# Patient Record
Sex: FEMALE | Race: WHITE | NOT HISPANIC OR LATINO | Employment: UNEMPLOYED | URBAN - METROPOLITAN AREA
[De-identification: names, ages, dates, MRNs, and addresses within clinical notes are randomized per-mention and may not be internally consistent; named-entity substitution may affect disease eponyms.]

---

## 2023-08-17 PROBLEM — N90.89 VULVAR LESION: Status: ACTIVE | Noted: 2023-08-17

## 2023-08-17 PROBLEM — K44.9 DIAPHRAGMATIC HERNIA WITHOUT OBSTRUCTION OR GANGRENE: Status: ACTIVE | Noted: 2023-08-17

## 2023-08-17 PROBLEM — R05.3 CHRONIC COUGH: Status: ACTIVE | Noted: 2023-08-17

## 2023-08-17 PROBLEM — R09.A2 GLOBUS PHARYNGEUS: Status: ACTIVE | Noted: 2023-08-17

## 2023-08-17 PROBLEM — D14.1 LARYNGEAL PAPILLOMA: Status: ACTIVE | Noted: 2023-08-17

## 2023-08-17 PROBLEM — K13.21 TONGUE LEUKOPLAKIA: Status: ACTIVE | Noted: 2023-08-17

## 2023-08-17 PROBLEM — J45.909 ASTHMA (HHS-HCC): Status: ACTIVE | Noted: 2023-08-17

## 2023-08-17 PROBLEM — K21.9 GASTROESOPHAGEAL REFLUX DISEASE: Status: ACTIVE | Noted: 2023-08-17

## 2023-08-17 PROBLEM — S02.5XXA CLOSED FRACTURE OF TOOTH: Status: ACTIVE | Noted: 2023-08-17

## 2023-08-17 PROBLEM — R49.0 HOARSENESS: Status: ACTIVE | Noted: 2023-08-17

## 2023-08-17 PROBLEM — C34.90 LUNG CANCER (MULTI): Status: ACTIVE | Noted: 2023-08-17

## 2023-08-17 PROBLEM — J44.9 CHRONIC OBSTRUCTIVE PULMONARY DISEASE (MULTI): Status: ACTIVE | Noted: 2023-08-17

## 2023-08-17 PROBLEM — K13.0 MUCOCELE OF LOWER LIP: Status: ACTIVE | Noted: 2023-08-17

## 2023-08-17 RX ORDER — ACETAMINOPHEN 500 MG
TABLET ORAL
COMMUNITY
End: 2024-01-08 | Stop reason: WASHOUT

## 2023-08-17 RX ORDER — BUDESONIDE AND FORMOTEROL FUMARATE DIHYDRATE 160; 4.5 UG/1; UG/1
AEROSOL RESPIRATORY (INHALATION)
COMMUNITY

## 2023-08-17 RX ORDER — ALBUTEROL SULFATE 90 UG/1
2 AEROSOL, METERED RESPIRATORY (INHALATION) 3 TIMES DAILY PRN
COMMUNITY

## 2023-08-17 RX ORDER — TIOTROPIUM BROMIDE INHALATION SPRAY 3.12 UG/1
SPRAY, METERED RESPIRATORY (INHALATION)
COMMUNITY
Start: 2022-01-11

## 2023-08-17 RX ORDER — IPRATROPIUM BROMIDE AND ALBUTEROL SULFATE 2.5; .5 MG/3ML; MG/3ML
SOLUTION RESPIRATORY (INHALATION)
COMMUNITY

## 2023-08-17 RX ORDER — BUPROPION HYDROCHLORIDE 300 MG/1
TABLET ORAL
COMMUNITY
End: 2024-06-06 | Stop reason: ALTCHOICE

## 2023-08-17 RX ORDER — AZELASTINE HCL 205.5 UG/1
SPRAY NASAL
COMMUNITY
End: 2023-08-17 | Stop reason: ENTERED-IN-ERROR

## 2023-08-17 RX ORDER — ALENDRONATE SODIUM 70 MG/1
70 TABLET ORAL
COMMUNITY

## 2023-08-17 RX ORDER — OMEPRAZOLE 40 MG/1
CAPSULE, DELAYED RELEASE ORAL
COMMUNITY
Start: 2022-03-29

## 2023-08-17 RX ORDER — GABAPENTIN 600 MG/1
1 TABLET ORAL 2 TIMES DAILY
COMMUNITY
Start: 2022-06-13

## 2023-08-17 RX ORDER — MONTELUKAST SODIUM 10 MG/1
TABLET ORAL
COMMUNITY

## 2023-08-17 RX ORDER — VENLAFAXINE HYDROCHLORIDE 150 MG/1
CAPSULE, EXTENDED RELEASE ORAL
COMMUNITY
Start: 2022-01-12 | End: 2024-06-06 | Stop reason: ALTCHOICE

## 2023-08-17 RX ORDER — IBUPROFEN 200 MG
TABLET ORAL
COMMUNITY

## 2023-08-17 RX ORDER — FLUTICASONE PROPIONATE 50 MCG
SPRAY, SUSPENSION (ML) NASAL
COMMUNITY
End: 2023-12-21 | Stop reason: ALTCHOICE

## 2023-08-30 ENCOUNTER — HOSPITAL ENCOUNTER (OUTPATIENT)
Dept: DATA CONVERSION | Facility: HOSPITAL | Age: 63
Discharge: HOME | End: 2023-08-30
Payer: COMMERCIAL

## 2023-08-30 DIAGNOSIS — C34.11 MALIGNANT NEOPLASM OF UPPER LOBE, RIGHT BRONCHUS OR LUNG (MULTI): ICD-10-CM

## 2023-08-30 DIAGNOSIS — C34.12 MALIGNANT NEOPLASM OF UPPER LOBE, LEFT BRONCHUS OR LUNG (MULTI): ICD-10-CM

## 2023-09-06 LAB
ALANINE AMINOTRANSFERASE (SGPT) (U/L) IN SER/PLAS: 15 U/L (ref 7–45)
ALBUMIN (G/DL) IN SER/PLAS: 4.3 G/DL (ref 3.4–5)
ALKALINE PHOSPHATASE (U/L) IN SER/PLAS: 81 U/L (ref 33–136)
AMYLASE (U/L) IN SER/PLAS: 34 U/L (ref 29–103)
ANION GAP IN SER/PLAS: 14 MMOL/L (ref 10–20)
ASPARTATE AMINOTRANSFERASE (SGOT) (U/L) IN SER/PLAS: 20 U/L (ref 9–39)
BASOPHILS (10*3/UL) IN BLOOD BY AUTOMATED COUNT: 0.08 X10E9/L (ref 0–0.1)
BASOPHILS/100 LEUKOCYTES IN BLOOD BY AUTOMATED COUNT: 1.1 % (ref 0–2)
BILIRUBIN TOTAL (MG/DL) IN SER/PLAS: 0.4 MG/DL (ref 0–1.2)
CALCIUM (MG/DL) IN SER/PLAS: 10 MG/DL (ref 8.6–10.6)
CARBON DIOXIDE, TOTAL (MMOL/L) IN SER/PLAS: 29 MMOL/L (ref 21–32)
CHLORIDE (MMOL/L) IN SER/PLAS: 100 MMOL/L (ref 98–107)
CREATININE (MG/DL) IN SER/PLAS: 0.93 MG/DL (ref 0.5–1.05)
EOSINOPHILS (10*3/UL) IN BLOOD BY AUTOMATED COUNT: 0.24 X10E9/L (ref 0–0.7)
EOSINOPHILS/100 LEUKOCYTES IN BLOOD BY AUTOMATED COUNT: 3.2 % (ref 0–6)
ERYTHROCYTE DISTRIBUTION WIDTH (RATIO) BY AUTOMATED COUNT: 16.6 % (ref 11.5–14.5)
ERYTHROCYTE MEAN CORPUSCULAR HEMOGLOBIN CONCENTRATION (G/DL) BY AUTOMATED: 31 G/DL (ref 32–36)
ERYTHROCYTE MEAN CORPUSCULAR VOLUME (FL) BY AUTOMATED COUNT: 93 FL (ref 80–100)
ERYTHROCYTES (10*6/UL) IN BLOOD BY AUTOMATED COUNT: 4.46 X10E12/L (ref 4–5.2)
GFR FEMALE: 69 ML/MIN/1.73M2
GLUCOSE (MG/DL) IN SER/PLAS: 81 MG/DL (ref 74–99)
HEMATOCRIT (%) IN BLOOD BY AUTOMATED COUNT: 41.6 % (ref 36–46)
HEMOGLOBIN (G/DL) IN BLOOD: 12.9 G/DL (ref 12–16)
IMMATURE GRANULOCYTES/100 LEUKOCYTES IN BLOOD BY AUTOMATED COUNT: 0.1 % (ref 0–0.9)
LEUKOCYTES (10*3/UL) IN BLOOD BY AUTOMATED COUNT: 7.5 X10E9/L (ref 4.4–11.3)
LIPASE (U/L) IN SER/PLAS: 26 U/L (ref 9–82)
LYMPHOCYTES (10*3/UL) IN BLOOD BY AUTOMATED COUNT: 2.11 X10E9/L (ref 1.2–4.8)
LYMPHOCYTES/100 LEUKOCYTES IN BLOOD BY AUTOMATED COUNT: 28.2 % (ref 13–44)
MONOCYTES (10*3/UL) IN BLOOD BY AUTOMATED COUNT: 0.86 X10E9/L (ref 0.1–1)
MONOCYTES/100 LEUKOCYTES IN BLOOD BY AUTOMATED COUNT: 11.5 % (ref 2–10)
NEUTROPHILS (10*3/UL) IN BLOOD BY AUTOMATED COUNT: 4.18 X10E9/L (ref 1.2–7.7)
NEUTROPHILS/100 LEUKOCYTES IN BLOOD BY AUTOMATED COUNT: 55.9 % (ref 40–80)
NRBC (PER 100 WBCS) BY AUTOMATED COUNT: 0 /100 WBC (ref 0–0)
PLATELETS (10*3/UL) IN BLOOD AUTOMATED COUNT: 432 X10E9/L (ref 150–450)
POTASSIUM (MMOL/L) IN SER/PLAS: 4.9 MMOL/L (ref 3.5–5.3)
PROTEIN TOTAL: 6.7 G/DL (ref 6.4–8.2)
SODIUM (MMOL/L) IN SER/PLAS: 138 MMOL/L (ref 136–145)
THYROTROPIN (MIU/L) IN SER/PLAS BY DETECTION LIMIT <= 0.05 MIU/L: 2.45 MIU/L (ref 0.44–3.98)
UREA NITROGEN (MG/DL) IN SER/PLAS: 9 MG/DL (ref 6–23)

## 2023-09-19 PROBLEM — J32.9 CHRONIC RECURRENT SINUSITIS: Status: ACTIVE | Noted: 2023-09-19

## 2023-09-19 PROBLEM — J38.3 DISORDER OF VOCAL CORD: Status: ACTIVE | Noted: 2023-09-19

## 2023-09-21 PROBLEM — C34.11 PRIMARY MALIGNANT NEOPLASM OF RIGHT UPPER LOBE OF LUNG (MULTI): Status: ACTIVE | Noted: 2023-09-21

## 2023-09-22 RX ORDER — HEPARIN SODIUM,PORCINE/PF 10 UNIT/ML
50 SYRINGE (ML) INTRAVENOUS AS NEEDED
Status: CANCELLED | OUTPATIENT
Start: 2023-09-30

## 2023-09-22 RX ORDER — HEPARIN 100 UNIT/ML
500 SYRINGE INTRAVENOUS AS NEEDED
Status: CANCELLED | OUTPATIENT
Start: 2023-09-30

## 2023-09-26 ENCOUNTER — HOSPITAL ENCOUNTER (OUTPATIENT)
Dept: DATA CONVERSION | Facility: HOSPITAL | Age: 63
Discharge: HOME | End: 2023-09-26
Payer: COMMERCIAL

## 2023-09-26 DIAGNOSIS — R10.9 UNSPECIFIED ABDOMINAL PAIN: ICD-10-CM

## 2023-09-26 DIAGNOSIS — Z00.6 ENCOUNTER FOR EXAMINATION FOR NORMAL COMPARISON AND CONTROL IN CLINICAL RESEARCH PROGRAM: ICD-10-CM

## 2023-09-26 DIAGNOSIS — C34.11 MALIGNANT NEOPLASM OF UPPER LOBE, RIGHT BRONCHUS OR LUNG (MULTI): ICD-10-CM

## 2023-09-26 DIAGNOSIS — Z51.12 ENCOUNTER FOR ANTINEOPLASTIC IMMUNOTHERAPY: ICD-10-CM

## 2023-09-27 LAB
ALANINE AMINOTRANSFERASE (SGPT) (U/L) IN SER/PLAS: 17 U/L (ref 7–45)
ALBUMIN (G/DL) IN SER/PLAS: 4.3 G/DL (ref 3.4–5)
ALKALINE PHOSPHATASE (U/L) IN SER/PLAS: 75 U/L (ref 33–136)
AMYLASE (U/L) IN SER/PLAS: 28 U/L (ref 29–103)
ANION GAP IN SER/PLAS: 13 MMOL/L (ref 10–20)
ASPARTATE AMINOTRANSFERASE (SGOT) (U/L) IN SER/PLAS: 21 U/L (ref 9–39)
BILIRUBIN TOTAL (MG/DL) IN SER/PLAS: 0.4 MG/DL (ref 0–1.2)
CALCIUM (MG/DL) IN SER/PLAS: 9.9 MG/DL (ref 8.6–10.6)
CARBON DIOXIDE, TOTAL (MMOL/L) IN SER/PLAS: 31 MMOL/L (ref 21–32)
CHLORIDE (MMOL/L) IN SER/PLAS: 103 MMOL/L (ref 98–107)
CREATININE (MG/DL) IN SER/PLAS: 0.84 MG/DL (ref 0.5–1.05)
GFR FEMALE: 78 ML/MIN/1.73M2
GLUCOSE (MG/DL) IN SER/PLAS: 81 MG/DL (ref 74–99)
LIPASE (U/L) IN SER/PLAS: 23 U/L (ref 9–82)
POTASSIUM (MMOL/L) IN SER/PLAS: 5 MMOL/L (ref 3.5–5.3)
PROTEIN TOTAL: 6.7 G/DL (ref 6.4–8.2)
SODIUM (MMOL/L) IN SER/PLAS: 142 MMOL/L (ref 136–145)
THYROTROPIN (MIU/L) IN SER/PLAS BY DETECTION LIMIT <= 0.05 MIU/L: 2.12 MIU/L (ref 0.44–3.98)
UREA NITROGEN (MG/DL) IN SER/PLAS: 9 MG/DL (ref 6–23)

## 2023-09-28 LAB
BASOPHILS (10*3/UL) IN BLOOD BY AUTOMATED COUNT: 0.1 X10E9/L (ref 0–0.1)
BASOPHILS/100 LEUKOCYTES IN BLOOD BY AUTOMATED COUNT: 1.2 % (ref 0–2)
EOSINOPHILS (10*3/UL) IN BLOOD BY AUTOMATED COUNT: 0.21 X10E9/L (ref 0–0.7)
EOSINOPHILS/100 LEUKOCYTES IN BLOOD BY AUTOMATED COUNT: 2.5 % (ref 0–6)
ERYTHROCYTE DISTRIBUTION WIDTH (RATIO) BY AUTOMATED COUNT: 15.9 % (ref 11.5–14.5)
ERYTHROCYTE MEAN CORPUSCULAR HEMOGLOBIN CONCENTRATION (G/DL) BY AUTOMATED: 30.8 G/DL (ref 32–36)
ERYTHROCYTE MEAN CORPUSCULAR VOLUME (FL) BY AUTOMATED COUNT: 96 FL (ref 80–100)
ERYTHROCYTES (10*6/UL) IN BLOOD BY AUTOMATED COUNT: 4.45 X10E12/L (ref 4–5.2)
HEMATOCRIT (%) IN BLOOD BY AUTOMATED COUNT: 42.6 % (ref 36–46)
HEMOGLOBIN (G/DL) IN BLOOD: 13.1 G/DL (ref 12–16)
IMMATURE GRANULOCYTES/100 LEUKOCYTES IN BLOOD BY AUTOMATED COUNT: 0.2 % (ref 0–0.9)
LEUKOCYTES (10*3/UL) IN BLOOD BY AUTOMATED COUNT: 8.3 X10E9/L (ref 4.4–11.3)
LYMPHOCYTES (10*3/UL) IN BLOOD BY AUTOMATED COUNT: 1.75 X10E9/L (ref 1.2–4.8)
LYMPHOCYTES/100 LEUKOCYTES IN BLOOD BY AUTOMATED COUNT: 21.1 % (ref 13–44)
MONOCYTES (10*3/UL) IN BLOOD BY AUTOMATED COUNT: 0.92 X10E9/L (ref 0.1–1)
MONOCYTES/100 LEUKOCYTES IN BLOOD BY AUTOMATED COUNT: 11.1 % (ref 2–10)
NEUTROPHILS (10*3/UL) IN BLOOD BY AUTOMATED COUNT: 5.3 X10E9/L (ref 1.2–7.7)
NEUTROPHILS/100 LEUKOCYTES IN BLOOD BY AUTOMATED COUNT: 63.9 % (ref 40–80)
NRBC (PER 100 WBCS) BY AUTOMATED COUNT: 0 /100 WBC (ref 0–0)
PLATELETS (10*3/UL) IN BLOOD AUTOMATED COUNT: 470 X10E9/L (ref 150–450)

## 2023-10-18 ENCOUNTER — LAB (OUTPATIENT)
Dept: LAB | Facility: LAB | Age: 63
End: 2023-10-18
Payer: COMMERCIAL

## 2023-10-18 DIAGNOSIS — C34.90 MALIGNANT NEOPLASM OF LUNG, UNSPECIFIED LATERALITY, UNSPECIFIED PART OF LUNG (MULTI): ICD-10-CM

## 2023-10-18 LAB
ALBUMIN SERPL BCP-MCNC: 4.2 G/DL (ref 3.4–5)
ALP SERPL-CCNC: 74 U/L (ref 33–136)
ALT SERPL W P-5'-P-CCNC: 18 U/L (ref 7–45)
AMYLASE SERPL-CCNC: 31 U/L (ref 29–103)
ANION GAP SERPL CALC-SCNC: 14 MMOL/L (ref 10–20)
AST SERPL W P-5'-P-CCNC: 19 U/L (ref 9–39)
BILIRUB SERPL-MCNC: 0.3 MG/DL (ref 0–1.2)
BUN SERPL-MCNC: 10 MG/DL (ref 6–23)
CALCIUM SERPL-MCNC: 9.8 MG/DL (ref 8.6–10.6)
CHLORIDE SERPL-SCNC: 101 MMOL/L (ref 98–107)
CO2 SERPL-SCNC: 30 MMOL/L (ref 21–32)
CREAT SERPL-MCNC: 0.79 MG/DL (ref 0.5–1.05)
GFR SERPL CREATININE-BSD FRML MDRD: 84 ML/MIN/1.73M*2
GLUCOSE SERPL-MCNC: 80 MG/DL (ref 74–99)
LIPASE SERPL-CCNC: 25 U/L (ref 9–82)
POTASSIUM SERPL-SCNC: 5.3 MMOL/L (ref 3.5–5.3)
PROT SERPL-MCNC: 6.7 G/DL (ref 6.4–8.2)
SODIUM SERPL-SCNC: 140 MMOL/L (ref 136–145)

## 2023-10-18 PROCEDURE — 80053 COMPREHEN METABOLIC PANEL: CPT | Mod: CMCLAB | Performed by: STUDENT IN AN ORGANIZED HEALTH CARE EDUCATION/TRAINING PROGRAM

## 2023-10-18 PROCEDURE — 82150 ASSAY OF AMYLASE: CPT | Performed by: STUDENT IN AN ORGANIZED HEALTH CARE EDUCATION/TRAINING PROGRAM

## 2023-10-18 PROCEDURE — 83690 ASSAY OF LIPASE: CPT | Performed by: STUDENT IN AN ORGANIZED HEALTH CARE EDUCATION/TRAINING PROGRAM

## 2023-10-18 PROCEDURE — 85025 COMPLETE CBC W/AUTO DIFF WBC: CPT | Performed by: STUDENT IN AN ORGANIZED HEALTH CARE EDUCATION/TRAINING PROGRAM

## 2023-10-19 ENCOUNTER — OFFICE VISIT (OUTPATIENT)
Dept: HEMATOLOGY/ONCOLOGY | Facility: CLINIC | Age: 63
End: 2023-10-19
Payer: COMMERCIAL

## 2023-10-19 ENCOUNTER — EDUCATION (OUTPATIENT)
Dept: HEMATOLOGY/ONCOLOGY | Facility: CLINIC | Age: 63
End: 2023-10-19

## 2023-10-19 ENCOUNTER — INFUSION (OUTPATIENT)
Dept: HEMATOLOGY/ONCOLOGY | Facility: CLINIC | Age: 63
End: 2023-10-19
Payer: COMMERCIAL

## 2023-10-19 VITALS
DIASTOLIC BLOOD PRESSURE: 64 MMHG | HEART RATE: 82 BPM | HEIGHT: 65 IN | SYSTOLIC BLOOD PRESSURE: 118 MMHG | BODY MASS INDEX: 23.84 KG/M2 | RESPIRATION RATE: 18 BRPM | OXYGEN SATURATION: 92 % | WEIGHT: 143.08 LBS | TEMPERATURE: 97.2 F

## 2023-10-19 DIAGNOSIS — C34.11 PRIMARY MALIGNANT NEOPLASM OF RIGHT UPPER LOBE OF LUNG (MULTI): ICD-10-CM

## 2023-10-19 DIAGNOSIS — C34.90 MALIGNANT NEOPLASM OF LUNG, UNSPECIFIED LATERALITY, UNSPECIFIED PART OF LUNG (MULTI): ICD-10-CM

## 2023-10-19 DIAGNOSIS — K59.00 CONSTIPATION, UNSPECIFIED CONSTIPATION TYPE: Primary | ICD-10-CM

## 2023-10-19 DIAGNOSIS — K12.30 MUCOSITIS: ICD-10-CM

## 2023-10-19 DIAGNOSIS — Z51.12 ENCOUNTER FOR ANTINEOPLASTIC IMMUNOTHERAPY: ICD-10-CM

## 2023-10-19 LAB
BASOPHILS # BLD AUTO: 0.08 X10*3/UL (ref 0–0.1)
BASOPHILS NFR BLD AUTO: 1 %
EOSINOPHIL # BLD AUTO: 0.17 X10*3/UL (ref 0–0.7)
EOSINOPHIL NFR BLD AUTO: 2.1 %
ERYTHROCYTE [DISTWIDTH] IN BLOOD BY AUTOMATED COUNT: 14.5 % (ref 11.5–14.5)
HCT VFR BLD AUTO: 42.6 % (ref 36–46)
HGB BLD-MCNC: 13.2 G/DL (ref 12–16)
IMM GRANULOCYTES # BLD AUTO: 0.02 X10*3/UL (ref 0–0.7)
IMM GRANULOCYTES NFR BLD AUTO: 0.2 % (ref 0–0.9)
LYMPHOCYTES # BLD AUTO: 1.85 X10*3/UL (ref 1.2–4.8)
LYMPHOCYTES NFR BLD AUTO: 22.4 %
MCH RBC QN AUTO: 30.2 PG (ref 26–34)
MCHC RBC AUTO-ENTMCNC: 31 G/DL (ref 32–36)
MCV RBC AUTO: 98 FL (ref 80–100)
MONOCYTES # BLD AUTO: 0.8 X10*3/UL (ref 0.1–1)
MONOCYTES NFR BLD AUTO: 9.7 %
NEUTROPHILS # BLD AUTO: 5.35 X10*3/UL (ref 1.2–7.7)
NEUTROPHILS NFR BLD AUTO: 64.6 %
NRBC BLD-RTO: 0 /100 WBCS (ref 0–0)
PLATELET # BLD AUTO: 392 X10*3/UL (ref 150–450)
PMV BLD AUTO: 11 FL (ref 7.5–11.5)
RBC # BLD AUTO: 4.37 X10*6/UL (ref 4–5.2)
WBC # BLD AUTO: 8.3 X10*3/UL (ref 4.4–11.3)

## 2023-10-19 PROCEDURE — 2500000004 HC RX 250 GENERAL PHARMACY W/ HCPCS (ALT 636 FOR OP/ED): Mod: SE | Performed by: STUDENT IN AN ORGANIZED HEALTH CARE EDUCATION/TRAINING PROGRAM

## 2023-10-19 PROCEDURE — 99214 OFFICE O/P EST MOD 30 MIN: CPT | Performed by: NURSE PRACTITIONER

## 2023-10-19 PROCEDURE — 1036F TOBACCO NON-USER: CPT | Performed by: NURSE PRACTITIONER

## 2023-10-19 PROCEDURE — 96365 THER/PROPH/DIAG IV INF INIT: CPT

## 2023-10-19 RX ORDER — HEPARIN 100 UNIT/ML
500 SYRINGE INTRAVENOUS AS NEEDED
Status: DISCONTINUED | OUTPATIENT
Start: 2023-10-19 | End: 2023-10-19 | Stop reason: HOSPADM

## 2023-10-19 RX ORDER — ALBUTEROL SULFATE 0.83 MG/ML
3 SOLUTION RESPIRATORY (INHALATION) AS NEEDED
Status: DISCONTINUED | OUTPATIENT
Start: 2023-10-19 | End: 2023-10-19 | Stop reason: HOSPADM

## 2023-10-19 RX ORDER — ACETAMINOPHEN 325 MG/1
650 TABLET ORAL AS NEEDED
Status: DISCONTINUED | OUTPATIENT
Start: 2023-10-19 | End: 2023-10-19 | Stop reason: HOSPADM

## 2023-10-19 RX ORDER — PROCHLORPERAZINE EDISYLATE 5 MG/ML
10 INJECTION INTRAMUSCULAR; INTRAVENOUS EVERY 6 HOURS PRN
Status: DISCONTINUED | OUTPATIENT
Start: 2023-10-19 | End: 2023-10-19 | Stop reason: HOSPADM

## 2023-10-19 RX ORDER — EPINEPHRINE 0.3 MG/.3ML
0.3 INJECTION SUBCUTANEOUS EVERY 5 MIN PRN
Status: DISCONTINUED | OUTPATIENT
Start: 2023-10-19 | End: 2023-10-19 | Stop reason: HOSPADM

## 2023-10-19 RX ORDER — DIPHENHYDRAMINE HYDROCHLORIDE 50 MG/ML
50 INJECTION INTRAMUSCULAR; INTRAVENOUS AS NEEDED
Status: DISCONTINUED | OUTPATIENT
Start: 2023-10-19 | End: 2023-10-19 | Stop reason: HOSPADM

## 2023-10-19 RX ORDER — HEPARIN SODIUM,PORCINE/PF 10 UNIT/ML
50 SYRINGE (ML) INTRAVENOUS AS NEEDED
Status: DISCONTINUED | OUTPATIENT
Start: 2023-10-19 | End: 2023-10-19 | Stop reason: HOSPADM

## 2023-10-19 RX ORDER — FAMOTIDINE 10 MG/ML
20 INJECTION INTRAVENOUS ONCE AS NEEDED
Status: DISCONTINUED | OUTPATIENT
Start: 2023-10-19 | End: 2023-10-19 | Stop reason: HOSPADM

## 2023-10-19 RX ORDER — PROCHLORPERAZINE MALEATE 10 MG
10 TABLET ORAL EVERY 6 HOURS PRN
Status: DISCONTINUED | OUTPATIENT
Start: 2023-10-19 | End: 2023-10-19 | Stop reason: HOSPADM

## 2023-10-19 RX ORDER — LACTULOSE 10 G/15ML
10 SOLUTION ORAL AS NEEDED
Qty: 960 ML | Refills: 5 | Status: SHIPPED | OUTPATIENT
Start: 2023-10-19

## 2023-10-19 RX ORDER — DIPHENHYDRAMINE HCL 25 MG
50 CAPSULE ORAL AS NEEDED
Status: DISCONTINUED | OUTPATIENT
Start: 2023-10-19 | End: 2023-10-19 | Stop reason: HOSPADM

## 2023-10-19 RX ORDER — HEPARIN 100 UNIT/ML
500 SYRINGE INTRAVENOUS AS NEEDED
Status: CANCELLED | OUTPATIENT
Start: 2023-10-19

## 2023-10-19 RX ORDER — HEPARIN SODIUM,PORCINE/PF 10 UNIT/ML
50 SYRINGE (ML) INTRAVENOUS AS NEEDED
Status: CANCELLED | OUTPATIENT
Start: 2023-10-19

## 2023-10-19 RX ADMIN — PEMBROLIZUMAB 200 MG: 25 INJECTION, SOLUTION INTRAVENOUS at 13:40

## 2023-10-19 ASSESSMENT — ENCOUNTER SYMPTOMS
DEPRESSION: 0
OCCASIONAL FEELINGS OF UNSTEADINESS: 1
LOSS OF SENSATION IN FEET: 0

## 2023-10-19 ASSESSMENT — PATIENT HEALTH QUESTIONNAIRE - PHQ9
1. LITTLE INTEREST OR PLEASURE IN DOING THINGS: NOT AT ALL
2. FEELING DOWN, DEPRESSED OR HOPELESS: SEVERAL DAYS
SUM OF ALL RESPONSES TO PHQ9 QUESTIONS 1 AND 2: 1

## 2023-10-19 ASSESSMENT — PAIN SCALES - GENERAL: PAINLEVEL: 3

## 2023-10-19 ASSESSMENT — COLUMBIA-SUICIDE SEVERITY RATING SCALE - C-SSRS
1. IN THE PAST MONTH, HAVE YOU WISHED YOU WERE DEAD OR WISHED YOU COULD GO TO SLEEP AND NOT WAKE UP?: NO
2. HAVE YOU ACTUALLY HAD ANY THOUGHTS OF KILLING YOURSELF?: NO
6. HAVE YOU EVER DONE ANYTHING, STARTED TO DO ANYTHING, OR PREPARED TO DO ANYTHING TO END YOUR LIFE?: NO

## 2023-10-19 NOTE — RESEARCH NOTES
Research Note Treatment Day    Hayley Potter is here today for treatment on [id number]. Today is C10_D_1. Procedures completed per protocol. AE's and con-meds reviewed with patient. Patient is aware of treatment plan.    [x]   Received treatment as planned   OR  []    Treatment delayed; patient calendar updated as required   Treatment delayed because:    []   AE    []   Physician Discretion    []   Clinical Deterioration or Progression     []   Other    Education Documentation  Treatment Plan and Schedule, taught by Rafiq Delgado RN at 10/19/2023  1:41 PM.  Learner: Patient  Readiness: Acceptance  Method: Explanation  Response: Verbalizes Understanding  Comment: Treatment plan and calendar reviewed with pt and daughter    Comprehensive Metabolic Panel (CMP), taught by Rafiq Delgado RN at 10/19/2023  1:41 PM.  Learner: Patient  Readiness: Acceptance  Method: Explanation  Response: Verbalizes Understanding  Comment: Treatment plan and calendar reviewed with pt and daughter    Complete Blood Count with Differential (CBC w/ Diff), taught by Rafiq Delgado RN at 10/19/2023  1:41 PM.  Learner: Patient  Readiness: Acceptance  Method: Explanation  Response: Verbalizes Understanding  Comment: Treatment plan and calendar reviewed with pt and daughter    Education Comments  No comments found.      Pt and daughter to clinic for cycle 10 of [id number].  Pt still complains of blurred vision grade 1, fatigue grade 1, low appetite grade 1, taste alteration grade 1, constipation grade 1 using stool softeners intermittently , still insomnia grade 1, neuropathy grade 1, dyspnea and cough grade 1 and bone pain grade 1.  NP in to complete assessment with pt.  Scans are scheduled prior to next visit.  Questions answered.

## 2023-10-19 NOTE — PROGRESS NOTES
Subjective:   Patient Name: Hayley Potter    : 1960     MRN: [id number]     Age: 63 y.o.     Gender: female  Referring Provider: KLAUS    CC:  Management of stage IA3 (G5tN5D1) adenocarcinoma of RUL s/p RUL lobectomy 2020, PDL-1 50%, NGS positive for KRAS G12C--> now with  metastatic recurrence involving liver, 11th L rib, and mediastinal LNs. 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 She was initially found to have spiculated 2.9cm mass in the RUL on the CT chest on 2020. She underwent CT guided RUL biopsy with pathology showed lung tissue with scan and focal aypicla grandular proliferation  suspicious for adenocarcinoma. IHC positive for CK7, TTF1 and napsin A. CK20 negative. PET/CT on 04/15/2020 showed RUL hypermetabolic RUL mass. No evidence of distant metastases. She underwent RUL lobectomy with mediastinal LN dissection with Dr. Wheat  on 2020 which showed invasive well to moderately differentiated adenocarcinoma, tumor measuring 2.5x2.3.x1.7cm. No VPI or LVI. All margins were negative. LN (0/11).      Unfortunately, on the surveillance CT chest (+) on 2022, there was a new irregular marginated 6mm GUSTABO nodule, which increased in size significantly on the repeat CT chest (-) on 2023, measuring 12mm.  There is also an additional new 9mm nodule  in the L lung as well as new mediastinal and possible L hilar LAD. PET/CT on  showed hypermetabolic L hilar and mediastinal LAD. 1st  GUSTABO nodule now 8mm likely inflammatory. 2nd GUSTABO nodule showed stable size with SUV 4.4. Soft tissue mass associated with  L 11th rib fracture, SUV 11.5.  "Mass within the liver SUV 13.1, suspicious for mets. Hypermetabolic activity also noted  in the stomach fundus, association with a hiatal hernia, in the cecum and ascending colon without masses seen. She underwent US guided liver biopsy on 02/16/2023 - poorly-differentiated carcinoma.     PMHx: vocal cord cancer, GERD and COPD, OA  PSHx: tonsillectomy, tubal ligation, breast surgery, L ankle and R hand surgery  FHx: strong family history of breast cancer including male breast cancer.   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 She quitted etoh 2010. She smokes Marijuana every day.     Treatment Summary:  04/21/2020: RUL lobectomy with medistainal LN dissection (Dr. Wheat)  04/11/2023: C1 pembrolizumab 200mg IV (on trial)  05/02/2023: C2 Pembrolizumab 200mg IV (on trial)  05/23/2023: C3 Pembrolizumab 200mg IV (on trial)  06/13/2023: C4 Pembrolizumab 200mg IV (on trial)   07/05/2023: C5 Pembrolizumab 200mg IV (on trial)   07/26/2023: C6 Pembrolizumab 200mg IV (on trial)   08/16/2023: C7 Pembrolizumab 200mg IV (on trial)   09/07/2023: C8 Pembrolizumab 200mg IV (on trial)   09/28/2023: C9 Pembrolizumab 200mg IV (on trial)   10/19/2023: C10 Pembrolizumab 200mg IV (on trial)    Interval History (10/19/2023):  Pt present in clinic for readiness to treat visit.  Her dtr is present for visit.  Energy level is good.  Breathing is stable.  Reports she gets a deep cough that at times can take up to 2-5 minutes to clear. Has neb that she saves for when episodes are \"bad.\" Intermittently, can see \"dots,\" has never passed out.  Appetite - has good and bad days. Wt is stable.  Denies n/v/d/c.  Routine BM's - taking senokot every other day.  Staying hydrated as best she can. Abdominal nerve pain managed, tolerable with routine gabapentin and PRN Advil.  Has not needed PRN oxycodone.  +Chronic mucositis to lower lip - managed with Anbesol and/or Oragel.  No rashs or skin concerns. " No other concerns today.  Labs reviewed and WNL.  Ready for treatment today.      ROS:  Patient denies the below review of systems, except for changes as noted in the interval history.    General:  Fatigue, anorexia, fevers, sweats, chills, heat/cold intolerance, weight changes.  HEENT:  Icterus, congestion, sore throat, rhinorrhea, taste change, voice changes.  Neurology:  Headache, dizziness, vision changes, hearing changes, other motor/sensory loss, gait instability  Pulmonology:  wheezing, hemoptysis, dyspnea at rest, pleuritic chest pain.  Cardiology:  Chest pain, palpitations  Gastroenterology:  Nausea, vomiting, reflux symptoms, constipation, diarrhea  Genitourinary:  Dysuria, polyuria, urine color change, urine smell change, hematuria.   Musculoskeletal:  Arthralgias, myalgias, joint swelling, focal weakness.  Skin:  Rash, pruritis, jaundice, petechiae  Psychology:  Anxiety, depression  Heme:  Easy bleeding or bruising.  Others:   All other systems reviewed and are negative.    Medical History:   Past Medical History:   Diagnosis Date    Personal history of malignant neoplasm of larynx     History of malignant neoplasm of larynx    Personal history of other diseases of the respiratory system     History of chronic obstructive lung disease    Personal history of other diseases of the respiratory system     History of asthma    Personal history of other malignant neoplasm of bronchus and lung     History of malignant neoplasm of lung       Surgical History:   Past Surgical History:   Procedure Laterality Date    CT GUIDED IMAGING FOR NEEDLE PLACEMENT  4/3/2020    CT GUIDED IMAGING FOR NEEDLE PLACEMENT [facility]    OTHER SURGICAL HISTORY  10/08/2019    Ankle surgery    OTHER SURGICAL HISTORY  10/08/2019    Tubal ligation    OTHER SURGICAL HISTORY  09/26/2022    Lumpectomy    OTHER SURGICAL HISTORY  02/23/2021    Tonsillectomy with adenoidectomy    US GUIDED PERCUTANEOUS PLACEMENT  2/16/2023    US  GUIDED PERCUTANEOUS PLACEMENT [facility]       Family History   Problem Relation Name Age of Onset    Cervical cancer Mother          FIGO stage IA2    Stroke Mother      Emphysema Mother      Breast cancer Mother      Stroke Brother         Allergies   Allergen Reactions    Cat Dander Runny nose     Irritated eyes    Cephalexin Other     YEAST INFECTION       Meds (Current):    Current Outpatient Medications:     albuterol 90 mcg/actuation inhaler, , Disp: , Rfl:     ALBUTEROL INHL, Albuterol AERS  Refills: 0     Active, Disp: , Rfl:     alendronate (Fosamax) 70 mg tablet, Take 1 tablet (70 mg) by mouth every 7 days. Take in the morning with a full glass of water, on an empty stomach, and do not take anything else by mouth or lie down for the next 30 min., Disp: , Rfl:     azelastine HCl (AZELASTINE NASL), , Disp: , Rfl:     budesonide-formoteroL (Symbicort) 160-4.5 mcg/actuation inhaler, Rinse mouth with water after use to reduce aftertaste and incidence of candidiasis. Do not swallow., Disp: , Rfl:     buPROPion XL (Wellbutrin XL) 300 mg 24 hr tablet, Do not crush, chew, or split., Disp: , Rfl:     cholecalciferol (Vitamin D3) 5,000 Units tablet, , Disp: , Rfl:     diphenhydramine HCl (BENADRYL ALLERGY ORAL), , Disp: , Rfl:     docusate sodium (COLACE CLEAR ORAL), , Disp: , Rfl:     fluticasone (Flonase Allergy Relief) 50 mcg/actuation nasal spray, 1 spray in each nostril Nasally Once a day, Disp: , Rfl:     gabapentin (Neurontin) 600 mg tablet, Take 1 tablet (600 mg) by mouth every 8 hours., Disp: , Rfl:     ibuprofen (AdviL) 200 mg tablet, 1 tablet with food or milk as needed Orally Three times a day, Disp: , Rfl:     ipratropium-albuteroL (Duo-Neb) 0.5-2.5 mg/3 mL nebulizer solution, , Disp: , Rfl:     montelukast (Singulair) 10 mg tablet, 1 tablet Orally Once a day, Disp: , Rfl:     omeprazole (PriLOSEC) 40 mg DR capsule, TAKE 1 CAPSULE BY MOUTH 30 MINUTES BEFORE MORNING MEAL TWICE A DAY, Disp: ,  "Rfl:     tiotropium (Spiriva Respimat) 2.5 mcg/actuation inhaler, INHALE TWO PUFFS BY MOUTH EVERY DAY, Disp: , Rfl:     venlafaxine XR (Effexor-XR) 150 mg 24 hr capsule, 1 capsule with food Orally Once a day, Disp: , Rfl:   No current facility-administered medications for this visit.    Performance Status (ECOG): 0     ECOG Definition  0 Fully active; no performance restrictions.  1 Strenuous physical activity restricted; fully ambulatory and able to carry out light work.  2 Capable of all self-care but unable to carry out any work activities. Up and about >50% of waking hours.  3 Capable of only limited self-care; confined to bed or chair >50% of waking hours.  4 Completely disabled; cannot carry out any self-care; totally confined to bed or chair.      Exam:  /64 (BP Location: Left arm, Patient Position: Sitting, BP Cuff Size: Adult)   Pulse 82   Temp 36.2 °C (97.2 °F) (Temporal)   Resp 18   Ht 1.657 m (5' 5.24\")   Wt 64.9 kg (143 lb 1.3 oz)   SpO2 92%   BMI 23.64 kg/m²     Wt Readings from Last 5 Encounters:   10/19/23 64.9 kg (143 lb 1.3 oz)   09/07/23 64.6 kg (142 lb 6.7 oz)   03/14/23 67.3 kg (148 lb 5.9 oz)   03/07/23 67.7 kg (149 lb 4 oz)   02/28/23 68.6 kg (151 lb 3.8 oz)        General: Well appearing, no acute distress  Neurology: Alert and oriented x3, CN II-XII grossly intact  Psychology: Affect appropriate  Eyes: PERRL, EOMI  ENT: Mucus membranes moist and intact, mucositis - two small white patches to lower lip  Lymphatics: No palpable adenopathy  Neck: Supple, no masses  Respiratory: Lungs clear bilaterally, no wheezes, no rales, no rhonchi  Cardiovascular: Normal rate and rhythm, no murmur  Abdomen: Soft, non-tender, non-distended, normoactive, no hepatosplenomegaly, no ascites  Musculoskeletal: Normal gait and stance  Extremities: No clubbing, no cyanosis, no edema  Skin: No rash  Genitourinary: Deferred  Rectal: Deferred   Pelvic: Deferred  Breasts: Deferred    Labs:  Lab on " 10/18/2023   Component Date Value Ref Range Status    WBC 10/18/2023 8.3  4.4 - 11.3 x10*3/uL Final    nRBC 10/18/2023 0.0  0.0 - 0.0 /100 WBCs Final    RBC 10/18/2023 4.37  4.00 - 5.20 x10*6/uL Final    Hemoglobin 10/18/2023 13.2  12.0 - 16.0 g/dL Final    Hematocrit 10/18/2023 42.6  36.0 - 46.0 % Final    MCV 10/18/2023 98  80 - 100 fL Final    MCH 10/18/2023 30.2  26.0 - 34.0 pg Final    MCHC 10/18/2023 31.0 (L)  32.0 - 36.0 g/dL Final    RDW 10/18/2023 14.5  11.5 - 14.5 % Final    Platelets 10/18/2023 392  150 - 450 x10*3/uL Final    MPV 10/18/2023 11.0  7.5 - 11.5 fL Final    Neutrophils % 10/18/2023 64.6  40.0 - 80.0 % Final    Immature Granulocytes %, Automated 10/18/2023 0.2  0.0 - 0.9 % Final    Immature Granulocyte Count (IG) includes promyelocytes, myelocytes and metamyelocytes but does not include bands. Percent differential counts (%) should be interpreted in the context of the absolute cell counts (cells/UL).    Lymphocytes % 10/18/2023 22.4  13.0 - 44.0 % Final    Monocytes % 10/18/2023 9.7  2.0 - 10.0 % Final    Eosinophils % 10/18/2023 2.1  0.0 - 6.0 % Final    Basophils % 10/18/2023 1.0  0.0 - 2.0 % Final    Neutrophils Absolute 10/18/2023 5.35  1.20 - 7.70 x10*3/uL Final    Percent differential counts (%) should be interpreted in the context of the absolute cell counts (cells/uL).    Immature Granulocytes Absolute, Au* 10/18/2023 0.02  0.00 - 0.70 x10*3/uL Final    Lymphocytes Absolute 10/18/2023 1.85  1.20 - 4.80 x10*3/uL Final    Monocytes Absolute 10/18/2023 0.80  0.10 - 1.00 x10*3/uL Final    Eosinophils Absolute 10/18/2023 0.17  0.00 - 0.70 x10*3/uL Final    Basophils Absolute 10/18/2023 0.08  0.00 - 0.10 x10*3/uL Final    Glucose 10/18/2023 80  74 - 99 mg/dL Final    Sodium 10/18/2023 140  136 - 145 mmol/L Final    Potassium 10/18/2023 5.3  3.5 - 5.3 mmol/L Final    Chloride 10/18/2023 101  98 - 107 mmol/L Final    Bicarbonate 10/18/2023 30  21 - 32 mmol/L Final    Anion Gap 10/18/2023 14   10 - 20 mmol/L Final    Urea Nitrogen 10/18/2023 10  6 - 23 mg/dL Final    Creatinine 10/18/2023 0.79  0.50 - 1.05 mg/dL Final    eGFR 10/18/2023 84  >60 mL/min/1.73m*2 Final    Calculations of estimated GFR are performed using the 2021 CKD-EPI Study Refit equation without the race variable for the IDMS-Traceable creatinine methods.  https://jasn.asnjournals.org/content/early/2021/09/22/ASN.3402286617    Calcium 10/18/2023 9.8  8.6 - 10.6 mg/dL Final    Albumin 10/18/2023 4.2  3.4 - 5.0 g/dL Final    Alkaline Phosphatase 10/18/2023 74  33 - 136 U/L Final    Total Protein 10/18/2023 6.7  6.4 - 8.2 g/dL Final    AST 10/18/2023 19  9 - 39 U/L Final    Bilirubin, Total 10/18/2023 0.3  0.0 - 1.2 mg/dL Final    ALT 10/18/2023 18  7 - 45 U/L Final    Patients treated with Sulfasalazine may generate falsely decreased results for ALT.    Amylase 10/18/2023 31  29 - 103 U/L Final    Lipase 10/18/2023 25  9 - 82 U/L Final        Assessment/Plan:   63 y.o. 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     The patient's records and imaging have been reviewed in detail.  MD discussed with the patient the natural history of their disease at length.  The following has also been discussed with the patient:     # Cancer: recurrent likely metastatic ([id number]) lung cancer: liver biopsy showed extensive necrosis. Liquid biopsy showed KRAS G12C mutation, which was identified in her original surgical resected  specimen. PDL-1 50%. Patient initially had stage IA2 (V6iP6U1) RUL lung  adenocarcinoma s/p RUL lobectomy, unfortunately now likely metastatic recurrence. Liver biopsy on 2/16 showed poorly differentiated malignant neoplasm with extensive necrosis. We discussed treatment  options with her today with either standard of care pembrolizumab vs clinical trials. Patient expressed interests in  enrolling into clinical trial. Enrolled into RCT [id number]. Doing well.                  - Interval Imaging: CT CAP (+) 08/30/2023: 1. Interval development of patchy and nodular infiltrates in the right lower lobe and worsening patchy and nodular infiltrates in the right middle  lobe, with the largest nodule in the right middle lobe posteriorly measuring 7 mm, not clearly visualized on the previous exam. The findings are most likely due to infectious etiology/pneumonia, however short-term follow-up is recommended. 2. Mildly prominent  pretracheal lymph nodes again seen, possibly reactive or neoplastic.  3. Previously described hypodense vague lesion in the left lobe of the liver centrally is less conspicuous on today's exam; follow-up recommended. 4. Additional findings include fracture of the right 8th rib, hiatal  hernia and chronic constipation. brain MRI (-) 02/24/2023: no brain metastases .     # Radiographic patchy infiltrations in the RML and RLL. DDx including pneumonia vs Grade I pneumonitis. Patient has no new symptoms from her baseline. Repeat CT chest showed improvement. Will continue to monitor.      # Hypothyroidism: G2. Likely 2/2 immunotherapy - resolved.  TSH 2.12 9/27/23.  Continue levothyroxine 75mcg daily. Will repeat TSH at next visit.      # Microcytic anemia 2/2 due to chronic disease: patient denies any hematochezia  or hematuria. No melena. hx of hiatal hernia. Per patient. Last EGD was done in 2021 and was normal. Colonoscopy was a few years ago.  EGD showed hiatal hernia 03/2023. Improved Hgb overall. Iron studies show low-normal ferritin and TSAT of 14%. Continue PO iron 3x/week. Tolerates well.    # Mucositis (chronic).  Pt reports since start of immunotherapy.  Recommended s&s rinse.  Continue Anbesol and/or Oragel for pain.  Will continue to monitor.       # Grade 1 hyperthyroidism: resolved, now hypothyroid (as above).     # Constipation: managed well with stool softener and PRN  lactulose. Lactulose refill Rx sent.       # Clinical Trials: April 11, 2023 initiated treatment on [id number] this is cycle 1 of first-line treatment.  She has been randomized to  arm A which is pembrolizumab alone for up to 2 years or progression followed by Alimta/carboplatin for second line therapy. Consent has been signed. Repeat CT scans after 2 cycles showed partial response. Continue treatment.      # Access: Peripheral veins.     # Pain: she has a displaced 8th Rib on the left side -Advised patient to take percocet daily and decrease advil use (she was taking 4x daily).  Will refer to supportive oncology. Pt declining to take percocet at this time and is taking advil sparingly.        # Bone Health: L fifth ribs lesion stable. Not noticeable on the scan on 8/30.     # Psychosocial: Coping well, no acute issues.  Good support from family & friends.  Social work support available.     # GI/CINV: No acute issues.  Eating and voiding well.  Nutrition consult available.     # Smoking: former smoker     # Fertility: N/A.        # Heme/ESAs: N/A.     # GOC: Patient is aware of palliative  intent goals of care.     # Language: English.     # Dispo: RTC in 3 weeks for next cycle.        Plan:    - RTC in 3 weeks for next cycle and FUV.  - She has our contact information and instructed to call with concerns/questions in the interim.

## 2023-10-30 ENCOUNTER — DOCUMENTATION (OUTPATIENT)
Dept: HEMATOLOGY/ONCOLOGY | Facility: CLINIC | Age: 63
End: 2023-10-30
Payer: COMMERCIAL

## 2023-11-02 ENCOUNTER — HOSPITAL ENCOUNTER (OUTPATIENT)
Dept: RADIOLOGY | Facility: HOSPITAL | Age: 63
Discharge: HOME | End: 2023-11-02
Payer: COMMERCIAL

## 2023-11-02 ENCOUNTER — APPOINTMENT (OUTPATIENT)
Dept: RADIOLOGY | Facility: CLINIC | Age: 63
End: 2023-11-02
Payer: COMMERCIAL

## 2023-11-02 DIAGNOSIS — Z00.6 ENCOUNTER FOR EXAMINATION FOR NORMAL COMPARISON AND CONTROL IN CLINICAL RESEARCH PROGRAM: ICD-10-CM

## 2023-11-02 DIAGNOSIS — Z51.12 ENCOUNTER FOR ANTINEOPLASTIC IMMUNOTHERAPY: ICD-10-CM

## 2023-11-02 DIAGNOSIS — C34.11 MALIGNANT NEOPLASM OF UPPER LOBE, RIGHT BRONCHUS OR LUNG (MULTI): ICD-10-CM

## 2023-11-02 PROCEDURE — 2550000001 HC RX 255 CONTRASTS: Performed by: STUDENT IN AN ORGANIZED HEALTH CARE EDUCATION/TRAINING PROGRAM

## 2023-11-02 PROCEDURE — 74177 CT ABD & PELVIS W/CONTRAST: CPT

## 2023-11-02 RX ADMIN — IOHEXOL 75 ML: 350 INJECTION, SOLUTION INTRAVENOUS at 11:14

## 2023-11-07 DIAGNOSIS — C34.92 PRIMARY MALIGNANT NEOPLASM OF LEFT LUNG METASTATIC TO OTHER SITE (MULTI): ICD-10-CM

## 2023-11-07 RX ORDER — ALBUTEROL SULFATE 0.83 MG/ML
3 SOLUTION RESPIRATORY (INHALATION) AS NEEDED
Status: CANCELLED | OUTPATIENT
Start: 2023-11-30

## 2023-11-07 RX ORDER — PROCHLORPERAZINE MALEATE 10 MG
10 TABLET ORAL EVERY 6 HOURS PRN
Status: CANCELLED | OUTPATIENT
Start: 2023-11-30

## 2023-11-07 RX ORDER — ALBUTEROL SULFATE 0.83 MG/ML
3 SOLUTION RESPIRATORY (INHALATION) AS NEEDED
Status: CANCELLED | OUTPATIENT
Start: 2023-11-09

## 2023-11-07 RX ORDER — DIPHENHYDRAMINE HCL 25 MG
50 CAPSULE ORAL AS NEEDED
Status: CANCELLED
Start: 2023-11-30

## 2023-11-07 RX ORDER — FAMOTIDINE 10 MG/ML
20 INJECTION INTRAVENOUS ONCE AS NEEDED
Status: CANCELLED | OUTPATIENT
Start: 2023-11-30

## 2023-11-07 RX ORDER — DIPHENHYDRAMINE HYDROCHLORIDE 50 MG/ML
50 INJECTION INTRAMUSCULAR; INTRAVENOUS AS NEEDED
Status: CANCELLED | OUTPATIENT
Start: 2023-11-09

## 2023-11-07 RX ORDER — EPINEPHRINE 0.3 MG/.3ML
0.3 INJECTION SUBCUTANEOUS EVERY 5 MIN PRN
Status: CANCELLED | OUTPATIENT
Start: 2023-11-30

## 2023-11-07 RX ORDER — PROCHLORPERAZINE MALEATE 10 MG
10 TABLET ORAL EVERY 6 HOURS PRN
Status: CANCELLED | OUTPATIENT
Start: 2023-11-09

## 2023-11-07 RX ORDER — DIPHENHYDRAMINE HYDROCHLORIDE 50 MG/ML
50 INJECTION INTRAMUSCULAR; INTRAVENOUS AS NEEDED
Status: CANCELLED | OUTPATIENT
Start: 2023-11-30

## 2023-11-07 RX ORDER — PROCHLORPERAZINE EDISYLATE 5 MG/ML
10 INJECTION INTRAMUSCULAR; INTRAVENOUS EVERY 6 HOURS PRN
Status: CANCELLED | OUTPATIENT
Start: 2023-11-09

## 2023-11-07 RX ORDER — EPINEPHRINE 0.3 MG/.3ML
0.3 INJECTION SUBCUTANEOUS EVERY 5 MIN PRN
Status: CANCELLED | OUTPATIENT
Start: 2023-11-09

## 2023-11-07 RX ORDER — DEXTROMETHORPHAN HYDROBROMIDE, GUAIFENESIN 5; 100 MG/5ML; MG/5ML
650 LIQUID ORAL AS NEEDED
Status: CANCELLED
Start: 2023-11-30

## 2023-11-07 RX ORDER — PROCHLORPERAZINE EDISYLATE 5 MG/ML
10 INJECTION INTRAMUSCULAR; INTRAVENOUS EVERY 6 HOURS PRN
Status: CANCELLED | OUTPATIENT
Start: 2023-11-30

## 2023-11-07 RX ORDER — FAMOTIDINE 10 MG/ML
20 INJECTION INTRAVENOUS ONCE AS NEEDED
Status: CANCELLED | OUTPATIENT
Start: 2023-11-09

## 2023-11-07 RX ORDER — DEXTROMETHORPHAN HYDROBROMIDE, GUAIFENESIN 5; 100 MG/5ML; MG/5ML
650 LIQUID ORAL AS NEEDED
Status: CANCELLED
Start: 2023-11-09

## 2023-11-07 RX ORDER — DIPHENHYDRAMINE HCL 25 MG
50 CAPSULE ORAL AS NEEDED
Status: CANCELLED
Start: 2023-11-09

## 2023-11-08 ENCOUNTER — LAB (OUTPATIENT)
Dept: LAB | Facility: LAB | Age: 63
End: 2023-11-08
Payer: COMMERCIAL

## 2023-11-08 DIAGNOSIS — C34.92 PRIMARY MALIGNANT NEOPLASM OF LEFT LUNG METASTATIC TO OTHER SITE (MULTI): ICD-10-CM

## 2023-11-08 DIAGNOSIS — C34.90 MALIGNANT NEOPLASM OF LUNG, UNSPECIFIED LATERALITY, UNSPECIFIED PART OF LUNG (MULTI): ICD-10-CM

## 2023-11-08 PROCEDURE — 80053 COMPREHEN METABOLIC PANEL: CPT

## 2023-11-08 PROCEDURE — 36415 COLL VENOUS BLD VENIPUNCTURE: CPT

## 2023-11-08 PROCEDURE — 85025 COMPLETE CBC W/AUTO DIFF WBC: CPT

## 2023-11-08 PROCEDURE — 83690 ASSAY OF LIPASE: CPT

## 2023-11-08 PROCEDURE — 84443 ASSAY THYROID STIM HORMONE: CPT

## 2023-11-08 PROCEDURE — 82150 ASSAY OF AMYLASE: CPT

## 2023-11-09 ENCOUNTER — EDUCATION (OUTPATIENT)
Dept: HEMATOLOGY/ONCOLOGY | Facility: CLINIC | Age: 63
End: 2023-11-09
Payer: COMMERCIAL

## 2023-11-09 ENCOUNTER — OFFICE VISIT (OUTPATIENT)
Dept: HEMATOLOGY/ONCOLOGY | Facility: CLINIC | Age: 63
End: 2023-11-09
Payer: COMMERCIAL

## 2023-11-09 ENCOUNTER — INFUSION (OUTPATIENT)
Dept: HEMATOLOGY/ONCOLOGY | Facility: CLINIC | Age: 63
End: 2023-11-09
Payer: COMMERCIAL

## 2023-11-09 VITALS
BODY MASS INDEX: 23.6 KG/M2 | SYSTOLIC BLOOD PRESSURE: 133 MMHG | RESPIRATION RATE: 18 BRPM | DIASTOLIC BLOOD PRESSURE: 88 MMHG | TEMPERATURE: 96.8 F | HEART RATE: 72 BPM | OXYGEN SATURATION: 95 % | WEIGHT: 142.86 LBS

## 2023-11-09 DIAGNOSIS — K12.30 MUCOSITIS: ICD-10-CM

## 2023-11-09 DIAGNOSIS — C34.90 MALIGNANT NEOPLASM OF LUNG, UNSPECIFIED LATERALITY, UNSPECIFIED PART OF LUNG (MULTI): ICD-10-CM

## 2023-11-09 DIAGNOSIS — C34.90 MALIGNANT NEOPLASM OF LUNG, UNSPECIFIED LATERALITY, UNSPECIFIED PART OF LUNG (MULTI): Primary | ICD-10-CM

## 2023-11-09 LAB
ALBUMIN SERPL BCP-MCNC: 4.5 G/DL (ref 3.4–5)
ALP SERPL-CCNC: 72 U/L (ref 33–136)
ALT SERPL W P-5'-P-CCNC: 20 U/L (ref 7–45)
AMYLASE SERPL-CCNC: 29 U/L (ref 29–103)
ANION GAP SERPL CALC-SCNC: 15 MMOL/L (ref 10–20)
AST SERPL W P-5'-P-CCNC: 23 U/L (ref 9–39)
BASOPHILS # BLD AUTO: 0.08 X10*3/UL (ref 0–0.1)
BASOPHILS NFR BLD AUTO: 1 %
BILIRUB SERPL-MCNC: 0.4 MG/DL (ref 0–1.2)
BUN SERPL-MCNC: 10 MG/DL (ref 6–23)
CALCIUM SERPL-MCNC: 9.9 MG/DL (ref 8.6–10.6)
CHLORIDE SERPL-SCNC: 102 MMOL/L (ref 98–107)
CO2 SERPL-SCNC: 28 MMOL/L (ref 21–32)
CREAT SERPL-MCNC: 0.86 MG/DL (ref 0.5–1.05)
EOSINOPHIL # BLD AUTO: 0.1 X10*3/UL (ref 0–0.7)
EOSINOPHIL NFR BLD AUTO: 1.3 %
ERYTHROCYTE [DISTWIDTH] IN BLOOD BY AUTOMATED COUNT: 13.6 % (ref 11.5–14.5)
GFR SERPL CREATININE-BSD FRML MDRD: 76 ML/MIN/1.73M*2
GLUCOSE SERPL-MCNC: 82 MG/DL (ref 74–99)
HCT VFR BLD AUTO: 43.8 % (ref 36–46)
HGB BLD-MCNC: 13.9 G/DL (ref 12–16)
IMM GRANULOCYTES # BLD AUTO: 0.02 X10*3/UL (ref 0–0.7)
IMM GRANULOCYTES NFR BLD AUTO: 0.3 % (ref 0–0.9)
LIPASE SERPL-CCNC: 18 U/L (ref 9–82)
LYMPHOCYTES # BLD AUTO: 1.71 X10*3/UL (ref 1.2–4.8)
LYMPHOCYTES NFR BLD AUTO: 21.4 %
MCH RBC QN AUTO: 30.5 PG (ref 26–34)
MCHC RBC AUTO-ENTMCNC: 31.7 G/DL (ref 32–36)
MCV RBC AUTO: 96 FL (ref 80–100)
MONOCYTES # BLD AUTO: 0.92 X10*3/UL (ref 0.1–1)
MONOCYTES NFR BLD AUTO: 11.5 %
NEUTROPHILS # BLD AUTO: 5.16 X10*3/UL (ref 1.2–7.7)
NEUTROPHILS NFR BLD AUTO: 64.5 %
NRBC BLD-RTO: 0 /100 WBCS (ref 0–0)
PLATELET # BLD AUTO: 452 X10*3/UL (ref 150–450)
POTASSIUM SERPL-SCNC: 4.6 MMOL/L (ref 3.5–5.3)
PROT SERPL-MCNC: 6.8 G/DL (ref 6.4–8.2)
RBC # BLD AUTO: 4.55 X10*6/UL (ref 4–5.2)
SODIUM SERPL-SCNC: 140 MMOL/L (ref 136–145)
TSH SERPL-ACNC: 1.07 MIU/L (ref 0.44–3.98)
WBC # BLD AUTO: 8 X10*3/UL (ref 4.4–11.3)

## 2023-11-09 PROCEDURE — 99213 OFFICE O/P EST LOW 20 MIN: CPT | Performed by: NURSE PRACTITIONER

## 2023-11-09 PROCEDURE — 1036F TOBACCO NON-USER: CPT | Performed by: NURSE PRACTITIONER

## 2023-11-09 PROCEDURE — 2500000004 HC RX 250 GENERAL PHARMACY W/ HCPCS (ALT 636 FOR OP/ED): Mod: SE | Performed by: STUDENT IN AN ORGANIZED HEALTH CARE EDUCATION/TRAINING PROGRAM

## 2023-11-09 PROCEDURE — 96365 THER/PROPH/DIAG IV INF INIT: CPT | Mod: INF

## 2023-11-09 RX ORDER — SENNOSIDES 8.6 MG/1
1 TABLET ORAL DAILY
COMMUNITY

## 2023-11-09 RX ORDER — FAMOTIDINE 10 MG/ML
20 INJECTION INTRAVENOUS ONCE AS NEEDED
Status: DISCONTINUED | OUTPATIENT
Start: 2023-11-09 | End: 2023-11-09 | Stop reason: HOSPADM

## 2023-11-09 RX ORDER — DIPHENHYDRAMINE HCL 25 MG
50 CAPSULE ORAL AS NEEDED
Status: DISCONTINUED | OUTPATIENT
Start: 2023-11-09 | End: 2023-11-09 | Stop reason: HOSPADM

## 2023-11-09 RX ORDER — HEPARIN 100 UNIT/ML
500 SYRINGE INTRAVENOUS AS NEEDED
Status: CANCELLED | OUTPATIENT
Start: 2023-11-09

## 2023-11-09 RX ORDER — PROCHLORPERAZINE EDISYLATE 5 MG/ML
10 INJECTION INTRAMUSCULAR; INTRAVENOUS EVERY 6 HOURS PRN
Status: DISCONTINUED | OUTPATIENT
Start: 2023-11-09 | End: 2023-11-09 | Stop reason: HOSPADM

## 2023-11-09 RX ORDER — ACETAMINOPHEN 325 MG/1
650 TABLET ORAL AS NEEDED
Status: DISCONTINUED | OUTPATIENT
Start: 2023-11-09 | End: 2023-11-09 | Stop reason: HOSPADM

## 2023-11-09 RX ORDER — ALBUTEROL SULFATE 0.83 MG/ML
3 SOLUTION RESPIRATORY (INHALATION) AS NEEDED
Status: DISCONTINUED | OUTPATIENT
Start: 2023-11-09 | End: 2023-11-09 | Stop reason: HOSPADM

## 2023-11-09 RX ORDER — PROCHLORPERAZINE MALEATE 10 MG
10 TABLET ORAL EVERY 6 HOURS PRN
Status: DISCONTINUED | OUTPATIENT
Start: 2023-11-09 | End: 2023-11-09 | Stop reason: HOSPADM

## 2023-11-09 RX ORDER — FERROUS GLUCONATE 325 MG
38 TABLET ORAL 2 TIMES WEEKLY
COMMUNITY

## 2023-11-09 RX ORDER — HEPARIN SODIUM,PORCINE/PF 10 UNIT/ML
50 SYRINGE (ML) INTRAVENOUS AS NEEDED
Status: DISCONTINUED | OUTPATIENT
Start: 2023-11-09 | End: 2023-11-09 | Stop reason: HOSPADM

## 2023-11-09 RX ORDER — LEVOTHYROXINE SODIUM 75 UG/1
75 TABLET ORAL
COMMUNITY
End: 2023-11-22 | Stop reason: SDUPTHER

## 2023-11-09 RX ORDER — EPINEPHRINE 0.3 MG/.3ML
0.3 INJECTION SUBCUTANEOUS EVERY 5 MIN PRN
Status: DISCONTINUED | OUTPATIENT
Start: 2023-11-09 | End: 2023-11-09 | Stop reason: HOSPADM

## 2023-11-09 RX ORDER — MULTIVIT-MIN/IRON FUM/FOLIC AC 7.5 MG-4
1 TABLET ORAL DAILY
COMMUNITY

## 2023-11-09 RX ORDER — DIPHENHYDRAMINE HYDROCHLORIDE 50 MG/ML
50 INJECTION INTRAMUSCULAR; INTRAVENOUS AS NEEDED
Status: DISCONTINUED | OUTPATIENT
Start: 2023-11-09 | End: 2023-11-09 | Stop reason: HOSPADM

## 2023-11-09 RX ORDER — HEPARIN 100 UNIT/ML
500 SYRINGE INTRAVENOUS AS NEEDED
Status: DISCONTINUED | OUTPATIENT
Start: 2023-11-09 | End: 2023-11-09 | Stop reason: HOSPADM

## 2023-11-09 RX ORDER — HEPARIN SODIUM,PORCINE/PF 10 UNIT/ML
50 SYRINGE (ML) INTRAVENOUS AS NEEDED
Status: CANCELLED | OUTPATIENT
Start: 2023-11-09

## 2023-11-09 RX ADMIN — Medication 200 MG: at 15:01

## 2023-11-09 ASSESSMENT — ENCOUNTER SYMPTOMS
OCCASIONAL FEELINGS OF UNSTEADINESS: 0
DEPRESSION: 1
LOSS OF SENSATION IN FEET: 1

## 2023-11-09 ASSESSMENT — PAIN SCALES - GENERAL: PAINLEVEL: 3

## 2023-11-09 NOTE — RESEARCH NOTES
Research Note Treatment Day    Hayley Potter is here today for treatment on [id number]. Today is C_11D1.  Procedures completed per protocol. AE's and con-meds reviewed with patient. Patient is aware of treatment plan.    [x]   Received treatment as planned   OR  []    Treatment delayed; patient calendar updated as required   Treatment delayed because:    []   AE    []   Physician Discretion    []   Clinical Deterioration or Progression     []   Other    Education Documentation  Mouth Sores, taught by Rafiq Delgado RN at 11/9/2023  3:11 PM.  Learner: Patient  Readiness: Acceptance  Method: Explanation  Response: Verbalizes Understanding  Comment: Treatment plan reinforced with pt    Supportive Medications, taught by Rafiq Delgado RN at 11/9/2023  3:11 PM.  Learner: Patient  Readiness: Acceptance  Method: Explanation  Response: Verbalizes Understanding  Comment: Treatment plan reinforced with pt    Treatment Plan and Schedule, taught by Rafiq Delgado RN at 11/9/2023  3:11 PM.  Learner: Patient  Readiness: Acceptance  Method: Explanation  Response: Verbalizes Understanding  Comment: Treatment plan reinforced with pt    Comprehensive Metabolic Panel (CMP), taught by Rafiq Delgado RN at 11/9/2023  3:11 PM.  Learner: Patient  Readiness: Acceptance  Method: Explanation  Response: Verbalizes Understanding  Comment: Treatment plan reinforced with pt    Complete Blood Count with Differential (CBC w/ Diff), taught by Rafiq Delgado RN at 11/9/2023  3:11 PM.  Learner: Patient  Readiness: Acceptance  Method: Explanation  Response: Verbalizes Understanding  Comment: Treatment plan reinforced with pt    Education Comments  No comments found.    Pt and daughter to clinic for next cycle of therapy.  NP reviewed the scans with pt and daughter.  Labs reviewed.  Questions answered.

## 2023-11-09 NOTE — PROGRESS NOTES
Patient ID: Hayley Potter is a 63 y.o. female.    CC:  Management of stage IA3 ([id number]) adenocarcinoma of RUL s/p RUL lobectomy 04/2020, PDL-1 50%, NGS positive for KRAS G12C--> now with  metastatic recurrence involving liver, 11th L rib, and mediastinal LNs. 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 She was initially found to have spiculated 2.9cm mass in the RUL on the CT chest on 03/02/2020. She underwent CT guided RUL biopsy with pathology showed lung tissue with scan and focal aypicla grandular proliferation  suspicious for adenocarcinoma. IHC positive for CK7, TTF1 and napsin A. CK20 negative. PET/CT on 04/15/2020 showed RUL hypermetabolic RUL mass. No evidence of distant metastases. She underwent RUL lobectomy with mediastinal LN dissection with Dr. Wheat  on 04/21/2020 which showed invasive well to moderately differentiated adenocarcinoma, tumor measuring 2.5x2.3.x1.7cm. No VPI or LVI. All margins were negative. LN (0/11).      Unfortunately, on the surveillance CT chest (+) on 08/24/2022, there was a new irregular marginated 6mm GUSTABO nodule, which increased in size significantly on the repeat CT chest (-) on 01/06/2023, measuring 12mm.  There is also an additional new 9mm nodule  in the L lung as well as new mediastinal and possible L hilar LAD. PET/CT on 02/016/2023 showed hypermetabolic L hilar and mediastinal LAD. 1st  GUSTABO nodule now 8mm likely inflammatory. 2nd GUSTABO nodule showed stable size with SUV 4.4. Soft tissue mass associated with  L 11th rib fracture, SUV 11.5. Mass within the liver SUV 13.1, suspicious for mets. Hypermetabolic activity also noted   in the stomach fundus, association with a hiatal hernia, in the cecum and ascending colon without masses seen. She underwent US guided liver biopsy on 02/16/2023 - poorly-differentiated carcinoma.       Treatment Summary:  04/21/2020: RUL lobectomy with medistainal LN dissection (Dr. Wheat)  04/11/2023: C1 pembrolizumab 200mg IV (on trial)  05/02/2023: C2 Pembrolizumab 200mg IV (on trial)  05/23/2023: C3 Pembrolizumab 200mg IV (on trial)  06/13/2023: C4 Pembrolizumab 200mg IV (on trial)   07/05/2023: C5 Pembrolizumab 200mg IV (on trial)   07/26/2023: C6 Pembrolizumab 200mg IV (on trial)   08/16/2023: C7 Pembrolizumab 200mg IV (on trial)   09/07/2023: C8 Pembrolizumab 200mg IV (on trial)   09/28/2023: C9 Pembrolizumab 200mg IV (on trial)   10/19/2023: C10 Pembrolizumab 200mg IV (on trial)  11/9/2023: C11 Pembrolizumab 200mg IV (on trial)    Interval History (11/9/2023):  Pt present in clinic for readiness to treat visit.  Her dtr is present for visit.  Energy level is good.  She denies any fever, chills or night sweats.  Cough has resolved.  No chest pain or shortness of breath.  She denies any nausea or vomiting.  No constipation or diarrhea.  Primary complaint today is mouth sores secondary to new dentures using Anbesol and/or Oragel minimal relief. She has chronic right rib pain which seems more irritated than normal.  She does have chronic abdominal nerve pain which is managed with gabapentin and as needed Advil.   No rashs or skin concerns. No other concerns today.  Labs reviewed and WNL.  Ready for treatment today.      PMHx: vocal cord cancer, GERD and COPD, OA  PSHx: tonsillectomy, tubal ligation, lumpectomy, L ankle and R hand surgery  FHx: strong family history of breast cancer including male breast cancer.   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 She quitted etoh 2010. She smokes Marijuana every day.      Allergies:  Cephalexin    Medications:  Medication list reviewed with patient and updated in EMR    Vital Signs:  /88 (BP Location: Left arm, Patient Position: Sitting, BP Cuff Size: Small adult)   Pulse 72   Temp 36 °C (96.8 °F) (Tympanic)   Resp 18   Wt 64.8 kg (142 lb 13.7 oz)   SpO2 95%   BMI 23.60 kg/m²     Wt Readings from Last 5 Encounters:   10/19/23 64.9 kg (143 lb 1.3 oz)   23 64.6 kg (142 lb 6.7 oz)   23 67.3 kg (148 lb 5.9 oz)   23 67.7 kg (149 lb 4 oz)   23 68.6 kg (151 lb 3.8 oz)      Physical Exam:  ECO  Pain: 3  Constitutional: Well developed, awake/alert/oriented x3, no distress, alert and cooperative  Eyes: PER. sclera anicteric  ENMT: Oral mucosa moist  Respiratory/Thorax: Breathing is non-labored. Lungs are clear to auscultation bilaterally. No adventitious breath sounds  Cardiovascular: S1-S2. Regular rate and rhythm. No murmurs, rubs, or gallops appreciated  Gastrointestinal: Abdomen soft nontender, nondistended, normal active bowel sounds.  Musculoskeletal: ROM intact, no joint swelling, normal strength  Extremities: normal extremities, no cyanosis, no edema, no clubbing  Neurologic: alert and oriented x3. Nonfocal exam. No myoclonus  Psychological: Pleasant, appropriate and easily engaged     Labs:   2023  WBC 8.0, hemoglobin 13.9, hematocrit 43.8, platelets 452,000  CMP within normal limits  TSH 1.07    Imaging:    IMPRESSION:  8 mm ground-glass nodule more conspicuous compared to 1 month earlier  and developed compared 2 months ago. Faint patchy ground-glass  opacities in both lower lungs are similar to mildly improved. No  evidence of malignancy in abdomen pelvis.      Signed by: Manuelito Izaguirre 2023 3:36 PM    Assessment/Plan:  63 y.o. 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      The patient's records and imaging have been reviewed in detail.  MD discussed with the patient the natural history of their disease at length.  The following has also been discussed with the patient:     # Cancer: recurrent likely metastatic (T2Y0K0e) lung cancer: liver biopsy showed extensive necrosis. Liquid biopsy showed KRAS G12C mutation, which was identified in her original surgical resected  specimen. PDL-1 50%. Patient initially had stage IA2 ([id number]) RUL lung  adenocarcinoma s/p RUL lobectomy, unfortunately now likely metastatic recurrence. Liver biopsy on 2/16 showed poorly differentiated malignant neoplasm with extensive necrosis. We discussed treatment  options with her today with either standard of care pembrolizumab vs clinical trials. Patient expressed interests in enrolling into clinical trial. Enrolled into RCT [id number]. Doing well.        - Interval Imaging: CT CAP 11/2/2023  See above      # Hypothyroidism: G2. Likely 2/2 immunotherapy - resolved.  TSH 1.07 11/8/23.  Continue levothyroxine 75mcg daily. Will repeat TSH at next visit.      # Microcytic anemia 2/2 due to chronic disease: patient denies any hematochezia  or hematuria. No melena. hx of hiatal hernia. Per patient. Last EGD was done in 2021 and was normal. Colonoscopy was a few years ago.  EGD showed hiatal hernia 03/2023. Improved Hgb overall. Iron studies show low-normal ferritin and TSAT of 14%. Continue PO iron 3x/week. Tolerates well.  - - - resolved     # Mucositis (chronic).  Pt reports since start of immunotherapy.  Chronic sore secondary to ill fitting dentures recommended s&s rinse.  Continue Anbesol and/or Oragel for pain.  Will continue to monitor.  BMX added     # Grade 1 hyperthyroidism: resolved, now hypothyroid (as above).     # Constipation: managed well with stool softener and PRN lactulose.        # Clinical Trials: April 11, 2023 initiated treatment on [id number] this is cycle 1 of first-line treatment.  She has been  randomized to  arm A which is pembrolizumab alone for up to 2 years or progression followed by Alimta/carboplatin for second line therapy. Consent has been signed. Repeat CT scans after 2 cycles showed partial response. Continue treatment.      # Access: Peripheral veins.     # Pain: she has a displaced 8th Rib on the left side -Advised patient to take percocet daily and decrease advil use (she was taking 4x daily).  Will refer to supportive oncology. Pt declining to take percocet at this time and is taking advil sparingly.        # Bone Health: L fifth ribs lesion stable. Not noticeable on the scan on 8/30.     # Psychosocial: Coping well, no acute issues.  Good support from family & friends.  Social work support available.     # GI/CINV: No acute issues.  Eating and voiding well.  Nutrition consult available.     # Smoking: former smoker     # Fertility: N/A.        # Heme/ESAs: N/A.     # GOC: Patient is aware of palliative  intent goals of care.     # Language: English.     # Dispo: RTC in 3 weeks for next cycle.      Plan:  - labs and ct reviewed with patient   - proceed with treatment today   - RTC in 3 weeks for next cycle and FUV.  - She has our contact information and instructed to call with concerns/questions in the interim.          DUTCH Amin-CNP

## 2023-11-22 ENCOUNTER — TELEPHONE (OUTPATIENT)
Dept: HEMATOLOGY/ONCOLOGY | Facility: CLINIC | Age: 63
End: 2023-11-22
Payer: COMMERCIAL

## 2023-11-22 DIAGNOSIS — E03.9 HYPOTHYROIDISM, UNSPECIFIED TYPE: Primary | ICD-10-CM

## 2023-11-22 RX ORDER — LEVOTHYROXINE SODIUM 75 UG/1
75 TABLET ORAL
Qty: 30 TABLET | Refills: 2 | Status: SHIPPED | OUTPATIENT
Start: 2023-11-22 | End: 2023-11-30 | Stop reason: SDUPTHER

## 2023-11-29 ENCOUNTER — LAB (OUTPATIENT)
Dept: LAB | Facility: LAB | Age: 63
End: 2023-11-29
Payer: COMMERCIAL

## 2023-11-29 DIAGNOSIS — C34.90 MALIGNANT NEOPLASM OF LUNG, UNSPECIFIED LATERALITY, UNSPECIFIED PART OF LUNG (MULTI): ICD-10-CM

## 2023-11-29 LAB
ALBUMIN SERPL BCP-MCNC: 4.3 G/DL (ref 3.4–5)
ALP SERPL-CCNC: 71 U/L (ref 33–136)
ALT SERPL W P-5'-P-CCNC: 18 U/L (ref 7–45)
AMYLASE SERPL-CCNC: 27 U/L (ref 29–103)
ANION GAP SERPL CALC-SCNC: 13 MMOL/L (ref 10–20)
AST SERPL W P-5'-P-CCNC: 25 U/L (ref 9–39)
BASOPHILS # BLD AUTO: 0.09 X10*3/UL (ref 0–0.1)
BASOPHILS NFR BLD AUTO: 1 %
BILIRUB SERPL-MCNC: 0.4 MG/DL (ref 0–1.2)
BUN SERPL-MCNC: 9 MG/DL (ref 6–23)
CALCIUM SERPL-MCNC: 10.2 MG/DL (ref 8.6–10.6)
CHLORIDE SERPL-SCNC: 102 MMOL/L (ref 98–107)
CO2 SERPL-SCNC: 30 MMOL/L (ref 21–32)
CREAT SERPL-MCNC: 0.93 MG/DL (ref 0.5–1.05)
EOSINOPHIL # BLD AUTO: 0.13 X10*3/UL (ref 0–0.7)
EOSINOPHIL NFR BLD AUTO: 1.4 %
ERYTHROCYTE [DISTWIDTH] IN BLOOD BY AUTOMATED COUNT: 13.2 % (ref 11.5–14.5)
GFR SERPL CREATININE-BSD FRML MDRD: 69 ML/MIN/1.73M*2
GLUCOSE SERPL-MCNC: 75 MG/DL (ref 74–99)
HCT VFR BLD AUTO: 43.9 % (ref 36–46)
HGB BLD-MCNC: 14.3 G/DL (ref 12–16)
IMM GRANULOCYTES # BLD AUTO: 0.02 X10*3/UL (ref 0–0.7)
IMM GRANULOCYTES NFR BLD AUTO: 0.2 % (ref 0–0.9)
LIPASE SERPL-CCNC: 16 U/L (ref 9–82)
LYMPHOCYTES # BLD AUTO: 2.23 X10*3/UL (ref 1.2–4.8)
LYMPHOCYTES NFR BLD AUTO: 24 %
MCH RBC QN AUTO: 31.6 PG (ref 26–34)
MCHC RBC AUTO-ENTMCNC: 32.6 G/DL (ref 32–36)
MCV RBC AUTO: 97 FL (ref 80–100)
MONOCYTES # BLD AUTO: 0.97 X10*3/UL (ref 0.1–1)
MONOCYTES NFR BLD AUTO: 10.4 %
NEUTROPHILS # BLD AUTO: 5.85 X10*3/UL (ref 1.2–7.7)
NEUTROPHILS NFR BLD AUTO: 63 %
NRBC BLD-RTO: 0 /100 WBCS (ref 0–0)
PLATELET # BLD AUTO: 459 X10*3/UL (ref 150–450)
POTASSIUM SERPL-SCNC: 5.2 MMOL/L (ref 3.5–5.3)
PROT SERPL-MCNC: 6.9 G/DL (ref 6.4–8.2)
RBC # BLD AUTO: 4.52 X10*6/UL (ref 4–5.2)
SODIUM SERPL-SCNC: 140 MMOL/L (ref 136–145)
WBC # BLD AUTO: 9.3 X10*3/UL (ref 4.4–11.3)

## 2023-11-29 PROCEDURE — 80053 COMPREHEN METABOLIC PANEL: CPT

## 2023-11-29 PROCEDURE — 82150 ASSAY OF AMYLASE: CPT

## 2023-11-29 PROCEDURE — 83690 ASSAY OF LIPASE: CPT

## 2023-11-29 PROCEDURE — 36415 COLL VENOUS BLD VENIPUNCTURE: CPT

## 2023-11-29 PROCEDURE — 85025 COMPLETE CBC W/AUTO DIFF WBC: CPT

## 2023-11-30 ENCOUNTER — ANCILLARY PROCEDURE (OUTPATIENT)
Dept: RADIOLOGY | Facility: CLINIC | Age: 63
End: 2023-11-30
Payer: COMMERCIAL

## 2023-11-30 ENCOUNTER — EDUCATION (OUTPATIENT)
Dept: HEMATOLOGY/ONCOLOGY | Facility: CLINIC | Age: 63
End: 2023-11-30

## 2023-11-30 ENCOUNTER — INFUSION (OUTPATIENT)
Dept: HEMATOLOGY/ONCOLOGY | Facility: CLINIC | Age: 63
End: 2023-11-30
Payer: COMMERCIAL

## 2023-11-30 ENCOUNTER — OFFICE VISIT (OUTPATIENT)
Dept: HEMATOLOGY/ONCOLOGY | Facility: CLINIC | Age: 63
End: 2023-11-30
Payer: COMMERCIAL

## 2023-11-30 VITALS
OXYGEN SATURATION: 96 % | DIASTOLIC BLOOD PRESSURE: 78 MMHG | TEMPERATURE: 97.3 F | WEIGHT: 141.98 LBS | BODY MASS INDEX: 23.46 KG/M2 | RESPIRATION RATE: 18 BRPM | SYSTOLIC BLOOD PRESSURE: 120 MMHG | HEART RATE: 90 BPM

## 2023-11-30 DIAGNOSIS — R07.89 LEFT-SIDED CHEST WALL PAIN: Primary | ICD-10-CM

## 2023-11-30 DIAGNOSIS — C34.90 MALIGNANT NEOPLASM OF LUNG, UNSPECIFIED LATERALITY, UNSPECIFIED PART OF LUNG (MULTI): ICD-10-CM

## 2023-11-30 DIAGNOSIS — R07.89 LEFT-SIDED CHEST WALL PAIN: ICD-10-CM

## 2023-11-30 DIAGNOSIS — E03.9 HYPOTHYROIDISM, UNSPECIFIED TYPE: ICD-10-CM

## 2023-11-30 DIAGNOSIS — C34.91 PRIMARY MALIGNANT NEOPLASM OF RIGHT LUNG METASTATIC TO OTHER SITE (MULTI): ICD-10-CM

## 2023-11-30 PROCEDURE — 96365 THER/PROPH/DIAG IV INF INIT: CPT | Mod: INF

## 2023-11-30 PROCEDURE — 1036F TOBACCO NON-USER: CPT | Performed by: NURSE PRACTITIONER

## 2023-11-30 PROCEDURE — 99214 OFFICE O/P EST MOD 30 MIN: CPT | Mod: 25 | Performed by: NURSE PRACTITIONER

## 2023-11-30 PROCEDURE — 99214 OFFICE O/P EST MOD 30 MIN: CPT | Performed by: NURSE PRACTITIONER

## 2023-11-30 PROCEDURE — 2500000004 HC RX 250 GENERAL PHARMACY W/ HCPCS (ALT 636 FOR OP/ED): Mod: SE | Performed by: STUDENT IN AN ORGANIZED HEALTH CARE EDUCATION/TRAINING PROGRAM

## 2023-11-30 PROCEDURE — 71046 X-RAY EXAM CHEST 2 VIEWS: CPT

## 2023-11-30 RX ORDER — LEVOTHYROXINE SODIUM 75 UG/1
75 TABLET ORAL DAILY
Qty: 90 TABLET | Refills: 1 | Status: SHIPPED | OUTPATIENT
Start: 2023-11-30 | End: 2024-11-29

## 2023-11-30 RX ORDER — DIPHENHYDRAMINE HCL 25 MG
50 CAPSULE ORAL AS NEEDED
Status: DISCONTINUED | OUTPATIENT
Start: 2023-11-30 | End: 2023-11-30 | Stop reason: HOSPADM

## 2023-11-30 RX ORDER — PROCHLORPERAZINE EDISYLATE 5 MG/ML
10 INJECTION INTRAMUSCULAR; INTRAVENOUS EVERY 6 HOURS PRN
Status: DISCONTINUED | OUTPATIENT
Start: 2023-11-30 | End: 2023-11-30 | Stop reason: HOSPADM

## 2023-11-30 RX ORDER — FAMOTIDINE 10 MG/ML
20 INJECTION INTRAVENOUS ONCE AS NEEDED
Status: DISCONTINUED | OUTPATIENT
Start: 2023-11-30 | End: 2023-11-30 | Stop reason: HOSPADM

## 2023-11-30 RX ORDER — ACETAMINOPHEN 325 MG/1
650 TABLET ORAL AS NEEDED
Status: DISCONTINUED | OUTPATIENT
Start: 2023-11-30 | End: 2023-11-30 | Stop reason: HOSPADM

## 2023-11-30 RX ORDER — DIPHENHYDRAMINE HYDROCHLORIDE 50 MG/ML
50 INJECTION INTRAMUSCULAR; INTRAVENOUS AS NEEDED
Status: DISCONTINUED | OUTPATIENT
Start: 2023-11-30 | End: 2023-11-30 | Stop reason: HOSPADM

## 2023-11-30 RX ORDER — EPINEPHRINE 0.3 MG/.3ML
0.3 INJECTION SUBCUTANEOUS EVERY 5 MIN PRN
Status: DISCONTINUED | OUTPATIENT
Start: 2023-11-30 | End: 2023-11-30 | Stop reason: HOSPADM

## 2023-11-30 RX ORDER — ALBUTEROL SULFATE 0.83 MG/ML
3 SOLUTION RESPIRATORY (INHALATION) AS NEEDED
Status: DISCONTINUED | OUTPATIENT
Start: 2023-11-30 | End: 2023-11-30 | Stop reason: HOSPADM

## 2023-11-30 RX ORDER — PROCHLORPERAZINE MALEATE 10 MG
10 TABLET ORAL EVERY 6 HOURS PRN
Status: DISCONTINUED | OUTPATIENT
Start: 2023-11-30 | End: 2023-11-30 | Stop reason: HOSPADM

## 2023-11-30 RX ADMIN — Medication 200 MG: at 14:43

## 2023-11-30 ASSESSMENT — PAIN SCALES - GENERAL: PAINLEVEL: 6

## 2023-11-30 NOTE — PROGRESS NOTES
Patient ID: Hayley Potter is a 63 y.o. female.    CC:  Management of stage IA3 ([id number]) adenocarcinoma of RUL s/p RUL lobectomy 04/2020, PDL-1 50%, NGS positive for KRAS G12C--> now with  metastatic recurrence involving liver, 11th L rib, and mediastinal LNs. 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 She was initially found to have spiculated 2.9cm mass in the RUL on the CT chest on 03/02/2020. She underwent CT guided RUL biopsy with pathology showed lung tissue with scan and focal aypicla grandular proliferation  suspicious for adenocarcinoma. IHC positive for CK7, TTF1 and napsin A. CK20 negative. PET/CT on 04/15/2020 showed RUL hypermetabolic RUL mass. No evidence of distant metastases. She underwent RUL lobectomy with mediastinal LN dissection with Dr. Wheat  on 04/21/2020 which showed invasive well to moderately differentiated adenocarcinoma, tumor measuring 2.5x2.3.x1.7cm. No VPI or LVI. All margins were negative. LN (0/11).      Unfortunately, on the surveillance CT chest (+) on 08/24/2022, there was a new irregular marginated 6mm GUSTABO nodule, which increased in size significantly on the repeat CT chest (-) on 01/06/2023, measuring 12mm.  There is also an additional new 9mm nodule  in the L lung as well as new mediastinal and possible L hilar LAD. PET/CT on 02/016/2023 showed hypermetabolic L hilar and mediastinal LAD. 1st  GUSTAOB nodule now 8mm likely inflammatory. 2nd GSUTABO nodule showed stable size with SUV 4.4. Soft tissue mass associated with  L 11th rib fracture, SUV 11.5. Mass within the liver SUV 13.1, suspicious for mets. Hypermetabolic activity also noted   in the stomach fundus, association with a hiatal hernia, in the cecum and ascending colon without masses seen. She underwent US guided liver biopsy on 02/16/2023 - poorly-differentiated carcinoma.     Treatment Summary:  04/21/2020: RUL lobectomy with medistainal LN dissection (Dr. Wheat)  04/11/2023: C1 pembrolizumab 200mg IV (on trial)  05/02/2023: C2 Pembrolizumab 200mg IV (on trial)  05/23/2023: C3 Pembrolizumab 200mg IV (on trial)  06/13/2023: C4 Pembrolizumab 200mg IV (on trial)   07/05/2023: C5 Pembrolizumab 200mg IV (on trial)   07/26/2023: C6 Pembrolizumab 200mg IV (on trial)   08/16/2023: C7 Pembrolizumab 200mg IV (on trial)   09/07/2023: C8 Pembrolizumab 200mg IV (on trial)   09/28/2023: C9 Pembrolizumab 200mg IV (on trial)   10/19/2023: C10 Pembrolizumab 200mg IV (on trial)  11/9/2023: C11 Pembrolizumab 200mg IV (on trial)  11/30/2023: C12 Pembrolizumab 200mg IV (on trial)    Interval History (11/30/2023):    Pt present in clinic for readiness to treat visit. She is due to receive cycle #12 of Pembrolizumab on clinical trial [id number]. Her daughter is present for visit.  Energy level is good.  She denies any fever, chills or night sweats.  Cough has resolved. Today she has complaints of left sided chest wall pain.   This is occurring with certain movements and occasionally with deep breaths.  Chronic right sided rib pain unchanged. She denies any nausea or vomiting.  No constipation or diarrhea. Previous mouth sores have resolved. No rashs or skin concerns. No other concerns today.  Labs reviewed and WNL.  Ready for treatment today.      PMHx: vocal cord cancer, GERD and COPD, OA  PSHx: tonsillectomy, tubal ligation, lumpectomy, L ankle and R hand surgery  FHx: strong family history of breast cancer including male breast cancer.   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 She quitted etoh 2010. She smokes Marijuana every day.      Allergies:  Cephalexin    Medications:  Medication list reviewed with patient and updated in EMR    Vital Signs:  /88 (BP Location: Left arm, Patient Position: Sitting, BP Cuff Size: Small adult)   Pulse 72   Temp 36 °C (96.8 °F) (Tympanic)   Resp 18   Wt 64.8 kg (142 lb 13.7 oz)   SpO2 95%   BMI 23.60 kg/m²      Physical Exam:  ECO  Pain: 3  Constitutional: Well developed, awake/alert/oriented x3, no distress, alert and cooperative  Eyes: PER. sclera anicteric  ENMT: Oral mucosa moist  Respiratory/Thorax: Breathing is non-labored. Lungs are clear to auscultation bilaterally. No adventitious breath sounds  Cardiovascular: S1-S2. Regular rate and rhythm. No murmurs, rubs, or gallops appreciated  Gastrointestinal: Abdomen soft nontender, nondistended, normal active bowel sounds.  Musculoskeletal: ROM intact, no joint swelling, normal strength  Extremities: normal extremities, no cyanosis, no edema, no clubbing  Neurologic: alert and oriented x3. Nonfocal exam. No myoclonus  Psychological: Pleasant, appropriate and easily engaged     Labs:   2023  WBC 8.0, hemoglobin 13.9, hematocrit 43.8, platelets 452,000  CMP within normal limits  TSH 1.07    Imaging:    IMPRESSION:  8 mm ground-glass nodule more conspicuous compared to 1 month earlier  and developed compared 2 months ago. Faint patchy ground-glass  opacities in both lower lungs are similar to mildly improved. No  evidence of malignancy in abdomen pelvis.      Signed by: Manuelito Izaguirre 2023 3:36 PM    Assessment/Plan:  63 y.o. 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     The patient's records and imaging have been reviewed in detail.  MD discussed with the patient the natural history of their disease at length.  The following has also been discussed with the patient:     # Cancer:  recurrent likely metastatic (T1N9L0t) lung cancer: liver biopsy showed extensive necrosis. Liquid biopsy showed KRAS G12C mutation, which was identified in her original surgical resected  specimen. PDL-1 50%. Patient initially had stage IA2 (P4uB3T3) RUL lung  adenocarcinoma s/p RUL lobectomy, unfortunately now likely metastatic recurrence. Liver biopsy on 2/16 showed poorly differentiated malignant neoplasm with extensive necrosis. We discussed treatment  options with her today with either standard of care pembrolizumab vs clinical trials. Patient expressed interests in enrolling into clinical trial. Enrolled into RCT [id number]. Doing well.        - Interval Imaging: CT CAP 11/2/2023  - - - next imaging ordered       # Hypothyroidism: G2. Likely 2/2 immunotherapy - resolved.  TSH 1.07 11/8/23.  Continue levothyroxine 75mcg daily.      # Microcytic anemia 2/2 due to chronic disease: patient denies any hematochezia  or hematuria. No melena. hx of hiatal hernia. Per patient. Last EGD was done in 2021 and was normal. Colonoscopy was a few years ago.  EGD showed hiatal hernia 03/2023. Improved Hgb overall. Iron studies show low-normal ferritin and TSAT of 14%. Continue PO iron 3x/week. Tolerates well.  - - - resolved     # Mucositis (chronic).  Pt reports since start of immunotherapy.  Chronic sore secondary to ill fitting dentures recommended s&s rinse.  Continue Anbesol and/or Oragel for pain.  Will continue to monitor.  BMX added     # Grade 1 hyperthyroidism: resolved, now hypothyroid (as above).     # Constipation: managed well with stool softener and PRN lactulose.        # Clinical Trials: April 11, 2023 initiated treatment on [id number] this is cycle 1 of first-line treatment.  She has been randomized to  arm A which is pembrolizumab alone for up to 2 years or progression followed by Alimta/carboplatin for second line therapy. Consent has been signed. Repeat CT scans after 2 cycles showed partial response. Continue  treatment.      # Access: Peripheral veins.     # Pain: she has a displaced 8th Rib on the left side -Advised patient to take percocet daily and decrease advil use (she was taking 4x daily).  Will refer to supportive oncology. Pt declining to take percocet at this time and is taking advil sparingly.     # Bone Health: L fifth ribs lesion stable. Not noticeable on the scan on 8/30.    - - - increased left rib or chest wall pain over the past month. Will obtain chest x-ray today. Additional imaging as indicated. Next CT due in January. ? Need for bone scan.      # Psychosocial: Coping well, no acute issues.  Good support from family & friends.  Social work support available.     # GI/CINV: No acute issues.  Eating and voiding well.  Nutrition consult available.     # Smoking: former smoker     # Fertility: N/A.        # Heme/ESAs: N/A.     # GOC: Patient is aware of palliative  intent goals of care.     # Language: English.     # Dispo: RTC in 3 weeks for next cycle.      Plan:  - proceed with treatment today   - RTC in 3 weeks for next cycle and FUV.  - She has our contact information and instructed to call with concerns/questions in the interim.       Mikie Hancock, DUTCH-CNP

## 2023-12-01 NOTE — RESEARCH NOTES
Research Note Treatment Day    Hayley Potter is here today for treatment on [id number]. Today is C12_D_1. Procedures completed per protocol. AE's and con-meds reviewed with patient. Patient is aware of treatment plan.    [x]   Received treatment as planned   OR  []    Treatment delayed; patient calendar updated as required   Treatment delayed because:    []   AE    []   Physician Discretion    []   Clinical Deterioration or Progression     []   Other    Education Documentation  Pain Management, taught by Rafiq Delgado RN at 12/1/2023  3:59 PM.  Learner: Family, Patient  Readiness: Acceptance  Method: Explanation  Response: Verbalizes Understanding    Treatment Plan and Schedule, taught by Rafiq Delgado RN at 12/1/2023  3:59 PM.  Learner: Family, Patient  Readiness: Acceptance  Method: Explanation  Response: Verbalizes Understanding    Diagnostic Studies, taught by Rafiq Delgado RN at 12/1/2023  3:59 PM.  Learner: Family, Patient  Readiness: Acceptance  Method: Explanation  Response: Verbalizes Understanding    Comprehensive Metabolic Panel (CMP), taught by Rafiq Delgado RN at 12/1/2023  3:59 PM.  Learner: Family, Patient  Readiness: Acceptance  Method: Explanation  Response: Verbalizes Understanding    Complete Blood Count with Differential (CBC w/ Diff), taught by Rafiq Delgado RN at 12/1/2023  3:59 PM.  Learner: Family, Patient  Readiness: Acceptance  Method: Explanation  Response: Verbalizes Understanding    Education Comments  No comments found.    Pt to clinic for cycle 12 of immunotherapy on [id number] trial.  Pt feels pretty good except for her continued complaints of pain to right side and a new pain to her left side.  NP in to see pt and she ordered a CXR to be done after treatment.  Labs were done and reviewed with pt.  Scheduled pt next scans per trial.  Questions answered.

## 2023-12-18 DIAGNOSIS — C34.90: ICD-10-CM

## 2023-12-18 DIAGNOSIS — C34.90 MALIGNANT NEOPLASM OF LUNG, UNSPECIFIED LATERALITY, UNSPECIFIED PART OF LUNG (MULTI): Primary | ICD-10-CM

## 2023-12-18 RX ORDER — FAMOTIDINE 10 MG/ML
20 INJECTION INTRAVENOUS ONCE AS NEEDED
Status: CANCELLED | OUTPATIENT
Start: 2023-12-21

## 2023-12-18 RX ORDER — ALBUTEROL SULFATE 0.83 MG/ML
3 SOLUTION RESPIRATORY (INHALATION) AS NEEDED
Status: CANCELLED | OUTPATIENT
Start: 2023-12-21

## 2023-12-18 RX ORDER — DIPHENHYDRAMINE HYDROCHLORIDE 50 MG/ML
50 INJECTION INTRAMUSCULAR; INTRAVENOUS AS NEEDED
Status: CANCELLED | OUTPATIENT
Start: 2023-12-21

## 2023-12-18 RX ORDER — EPINEPHRINE 0.3 MG/.3ML
0.3 INJECTION SUBCUTANEOUS EVERY 5 MIN PRN
Status: CANCELLED | OUTPATIENT
Start: 2023-12-21

## 2023-12-18 RX ORDER — EPINEPHRINE 0.3 MG/.3ML
0.3 INJECTION SUBCUTANEOUS EVERY 5 MIN PRN
Status: CANCELLED | OUTPATIENT
Start: 2024-01-11

## 2023-12-18 RX ORDER — FAMOTIDINE 10 MG/ML
20 INJECTION INTRAVENOUS ONCE AS NEEDED
Status: CANCELLED | OUTPATIENT
Start: 2024-01-11

## 2023-12-18 RX ORDER — DEXTROMETHORPHAN HYDROBROMIDE, GUAIFENESIN 5; 100 MG/5ML; MG/5ML
650 LIQUID ORAL AS NEEDED
Status: CANCELLED
Start: 2024-01-11

## 2023-12-18 RX ORDER — PROCHLORPERAZINE EDISYLATE 5 MG/ML
10 INJECTION INTRAMUSCULAR; INTRAVENOUS EVERY 6 HOURS PRN
Status: CANCELLED | OUTPATIENT
Start: 2023-12-21

## 2023-12-18 RX ORDER — PROCHLORPERAZINE EDISYLATE 5 MG/ML
10 INJECTION INTRAMUSCULAR; INTRAVENOUS EVERY 6 HOURS PRN
Status: CANCELLED | OUTPATIENT
Start: 2024-01-11

## 2023-12-18 RX ORDER — ALBUTEROL SULFATE 0.83 MG/ML
3 SOLUTION RESPIRATORY (INHALATION) AS NEEDED
Status: CANCELLED | OUTPATIENT
Start: 2024-01-11

## 2023-12-18 RX ORDER — PROCHLORPERAZINE MALEATE 10 MG
10 TABLET ORAL EVERY 6 HOURS PRN
Status: CANCELLED | OUTPATIENT
Start: 2023-12-21

## 2023-12-18 RX ORDER — DIPHENHYDRAMINE HCL 50 MG
50 CAPSULE ORAL AS NEEDED
Status: CANCELLED
Start: 2023-12-21

## 2023-12-18 RX ORDER — DIPHENHYDRAMINE HYDROCHLORIDE 50 MG/ML
50 INJECTION INTRAMUSCULAR; INTRAVENOUS AS NEEDED
Status: CANCELLED | OUTPATIENT
Start: 2024-01-11

## 2023-12-18 RX ORDER — DIPHENHYDRAMINE HCL 50 MG
50 CAPSULE ORAL AS NEEDED
Status: CANCELLED
Start: 2024-01-11

## 2023-12-18 RX ORDER — DEXTROMETHORPHAN HYDROBROMIDE, GUAIFENESIN 5; 100 MG/5ML; MG/5ML
650 LIQUID ORAL AS NEEDED
Status: CANCELLED
Start: 2023-12-21

## 2023-12-18 RX ORDER — PROCHLORPERAZINE MALEATE 10 MG
10 TABLET ORAL EVERY 6 HOURS PRN
Status: CANCELLED | OUTPATIENT
Start: 2024-01-11

## 2023-12-19 ENCOUNTER — HOSPITAL ENCOUNTER (OUTPATIENT)
Dept: RADIOLOGY | Facility: HOSPITAL | Age: 63
Discharge: HOME | End: 2023-12-19
Payer: COMMERCIAL

## 2023-12-19 VITALS — BODY MASS INDEX: 22.5 KG/M2 | HEIGHT: 66 IN | WEIGHT: 140 LBS

## 2023-12-19 DIAGNOSIS — Z12.31 ENCOUNTER FOR SCREENING MAMMOGRAM FOR MALIGNANT NEOPLASM OF BREAST: ICD-10-CM

## 2023-12-19 PROCEDURE — 77067 SCR MAMMO BI INCL CAD: CPT

## 2023-12-20 ENCOUNTER — LAB (OUTPATIENT)
Dept: LAB | Facility: LAB | Age: 63
End: 2023-12-20
Payer: COMMERCIAL

## 2023-12-20 DIAGNOSIS — C34.90 MALIGNANT NEOPLASM OF LUNG, UNSPECIFIED LATERALITY, UNSPECIFIED PART OF LUNG (MULTI): ICD-10-CM

## 2023-12-20 LAB
ALBUMIN SERPL BCP-MCNC: 4.3 G/DL (ref 3.4–5)
ALP SERPL-CCNC: 65 U/L (ref 33–136)
ALT SERPL W P-5'-P-CCNC: 20 U/L (ref 7–45)
AMYLASE SERPL-CCNC: 23 U/L (ref 29–103)
ANION GAP SERPL CALC-SCNC: 15 MMOL/L (ref 10–20)
AST SERPL W P-5'-P-CCNC: 23 U/L (ref 9–39)
BASOPHILS # BLD AUTO: 0.07 X10*3/UL (ref 0–0.1)
BASOPHILS NFR BLD AUTO: 0.9 %
BILIRUB SERPL-MCNC: 0.4 MG/DL (ref 0–1.2)
BUN SERPL-MCNC: 7 MG/DL (ref 6–23)
CALCIUM SERPL-MCNC: 9.9 MG/DL (ref 8.6–10.6)
CHLORIDE SERPL-SCNC: 101 MMOL/L (ref 98–107)
CO2 SERPL-SCNC: 29 MMOL/L (ref 21–32)
CREAT SERPL-MCNC: 0.88 MG/DL (ref 0.5–1.05)
EOSINOPHIL # BLD AUTO: 0.09 X10*3/UL (ref 0–0.7)
EOSINOPHIL NFR BLD AUTO: 1.2 %
ERYTHROCYTE [DISTWIDTH] IN BLOOD BY AUTOMATED COUNT: 12.9 % (ref 11.5–14.5)
GFR SERPL CREATININE-BSD FRML MDRD: 74 ML/MIN/1.73M*2
GLUCOSE SERPL-MCNC: 78 MG/DL (ref 74–99)
HCT VFR BLD AUTO: 42.8 % (ref 36–46)
HGB BLD-MCNC: 14 G/DL (ref 12–16)
IMM GRANULOCYTES # BLD AUTO: 0.02 X10*3/UL (ref 0–0.7)
IMM GRANULOCYTES NFR BLD AUTO: 0.3 % (ref 0–0.9)
LIPASE SERPL-CCNC: 19 U/L (ref 9–82)
LYMPHOCYTES # BLD AUTO: 1.83 X10*3/UL (ref 1.2–4.8)
LYMPHOCYTES NFR BLD AUTO: 24.4 %
MCH RBC QN AUTO: 31.1 PG (ref 26–34)
MCHC RBC AUTO-ENTMCNC: 32.7 G/DL (ref 32–36)
MCV RBC AUTO: 95 FL (ref 80–100)
MONOCYTES # BLD AUTO: 0.82 X10*3/UL (ref 0.1–1)
MONOCYTES NFR BLD AUTO: 10.9 %
NEUTROPHILS # BLD AUTO: 4.68 X10*3/UL (ref 1.2–7.7)
NEUTROPHILS NFR BLD AUTO: 62.3 %
NRBC BLD-RTO: 0 /100 WBCS (ref 0–0)
PLATELET # BLD AUTO: 386 X10*3/UL (ref 150–450)
POTASSIUM SERPL-SCNC: 4.9 MMOL/L (ref 3.5–5.3)
PROT SERPL-MCNC: 6.7 G/DL (ref 6.4–8.2)
RBC # BLD AUTO: 4.5 X10*6/UL (ref 4–5.2)
SODIUM SERPL-SCNC: 140 MMOL/L (ref 136–145)
WBC # BLD AUTO: 7.5 X10*3/UL (ref 4.4–11.3)

## 2023-12-20 PROCEDURE — 83690 ASSAY OF LIPASE: CPT

## 2023-12-20 PROCEDURE — 36415 COLL VENOUS BLD VENIPUNCTURE: CPT

## 2023-12-20 PROCEDURE — 80053 COMPREHEN METABOLIC PANEL: CPT

## 2023-12-20 PROCEDURE — 85025 COMPLETE CBC W/AUTO DIFF WBC: CPT

## 2023-12-20 PROCEDURE — 82150 ASSAY OF AMYLASE: CPT

## 2023-12-21 ENCOUNTER — EDUCATION (OUTPATIENT)
Dept: HEMATOLOGY/ONCOLOGY | Facility: CLINIC | Age: 63
End: 2023-12-21

## 2023-12-21 ENCOUNTER — OFFICE VISIT (OUTPATIENT)
Dept: HEMATOLOGY/ONCOLOGY | Facility: CLINIC | Age: 63
End: 2023-12-21
Payer: COMMERCIAL

## 2023-12-21 ENCOUNTER — INFUSION (OUTPATIENT)
Dept: HEMATOLOGY/ONCOLOGY | Facility: CLINIC | Age: 63
End: 2023-12-21
Payer: COMMERCIAL

## 2023-12-21 ENCOUNTER — APPOINTMENT (OUTPATIENT)
Dept: HEMATOLOGY/ONCOLOGY | Facility: CLINIC | Age: 63
End: 2023-12-21
Payer: COMMERCIAL

## 2023-12-21 VITALS
BODY MASS INDEX: 22.65 KG/M2 | WEIGHT: 138.23 LBS | OXYGEN SATURATION: 96 % | DIASTOLIC BLOOD PRESSURE: 83 MMHG | HEART RATE: 93 BPM | RESPIRATION RATE: 18 BRPM | TEMPERATURE: 97.7 F | SYSTOLIC BLOOD PRESSURE: 128 MMHG

## 2023-12-21 DIAGNOSIS — M25.571 ARTHRALGIA OF RIGHT FOOT: ICD-10-CM

## 2023-12-21 DIAGNOSIS — C34.90 MALIGNANT NEOPLASM OF LUNG, UNSPECIFIED LATERALITY, UNSPECIFIED PART OF LUNG (MULTI): ICD-10-CM

## 2023-12-21 DIAGNOSIS — G89.3 CANCER ASSOCIATED PAIN: Primary | ICD-10-CM

## 2023-12-21 DIAGNOSIS — Z51.12 ENCOUNTER FOR ANTINEOPLASTIC IMMUNOTHERAPY: ICD-10-CM

## 2023-12-21 PROCEDURE — 96365 THER/PROPH/DIAG IV INF INIT: CPT | Mod: INF

## 2023-12-21 PROCEDURE — 2500000004 HC RX 250 GENERAL PHARMACY W/ HCPCS (ALT 636 FOR OP/ED): Mod: SE | Performed by: STUDENT IN AN ORGANIZED HEALTH CARE EDUCATION/TRAINING PROGRAM

## 2023-12-21 PROCEDURE — 99214 OFFICE O/P EST MOD 30 MIN: CPT | Performed by: NURSE PRACTITIONER

## 2023-12-21 PROCEDURE — 1036F TOBACCO NON-USER: CPT | Performed by: NURSE PRACTITIONER

## 2023-12-21 RX ORDER — PROCHLORPERAZINE EDISYLATE 5 MG/ML
10 INJECTION INTRAMUSCULAR; INTRAVENOUS EVERY 6 HOURS PRN
Status: DISCONTINUED | OUTPATIENT
Start: 2023-12-21 | End: 2023-12-21 | Stop reason: HOSPADM

## 2023-12-21 RX ORDER — HEPARIN 100 UNIT/ML
500 SYRINGE INTRAVENOUS AS NEEDED
Status: CANCELLED | OUTPATIENT
Start: 2023-12-21

## 2023-12-21 RX ORDER — HEPARIN SODIUM,PORCINE/PF 10 UNIT/ML
50 SYRINGE (ML) INTRAVENOUS AS NEEDED
Status: CANCELLED | OUTPATIENT
Start: 2023-12-21

## 2023-12-21 RX ORDER — DIPHENHYDRAMINE HCL 25 MG
50 CAPSULE ORAL AS NEEDED
Status: DISCONTINUED | OUTPATIENT
Start: 2023-12-21 | End: 2023-12-21 | Stop reason: HOSPADM

## 2023-12-21 RX ORDER — OXYCODONE AND ACETAMINOPHEN 5; 325 MG/1; MG/1
1 TABLET ORAL EVERY 6 HOURS PRN
Qty: 30 TABLET | Refills: 0 | Status: SHIPPED | OUTPATIENT
Start: 2023-12-21 | End: 2024-04-25

## 2023-12-21 RX ORDER — PROCHLORPERAZINE MALEATE 10 MG
10 TABLET ORAL EVERY 6 HOURS PRN
Status: DISCONTINUED | OUTPATIENT
Start: 2023-12-21 | End: 2023-12-21 | Stop reason: HOSPADM

## 2023-12-21 RX ORDER — ACETAMINOPHEN 325 MG/1
650 TABLET ORAL AS NEEDED
Status: DISCONTINUED | OUTPATIENT
Start: 2023-12-21 | End: 2023-12-21 | Stop reason: HOSPADM

## 2023-12-21 RX ADMIN — PEMBROLIZUMAB 200 MG: 25 INJECTION, SOLUTION INTRAVENOUS at 11:23

## 2023-12-21 ASSESSMENT — PAIN SCALES - GENERAL: PAINLEVEL: 2

## 2023-12-21 NOTE — PROGRESS NOTES
Patient ID: Hayley Potter is a 63 y.o. female.    CC:  Management of stage IA3 ([id number]) adenocarcinoma of RUL s/p RUL lobectomy 04/2020, PDL-1 50%, NGS positive for KRAS G12C--> now with  metastatic recurrence involving liver, 11th L rib, and mediastinal LNs. 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 She was initially found to have spiculated 2.9cm mass in the RUL on the CT chest on 03/02/2020. She underwent CT guided RUL biopsy with pathology showed lung tissue with scan and focal aypicla grandular proliferation  suspicious for adenocarcinoma. IHC positive for CK7, TTF1 and napsin A. CK20 negative. PET/CT on 04/15/2020 showed RUL hypermetabolic RUL mass. No evidence of distant metastases. She underwent RUL lobectomy with mediastinal LN dissection with Dr. Wheat  on 04/21/2020 which showed invasive well to moderately differentiated adenocarcinoma, tumor measuring 2.5x2.3.x1.7cm. No VPI or LVI. All margins were negative. LN (0/11).      Unfortunately, on the surveillance CT chest (+) on 08/24/2022, there was a new irregular marginated 6mm GUSTABO nodule, which increased in size significantly on the repeat CT chest (-) on 01/06/2023, measuring 12mm.  There is also an additional new 9mm nodule  in the L lung as well as new mediastinal and possible L hilar LAD. PET/CT on 02/016/2023 showed hypermetabolic L hilar and mediastinal LAD. 1st  GUSTABO nodule now 8mm likely inflammatory. 2nd GUSTABO nodule showed stable size with SUV 4.4. Soft tissue mass associated with  L 11th rib fracture, SUV 11.5. Mass within the liver SUV 13.1, suspicious for mets. Hypermetabolic activity also noted   in the stomach fundus, association with a hiatal hernia, in the cecum and ascending colon without masses seen. She underwent US guided liver biopsy on 02/16/2023 - poorly-differentiated carcinoma.     Treatment Summary:  04/21/2020: RUL lobectomy with medistainal LN dissection (Dr. Wheat)  04/11/2023: C1 pembrolizumab 200mg IV (on trial)  05/02/2023: C2 Pembrolizumab 200mg IV (on trial)  05/23/2023: C3 Pembrolizumab 200mg IV (on trial)  06/13/2023: C4 Pembrolizumab 200mg IV (on trial)   07/05/2023: C5 Pembrolizumab 200mg IV (on trial)   07/26/2023: C6 Pembrolizumab 200mg IV (on trial)   08/16/2023: C7 Pembrolizumab 200mg IV (on trial)   09/07/2023: C8 Pembrolizumab 200mg IV (on trial)   09/28/2023: C9 Pembrolizumab 200mg IV (on trial)   10/19/2023: C10 Pembrolizumab 200mg IV (on trial)  11/09/2023: C11 Pembrolizumab 200mg IV (on trial)  11/30/2023: C12 Pembrolizumab 200mg IV (on trial)  12/21/2023: C13 Pembrolizumab 200mg IV (on trial)    Interval History (12/21/2023):    Pt present in clinic for readiness to treat visit. - cycle 13 of Pembrolizumab on clinical trial [id number]. Her daughter is present for visit.  Energy level is good.  Breathing is stable.  No changes with her appetite.  Wt is stable.  Denies n/v/d.  Intermittent constipation d/t diet - managed.  Reports today arthralgia pain (grade 1) to her right great toe.  Intermittent, 2x/wk.  Lasts minutes.  Started a few weeks ago.  Same discomfort as in her wrists, left thumb, and joints to right hand.  Requesting norco refill.  No rashes or skin concerns.  Labs WNL.  Ready for treatment today.      PMHx: vocal cord cancer, GERD and COPD, OA  PSHx: tonsillectomy, tubal ligation, lumpectomy, L ankle and R hand surgery  FHx: strong family history of breast cancer including male breast cancer.   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 She quitted etoh 2010. She smokes Marijuana every day.      Allergies:  Cephalexin    Medications:  Medication list reviewed with patient and updated in EMR    Vital Signs:  /83, HR 93, RR 18, T 97.7, SpO2 96%, Wt 62.7 kg, Ht 166.4 cm  Wt Readings from Last 5 Encounters:   23 62.7 kg (138 lb 3.7 oz)   23 63.5 kg (140 lb)   23 64.4 kg (141 lb 15.6 oz)   23 64.8 kg (142 lb 13.7 oz)   10/19/23 64.9 kg (143 lb 1.3 oz)      Physical Exam:  ECO  Pain: mild 1-3  General: Well appearing, no acute distress  Neurology: Alert and oriented x3, CN II-XII grossly intact  Psychology: Affect appropriate  Eyes: PERRL, EOMI  ENT: Mucus membranes moist and intact, no ulcers  Lymphatics: No LAD   Neck: Neck supple  Respiratory: Lungs clear bilaterally, no wheezes, no rales, no rhonchi  Cardiovascular: Normal rate and rhythm, no murmur  Abdomen: Soft, non-tender, non-distended, normoactive, no ascites  Musculoskeletal: Normal gait and stance  Extremities: No clubbing, no cyanosis, no edema  Skin: No rash, erythema to anterior neck 2/2 radiation     Lab Results   Component Value Date    WBC 7.5 2023    HGB 14.0 2023    HCT 42.8 2023    MCV 95 2023     2023   Na 140, K 4.9, BUN 7, Cr 0.88     Assessment/Plan:  63 y.o. 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     The patient's records and imaging have been reviewed in detail.  MD discussed with the patient the natural history of their disease at length.  The following has also been discussed with the patient:     # Cancer: recurrent likely metastatic ([id number]) lung cancer: liver biopsy showed extensive necrosis. Liquid biopsy showed KRAS G12C mutation, which was identified in her original surgical resected  specimen. PDL-1 50%. Patient initially had stage IA2 ([id number]) RUL lung  adenocarcinoma s/p RUL lobectomy, unfortunately  now likely metastatic recurrence. Liver biopsy on  showed poorly differentiated malignant neoplasm with extensive necrosis. We discussed treatment  options with her today with either standard of care pembrolizumab vs clinical trials. Patient expressed interests in enrolling into clinical trial. Enrolled into RCT [id number]. Doing well.      Imagin2023 CT CAP (-/+):  8 mm ground-glass nodule more conspicuous compared to 1 month earlier and developed compared 2 months ago. Faint patchy ground-glass  opacities in both lower lungs are similar to mildly improved. No evidence of malignancy in abdomen pelvis.  Repeat scan scheduled 24.       # Hypothyroidism: G2. Likely 2/2 immunotherapy - resolved.  TSH 1.07 23.  Continue levothyroxine 75mcg daily.      # Microcytic anemia 2/2 due to chronic disease: patient denies any hematochezia  or hematuria. No melena. hx of hiatal hernia. Per patient. Last EGD was done in  and was normal. Colonoscopy was a few years ago.  EGD showed hiatal hernia 2023. Improved Hgb overall. Iron studies show low-normal ferritin and TSAT of 14%. Continue PO iron 3x/week. Tolerates well.     # Mucositis (chronic).  Pt reports since start of immunotherapy.  Chronic sore secondary to ill fitting dentures recommended s&s rinse.  Continue Anbesol and/or Oragel for pain.  Will continue to monitor.  BMX added.   - denies current complaints.     # Grade 1 hyperthyroidism: resolved, now hypothyroid (as above).     # Constipation: managed well with stool softener and PRN lactulose.        # Clinical Trials: 2023 initiated treatment on [id number] this is cycle 1 of first-line treatment.  She has been randomized to  arm A which is pembrolizumab alone for up to 2 years or progression followed by Alimta/carboplatin for second line therapy. Consent has been signed. Repeat CT scans after 2 cycles showed partial response. Continue treatment.      # Access: Peripheral veins.     #  Pain: she has a displaced 8th Rib on the left side -Advised patient to take percocet daily and decrease advil use (she was taking 4x daily).  Will refer to supportive oncology. Pt declining to take percocet at this time and is taking advil sparingly.     # Bone Health: L fifth ribs lesion stable. Not noticeable on the scan on 8/30.    # Arthralgia (grade 1).  Uric acid lab ordered r/o gout vs. Immune related arthralgia.  Intermittent, continue PRN Advil or Percocet for severe pain.  Percocet refill Rx sent.  OARRS reviewed - appropriate, no aberrant behavior.  Last filled 3/8.    Left rib or chest wall pain over the past month. 11/30/23 CXR - RESULTS: Mediastinal surgical clips are noted. The cardiac silhouette is within normal limits. Mediastinal contours are unremarkable. The lungs demonstrate no infiltrate or atelectasis. There is no evidence for pleural effusion. Remote fracture is noted involving the right 7th rib. There are degenerative changes of the thoracic spine.  - Continue PRN Percocet for pain control.      # Psychosocial: Coping well, no acute issues.  Good support from family & friends.  Social work support available.     # GI/CINV: No acute issues.  Eating and voiding well.  Nutrition consult available.     # Smoking: former smoker     # Fertility: N/A.        # Heme/ESAs: N/A.     # GOC: Patient is aware of palliative  intent goals of care.     # Language: English.     # Dispo: RTC in 3 weeks for next cycle.  Pt instructed to call with concerns/questions .

## 2023-12-21 NOTE — PROGRESS NOTES
pt tolerated today's infusions without difficulty. Pt aware of upcoming appt schedule. pt to call with any questions and/or concerns.

## 2023-12-21 NOTE — RESEARCH NOTES
Research Note Treatment Day    Hayley Potter is here today for treatment on [id number]. Today is C_13D_1. Procedures completed per protocol. AE's and con-meds reviewed with patient. Patient is aware of treatment plan.    [x]   Received treatment as planned   OR  []    Treatment delayed; patient calendar updated as required   Treatment delayed because:    []   AE    []   Physician Discretion    []   Clinical Deterioration or Progression     []   Other    Education Documentation  Treatment Plan and Schedule, taught by Rafiq Delgado RN at 12/21/2023  4:13 PM.  Learner: Patient  Readiness: Acceptance  Method: Explanation  Response: Verbalizes Understanding    Education Comments  No comments found.    Pt to clinic with her daughter for cycle 13 on [id number].  Pt feels pretty well with no real complaints other than some joint pain to knuckles and right toe. NP in to finish assessment.

## 2023-12-23 PROBLEM — G89.3 CANCER ASSOCIATED PAIN: Status: ACTIVE | Noted: 2023-12-23

## 2023-12-23 PROBLEM — M25.571 ARTHRALGIA OF RIGHT FOOT: Status: ACTIVE | Noted: 2023-12-23

## 2024-01-04 ENCOUNTER — HOSPITAL ENCOUNTER (OUTPATIENT)
Dept: RADIOLOGY | Facility: HOSPITAL | Age: 64
Discharge: HOME | End: 2024-01-04
Payer: COMMERCIAL

## 2024-01-04 DIAGNOSIS — C34.91 PRIMARY MALIGNANT NEOPLASM OF RIGHT LUNG METASTATIC TO OTHER SITE (MULTI): ICD-10-CM

## 2024-01-04 PROCEDURE — 74177 CT ABD & PELVIS W/CONTRAST: CPT

## 2024-01-04 PROCEDURE — 2550000001 HC RX 255 CONTRASTS: Performed by: STUDENT IN AN ORGANIZED HEALTH CARE EDUCATION/TRAINING PROGRAM

## 2024-01-04 RX ADMIN — IOHEXOL 75 ML: 350 INJECTION, SOLUTION INTRAVENOUS at 09:22

## 2024-01-08 DIAGNOSIS — C34.11 MALIGNANT NEOPLASM OF UPPER LOBE OF RIGHT LUNG (MULTI): ICD-10-CM

## 2024-01-08 DIAGNOSIS — C34.90 MALIGNANT NEOPLASM OF LUNG, UNSPECIFIED LATERALITY, UNSPECIFIED PART OF LUNG (MULTI): ICD-10-CM

## 2024-01-08 PROBLEM — M79.18 MYOFASCIAL MUSCLE PAIN: Status: ACTIVE | Noted: 2021-05-22

## 2024-01-08 PROBLEM — C34.91 PRIMARY MALIGNANT NEOPLASM OF RIGHT LUNG METASTATIC TO OTHER SITE (MULTI): Status: ACTIVE | Noted: 2023-09-21

## 2024-01-08 PROBLEM — G54.1: Status: ACTIVE | Noted: 2021-03-20

## 2024-01-08 PROBLEM — J40 BRONCHITIS: Status: ACTIVE | Noted: 2024-01-08

## 2024-01-08 PROBLEM — E03.9 HYPOTHYROIDISM: Status: ACTIVE | Noted: 2023-09-28

## 2024-01-08 PROBLEM — R07.89 CHEST WALL PAIN: Status: ACTIVE | Noted: 2023-11-30

## 2024-01-08 RX ORDER — FLUTICASONE PROPIONATE 50 MCG
SPRAY, SUSPENSION (ML) NASAL
COMMUNITY
End: 2024-04-25 | Stop reason: ALTCHOICE

## 2024-01-08 RX ORDER — AMOXICILLIN AND CLAVULANATE POTASSIUM 875; 125 MG/1; MG/1
875 TABLET, FILM COATED ORAL 2 TIMES DAILY
COMMUNITY
Start: 2023-05-23 | End: 2024-01-11 | Stop reason: ALTCHOICE

## 2024-01-08 RX ORDER — AZITHROMYCIN 500 MG/1
500 TABLET, FILM COATED ORAL DAILY
COMMUNITY
Start: 2023-05-23 | End: 2024-04-04 | Stop reason: ALTCHOICE

## 2024-01-08 RX ORDER — BUSPIRONE HYDROCHLORIDE 10 MG/1
10 TABLET ORAL 2 TIMES DAILY
COMMUNITY
Start: 2023-01-19

## 2024-01-08 RX ORDER — ASPIRIN 325 MG
50000 TABLET, DELAYED RELEASE (ENTERIC COATED) ORAL
COMMUNITY
Start: 2023-11-21

## 2024-01-08 RX ORDER — SOD SULF/POT CHLORIDE/MAG SULF 1.479 G
TABLET ORAL
COMMUNITY
Start: 2023-02-27

## 2024-01-08 RX ORDER — RISPERIDONE 1 MG/ML
SOLUTION ORAL
COMMUNITY
Start: 2023-11-09

## 2024-01-08 RX ORDER — PREGABALIN 50 MG/1
50 CAPSULE ORAL 3 TIMES DAILY
COMMUNITY
Start: 2021-06-10 | End: 2024-04-04 | Stop reason: ALTCHOICE

## 2024-01-08 RX ORDER — DOXYCYCLINE 100 MG/1
100 CAPSULE ORAL
COMMUNITY
Start: 2023-04-11 | End: 2024-02-22 | Stop reason: WASHOUT

## 2024-01-08 RX ORDER — BISACODYL 5 MG
5 TABLET, DELAYED RELEASE (ENTERIC COATED) ORAL
COMMUNITY

## 2024-01-08 RX ORDER — BUSPIRONE HYDROCHLORIDE 15 MG/1
15 TABLET ORAL EVERY 12 HOURS
COMMUNITY
Start: 2023-10-10

## 2024-01-08 RX ORDER — CYCLOBENZAPRINE HCL 10 MG
10 TABLET ORAL
COMMUNITY
Start: 2023-01-19

## 2024-01-10 ENCOUNTER — LAB (OUTPATIENT)
Dept: LAB | Facility: LAB | Age: 64
End: 2024-01-10
Payer: COMMERCIAL

## 2024-01-10 DIAGNOSIS — C34.90: ICD-10-CM

## 2024-01-10 LAB
ALBUMIN SERPL BCP-MCNC: 4.3 G/DL (ref 3.4–5)
ALP SERPL-CCNC: 64 U/L (ref 33–136)
ALT SERPL W P-5'-P-CCNC: 15 U/L (ref 7–45)
AMYLASE SERPL-CCNC: 26 U/L (ref 29–103)
ANION GAP SERPL CALC-SCNC: 13 MMOL/L (ref 10–20)
AST SERPL W P-5'-P-CCNC: 19 U/L (ref 9–39)
BASOPHILS # BLD AUTO: 0.08 X10*3/UL (ref 0–0.1)
BASOPHILS NFR BLD AUTO: 1 %
BILIRUB SERPL-MCNC: 0.4 MG/DL (ref 0–1.2)
BUN SERPL-MCNC: 10 MG/DL (ref 6–23)
CALCIUM SERPL-MCNC: 10 MG/DL (ref 8.6–10.6)
CHLORIDE SERPL-SCNC: 103 MMOL/L (ref 98–107)
CO2 SERPL-SCNC: 29 MMOL/L (ref 21–32)
CREAT SERPL-MCNC: 0.92 MG/DL (ref 0.5–1.05)
EGFRCR SERPLBLD CKD-EPI 2021: 70 ML/MIN/1.73M*2
EOSINOPHIL # BLD AUTO: 0.12 X10*3/UL (ref 0–0.7)
EOSINOPHIL NFR BLD AUTO: 1.5 %
ERYTHROCYTE [DISTWIDTH] IN BLOOD BY AUTOMATED COUNT: 12.8 % (ref 11.5–14.5)
GLUCOSE SERPL-MCNC: 87 MG/DL (ref 74–99)
HCT VFR BLD AUTO: 42.8 % (ref 36–46)
HGB BLD-MCNC: 14 G/DL (ref 12–16)
IMM GRANULOCYTES # BLD AUTO: 0.02 X10*3/UL (ref 0–0.7)
IMM GRANULOCYTES NFR BLD AUTO: 0.3 % (ref 0–0.9)
LIPASE SERPL-CCNC: 24 U/L (ref 9–82)
LYMPHOCYTES # BLD AUTO: 1.6 X10*3/UL (ref 1.2–4.8)
LYMPHOCYTES NFR BLD AUTO: 20.6 %
MCH RBC QN AUTO: 31.1 PG (ref 26–34)
MCHC RBC AUTO-ENTMCNC: 32.7 G/DL (ref 32–36)
MCV RBC AUTO: 95 FL (ref 80–100)
MONOCYTES # BLD AUTO: 1.02 X10*3/UL (ref 0.1–1)
MONOCYTES NFR BLD AUTO: 13.2 %
NEUTROPHILS # BLD AUTO: 4.91 X10*3/UL (ref 1.2–7.7)
NEUTROPHILS NFR BLD AUTO: 63.4 %
NRBC BLD-RTO: 0 /100 WBCS (ref 0–0)
PLATELET # BLD AUTO: 390 X10*3/UL (ref 150–450)
POTASSIUM SERPL-SCNC: 5.3 MMOL/L (ref 3.5–5.3)
PROT SERPL-MCNC: 6.6 G/DL (ref 6.4–8.2)
RBC # BLD AUTO: 4.5 X10*6/UL (ref 4–5.2)
SODIUM SERPL-SCNC: 140 MMOL/L (ref 136–145)
WBC # BLD AUTO: 7.8 X10*3/UL (ref 4.4–11.3)

## 2024-01-10 PROCEDURE — 83690 ASSAY OF LIPASE: CPT

## 2024-01-10 PROCEDURE — 82150 ASSAY OF AMYLASE: CPT

## 2024-01-10 PROCEDURE — 85025 COMPLETE CBC W/AUTO DIFF WBC: CPT

## 2024-01-10 PROCEDURE — 80053 COMPREHEN METABOLIC PANEL: CPT

## 2024-01-10 PROCEDURE — 36415 COLL VENOUS BLD VENIPUNCTURE: CPT

## 2024-01-11 ENCOUNTER — OFFICE VISIT (OUTPATIENT)
Dept: HEMATOLOGY/ONCOLOGY | Facility: CLINIC | Age: 64
End: 2024-01-11
Payer: COMMERCIAL

## 2024-01-11 ENCOUNTER — APPOINTMENT (OUTPATIENT)
Dept: HEMATOLOGY/ONCOLOGY | Facility: CLINIC | Age: 64
End: 2024-01-11
Payer: COMMERCIAL

## 2024-01-11 ENCOUNTER — INFUSION (OUTPATIENT)
Dept: HEMATOLOGY/ONCOLOGY | Facility: CLINIC | Age: 64
End: 2024-01-11
Payer: COMMERCIAL

## 2024-01-11 VITALS
RESPIRATION RATE: 18 BRPM | HEART RATE: 94 BPM | BODY MASS INDEX: 22.56 KG/M2 | OXYGEN SATURATION: 92 % | TEMPERATURE: 96.8 F | DIASTOLIC BLOOD PRESSURE: 82 MMHG | SYSTOLIC BLOOD PRESSURE: 150 MMHG | WEIGHT: 137.68 LBS

## 2024-01-11 DIAGNOSIS — M25.571 ARTHRALGIA OF RIGHT FOOT: ICD-10-CM

## 2024-01-11 DIAGNOSIS — G89.3 CANCER ASSOCIATED PAIN: ICD-10-CM

## 2024-01-11 DIAGNOSIS — C34.90: Primary | ICD-10-CM

## 2024-01-11 DIAGNOSIS — Z00.6 EXAMINATION OF PARTICIPANT IN CLINICAL TRIAL: ICD-10-CM

## 2024-01-11 DIAGNOSIS — C34.11 MALIGNANT NEOPLASM OF UPPER LOBE OF RIGHT LUNG (MULTI): ICD-10-CM

## 2024-01-11 DIAGNOSIS — C34.90: ICD-10-CM

## 2024-01-11 LAB — URATE SERPL-MCNC: 4 MG/DL (ref 2.3–6.7)

## 2024-01-11 PROCEDURE — 86481 TB AG RESPONSE T-CELL SUSP: CPT

## 2024-01-11 PROCEDURE — 2500000004 HC RX 250 GENERAL PHARMACY W/ HCPCS (ALT 636 FOR OP/ED): Mod: SE | Performed by: STUDENT IN AN ORGANIZED HEALTH CARE EDUCATION/TRAINING PROGRAM

## 2024-01-11 PROCEDURE — 1036F TOBACCO NON-USER: CPT | Performed by: STUDENT IN AN ORGANIZED HEALTH CARE EDUCATION/TRAINING PROGRAM

## 2024-01-11 PROCEDURE — 84550 ASSAY OF BLOOD/URIC ACID: CPT

## 2024-01-11 PROCEDURE — 99215 OFFICE O/P EST HI 40 MIN: CPT | Performed by: STUDENT IN AN ORGANIZED HEALTH CARE EDUCATION/TRAINING PROGRAM

## 2024-01-11 RX ORDER — OXYCODONE AND ACETAMINOPHEN 5; 325 MG/1; MG/1
1 TABLET ORAL EVERY 12 HOURS PRN
Qty: 14 TABLET | Refills: 0 | Status: CANCELLED | OUTPATIENT
Start: 2024-01-11 | End: 2024-01-18

## 2024-01-11 RX ORDER — ALBUTEROL SULFATE 0.83 MG/ML
3 SOLUTION RESPIRATORY (INHALATION) AS NEEDED
Status: DISCONTINUED | OUTPATIENT
Start: 2024-01-11 | End: 2024-01-11 | Stop reason: HOSPADM

## 2024-01-11 RX ORDER — ACETAMINOPHEN 325 MG/1
650 TABLET ORAL AS NEEDED
Status: DISCONTINUED | OUTPATIENT
Start: 2024-01-11 | End: 2024-01-11 | Stop reason: HOSPADM

## 2024-01-11 RX ORDER — PROCHLORPERAZINE EDISYLATE 5 MG/ML
10 INJECTION INTRAMUSCULAR; INTRAVENOUS EVERY 6 HOURS PRN
Status: DISCONTINUED | OUTPATIENT
Start: 2024-01-11 | End: 2024-01-11 | Stop reason: HOSPADM

## 2024-01-11 RX ORDER — DIPHENHYDRAMINE HCL 25 MG
50 CAPSULE ORAL AS NEEDED
Status: DISCONTINUED | OUTPATIENT
Start: 2024-01-11 | End: 2024-01-11 | Stop reason: HOSPADM

## 2024-01-11 RX ORDER — FAMOTIDINE 10 MG/ML
20 INJECTION INTRAVENOUS ONCE AS NEEDED
Status: DISCONTINUED | OUTPATIENT
Start: 2024-01-11 | End: 2024-01-11 | Stop reason: HOSPADM

## 2024-01-11 RX ORDER — EPINEPHRINE 0.3 MG/.3ML
0.3 INJECTION SUBCUTANEOUS EVERY 5 MIN PRN
Status: DISCONTINUED | OUTPATIENT
Start: 2024-01-11 | End: 2024-01-11 | Stop reason: HOSPADM

## 2024-01-11 RX ORDER — PROCHLORPERAZINE MALEATE 10 MG
10 TABLET ORAL EVERY 6 HOURS PRN
Status: DISCONTINUED | OUTPATIENT
Start: 2024-01-11 | End: 2024-01-11 | Stop reason: HOSPADM

## 2024-01-11 RX ORDER — DIPHENHYDRAMINE HYDROCHLORIDE 50 MG/ML
50 INJECTION INTRAMUSCULAR; INTRAVENOUS AS NEEDED
Status: DISCONTINUED | OUTPATIENT
Start: 2024-01-11 | End: 2024-01-11 | Stop reason: HOSPADM

## 2024-01-11 RX ADMIN — Medication 200 MG: at 11:15

## 2024-01-11 ASSESSMENT — COLUMBIA-SUICIDE SEVERITY RATING SCALE - C-SSRS
6. HAVE YOU EVER DONE ANYTHING, STARTED TO DO ANYTHING, OR PREPARED TO DO ANYTHING TO END YOUR LIFE?: NO
2. HAVE YOU ACTUALLY HAD ANY THOUGHTS OF KILLING YOURSELF?: NO
1. IN THE PAST MONTH, HAVE YOU WISHED YOU WERE DEAD OR WISHED YOU COULD GO TO SLEEP AND NOT WAKE UP?: NO

## 2024-01-11 ASSESSMENT — ENCOUNTER SYMPTOMS
LOSS OF SENSATION IN FEET: 0
DEPRESSION: 0
OCCASIONAL FEELINGS OF UNSTEADINESS: 0

## 2024-01-11 ASSESSMENT — PATIENT HEALTH QUESTIONNAIRE - PHQ9
2. FEELING DOWN, DEPRESSED OR HOPELESS: NOT AT ALL
SUM OF ALL RESPONSES TO PHQ9 QUESTIONS 1 AND 2: 0
10. IF YOU CHECKED OFF ANY PROBLEMS, HOW DIFFICULT HAVE THESE PROBLEMS MADE IT FOR YOU TO DO YOUR WORK, TAKE CARE OF THINGS AT HOME, OR GET ALONG WITH OTHER PEOPLE: NOT DIFFICULT AT ALL
1. LITTLE INTEREST OR PLEASURE IN DOING THINGS: NOT AT ALL

## 2024-01-11 ASSESSMENT — PAIN SCALES - GENERAL: PAINLEVEL: 2

## 2024-01-11 NOTE — PROGRESS NOTES
Patient ID: Hayley Potter is a 63 y.o. female.    CC:  Management of stage IA3 ([id number]) adenocarcinoma of RUL s/p RUL lobectomy 04/2020, PDL-1 50%, NGS positive for KRAS G12C--> now with  metastatic recurrence involving liver, 11th L rib, and mediastinal LNs. 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 She was initially found to have spiculated 2.9cm mass in the RUL on the CT chest on 03/02/2020. She underwent CT guided RUL biopsy with pathology showed lung tissue with scan and focal aypicla grandular proliferation  suspicious for adenocarcinoma. IHC positive for CK7, TTF1 and napsin A. CK20 negative. PET/CT on 04/15/2020 showed RUL hypermetabolic RUL mass. No evidence of distant metastases. She underwent RUL lobectomy with mediastinal LN dissection with Dr. Wheat  on 04/21/2020 which showed invasive well to moderately differentiated adenocarcinoma, tumor measuring 2.5x2.3.x1.7cm. No VPI or LVI. All margins were negative. LN (0/11).      Unfortunately, on the surveillance CT chest (+) on 08/24/2022, there was a new irregular marginated 6mm GUSTABO nodule, which increased in size significantly on the repeat CT chest (-) on 01/06/2023, measuring 12mm.  There is also an additional new 9mm nodule  in the L lung as well as new mediastinal and possible L hilar LAD. PET/CT on 02/016/2023 showed hypermetabolic L hilar and mediastinal LAD. 1st  GUSTABO nodule now 8mm likely inflammatory. 2nd GUSTABO nodule showed stable size with SUV 4.4. Soft tissue mass associated with  L 11th rib fracture, SUV 11.5. Mass within the liver SUV 13.1, suspicious for mets. Hypermetabolic activity also noted   in the stomach fundus, association with a hiatal hernia, in the cecum and ascending colon without masses seen. She underwent US guided liver biopsy on 02/16/2023 - poorly-differentiated carcinoma.     Treatment Summary:  04/21/2020: RUL lobectomy with medistainal LN dissection (Dr. Wheat)  04/11/2023: C1 pembrolizumab 200mg IV (on trial)  05/02/2023: C2 Pembrolizumab 200mg IV (on trial)  05/23/2023: C3 Pembrolizumab 200mg IV (on trial)  06/13/2023: C4 Pembrolizumab 200mg IV (on trial)   07/05/2023: C5 Pembrolizumab 200mg IV (on trial)   07/26/2023: C6 Pembrolizumab 200mg IV (on trial)   08/16/2023: C7 Pembrolizumab 200mg IV (on trial)   09/07/2023: C8 Pembrolizumab 200mg IV (on trial)   09/28/2023: C9 Pembrolizumab 200mg IV (on trial)   10/19/2023: C10 Pembrolizumab 200mg IV (on trial)  11/09/2023: C11 Pembrolizumab 200mg IV (on trial)  11/30/2023: C12 Pembrolizumab 200mg IV (on trial)  12/21/2023: C13 Pembrolizumab 200mg IV (on trial)  01/11/2024: C14 Pembrolizumab 200mg IV (on trial)    Interval History (01/11/2023):    Pt present in clinic for readiness to treat visit. Energy level is good.  Breathing is stable.  No changes with her appetite.  Wt is stable.  Denies n/v/d.  Intermittent constipation d/t diet - managed.  Stable cough. R sided rib cage pain, below R breast, started around the holiday. She was doing a lot of heavy lifting around that time.  No rashes or skin concerns.  Labs WNL.  Ready for treatment today.      PMHx: vocal cord cancer, GERD and COPD, OA  PSHx: tonsillectomy, tubal ligation, lumpectomy, L ankle and R hand surgery  FHx: strong family history of breast cancer including male breast cancer.   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 She quitted etoh 2010. She smokes Marijuana every day.     Allergies:  Cephalexin    Medications:  Medication list reviewed with patient and updated in EMR    Vital Signs:  /83, HR 93, RR 18, T 97.7,  SpO2 96%, Wt 62.7 kg, Ht 166.4 cm  Wt Readings from Last 5 Encounters:   23 62.7 kg (138 lb 3.7 oz)   23 63.5 kg (140 lb)   23 64.4 kg (141 lb 15.6 oz)   23 64.8 kg (142 lb 13.7 oz)   10/19/23 64.9 kg (143 lb 1.3 oz)      Physical Exam:  ECO  Pain: mild 1-3  General: Well appearing, no acute distress  Neurology: Alert and oriented x3, CN II-XII grossly intact  Psychology: Affect appropriate  Eyes: PERRL, EOMI  ENT: Mucus membranes moist and intact, no ulcers  Lymphatics: No LAD   Neck: Neck supple  Respiratory: Lungs clear bilaterally, no wheezes, no rales, no rhonchi  Cardiovascular: Normal rate and rhythm, no murmur  Abdomen: Soft, non-tender, non-distended, normoactive, no ascites  Musculoskeletal: Normal gait and stance  Extremities: No clubbing, no cyanosis, no edema  Skin: No rash, erythema to anterior neck / radiation     Lab Results   Component Value Date    WBC 7.8 01/10/2024    HGB 14.0 01/10/2024    HCT 42.8 01/10/2024    MCV 95 01/10/2024     01/10/2024   Na 140, K 4.9, BUN 7, Cr 0.88     Assessment/Plan:  63 y.o. 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     The patient's records and imaging have been reviewed in detail.  MD discussed with the patient the natural history of their disease at length.  The following has also been discussed with the patient:     # Cancer: recurrent likely metastatic ([id number]) lung cancer: liver biopsy showed extensive necrosis. Liquid biopsy showed KRAS G12C mutation, which was identified in her original surgical resected  specimen. PDL-1 50%. Patient initially had stage IA2 ([id number]) RUL lung  adenocarcinoma s/p RUL lobectomy, unfortunately now likely metastatic recurrence. Liver biopsy on  showed poorly differentiated malignant neoplasm with extensive necrosis. We discussed treatment   options with her today with either standard of care pembrolizumab vs clinical trials. Patient expressed interests in enrolling into clinical trial. Enrolled into RCT [id number]. Doing well.      - Interval scan: 01/04/2024: CT CAP (+): 1. Interval resolution of subsolid nodule in the right lower lobe infrahilar location. 2. Stable appearing nodular opacity in the left upper lobe subpleural location with slight linear configuration appearing less conspicuous from CT dated 05/19/2023. Attention this area on follow-up.  3. No lymphadenopathy in the chest. 4. No metastatic disease in the abdomen/pelvis. 5. Stable appearing sclerotic lesion within the left posterior 5th rib from prior imaging including 11/11/2021.       # Hypothyroidism: G2. Likely 2/2 immunotherapy - resolved.  TSH 1.07 11/8/23.  Continue levothyroxine 75mcg daily.      # Microcytic anemia 2/2 due to chronic disease: patient denies any hematochezia  or hematuria. No melena. hx of hiatal hernia. Per patient. Last EGD was done in 2021 and was normal. Colonoscopy was a few years ago.  EGD showed hiatal hernia 03/2023. Improved Hgb overall. Iron studies show low-normal ferritin and TSAT of 14%. Continue PO iron 3x/week. Tolerates well.     # Mucositis (chronic).  Pt reports since start of immunotherapy.  Chronic sore secondary to ill fitting dentures recommended s&s rinse.  Continue Anbesol and/or Oragel for pain.  Will continue to monitor.  BMX added.  12/21 - denies current complaints.     # Grade 1 hyperthyroidism: resolved, now hypothyroid (as above).     # Constipation: managed well with stool softener and PRN lactulose.        # Clinical Trials: April 11, 2023 initiated treatment on [id number] this is cycle 1 of first-line treatment.  She has been randomized to  arm A which is pembrolizumab alone for up to 2 years or progression followed by Alimta/carboplatin for second line therapy. Consent has been signed. Repeat CT scans after 2 cycles showed partial  response. Continue treatment.      # Access: Peripheral veins.     # Pain: she has a displaced 8th Rib on the left side -Advised patient to take percocet daily and decrease advil use. Currently using percocet, as needed, which helps.    # Bone Health: L fifth ribs lesion stable.    # Arthralgia (grade 1).  Uric acid lab ordered r/o gout vs. Immune related arthralgia.  Intermittent, continue PRN Advil or Percocet for severe pain.  Percocet refill Rx sent.  OARRS reviewed - appropriate, no aberrant behavior.  Last filled 3/8.    # Left rib or chest wall pain over the past month. 11/30/23 CXR - RESULTS: Mediastinal surgical clips are noted. The cardiac silhouette is within normal limits. Mediastinal contours are unremarkable. The lungs demonstrate no infiltrate or atelectasis. There is no evidence for pleural effusion. Remote fracture is noted involving the right 7th rib. There are degenerative changes of the thoracic spine.  - Continue PRN Percocet for pain control.      # Psychosocial: Coping well, no acute issues.  Good support from family & friends.  Social work support available.     # GI/CINV: No acute issues.  Eating and voiding well.  Nutrition consult available.     # Smoking: former smoker     # Fertility: N/A.        # Heme/ESAs: N/A.     # GOC: Patient is aware of palliative  intent goals of care.     # Language: English.     # Dispo: RTC in 3 weeks for next cycle.  Pt instructed to call with concerns/questions .

## 2024-01-11 NOTE — PROGRESS NOTES
"Patient here today for follow up. Patient is still having left sided rib pain. She has new right sided pain, under her breast, started about 10 days ago. New cough, productive with pale yellow sputum. Afebrile.    Fatigue: energy level \"pretty good\"  PO intake: adequate \"most of the time\"  Weight: slow weight loss  N/V/D/C: denies    Medications reviewed with patient.       "

## 2024-01-13 LAB
NIL(NEG) CONTROL SPOT COUNT: NORMAL
PANEL A SPOT COUNT: 0
PANEL B SPOT COUNT: 0
POS CONTROL SPOT COUNT: NORMAL
T-SPOT. TB INTERPRETATION: NEGATIVE

## 2024-01-26 DIAGNOSIS — C34.11 MALIGNANT NEOPLASM OF UPPER LOBE OF RIGHT LUNG (MULTI): Primary | ICD-10-CM

## 2024-01-26 RX ORDER — DIPHENHYDRAMINE HYDROCHLORIDE 50 MG/ML
50 INJECTION INTRAMUSCULAR; INTRAVENOUS AS NEEDED
Status: CANCELLED | OUTPATIENT
Start: 2024-02-01

## 2024-01-26 RX ORDER — ALBUTEROL SULFATE 0.83 MG/ML
3 SOLUTION RESPIRATORY (INHALATION) AS NEEDED
Status: CANCELLED | OUTPATIENT
Start: 2024-02-22

## 2024-01-26 RX ORDER — PROCHLORPERAZINE MALEATE 10 MG
10 TABLET ORAL EVERY 6 HOURS PRN
Status: CANCELLED | OUTPATIENT
Start: 2024-02-22

## 2024-01-26 RX ORDER — DIPHENHYDRAMINE HYDROCHLORIDE 50 MG/ML
50 INJECTION INTRAMUSCULAR; INTRAVENOUS AS NEEDED
Status: CANCELLED | OUTPATIENT
Start: 2024-02-22

## 2024-01-26 RX ORDER — PROCHLORPERAZINE MALEATE 10 MG
10 TABLET ORAL EVERY 6 HOURS PRN
Status: CANCELLED | OUTPATIENT
Start: 2024-03-14

## 2024-01-26 RX ORDER — DIPHENHYDRAMINE HCL 25 MG
50 CAPSULE ORAL AS NEEDED
Status: CANCELLED
Start: 2024-02-22

## 2024-01-26 RX ORDER — PROCHLORPERAZINE EDISYLATE 5 MG/ML
10 INJECTION INTRAMUSCULAR; INTRAVENOUS EVERY 6 HOURS PRN
Status: CANCELLED | OUTPATIENT
Start: 2024-02-22

## 2024-01-26 RX ORDER — DIPHENHYDRAMINE HYDROCHLORIDE 50 MG/ML
50 INJECTION INTRAMUSCULAR; INTRAVENOUS AS NEEDED
Status: CANCELLED | OUTPATIENT
Start: 2024-03-14

## 2024-01-26 RX ORDER — DEXTROMETHORPHAN HYDROBROMIDE, GUAIFENESIN 5; 100 MG/5ML; MG/5ML
650 LIQUID ORAL AS NEEDED
Status: CANCELLED
Start: 2024-02-01

## 2024-01-26 RX ORDER — DEXTROMETHORPHAN HYDROBROMIDE, GUAIFENESIN 5; 100 MG/5ML; MG/5ML
650 LIQUID ORAL AS NEEDED
Status: CANCELLED
Start: 2024-02-22

## 2024-01-26 RX ORDER — FAMOTIDINE 10 MG/ML
20 INJECTION INTRAVENOUS ONCE AS NEEDED
Status: CANCELLED | OUTPATIENT
Start: 2024-02-01

## 2024-01-26 RX ORDER — PROCHLORPERAZINE MALEATE 10 MG
10 TABLET ORAL EVERY 6 HOURS PRN
Status: CANCELLED | OUTPATIENT
Start: 2024-02-01

## 2024-01-26 RX ORDER — PROCHLORPERAZINE EDISYLATE 5 MG/ML
10 INJECTION INTRAMUSCULAR; INTRAVENOUS EVERY 6 HOURS PRN
Status: CANCELLED | OUTPATIENT
Start: 2024-02-01

## 2024-01-26 RX ORDER — ALBUTEROL SULFATE 0.83 MG/ML
3 SOLUTION RESPIRATORY (INHALATION) AS NEEDED
Status: CANCELLED | OUTPATIENT
Start: 2024-02-01

## 2024-01-26 RX ORDER — DIPHENHYDRAMINE HCL 25 MG
50 CAPSULE ORAL AS NEEDED
Status: CANCELLED
Start: 2024-03-14

## 2024-01-26 RX ORDER — PROCHLORPERAZINE EDISYLATE 5 MG/ML
10 INJECTION INTRAMUSCULAR; INTRAVENOUS EVERY 6 HOURS PRN
Status: CANCELLED | OUTPATIENT
Start: 2024-03-14

## 2024-01-26 RX ORDER — FAMOTIDINE 10 MG/ML
20 INJECTION INTRAVENOUS ONCE AS NEEDED
Status: CANCELLED | OUTPATIENT
Start: 2024-02-22

## 2024-01-26 RX ORDER — EPINEPHRINE 0.3 MG/.3ML
0.3 INJECTION SUBCUTANEOUS EVERY 5 MIN PRN
Status: CANCELLED | OUTPATIENT
Start: 2024-03-14

## 2024-01-26 RX ORDER — EPINEPHRINE 0.3 MG/.3ML
0.3 INJECTION SUBCUTANEOUS EVERY 5 MIN PRN
Status: CANCELLED | OUTPATIENT
Start: 2024-02-22

## 2024-01-26 RX ORDER — ALBUTEROL SULFATE 0.83 MG/ML
3 SOLUTION RESPIRATORY (INHALATION) AS NEEDED
Status: CANCELLED | OUTPATIENT
Start: 2024-03-14

## 2024-01-26 RX ORDER — FAMOTIDINE 10 MG/ML
20 INJECTION INTRAVENOUS ONCE AS NEEDED
Status: CANCELLED | OUTPATIENT
Start: 2024-03-14

## 2024-01-26 RX ORDER — DIPHENHYDRAMINE HCL 25 MG
50 CAPSULE ORAL AS NEEDED
Status: CANCELLED
Start: 2024-02-01

## 2024-01-26 RX ORDER — EPINEPHRINE 0.3 MG/.3ML
0.3 INJECTION SUBCUTANEOUS EVERY 5 MIN PRN
Status: CANCELLED | OUTPATIENT
Start: 2024-02-01

## 2024-01-26 RX ORDER — DEXTROMETHORPHAN HYDROBROMIDE, GUAIFENESIN 5; 100 MG/5ML; MG/5ML
650 LIQUID ORAL AS NEEDED
Status: CANCELLED
Start: 2024-03-14

## 2024-01-31 ENCOUNTER — LAB (OUTPATIENT)
Dept: LAB | Facility: LAB | Age: 64
End: 2024-01-31
Payer: COMMERCIAL

## 2024-01-31 DIAGNOSIS — C34.11 MALIGNANT NEOPLASM OF UPPER LOBE OF RIGHT LUNG (MULTI): ICD-10-CM

## 2024-01-31 LAB
BASOPHILS # BLD AUTO: 0.1 X10*3/UL (ref 0–0.1)
BASOPHILS NFR BLD AUTO: 1.3 %
EOSINOPHIL # BLD AUTO: 0.12 X10*3/UL (ref 0–0.7)
EOSINOPHIL NFR BLD AUTO: 1.6 %
ERYTHROCYTE [DISTWIDTH] IN BLOOD BY AUTOMATED COUNT: 12.9 % (ref 11.5–14.5)
HCT VFR BLD AUTO: 41.4 % (ref 36–46)
HGB BLD-MCNC: 13.4 G/DL (ref 12–16)
IMM GRANULOCYTES # BLD AUTO: 0.03 X10*3/UL (ref 0–0.7)
IMM GRANULOCYTES NFR BLD AUTO: 0.4 % (ref 0–0.9)
LYMPHOCYTES # BLD AUTO: 1.75 X10*3/UL (ref 1.2–4.8)
LYMPHOCYTES NFR BLD AUTO: 22.6 %
MCH RBC QN AUTO: 30.9 PG (ref 26–34)
MCHC RBC AUTO-ENTMCNC: 32.4 G/DL (ref 32–36)
MCV RBC AUTO: 95 FL (ref 80–100)
MONOCYTES # BLD AUTO: 1.06 X10*3/UL (ref 0.1–1)
MONOCYTES NFR BLD AUTO: 13.7 %
NEUTROPHILS # BLD AUTO: 4.68 X10*3/UL (ref 1.2–7.7)
NEUTROPHILS NFR BLD AUTO: 60.4 %
NRBC BLD-RTO: 0 /100 WBCS (ref 0–0)
PLATELET # BLD AUTO: 399 X10*3/UL (ref 150–450)
RBC # BLD AUTO: 4.34 X10*6/UL (ref 4–5.2)
WBC # BLD AUTO: 7.7 X10*3/UL (ref 4.4–11.3)

## 2024-01-31 PROCEDURE — 36415 COLL VENOUS BLD VENIPUNCTURE: CPT

## 2024-01-31 PROCEDURE — 82150 ASSAY OF AMYLASE: CPT

## 2024-01-31 PROCEDURE — 85025 COMPLETE CBC W/AUTO DIFF WBC: CPT

## 2024-01-31 PROCEDURE — 80053 COMPREHEN METABOLIC PANEL: CPT

## 2024-01-31 PROCEDURE — 83690 ASSAY OF LIPASE: CPT

## 2024-02-01 ENCOUNTER — APPOINTMENT (OUTPATIENT)
Dept: HEMATOLOGY/ONCOLOGY | Facility: CLINIC | Age: 64
End: 2024-02-01
Payer: COMMERCIAL

## 2024-02-01 ENCOUNTER — OFFICE VISIT (OUTPATIENT)
Dept: HEMATOLOGY/ONCOLOGY | Facility: CLINIC | Age: 64
End: 2024-02-01
Payer: COMMERCIAL

## 2024-02-01 ENCOUNTER — EDUCATION (OUTPATIENT)
Dept: HEMATOLOGY/ONCOLOGY | Facility: CLINIC | Age: 64
End: 2024-02-01

## 2024-02-01 VITALS
OXYGEN SATURATION: 93 % | DIASTOLIC BLOOD PRESSURE: 81 MMHG | BODY MASS INDEX: 23.07 KG/M2 | SYSTOLIC BLOOD PRESSURE: 118 MMHG | TEMPERATURE: 97 F | RESPIRATION RATE: 18 BRPM | WEIGHT: 140.76 LBS | HEART RATE: 97 BPM

## 2024-02-01 DIAGNOSIS — R07.89 CHEST WALL PAIN: ICD-10-CM

## 2024-02-01 DIAGNOSIS — Z51.12 ENCOUNTER FOR ANTINEOPLASTIC IMMUNOTHERAPY: ICD-10-CM

## 2024-02-01 DIAGNOSIS — C34.11 MALIGNANT NEOPLASM OF UPPER LOBE OF RIGHT LUNG (MULTI): ICD-10-CM

## 2024-02-01 DIAGNOSIS — C34.91 PRIMARY MALIGNANT NEOPLASM OF RIGHT LUNG METASTATIC TO OTHER SITE (MULTI): Primary | ICD-10-CM

## 2024-02-01 LAB
ALBUMIN SERPL BCP-MCNC: 4.4 G/DL (ref 3.4–5)
ALP SERPL-CCNC: 67 U/L (ref 33–136)
ALT SERPL W P-5'-P-CCNC: 13 U/L (ref 7–45)
AMYLASE SERPL-CCNC: 34 U/L (ref 29–103)
ANION GAP SERPL CALC-SCNC: 14 MMOL/L (ref 10–20)
AST SERPL W P-5'-P-CCNC: 18 U/L (ref 9–39)
BILIRUB SERPL-MCNC: 0.5 MG/DL (ref 0–1.2)
BUN SERPL-MCNC: 9 MG/DL (ref 6–23)
CALCIUM SERPL-MCNC: 10.1 MG/DL (ref 8.6–10.6)
CHLORIDE SERPL-SCNC: 103 MMOL/L (ref 98–107)
CO2 SERPL-SCNC: 30 MMOL/L (ref 21–32)
CREAT SERPL-MCNC: 0.92 MG/DL (ref 0.5–1.05)
EGFRCR SERPLBLD CKD-EPI 2021: 70 ML/MIN/1.73M*2
GLUCOSE SERPL-MCNC: 70 MG/DL (ref 74–99)
LIPASE SERPL-CCNC: 22 U/L (ref 9–82)
POTASSIUM SERPL-SCNC: 5.3 MMOL/L (ref 3.5–5.3)
PROT SERPL-MCNC: 6.2 G/DL (ref 6.4–8.2)
SODIUM SERPL-SCNC: 142 MMOL/L (ref 136–145)

## 2024-02-01 PROCEDURE — 99214 OFFICE O/P EST MOD 30 MIN: CPT | Performed by: NURSE PRACTITIONER

## 2024-02-01 PROCEDURE — 1036F TOBACCO NON-USER: CPT | Performed by: NURSE PRACTITIONER

## 2024-02-01 ASSESSMENT — PATIENT HEALTH QUESTIONNAIRE - PHQ9
2. FEELING DOWN, DEPRESSED OR HOPELESS: NEARLY EVERY DAY
10. IF YOU CHECKED OFF ANY PROBLEMS, HOW DIFFICULT HAVE THESE PROBLEMS MADE IT FOR YOU TO DO YOUR WORK, TAKE CARE OF THINGS AT HOME, OR GET ALONG WITH OTHER PEOPLE: NOT DIFFICULT AT ALL
5. POOR APPETITE OR OVEREATING: SEVERAL DAYS
8. MOVING OR SPEAKING SO SLOWLY THAT OTHER PEOPLE COULD HAVE NOTICED. OR THE OPPOSITE, BEING SO FIGETY OR RESTLESS THAT YOU HAVE BEEN MOVING AROUND A LOT MORE THAN USUAL: NOT AT ALL
7. TROUBLE CONCENTRATING ON THINGS, SUCH AS READING THE NEWSPAPER OR WATCHING TELEVISION: NEARLY EVERY DAY
4. FEELING TIRED OR HAVING LITTLE ENERGY: SEVERAL DAYS
1. LITTLE INTEREST OR PLEASURE IN DOING THINGS: NOT AT ALL
SUM OF ALL RESPONSES TO PHQ9 QUESTIONS 1 AND 2: 3
3. TROUBLE FALLING OR STAYING ASLEEP OR SLEEPING TOO MUCH: NEARLY EVERY DAY
9. THOUGHTS THAT YOU WOULD BE BETTER OFF DEAD, OR OF HURTING YOURSELF: NOT AT ALL
SUM OF ALL RESPONSES TO PHQ QUESTIONS 1-9: 11
6. FEELING BAD ABOUT YOURSELF - OR THAT YOU ARE A FAILURE OR HAVE LET YOURSELF OR YOUR FAMILY DOWN: NOT AT ALL

## 2024-02-01 ASSESSMENT — COLUMBIA-SUICIDE SEVERITY RATING SCALE - C-SSRS
2. HAVE YOU ACTUALLY HAD ANY THOUGHTS OF KILLING YOURSELF?: NO
1. IN THE PAST MONTH, HAVE YOU WISHED YOU WERE DEAD OR WISHED YOU COULD GO TO SLEEP AND NOT WAKE UP?: NO
6. HAVE YOU EVER DONE ANYTHING, STARTED TO DO ANYTHING, OR PREPARED TO DO ANYTHING TO END YOUR LIFE?: NO

## 2024-02-01 ASSESSMENT — ENCOUNTER SYMPTOMS
OCCASIONAL FEELINGS OF UNSTEADINESS: 0
LOSS OF SENSATION IN FEET: 0
DEPRESSION: 0

## 2024-02-01 ASSESSMENT — PAIN SCALES - GENERAL: PAINLEVEL: 2

## 2024-02-01 NOTE — RESEARCH NOTES
Research Note Treatment Day    Hayley Potter is here today for treatment on [id number]. Today is C15_D_1. Procedures completed per protocol. AE's and con-meds reviewed with patient. Patient is aware of treatment plan.    [x]   Received treatment as planned   OR  []    Treatment delayed; patient calendar updated as required   Treatment delayed because:    []   AE    []   Physician Discretion    []   Clinical Deterioration or Progression     []   Other    Education Documentation  Treatment Plan and Schedule, taught by Rafiq Delgado RN at 2/1/2024  4:02 PM.  Learner: Family, Patient  Readiness: Acceptance  Method: Explanation  Response: Verbalizes Understanding    Education Comments  No comments found.    Pt and her daughter to clinic for cycle 15 on [id number].  She is tolerating treatment well without major complaints.  Labs reviewed and ok for treatment. Pt aware of treatment plan . NP in to complete assessment.

## 2024-02-01 NOTE — PROGRESS NOTES
Patient ID: Hayley Potter is a 63 y.o. female.    CC:  Management of stage IA3 ([id number]) adenocarcinoma of RUL s/p RUL lobectomy 04/2020, PDL-1 50%, NGS positive for KRAS G12C--> now with  metastatic recurrence involving liver, 11th L rib, and mediastinal LNs. 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 She was initially found to have spiculated 2.9cm mass in the RUL on the CT chest on 03/02/2020. She underwent CT guided RUL biopsy with pathology showed lung tissue with scan and focal aypicla grandular proliferation  suspicious for adenocarcinoma. IHC positive for CK7, TTF1 and napsin A. CK20 negative. PET/CT on 04/15/2020 showed RUL hypermetabolic RUL mass. No evidence of distant metastases. She underwent RUL lobectomy with mediastinal LN dissection with Dr. Wheat  on 04/21/2020 which showed invasive well to moderately differentiated adenocarcinoma, tumor measuring 2.5x2.3.x1.7cm. No VPI or LVI. All margins were negative. LN (0/11).      Unfortunately, on the surveillance CT chest (+) on 08/24/2022, there was a new irregular marginated 6mm GUSTABO nodule, which increased in size significantly on the repeat CT chest (-) on 01/06/2023, measuring 12mm.  There is also an additional new 9mm nodule  in the L lung as well as new mediastinal and possible L hilar LAD. PET/CT on 02/016/2023 showed hypermetabolic L hilar and mediastinal LAD. 1st  GUSTABO nodule now 8mm likely inflammatory. 2nd GUSTABO nodule showed stable size with SUV 4.4. Soft tissue mass associated with  L 11th rib fracture, SUV 11.5. Mass within the liver SUV 13.1, suspicious for mets. Hypermetabolic activity also noted   in the stomach fundus, association with a hiatal hernia, in the cecum and ascending colon without masses seen. She underwent US guided liver biopsy on 02/16/2023 - poorly-differentiated carcinoma.     Treatment Summary:  04/21/2020: RUL lobectomy with medistainal LN dissection (Dr. Wheat)  04/11/2023: C1 pembrolizumab 200mg IV (on trial)  05/02/2023: C2 Pembrolizumab 200mg IV (on trial)  05/23/2023: C3 Pembrolizumab 200mg IV (on trial)  06/13/2023: C4 Pembrolizumab 200mg IV (on trial)   07/05/2023: C5 Pembrolizumab 200mg IV (on trial)   07/26/2023: C6 Pembrolizumab 200mg IV (on trial)   08/16/2023: C7 Pembrolizumab 200mg IV (on trial)   09/07/2023: C8 Pembrolizumab 200mg IV (on trial)   09/28/2023: C9 Pembrolizumab 200mg IV (on trial)   10/19/2023: C10 Pembrolizumab 200mg IV (on trial)  11/09/2023: C11 Pembrolizumab 200mg IV (on trial)  11/30/2023: C12 Pembrolizumab 200mg IV (on trial)  12/21/2023: C13 Pembrolizumab 200mg IV (on trial)  01/11/2024: C14 Pembrolizumab 200mg IV (on trial)  02/01/2024: C15 Pembrolizumab 200mg IV (on trial)    Interval History (02/01/2024):    Pt present in clinic for readiness to treat visit.  Her dtr is present for visit.  Reports she is feeling good today.  +Fatigue (grade 1), few days following treatment, then resolves.  Intermittent hot flashes.  Breathing is stable.  Appetite - certain days better than other days.  Wt is stable.  Denies n/v/c/d.  Daily BM.  Staying hydrated.  Intermittent rib pain, comes and goes.  Starts in center of her lower chest and extends out on each flank.  Rates her pain 2-3/10.  No recent 7/10 episodes.  Managed with PRN percocet. Discussed okay to receive Influenza and RSV vaccines.  No rashes or skin concerns.  No other concerns today.  Labs WNL.  Ready for treatment.       PMHx: vocal cord cancer, GERD and COPD, OA  PSHx: tonsillectomy, tubal ligation, lumpectomy, L ankle and R hand surgery  FHx: strong family history of breast cancer including male  breast cancer.   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 Then worked as a  since . She quitted etoh . She smokes Marijuana every day.     Allergies:  Cephalexin    Medications:  Medication list reviewed with patient and updated in EMR    Vital Signs:    /81 (BP Location: Right arm, Patient Position: Sitting, BP Cuff Size: Adult long)   Pulse 97   Temp 36.1 °C (97 °F) (Temporal)   Resp 18   Wt 63.9 kg (140 lb 12.2 oz)   SpO2 93%   BMI 23.07 kg/m²     Wt Readings from Last 5 Encounters:   24 62.5 kg (137 lb 10.8 oz)   23 62.7 kg (138 lb 3.7 oz)   23 63.5 kg (140 lb)   23 64.4 kg (141 lb 15.6 oz)   23 64.8 kg (142 lb 13.7 oz)      Physical Exam:  ECO  Pain: mild 2-3  General: Well appearing, no acute distress  Neurology: Alert and oriented x3, CN II-XII grossly intact  Psychology: Affect appropriate  Eyes: PERRL, EOMI  ENT: Mucus membranes moist and intact, no ulcers  Lymphatics: No LAD   Neck: Neck supple  Respiratory: Lungs clear bilaterally, no wheezes, no rales, no rhonchi  Cardiovascular: Normal rate and rhythm, no murmur  Abdomen: Soft, non-tender, non-distended, normoactive, no ascites  Musculoskeletal: Normal gait and stance  Extremities: No clubbing, no cyanosis, no edema  Skin: No rashes or skin concerns.       Results from last 7 days   Lab Units 24  1410   GLUCOSE mg/dL 70*   SODIUM mmol/L 142   POTASSIUM mmol/L 5.3   CHLORIDE mmol/L 103   CO2 mmol/L 30   BUN mg/dL 9   CREATININE mg/dL 0.92   EGFR mL/min/1.73m*2 70   CALCIUM mg/dL 10.1   ALBUMIN g/dL 4.4   PROTEIN TOTAL g/dL 6.2*   BILIRUBIN TOTAL mg/dL 0.5   ALK PHOS U/L 67   ALT U/L 13   AST U/L 18     Results from last 7 days   Lab Units 24  1410   WBC AUTO x10*3/uL 7.7   HEMOGLOBIN g/dL 13.4   HEMATOCRIT % 41.4   PLATELETS AUTO x10*3/uL 399   NEUTROS ABS x10*3/uL 4.68   LYMPHS ABS AUTO x10*3/uL 1.75   MONOS ABS AUTO x10*3/uL 1.06*   EOS ABS AUTO x10*3/uL 0.12    NEUTROS PCT AUTO % 60.4   LYMPHS PCT AUTO % 22.6   MONOS PCT AUTO % 13.7   EOS PCT AUTO % 1.6      Assessment/Plan:  63 y.o. 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     The patient's records and imaging have been reviewed in detail.  MD discussed with the patient the natural history of their disease at length.  The following has also been discussed with the patient:     # Cancer: recurrent likely metastatic ([id number]) lung cancer: liver biopsy showed extensive necrosis. Liquid biopsy showed KRAS G12C mutation, which was identified in her original surgical resected  specimen. PDL-1 50%. Patient initially had stage IA2 ([id number]) RUL lung  adenocarcinoma s/p RUL lobectomy, unfortunately now likely metastatic recurrence. Liver biopsy on 2/16 showed poorly differentiated malignant neoplasm with extensive necrosis. We discussed treatment  options with her today with either standard of care pembrolizumab vs clinical trials. Patient expressed interests in enrolling into clinical trial. Enrolled into RCT [id number]. Doing well.      - Interval scan: 01/04/2024: CT CAP (+): 1. Interval resolution of subsolid nodule in the right lower lobe infrahilar location. 2. Stable appearing nodular opacity in the left upper lobe subpleural location with slight linear configuration appearing less conspicuous from CT dated 05/19/2023. Attention this area on follow-up.  3. No lymphadenopathy in the chest. 4. No metastatic disease in the abdomen/pelvis. 5. Stable appearing sclerotic lesion within the left posterior 5th rib from prior imaging including 11/11/2021.       # Hypothyroidism: G2. Likely 2/2 immunotherapy - resolved.  TSH 1.07 11/8/23.  Continue levothyroxine 75mcg daily.      # Microcytic anemia 2/2 due to chronic disease: patient denies any hematochezia  or hematuria. No melena. hx  of hiatal hernia. Per patient. Last EGD was done in 2021 and was normal. Colonoscopy was a few years ago.  EGD showed hiatal hernia 03/2023. Improved Hgb overall. Iron studies show low-normal ferritin and TSAT of 14%. Continue PO iron 3x/week. Tolerates well.     # Mucositis (chronic).  Pt reports since start of immunotherapy.  Chronic sore secondary to ill fitting dentures recommended s&s rinse.  Continue Anbesol and/or Oragel for pain.  Will continue to monitor.  BMX added.  2/1/24  - resolved      # Grade 1 hyperthyroidism: resolved, now hypothyroid (as above).     # Constipation: managed well with stool softener and PRN lactulose.        # Clinical Trials: April 11, 2023 initiated treatment on [id number] this is cycle 1 of first-line treatment.  She has been randomized to  arm A which is pembrolizumab alone for up to 2 years or progression followed by Alimta/carboplatin for second line therapy. Consent has been signed. Repeat CT scans after 2 cycles showed partial response. Continue treatment.      # Access: Peripheral veins.     # Pain: she has a displaced 8th Rib on the left side -Advised patient to take percocet daily and decrease advil use. Currently managed with PRN Percocet.  Rx last sent 12/21/23.     # Bone Health: L fifth ribs lesion stable.    # Arthralgia (grade 1).  Uric acid lab (-).  Intermittent arthralgia to left hand.  Continue PRN Advil or Percocet for severe pain.      # Left rib or chest wall pain over the past month. 11/30/23 CXR - RESULTS: Mediastinal surgical clips are noted. The cardiac silhouette is within normal limits. Mediastinal contours are unremarkable. The lungs demonstrate no infiltrate or atelectasis. There is no evidence for pleural effusion. Remote fracture is noted involving the right 7th rib. There are degenerative changes of the thoracic spine.  - Continue PRN Percocet for pain control.      # Psychosocial: Coping well, no acute issues.  Good support from family & friends.   Social work support available.     # GI/CINV: No acute issues.  Eating and voiding well.  Nutrition consult available.     # Smoking: former smoker     # Fertility: N/A.        # Heme/ESAs: N/A.     # GOC: Patient is aware of palliative  intent goals of care.     # Language: English.     # Dispo: RTC in 3 weeks for next cycle.  Pt instructed to call with concerns/questions .

## 2024-02-15 DIAGNOSIS — C34.11 MALIGNANT NEOPLASM OF UPPER LOBE OF RIGHT LUNG (MULTI): Primary | ICD-10-CM

## 2024-02-15 DIAGNOSIS — E03.2 HYPOTHYROIDISM DUE TO MEDICATION: ICD-10-CM

## 2024-02-15 DIAGNOSIS — C34.91 PRIMARY MALIGNANT NEOPLASM OF RIGHT LUNG METASTATIC TO OTHER SITE (MULTI): ICD-10-CM

## 2024-02-15 RX ORDER — PROCHLORPERAZINE MALEATE 10 MG
10 TABLET ORAL EVERY 6 HOURS PRN
Status: CANCELLED | OUTPATIENT
Start: 2024-04-25

## 2024-02-15 RX ORDER — ALBUTEROL SULFATE 0.83 MG/ML
3 SOLUTION RESPIRATORY (INHALATION) AS NEEDED
Status: CANCELLED | OUTPATIENT
Start: 2024-04-04

## 2024-02-15 RX ORDER — DIPHENHYDRAMINE HCL 25 MG
50 CAPSULE ORAL AS NEEDED
Status: CANCELLED
Start: 2024-04-25

## 2024-02-15 RX ORDER — PROCHLORPERAZINE MALEATE 10 MG
10 TABLET ORAL EVERY 6 HOURS PRN
Status: CANCELLED | OUTPATIENT
Start: 2024-04-04

## 2024-02-15 RX ORDER — DIPHENHYDRAMINE HCL 25 MG
50 CAPSULE ORAL AS NEEDED
Status: CANCELLED
Start: 2024-04-04

## 2024-02-15 RX ORDER — DEXTROMETHORPHAN HYDROBROMIDE, GUAIFENESIN 5; 100 MG/5ML; MG/5ML
650 LIQUID ORAL AS NEEDED
Status: CANCELLED
Start: 2024-04-04

## 2024-02-15 RX ORDER — DIPHENHYDRAMINE HYDROCHLORIDE 50 MG/ML
50 INJECTION INTRAMUSCULAR; INTRAVENOUS AS NEEDED
Status: CANCELLED | OUTPATIENT
Start: 2024-05-16

## 2024-02-15 RX ORDER — ALBUTEROL SULFATE 0.83 MG/ML
3 SOLUTION RESPIRATORY (INHALATION) AS NEEDED
Status: CANCELLED | OUTPATIENT
Start: 2024-04-25

## 2024-02-15 RX ORDER — DEXTROMETHORPHAN HYDROBROMIDE, GUAIFENESIN 5; 100 MG/5ML; MG/5ML
650 LIQUID ORAL AS NEEDED
Status: CANCELLED
Start: 2024-05-16

## 2024-02-15 RX ORDER — EPINEPHRINE 0.3 MG/.3ML
0.3 INJECTION SUBCUTANEOUS EVERY 5 MIN PRN
Status: CANCELLED | OUTPATIENT
Start: 2024-04-25

## 2024-02-15 RX ORDER — FAMOTIDINE 10 MG/ML
20 INJECTION INTRAVENOUS ONCE AS NEEDED
Status: CANCELLED | OUTPATIENT
Start: 2024-04-04

## 2024-02-15 RX ORDER — PROCHLORPERAZINE EDISYLATE 5 MG/ML
10 INJECTION INTRAMUSCULAR; INTRAVENOUS EVERY 6 HOURS PRN
Status: CANCELLED | OUTPATIENT
Start: 2024-04-25

## 2024-02-15 RX ORDER — EPINEPHRINE 0.3 MG/.3ML
0.3 INJECTION SUBCUTANEOUS EVERY 5 MIN PRN
Status: CANCELLED | OUTPATIENT
Start: 2024-04-04

## 2024-02-15 RX ORDER — PROCHLORPERAZINE MALEATE 10 MG
10 TABLET ORAL EVERY 6 HOURS PRN
Status: CANCELLED | OUTPATIENT
Start: 2024-05-16

## 2024-02-15 RX ORDER — PROCHLORPERAZINE EDISYLATE 5 MG/ML
10 INJECTION INTRAMUSCULAR; INTRAVENOUS EVERY 6 HOURS PRN
Status: CANCELLED | OUTPATIENT
Start: 2024-05-16

## 2024-02-15 RX ORDER — DIPHENHYDRAMINE HYDROCHLORIDE 50 MG/ML
50 INJECTION INTRAMUSCULAR; INTRAVENOUS AS NEEDED
Status: CANCELLED | OUTPATIENT
Start: 2024-04-04

## 2024-02-15 RX ORDER — ALBUTEROL SULFATE 0.83 MG/ML
3 SOLUTION RESPIRATORY (INHALATION) AS NEEDED
Status: CANCELLED | OUTPATIENT
Start: 2024-05-16

## 2024-02-15 RX ORDER — DIPHENHYDRAMINE HYDROCHLORIDE 50 MG/ML
50 INJECTION INTRAMUSCULAR; INTRAVENOUS AS NEEDED
Status: CANCELLED | OUTPATIENT
Start: 2024-04-25

## 2024-02-15 RX ORDER — DIPHENHYDRAMINE HCL 25 MG
50 CAPSULE ORAL AS NEEDED
Status: CANCELLED
Start: 2024-05-16

## 2024-02-15 RX ORDER — FAMOTIDINE 10 MG/ML
20 INJECTION INTRAVENOUS ONCE AS NEEDED
Status: CANCELLED | OUTPATIENT
Start: 2024-04-25

## 2024-02-15 RX ORDER — EPINEPHRINE 0.3 MG/.3ML
0.3 INJECTION SUBCUTANEOUS EVERY 5 MIN PRN
Status: CANCELLED | OUTPATIENT
Start: 2024-05-16

## 2024-02-15 RX ORDER — PROCHLORPERAZINE EDISYLATE 5 MG/ML
10 INJECTION INTRAMUSCULAR; INTRAVENOUS EVERY 6 HOURS PRN
Status: CANCELLED | OUTPATIENT
Start: 2024-04-04

## 2024-02-15 RX ORDER — FAMOTIDINE 10 MG/ML
20 INJECTION INTRAVENOUS ONCE AS NEEDED
Status: CANCELLED | OUTPATIENT
Start: 2024-05-16

## 2024-02-15 RX ORDER — DEXTROMETHORPHAN HYDROBROMIDE, GUAIFENESIN 5; 100 MG/5ML; MG/5ML
650 LIQUID ORAL AS NEEDED
Status: CANCELLED
Start: 2024-04-25

## 2024-02-21 ENCOUNTER — LAB (OUTPATIENT)
Dept: LAB | Facility: LAB | Age: 64
End: 2024-02-21
Payer: COMMERCIAL

## 2024-02-21 DIAGNOSIS — E03.2 HYPOTHYROIDISM DUE TO MEDICATION: ICD-10-CM

## 2024-02-21 DIAGNOSIS — C34.11 MALIGNANT NEOPLASM OF UPPER LOBE OF RIGHT LUNG (MULTI): ICD-10-CM

## 2024-02-21 LAB
ALBUMIN SERPL BCP-MCNC: 4.3 G/DL (ref 3.4–5)
ALP SERPL-CCNC: 73 U/L (ref 33–136)
ALT SERPL W P-5'-P-CCNC: 14 U/L (ref 7–45)
AMYLASE SERPL-CCNC: 31 U/L (ref 29–103)
ANION GAP SERPL CALC-SCNC: 16 MMOL/L (ref 10–20)
AST SERPL W P-5'-P-CCNC: 19 U/L (ref 9–39)
BASOPHILS # BLD AUTO: 0.06 X10*3/UL (ref 0–0.1)
BASOPHILS NFR BLD AUTO: 0.9 %
BILIRUB SERPL-MCNC: 0.5 MG/DL (ref 0–1.2)
BUN SERPL-MCNC: 8 MG/DL (ref 6–23)
CALCIUM SERPL-MCNC: 9.9 MG/DL (ref 8.6–10.6)
CHLORIDE SERPL-SCNC: 101 MMOL/L (ref 98–107)
CO2 SERPL-SCNC: 30 MMOL/L (ref 21–32)
CREAT SERPL-MCNC: 0.85 MG/DL (ref 0.5–1.05)
EGFRCR SERPLBLD CKD-EPI 2021: 77 ML/MIN/1.73M*2
EOSINOPHIL # BLD AUTO: 0.08 X10*3/UL (ref 0–0.7)
EOSINOPHIL NFR BLD AUTO: 1.2 %
ERYTHROCYTE [DISTWIDTH] IN BLOOD BY AUTOMATED COUNT: 12.9 % (ref 11.5–14.5)
GLUCOSE SERPL-MCNC: 69 MG/DL (ref 74–99)
HCT VFR BLD AUTO: 42.3 % (ref 36–46)
HGB BLD-MCNC: 13.7 G/DL (ref 12–16)
IMM GRANULOCYTES # BLD AUTO: 0.01 X10*3/UL (ref 0–0.7)
IMM GRANULOCYTES NFR BLD AUTO: 0.2 % (ref 0–0.9)
LIPASE SERPL-CCNC: 19 U/L (ref 9–82)
LYMPHOCYTES # BLD AUTO: 1.57 X10*3/UL (ref 1.2–4.8)
LYMPHOCYTES NFR BLD AUTO: 24.3 %
MCH RBC QN AUTO: 31 PG (ref 26–34)
MCHC RBC AUTO-ENTMCNC: 32.4 G/DL (ref 32–36)
MCV RBC AUTO: 96 FL (ref 80–100)
MONOCYTES # BLD AUTO: 0.78 X10*3/UL (ref 0.1–1)
MONOCYTES NFR BLD AUTO: 12.1 %
NEUTROPHILS # BLD AUTO: 3.95 X10*3/UL (ref 1.2–7.7)
NEUTROPHILS NFR BLD AUTO: 61.3 %
NRBC BLD-RTO: 0 /100 WBCS (ref 0–0)
PLATELET # BLD AUTO: 387 X10*3/UL (ref 150–450)
POTASSIUM SERPL-SCNC: 5.2 MMOL/L (ref 3.5–5.3)
PROT SERPL-MCNC: 6.6 G/DL (ref 6.4–8.2)
RBC # BLD AUTO: 4.42 X10*6/UL (ref 4–5.2)
SODIUM SERPL-SCNC: 142 MMOL/L (ref 136–145)
TSH SERPL-ACNC: 0.63 MIU/L (ref 0.44–3.98)
WBC # BLD AUTO: 6.5 X10*3/UL (ref 4.4–11.3)

## 2024-02-21 PROCEDURE — 82150 ASSAY OF AMYLASE: CPT

## 2024-02-21 PROCEDURE — 83690 ASSAY OF LIPASE: CPT

## 2024-02-21 PROCEDURE — 84443 ASSAY THYROID STIM HORMONE: CPT

## 2024-02-21 PROCEDURE — 36415 COLL VENOUS BLD VENIPUNCTURE: CPT

## 2024-02-21 PROCEDURE — 85025 COMPLETE CBC W/AUTO DIFF WBC: CPT

## 2024-02-21 PROCEDURE — 80053 COMPREHEN METABOLIC PANEL: CPT

## 2024-02-22 ENCOUNTER — EDUCATION (OUTPATIENT)
Dept: HEMATOLOGY/ONCOLOGY | Facility: CLINIC | Age: 64
End: 2024-02-22

## 2024-02-22 ENCOUNTER — INFUSION (OUTPATIENT)
Dept: HEMATOLOGY/ONCOLOGY | Facility: CLINIC | Age: 64
End: 2024-02-22
Payer: COMMERCIAL

## 2024-02-22 ENCOUNTER — OFFICE VISIT (OUTPATIENT)
Dept: HEMATOLOGY/ONCOLOGY | Facility: CLINIC | Age: 64
End: 2024-02-22
Payer: COMMERCIAL

## 2024-02-22 ENCOUNTER — APPOINTMENT (OUTPATIENT)
Dept: HEMATOLOGY/ONCOLOGY | Facility: CLINIC | Age: 64
End: 2024-02-22
Payer: COMMERCIAL

## 2024-02-22 VITALS
TEMPERATURE: 97 F | BODY MASS INDEX: 22.53 KG/M2 | SYSTOLIC BLOOD PRESSURE: 126 MMHG | WEIGHT: 137.46 LBS | RESPIRATION RATE: 17 BRPM | HEART RATE: 87 BPM | OXYGEN SATURATION: 92 % | DIASTOLIC BLOOD PRESSURE: 66 MMHG

## 2024-02-22 DIAGNOSIS — Z51.12 ENCOUNTER FOR ANTINEOPLASTIC IMMUNOTHERAPY: ICD-10-CM

## 2024-02-22 DIAGNOSIS — J01.11 ACUTE RECURRENT FRONTAL SINUSITIS: Primary | ICD-10-CM

## 2024-02-22 DIAGNOSIS — C34.11 MALIGNANT NEOPLASM OF UPPER LOBE OF RIGHT LUNG (MULTI): ICD-10-CM

## 2024-02-22 PROCEDURE — 99214 OFFICE O/P EST MOD 30 MIN: CPT | Performed by: NURSE PRACTITIONER

## 2024-02-22 PROCEDURE — 96365 THER/PROPH/DIAG IV INF INIT: CPT | Mod: INF

## 2024-02-22 PROCEDURE — 2500000004 HC RX 250 GENERAL PHARMACY W/ HCPCS (ALT 636 FOR OP/ED): Mod: SE | Performed by: STUDENT IN AN ORGANIZED HEALTH CARE EDUCATION/TRAINING PROGRAM

## 2024-02-22 PROCEDURE — 1036F TOBACCO NON-USER: CPT | Performed by: NURSE PRACTITIONER

## 2024-02-22 RX ORDER — DIPHENHYDRAMINE HYDROCHLORIDE 50 MG/ML
50 INJECTION INTRAMUSCULAR; INTRAVENOUS AS NEEDED
Status: DISCONTINUED | OUTPATIENT
Start: 2024-02-22 | End: 2024-02-22 | Stop reason: HOSPADM

## 2024-02-22 RX ORDER — DIPHENHYDRAMINE HCL 25 MG
50 CAPSULE ORAL AS NEEDED
Status: DISCONTINUED | OUTPATIENT
Start: 2024-02-22 | End: 2024-02-22 | Stop reason: HOSPADM

## 2024-02-22 RX ORDER — DOXYCYCLINE 100 MG/1
100 CAPSULE ORAL 2 TIMES DAILY
Qty: 20 CAPSULE | Refills: 0 | Status: SHIPPED | OUTPATIENT
Start: 2024-02-22 | End: 2024-03-03

## 2024-02-22 RX ORDER — PROCHLORPERAZINE EDISYLATE 5 MG/ML
10 INJECTION INTRAMUSCULAR; INTRAVENOUS EVERY 6 HOURS PRN
Status: DISCONTINUED | OUTPATIENT
Start: 2024-02-22 | End: 2024-02-22 | Stop reason: HOSPADM

## 2024-02-22 RX ORDER — ALBUTEROL SULFATE 0.83 MG/ML
3 SOLUTION RESPIRATORY (INHALATION) AS NEEDED
Status: DISCONTINUED | OUTPATIENT
Start: 2024-02-22 | End: 2024-02-22 | Stop reason: HOSPADM

## 2024-02-22 RX ORDER — PROCHLORPERAZINE MALEATE 10 MG
10 TABLET ORAL EVERY 6 HOURS PRN
Status: DISCONTINUED | OUTPATIENT
Start: 2024-02-22 | End: 2024-02-22 | Stop reason: HOSPADM

## 2024-02-22 RX ORDER — ACETAMINOPHEN 325 MG/1
650 TABLET ORAL AS NEEDED
Status: DISCONTINUED | OUTPATIENT
Start: 2024-02-22 | End: 2024-02-22 | Stop reason: HOSPADM

## 2024-02-22 RX ORDER — FAMOTIDINE 10 MG/ML
20 INJECTION INTRAVENOUS ONCE AS NEEDED
Status: DISCONTINUED | OUTPATIENT
Start: 2024-02-22 | End: 2024-02-22 | Stop reason: HOSPADM

## 2024-02-22 RX ORDER — EPINEPHRINE 0.3 MG/.3ML
0.3 INJECTION SUBCUTANEOUS EVERY 5 MIN PRN
Status: DISCONTINUED | OUTPATIENT
Start: 2024-02-22 | End: 2024-02-22 | Stop reason: HOSPADM

## 2024-02-22 RX ADMIN — Medication 200 MG: at 13:36

## 2024-02-22 ASSESSMENT — PATIENT HEALTH QUESTIONNAIRE - PHQ9
SUM OF ALL RESPONSES TO PHQ9 QUESTIONS 1 AND 2: 0
2. FEELING DOWN, DEPRESSED OR HOPELESS: NOT AT ALL
1. LITTLE INTEREST OR PLEASURE IN DOING THINGS: NOT AT ALL

## 2024-02-22 ASSESSMENT — ENCOUNTER SYMPTOMS
DEPRESSION: 0
OCCASIONAL FEELINGS OF UNSTEADINESS: 0
LOSS OF SENSATION IN FEET: 0

## 2024-02-22 ASSESSMENT — PAIN SCALES - GENERAL: PAINLEVEL: 3

## 2024-02-22 NOTE — RESEARCH NOTES
Research Note Treatment Day    Hayley Potter is here today for treatment on [id number]. Today is C16D1. Procedures completed per protocol. AE's and con-meds reviewed with patient. Patient is aware of treatment plan.    [x]   Received treatment as planned   OR  []    Treatment delayed; patient calendar updated as required   Treatment delayed because:    []   AE    []   Physician Discretion    []   Clinical Deterioration or Progression     []   Other    Education Documentation  Treatment Plan and Schedule, taught by Rafiq Delgaod RN at 2/22/2024  3:55 PM.  Learner: Family, Patient  Readiness: Acceptance  Method: Explanation  Response: Verbalizes Understanding    Education Comments  No comments found.    Pt and her daughter to the clinic for cycle 16 of [id number] pembro.  Pt is feeling well today with minimal complaints.  NP in to see pt for complete assessment. Pt will have CT scans prior to her next visit with us.  All questions answered appropriately.
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
## 2024-03-11 ENCOUNTER — APPOINTMENT (OUTPATIENT)
Dept: RADIOLOGY | Facility: HOSPITAL | Age: 64
End: 2024-03-11
Payer: COMMERCIAL

## 2024-03-11 ENCOUNTER — HOSPITAL ENCOUNTER (OUTPATIENT)
Dept: RADIOLOGY | Facility: HOSPITAL | Age: 64
Discharge: HOME | End: 2024-03-11
Payer: COMMERCIAL

## 2024-03-11 DIAGNOSIS — C34.91 PRIMARY MALIGNANT NEOPLASM OF RIGHT LUNG METASTATIC TO OTHER SITE (MULTI): ICD-10-CM

## 2024-03-11 PROCEDURE — 2550000001 HC RX 255 CONTRASTS: Performed by: STUDENT IN AN ORGANIZED HEALTH CARE EDUCATION/TRAINING PROGRAM

## 2024-03-11 PROCEDURE — 74177 CT ABD & PELVIS W/CONTRAST: CPT

## 2024-03-11 RX ADMIN — IOHEXOL 75 ML: 350 INJECTION, SOLUTION INTRAVENOUS at 11:09

## 2024-03-12 DIAGNOSIS — C34.11 MALIGNANT NEOPLASM OF UPPER LOBE OF RIGHT LUNG (MULTI): ICD-10-CM

## 2024-03-13 ENCOUNTER — LAB (OUTPATIENT)
Dept: LAB | Facility: LAB | Age: 64
End: 2024-03-13
Payer: COMMERCIAL

## 2024-03-13 DIAGNOSIS — C34.11 MALIGNANT NEOPLASM OF UPPER LOBE OF RIGHT LUNG (MULTI): ICD-10-CM

## 2024-03-13 LAB
BASOPHILS # BLD AUTO: 0.09 X10*3/UL (ref 0–0.1)
BASOPHILS NFR BLD AUTO: 1.4 %
EOSINOPHIL # BLD AUTO: 0.09 X10*3/UL (ref 0–0.7)
EOSINOPHIL NFR BLD AUTO: 1.4 %
ERYTHROCYTE [DISTWIDTH] IN BLOOD BY AUTOMATED COUNT: 13 % (ref 11.5–14.5)
HCT VFR BLD AUTO: 39.3 % (ref 36–46)
HGB BLD-MCNC: 13.1 G/DL (ref 12–16)
IMM GRANULOCYTES # BLD AUTO: 0.02 X10*3/UL (ref 0–0.7)
IMM GRANULOCYTES NFR BLD AUTO: 0.3 % (ref 0–0.9)
LYMPHOCYTES # BLD AUTO: 1.58 X10*3/UL (ref 1.2–4.8)
LYMPHOCYTES NFR BLD AUTO: 23.9 %
MCH RBC QN AUTO: 31.4 PG (ref 26–34)
MCHC RBC AUTO-ENTMCNC: 33.3 G/DL (ref 32–36)
MCV RBC AUTO: 94 FL (ref 80–100)
MONOCYTES # BLD AUTO: 0.72 X10*3/UL (ref 0.1–1)
MONOCYTES NFR BLD AUTO: 10.9 %
NEUTROPHILS # BLD AUTO: 4.11 X10*3/UL (ref 1.2–7.7)
NEUTROPHILS NFR BLD AUTO: 62.1 %
NRBC BLD-RTO: 0 /100 WBCS (ref 0–0)
PLATELET # BLD AUTO: 399 X10*3/UL (ref 150–450)
RBC # BLD AUTO: 4.17 X10*6/UL (ref 4–5.2)
WBC # BLD AUTO: 6.6 X10*3/UL (ref 4.4–11.3)

## 2024-03-13 PROCEDURE — 82150 ASSAY OF AMYLASE: CPT

## 2024-03-13 PROCEDURE — 36415 COLL VENOUS BLD VENIPUNCTURE: CPT

## 2024-03-13 PROCEDURE — 85025 COMPLETE CBC W/AUTO DIFF WBC: CPT

## 2024-03-13 PROCEDURE — 83690 ASSAY OF LIPASE: CPT

## 2024-03-13 PROCEDURE — 80053 COMPREHEN METABOLIC PANEL: CPT

## 2024-03-14 ENCOUNTER — INFUSION (OUTPATIENT)
Dept: HEMATOLOGY/ONCOLOGY | Facility: CLINIC | Age: 64
End: 2024-03-14
Payer: COMMERCIAL

## 2024-03-14 ENCOUNTER — APPOINTMENT (OUTPATIENT)
Dept: HEMATOLOGY/ONCOLOGY | Facility: CLINIC | Age: 64
End: 2024-03-14
Payer: COMMERCIAL

## 2024-03-14 ENCOUNTER — OFFICE VISIT (OUTPATIENT)
Dept: HEMATOLOGY/ONCOLOGY | Facility: CLINIC | Age: 64
End: 2024-03-14
Payer: COMMERCIAL

## 2024-03-14 VITALS
WEIGHT: 142.53 LBS | RESPIRATION RATE: 18 BRPM | HEART RATE: 94 BPM | OXYGEN SATURATION: 94 % | DIASTOLIC BLOOD PRESSURE: 90 MMHG | TEMPERATURE: 96.1 F | BODY MASS INDEX: 23.36 KG/M2 | SYSTOLIC BLOOD PRESSURE: 138 MMHG

## 2024-03-14 DIAGNOSIS — C34.11 MALIGNANT NEOPLASM OF UPPER LOBE OF RIGHT LUNG (MULTI): ICD-10-CM

## 2024-03-14 DIAGNOSIS — R05.3 CHRONIC COUGH: ICD-10-CM

## 2024-03-14 DIAGNOSIS — Z51.12 ENCOUNTER FOR ANTINEOPLASTIC IMMUNOTHERAPY: Primary | ICD-10-CM

## 2024-03-14 LAB
ALBUMIN SERPL BCP-MCNC: 4.1 G/DL (ref 3.4–5)
ALP SERPL-CCNC: 91 U/L (ref 33–136)
ALT SERPL W P-5'-P-CCNC: 13 U/L (ref 7–45)
AMYLASE SERPL-CCNC: 33 U/L (ref 29–103)
ANION GAP SERPL CALC-SCNC: 13 MMOL/L (ref 10–20)
AST SERPL W P-5'-P-CCNC: 16 U/L (ref 9–39)
BILIRUB SERPL-MCNC: 0.3 MG/DL (ref 0–1.2)
BUN SERPL-MCNC: 8 MG/DL (ref 6–23)
CALCIUM SERPL-MCNC: 9.9 MG/DL (ref 8.6–10.6)
CHLORIDE SERPL-SCNC: 99 MMOL/L (ref 98–107)
CO2 SERPL-SCNC: 32 MMOL/L (ref 21–32)
CREAT SERPL-MCNC: 0.9 MG/DL (ref 0.5–1.05)
EGFRCR SERPLBLD CKD-EPI 2021: 72 ML/MIN/1.73M*2
GLUCOSE SERPL-MCNC: 78 MG/DL (ref 74–99)
LIPASE SERPL-CCNC: 26 U/L (ref 9–82)
POTASSIUM SERPL-SCNC: 5 MMOL/L (ref 3.5–5.3)
PROT SERPL-MCNC: 6.5 G/DL (ref 6.4–8.2)
SODIUM SERPL-SCNC: 139 MMOL/L (ref 136–145)

## 2024-03-14 PROCEDURE — 96374 THER/PROPH/DIAG INJ IV PUSH: CPT | Mod: INF

## 2024-03-14 PROCEDURE — 99214 OFFICE O/P EST MOD 30 MIN: CPT | Performed by: NURSE PRACTITIONER

## 2024-03-14 PROCEDURE — 96413 CHEMO IV INFUSION 1 HR: CPT

## 2024-03-14 PROCEDURE — 2500000004 HC RX 250 GENERAL PHARMACY W/ HCPCS (ALT 636 FOR OP/ED): Mod: SE | Performed by: STUDENT IN AN ORGANIZED HEALTH CARE EDUCATION/TRAINING PROGRAM

## 2024-03-14 PROCEDURE — 1036F TOBACCO NON-USER: CPT | Performed by: NURSE PRACTITIONER

## 2024-03-14 RX ORDER — HEPARIN 100 UNIT/ML
500 SYRINGE INTRAVENOUS AS NEEDED
Status: CANCELLED | OUTPATIENT
Start: 2024-03-14

## 2024-03-14 RX ORDER — FAMOTIDINE 10 MG/ML
20 INJECTION INTRAVENOUS ONCE AS NEEDED
Status: DISCONTINUED | OUTPATIENT
Start: 2024-03-14 | End: 2024-03-14 | Stop reason: HOSPADM

## 2024-03-14 RX ORDER — ALBUTEROL SULFATE 0.83 MG/ML
3 SOLUTION RESPIRATORY (INHALATION) AS NEEDED
Status: DISCONTINUED | OUTPATIENT
Start: 2024-03-14 | End: 2024-03-14 | Stop reason: HOSPADM

## 2024-03-14 RX ORDER — HEPARIN SODIUM,PORCINE/PF 10 UNIT/ML
50 SYRINGE (ML) INTRAVENOUS AS NEEDED
Status: CANCELLED | OUTPATIENT
Start: 2024-03-14

## 2024-03-14 RX ORDER — DIPHENHYDRAMINE HYDROCHLORIDE 50 MG/ML
50 INJECTION INTRAMUSCULAR; INTRAVENOUS AS NEEDED
Status: DISCONTINUED | OUTPATIENT
Start: 2024-03-14 | End: 2024-03-14 | Stop reason: HOSPADM

## 2024-03-14 RX ORDER — EPINEPHRINE 0.3 MG/.3ML
0.3 INJECTION SUBCUTANEOUS EVERY 5 MIN PRN
Status: DISCONTINUED | OUTPATIENT
Start: 2024-03-14 | End: 2024-03-14 | Stop reason: HOSPADM

## 2024-03-14 RX ADMIN — Medication 200 MG: at 13:48

## 2024-03-14 ASSESSMENT — ENCOUNTER SYMPTOMS
GASTROINTESTINAL NEGATIVE: 1
NERVOUS/ANXIOUS: 1
FLANK PAIN: 1
FATIGUE: 1
NEUROLOGICAL NEGATIVE: 1
COUGH: 1
HEMATOLOGIC/LYMPHATIC NEGATIVE: 1

## 2024-03-14 ASSESSMENT — PAIN SCALES - GENERAL: PAINLEVEL: 3

## 2024-03-14 NOTE — PROGRESS NOTES
pt tolerated today's infusions without difficulty. pt to call with any questions and/or concerns.

## 2024-03-14 NOTE — PROGRESS NOTES
Patient ID: Hayley Potter is a 63 y.o. female.    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 She was initially found to have spiculated 2.9cm mass in the RUL on the CT chest on 03/02/2020. She underwent CT guided RUL biopsy with pathology showed lung tissue with scan and focal aypicla grandular proliferation  suspicious for adenocarcinoma. IHC positive for CK7, TTF1 and napsin A. CK20 negative. PET/CT on 04/15/2020 showed RUL hypermetabolic RUL mass. No evidence of distant metastases. She underwent RUL lobectomy with mediastinal LN dissection with Dr. Wheat  on 04/21/2020 which showed invasive well to moderately differentiated adenocarcinoma, tumor measuring 2.5x2.3.x1.7cm. No VPI or LVI. All margins were negative. LN (0/11).      Unfortunately, on the surveillance CT chest (+) on 08/24/2022, there was a new irregular marginated 6mm GUSTABO nodule, which increased in size significantly on the repeat CT chest (-) on 01/06/2023, measuring 12mm.  There is also an additional new 9mm nodule  in the L lung as well as new mediastinal and possible L hilar LAD. PET/CT on 02/16/2023 showed hypermetabolic L hilar and mediastinal LAD. 1st  GUSTABO nodule now 8mm likely inflammatory. 2nd GUSTABO nodule showed stable size with SUV 4.4. Soft tissue mass associated with  L 11th rib fracture, SUV 11.5. Mass within the liver SUV 13.1, suspicious for mets. Hypermetabolic activity also noted  in  the stomach fundus, association with a hiatal hernia, in the cecum and ascending colon without masses seen. She underwent US guided liver biopsy on 02/16/2023 - poorly-differentiated carcinoma.     Treatment Summary:  04/21/2020: RUL lobectomy with medistainal LN dissection (Dr. Wheat)  04/11/2023: C1 pembrolizumab 200mg IV (on trial)  05/02/2023: C2 Pembrolizumab 200mg IV (on trial)  05/23/2023: C3 Pembrolizumab 200mg IV (on trial)  06/13/2023: C4 Pembrolizumab 200mg IV (on trial)   07/05/2023: C5 Pembrolizumab 200mg IV (on trial)   07/26/2023: C6 Pembrolizumab 200mg IV (on trial)   08/16/2023: C7 Pembrolizumab 200mg IV (on trial)   09/07/2023: C8 Pembrolizumab 200mg IV (on trial)   09/28/2023: C9 Pembrolizumab 200mg IV (on trial)   10/19/2023: C10 Pembrolizumab 200mg IV (on trial)  11/09/2023: C11 Pembrolizumab 200mg IV (on trial)  11/30/2023: C12 Pembrolizumab 200mg IV (on trial)  12/21/2023: C13 Pembrolizumab 200mg IV (on trial)  01/11/2024: C14 Pembrolizumab 200mg IV (on trial)  02/01/2024: C15 Pembrolizumab 200mg IV (on trial)  02/22/2024: C16 Pembrolizumab 200mg IV (on trial)  03/14/2024: C17 Pembrolizumab 200mg IV (on trial)    Interval History (03/14/2024):    Pt present in clinic for readiness to treat visit.  Her dtr is present for visit.  Feeling good, little tired at times, fatigue (grade 1).   Breathing feels good, tight in the morning, not enough to make her uncomfortable.  Completed ATB - sinuses feel better.  Cough - productive at times. Not keeping her up.  Phlegm translucent.  No fevers, chills, or CP.  Appetite is good.  Denies n/v/c/d. Pain 3/10 under her ribs. The same, no worse. Managed with gummies.  Callus - to podiatrist, taken care of.  Weaning off her antidepressant. Plan is every other day, then every four days, then stop.  New med started Banophen 25mg Q6H PRN, taken 1-2x/day.  Reports feeling anxious at times vs. feeling depressed.  No other concerns today.  Ready for treatment.         Review of Systems   Constitutional:  Positive for fatigue.   HENT:  Negative.     Respiratory:  Positive for cough.    Gastrointestinal: Negative.    Musculoskeletal:  Positive for flank pain.   Neurological: Negative.    Hematological: Negative.    Psychiatric/Behavioral:  The patient is nervous/anxious.       PMHx: vocal cord cancer, GERD and COPD, OA  PSHx: tonsillectomy, tubal ligation, lumpectomy, L ankle and R hand surgery  FHx: strong family history of breast cancer including male breast cancer.   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 She quit etoh . She smokes Marijuana every day.     Allergies:  Cephalexin    Medications:  Medication list reviewed with patient and updated in EMR    Vital Signs:  /90 (BP Location: Right arm, Patient Position: Sitting, BP Cuff Size: Adult long)   Pulse 94   Temp 35.6 °C (96.1 °F) (Temporal)   Resp 18   Wt 64.7 kg (142 lb 8.4 oz)   SpO2 94%   BMI 23.36 kg/m²     Wt Readings from Last 5 Encounters:   24 64.7 kg (142 lb 8.4 oz)   24 62.4 kg (137 lb 7.3 oz)   24 63.9 kg (140 lb 12.2 oz)   24 62.5 kg (137 lb 10.8 oz)   23 62.7 kg (138 lb 3.7 oz)      Physical Exam:  ECO  Pain: mild 2-3    General: Well appearing, no acute distress  Neurology: Alert and oriented x3, CN II-XII grossly intact  Psychology: Affect appropriate  Eyes: PERRL, EOMI  ENT: Mucus membranes moist and intact, no ulcers  Lymphatics: No LAD   Neck: Neck supple  Respiratory: Lungs clear bilaterally, no wheezes, no rales, no rhonchi  Cardiovascular: Normal rate and rhythm, no murmur  Abdomen: Soft, non-tender, non-distended, normoactive, no ascites  Musculoskeletal: Normal gait and stance  Extremities: No clubbing, no cyanosis, no edema  Skin: No rashes or skin concerns    Results from last 7 days   Lab Units 24  1528   GLUCOSE mg/dL 78   SODIUM mmol/L 139   POTASSIUM mmol/L 5.0   CHLORIDE mmol/L 99    CO2 mmol/L 32   BUN mg/dL 8   CREATININE mg/dL 0.90   EGFR mL/min/1.73m*2 72   CALCIUM mg/dL 9.9   ALBUMIN g/dL 4.1   PROTEIN TOTAL g/dL 6.5   BILIRUBIN TOTAL mg/dL 0.3   ALK PHOS U/L 91   ALT U/L 13   AST U/L 16     Results from last 7 days   Lab Units 03/13/24  1528   WBC AUTO x10*3/uL 6.6   HEMOGLOBIN g/dL 13.1   HEMATOCRIT % 39.3   PLATELETS AUTO x10*3/uL 399   NEUTROS ABS x10*3/uL 4.11   LYMPHS ABS AUTO x10*3/uL 1.58   MONOS ABS AUTO x10*3/uL 0.72   EOS ABS AUTO x10*3/uL 0.09   NEUTROS PCT AUTO % 62.1   LYMPHS PCT AUTO % 23.9   MONOS PCT AUTO % 10.9   EOS PCT AUTO % 1.4      Assessment/Plan:  63 y.o. 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     The patient's records and imaging have been reviewed in detail.  MD discussed with the patient the natural history of their disease at length.  The following has also been discussed with the patient:     # Cancer: recurrent likely metastatic ([id number]) lung cancer: liver biopsy showed extensive necrosis. Liquid biopsy showed KRAS G12C mutation, which was identified in her original surgical resected  specimen. PDL-1 50%. Patient initially had stage IA2 (L3yS1D3) RUL lung  adenocarcinoma s/p RUL lobectomy, unfortunately now likely metastatic recurrence. Liver biopsy on 2/16 showed poorly differentiated malignant neoplasm with extensive necrosis. We discussed treatment  options with her today with either standard of care pembrolizumab vs clinical trials. Patient expressed interests in enrolling into clinical trial. Enrolled into RCT [id number]. Doing well.      - Interval scan: 03/11/2024: CT CAP (+): 1. No new metastatic disease in the chest, abdomen, or pelvis.  2. Stable appearing nodularity in the left upper lobe contiguous with  an area of linear scar measuring 7 mm.  3. Sclerotic lesion left posterior 5th rib as seen on  prior imaging.    # Hypothyroidism: G2. Likely 2/2 immunotherapy - resolved.  TSH 1.07 11/8/23.  Continue levothyroxine 75mcg daily.      # Microcytic anemia 2/2 due to chronic disease: patient denies any hematochezia  or hematuria. No melena. hx of hiatal hernia. Per patient. Last EGD was done in 2021 and was normal. Colonoscopy was a few years ago.  EGD showed hiatal hernia 03/2023. Improved Hgb overall. Iron studies show low-normal ferritin and TSAT of 14%. Continue PO iron 3x/week. Tolerates well. Stable.      # Mucositis (chronic).  Pt reports since start of immunotherapy.  Chronic sore secondary to ill fitting dentures recommended s&s rinse.  Continue Anbesol and/or Oragel for pain.  Will continue to monitor.  BMX added.  2/1/24  - resolved      # Grade 1 hyperthyroidism: resolved, now hypothyroid (as above).     # Constipation: managed well with stool softener and PRN lactulose.        # Clinical Trials: April 11, 2023 initiated treatment on [id number] this is cycle 1 of first-line treatment.  She has been randomized to  arm A which is pembrolizumab alone for up to 2 years or progression followed by Alimta/carboplatin for second line therapy. Consent has been signed. 3/11 CT scan shows favorable response. Continue treatment.      # Access: Peripheral veins.     # Pain: she has a displaced 8th Rib on the left side -Advised patient to take percocet daily and decrease Advil use. PRN Percocet Rx in place.  Rx last sent 12/21/23. Currently managed with PRN gummies.      # Bone Health: L fifth ribs lesion stable.    # Arthralgia (grade 1).  Uric acid lab (-).  Intermittent arthralgia to left hand.  Continue PRN Advil or Percocet for severe pain.      # Left rib or chest wall pain over the past month. 11/30/23 CXR - RESULTS: Mediastinal surgical clips are noted. The cardiac silhouette is within normal limits. Mediastinal contours are unremarkable. The lungs demonstrate no infiltrate or atelectasis. There is no evidence  for pleural effusion. Remote fracture is noted involving the right 7th rib. There are degenerative changes of the thoracic spine.  - Continue PRN Percocet and/or gummies for pain control.      # Psychosocial: Coping well, no acute issues.  Good support from family & friends.  Social work support available.     # GI/CINV: No acute issues.  Eating and voiding well.  Nutrition consult available.    # Sinusitis:  Pressure to maxillary and frontal sinuses.  +Increased mucous, yellow in color.  Denies fevers.  Hx of recurrent sinus infections.  Rx called to pt's pharmacy.  Doxycycline 100mg BID x7-10 days.  Extended course d/t recent smoke exposure.  3/14: Resolved.       # Smoking: former smoker     # Fertility: N/A.        # Heme/ESAs: N/A.     # GOC: Patient is aware of palliative  intent goals of care.     # Language: English.     # Dispo: RTC in 3 weeks for next cycle.  Pt instructed to call with concerns/questions .

## 2024-04-03 ENCOUNTER — LAB (OUTPATIENT)
Dept: LAB | Facility: LAB | Age: 64
End: 2024-04-03
Payer: COMMERCIAL

## 2024-04-03 DIAGNOSIS — C34.11 MALIGNANT NEOPLASM OF UPPER LOBE OF RIGHT LUNG (MULTI): ICD-10-CM

## 2024-04-03 LAB
ALBUMIN SERPL BCP-MCNC: 4.1 G/DL (ref 3.4–5)
ALP SERPL-CCNC: 62 U/L (ref 33–136)
ALT SERPL W P-5'-P-CCNC: 15 U/L (ref 7–45)
AMYLASE SERPL-CCNC: 35 U/L (ref 29–103)
ANION GAP SERPL CALC-SCNC: 14 MMOL/L (ref 10–20)
AST SERPL W P-5'-P-CCNC: 18 U/L (ref 9–39)
BASOPHILS # BLD AUTO: 0.08 X10*3/UL (ref 0–0.1)
BASOPHILS NFR BLD AUTO: 1 %
BILIRUB SERPL-MCNC: 0.5 MG/DL (ref 0–1.2)
BUN SERPL-MCNC: 8 MG/DL (ref 6–23)
CALCIUM SERPL-MCNC: 10.2 MG/DL (ref 8.6–10.6)
CHLORIDE SERPL-SCNC: 105 MMOL/L (ref 98–107)
CO2 SERPL-SCNC: 29 MMOL/L (ref 21–32)
CREAT SERPL-MCNC: 0.89 MG/DL (ref 0.5–1.05)
EGFRCR SERPLBLD CKD-EPI 2021: 73 ML/MIN/1.73M*2
EOSINOPHIL # BLD AUTO: 0.11 X10*3/UL (ref 0–0.7)
EOSINOPHIL NFR BLD AUTO: 1.3 %
ERYTHROCYTE [DISTWIDTH] IN BLOOD BY AUTOMATED COUNT: 13.2 % (ref 11.5–14.5)
GLUCOSE SERPL-MCNC: 88 MG/DL (ref 74–99)
HCT VFR BLD AUTO: 42.7 % (ref 36–46)
HGB BLD-MCNC: 13.8 G/DL (ref 12–16)
IMM GRANULOCYTES # BLD AUTO: 0.01 X10*3/UL (ref 0–0.7)
IMM GRANULOCYTES NFR BLD AUTO: 0.1 % (ref 0–0.9)
LIPASE SERPL-CCNC: 14 U/L (ref 9–82)
LYMPHOCYTES # BLD AUTO: 1.71 X10*3/UL (ref 1.2–4.8)
LYMPHOCYTES NFR BLD AUTO: 20.4 %
MCH RBC QN AUTO: 31 PG (ref 26–34)
MCHC RBC AUTO-ENTMCNC: 32.3 G/DL (ref 32–36)
MCV RBC AUTO: 96 FL (ref 80–100)
MONOCYTES # BLD AUTO: 0.95 X10*3/UL (ref 0.1–1)
MONOCYTES NFR BLD AUTO: 11.4 %
NEUTROPHILS # BLD AUTO: 5.51 X10*3/UL (ref 1.2–7.7)
NEUTROPHILS NFR BLD AUTO: 65.8 %
NRBC BLD-RTO: 0 /100 WBCS (ref 0–0)
PLATELET # BLD AUTO: 445 X10*3/UL (ref 150–450)
POTASSIUM SERPL-SCNC: 4.6 MMOL/L (ref 3.5–5.3)
PROT SERPL-MCNC: 6.5 G/DL (ref 6.4–8.2)
RBC # BLD AUTO: 4.45 X10*6/UL (ref 4–5.2)
SODIUM SERPL-SCNC: 143 MMOL/L (ref 136–145)
WBC # BLD AUTO: 8.4 X10*3/UL (ref 4.4–11.3)

## 2024-04-03 PROCEDURE — 80053 COMPREHEN METABOLIC PANEL: CPT

## 2024-04-03 PROCEDURE — 85025 COMPLETE CBC W/AUTO DIFF WBC: CPT

## 2024-04-03 PROCEDURE — 36415 COLL VENOUS BLD VENIPUNCTURE: CPT

## 2024-04-03 PROCEDURE — 82150 ASSAY OF AMYLASE: CPT

## 2024-04-03 PROCEDURE — 83690 ASSAY OF LIPASE: CPT

## 2024-04-04 ENCOUNTER — OFFICE VISIT (OUTPATIENT)
Dept: HEMATOLOGY/ONCOLOGY | Facility: CLINIC | Age: 64
End: 2024-04-04
Payer: COMMERCIAL

## 2024-04-04 ENCOUNTER — EDUCATION (OUTPATIENT)
Dept: HEMATOLOGY/ONCOLOGY | Facility: CLINIC | Age: 64
End: 2024-04-04
Payer: COMMERCIAL

## 2024-04-04 ENCOUNTER — INFUSION (OUTPATIENT)
Dept: HEMATOLOGY/ONCOLOGY | Facility: CLINIC | Age: 64
End: 2024-04-04
Payer: COMMERCIAL

## 2024-04-04 VITALS
HEART RATE: 92 BPM | TEMPERATURE: 98.2 F | RESPIRATION RATE: 18 BRPM | DIASTOLIC BLOOD PRESSURE: 75 MMHG | WEIGHT: 136.8 LBS | SYSTOLIC BLOOD PRESSURE: 109 MMHG | HEIGHT: 65 IN | OXYGEN SATURATION: 99 % | BODY MASS INDEX: 22.79 KG/M2

## 2024-04-04 DIAGNOSIS — C34.11 MALIGNANT NEOPLASM OF UPPER LOBE OF RIGHT LUNG (MULTI): Primary | ICD-10-CM

## 2024-04-04 DIAGNOSIS — Z00.6 EXAMINATION OF PARTICIPANT IN CLINICAL TRIAL: ICD-10-CM

## 2024-04-04 DIAGNOSIS — C34.11 MALIGNANT NEOPLASM OF UPPER LOBE OF RIGHT LUNG (MULTI): ICD-10-CM

## 2024-04-04 PROCEDURE — 2500000004 HC RX 250 GENERAL PHARMACY W/ HCPCS (ALT 636 FOR OP/ED): Mod: SE | Performed by: STUDENT IN AN ORGANIZED HEALTH CARE EDUCATION/TRAINING PROGRAM

## 2024-04-04 PROCEDURE — 99214 OFFICE O/P EST MOD 30 MIN: CPT | Performed by: STUDENT IN AN ORGANIZED HEALTH CARE EDUCATION/TRAINING PROGRAM

## 2024-04-04 PROCEDURE — 96413 CHEMO IV INFUSION 1 HR: CPT

## 2024-04-04 RX ORDER — DIPHENHYDRAMINE HYDROCHLORIDE 50 MG/ML
50 INJECTION INTRAMUSCULAR; INTRAVENOUS AS NEEDED
Status: DISCONTINUED | OUTPATIENT
Start: 2024-04-04 | End: 2024-04-04 | Stop reason: HOSPADM

## 2024-04-04 RX ORDER — ACETAMINOPHEN 325 MG/1
650 TABLET ORAL AS NEEDED
Status: DISCONTINUED | OUTPATIENT
Start: 2024-04-04 | End: 2024-04-04 | Stop reason: HOSPADM

## 2024-04-04 RX ORDER — EPINEPHRINE 0.3 MG/.3ML
0.3 INJECTION SUBCUTANEOUS EVERY 5 MIN PRN
Status: DISCONTINUED | OUTPATIENT
Start: 2024-04-04 | End: 2024-04-04 | Stop reason: HOSPADM

## 2024-04-04 RX ORDER — DIPHENHYDRAMINE HCL 25 MG
50 CAPSULE ORAL AS NEEDED
Status: DISCONTINUED | OUTPATIENT
Start: 2024-04-04 | End: 2024-04-04 | Stop reason: HOSPADM

## 2024-04-04 RX ORDER — PROCHLORPERAZINE MALEATE 10 MG
10 TABLET ORAL EVERY 6 HOURS PRN
Status: DISCONTINUED | OUTPATIENT
Start: 2024-04-04 | End: 2024-04-04 | Stop reason: HOSPADM

## 2024-04-04 RX ORDER — PROCHLORPERAZINE EDISYLATE 5 MG/ML
10 INJECTION INTRAMUSCULAR; INTRAVENOUS EVERY 6 HOURS PRN
Status: DISCONTINUED | OUTPATIENT
Start: 2024-04-04 | End: 2024-04-04 | Stop reason: HOSPADM

## 2024-04-04 RX ORDER — ALBUTEROL SULFATE 0.83 MG/ML
3 SOLUTION RESPIRATORY (INHALATION) AS NEEDED
Status: DISCONTINUED | OUTPATIENT
Start: 2024-04-04 | End: 2024-04-04 | Stop reason: HOSPADM

## 2024-04-04 RX ORDER — FAMOTIDINE 10 MG/ML
20 INJECTION INTRAVENOUS ONCE AS NEEDED
Status: DISCONTINUED | OUTPATIENT
Start: 2024-04-04 | End: 2024-04-04 | Stop reason: HOSPADM

## 2024-04-04 RX ADMIN — Medication 200 MG: at 14:57

## 2024-04-04 ASSESSMENT — ENCOUNTER SYMPTOMS
NEUROLOGICAL NEGATIVE: 1
COUGH: 1
FATIGUE: 1
HEMATOLOGIC/LYMPHATIC NEGATIVE: 1
NERVOUS/ANXIOUS: 1
FLANK PAIN: 1
GASTROINTESTINAL NEGATIVE: 1

## 2024-04-04 ASSESSMENT — PAIN SCALES - GENERAL: PAINLEVEL: 3

## 2024-04-04 NOTE — RESEARCH NOTES
Research Note Treatment Day    Hayley Potter is here today for treatment on [id number]. Today is C18D1. Procedures completed per protocol. AE's and con-meds reviewed with patient. Patient is aware of treatment plan.    [x]   Received treatment as planned   OR  []    Treatment delayed; patient calendar updated as required   Treatment delayed because:    []   AE    []   Physician Discretion    []   Clinical Deterioration or Progression     []   Other    Education Documentation  Treatment Plan and Schedule, taught by Rafiq Delgado RN at 4/4/2024  2:55 PM.  Learner: Family, Patient  Readiness: Acceptance  Method: Explanation  Response: Verbalizes Understanding    Education Comments  No comments found.    Pt here with her daughter for cycle 18 day 1.  Pt is feeling well without significant complaints noted.  She is excited about having been on this for a year already and states she feels so much better than she did a year ago.  MD in to see pt for complete assessment.

## 2024-04-04 NOTE — PROGRESS NOTES
Patient ID: Hayley Potter is a 63 y.o. female.    CC:  Management of stage IA3 (I3pB5M2) adenocarcinoma of RUL s/p RUL lobectomy 04/2020, PDL-1 50%, NGS positive for KRAS G12C--> now with  metastatic recurrence involving liver, 11th L rib, and mediastinal LNs. 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 She was initially found to have spiculated 2.9cm mass in the RUL on the CT chest on 03/02/2020. She underwent CT guided RUL biopsy with pathology showed lung tissue with scan and focal aypicla grandular proliferation  suspicious for adenocarcinoma. IHC positive for CK7, TTF1 and napsin A. CK20 negative. PET/CT on 04/15/2020 showed RUL hypermetabolic RUL mass. No evidence of distant metastases. She underwent RUL lobectomy with mediastinal LN dissection with Dr. Wheat  on 04/21/2020 which showed invasive well to moderately differentiated adenocarcinoma, tumor measuring 2.5x2.3.x1.7cm. No VPI or LVI. All margins were negative. LN (0/11).      Unfortunately, on the surveillance CT chest (+) on 08/24/2022, there was a new irregular marginated 6mm GUSTABO nodule, which increased in size significantly on the repeat CT chest (-) on 01/06/2023, measuring 12mm.  There is also an additional new 9mm nodule  in the L lung as well as new mediastinal and possible L hilar LAD. PET/CT on 02/16/2023 showed hypermetabolic L hilar and mediastinal LAD. 1st  GUSTABO nodule now 8mm likely inflammatory. 2nd GUSTABO nodule showed stable size with SUV 4.4. Soft tissue mass associated with  L 11th rib fracture, SUV 11.5. Mass within the liver SUV 13.1, suspicious for mets. Hypermetabolic activity also noted  in  the stomach fundus, association with a hiatal hernia, in the cecum and ascending colon without masses seen. She underwent US guided liver biopsy on 02/16/2023 - poorly-differentiated carcinoma.     Treatment Summary:  04/21/2020: RUL lobectomy with medistainal LN dissection (Dr. Wheat)  04/11/2023: C1 pembrolizumab 200mg IV (on trial)  05/02/2023: C2 Pembrolizumab 200mg IV (on trial)  05/23/2023: C3 Pembrolizumab 200mg IV (on trial)  06/13/2023: C4 Pembrolizumab 200mg IV (on trial)   07/05/2023: C5 Pembrolizumab 200mg IV (on trial)   07/26/2023: C6 Pembrolizumab 200mg IV (on trial)   08/16/2023: C7 Pembrolizumab 200mg IV (on trial)   09/07/2023: C8 Pembrolizumab 200mg IV (on trial)   09/28/2023: C9 Pembrolizumab 200mg IV (on trial)   10/19/2023: C10 Pembrolizumab 200mg IV (on trial)  11/09/2023: C11 Pembrolizumab 200mg IV (on trial)  11/30/2023: C12 Pembrolizumab 200mg IV (on trial)  12/21/2023: C13 Pembrolizumab 200mg IV (on trial)  01/11/2024: C14 Pembrolizumab 200mg IV (on trial)  02/01/2024: C15 Pembrolizumab 200mg IV (on trial)  02/22/2024: C16 Pembrolizumab 200mg IV (on trial)  03/14/2024: C17 Pembrolizumab 200mg IV (on trial)  04/04/2024: C18 Pembrolizumab 200mg IV (on trial)    Interval History (03/14/2024):    Pt present in clinic for readiness to treat visit.  Her dtr is present for visit.  She recently was tapered off from her antidepressant. She has been having more anxiety and agitation lately. Chronic cough stable. Chronic rib pain stable. Energy level is fair. Eating and voiding well. No n/v/d/c. No fever or chills.       Review of Systems   Constitutional:  Positive for fatigue.   HENT:  Negative.     Respiratory:  Positive for cough.    Gastrointestinal: Negative.    Musculoskeletal:  Positive for flank pain.   Neurological: Negative.    Hematological: Negative.    Psychiatric/Behavioral:  The patient is nervous/anxious.       PMHx: vocal cord cancer, GERD and COPD, OA  PSHx: tonsillectomy, tubal  "ligation, lumpectomy, L ankle and R hand surgery  FHx: strong family history of breast cancer including male breast cancer.   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 She quit etoh . She smokes Marijuana every day.     Allergies:  Cephalexin    Medications:  Medication list reviewed with patient and updated in EMR    Vital Signs:  /75 (BP Location: Left arm, Patient Position: Sitting, BP Cuff Size: Adult long)   Pulse 92   Temp 36.8 °C (98.2 °F) (Temporal)   Resp 18   Ht (S) 1.652 m (5' 5.04\")   Wt 62.1 kg (136 lb 12.7 oz)   SpO2 99%   BMI 22.74 kg/m²     Wt Readings from Last 5 Encounters:   24 62.1 kg (136 lb 12.7 oz)   24 64.7 kg (142 lb 8.4 oz)   24 62.4 kg (137 lb 7.3 oz)   24 63.9 kg (140 lb 12.2 oz)   24 62.5 kg (137 lb 10.8 oz)      Physical Exam:  ECO  Pain: mild 2-3    General: Well appearing, no acute distress  Neurology: Alert and oriented x3, CN II-XII grossly intact  Psychology: Affect appropriate  Eyes: PERRL, EOMI  ENT: Mucus membranes moist and intact, no ulcers  Lymphatics: No LAD   Neck: Neck supple  Respiratory: Lungs clear bilaterally, no wheezes, no rales, no rhonchi  Cardiovascular: Normal rate and rhythm, no murmur  Abdomen: Soft, non-tender, non-distended, normoactive, no ascites  Musculoskeletal: Normal gait and stance  Extremities: No clubbing, no cyanosis, no edema  Skin: No rashes or skin concerns    Results from last 7 days   Lab Units 24  1546   GLUCOSE mg/dL 88   SODIUM mmol/L 143   POTASSIUM mmol/L 4.6   CHLORIDE mmol/L 105   CO2 mmol/L 29   BUN mg/dL 8   CREATININE mg/dL 0.89   EGFR mL/min/1.73m*2 73   CALCIUM mg/dL 10.2   ALBUMIN g/dL 4.1   PROTEIN TOTAL g/dL 6.5   BILIRUBIN TOTAL mg/dL 0.5   ALK PHOS U/L 62   ALT U/L 15   AST U/L 18       Results from last 7 days   Lab Units 24  1546   WBC AUTO x10*3/uL 8.4   HEMOGLOBIN g/dL 13.8   HEMATOCRIT % 42.7   PLATELETS AUTO " x10*3/uL 445   NEUTROS ABS x10*3/uL 5.51   LYMPHS ABS AUTO x10*3/uL 1.71   MONOS ABS AUTO x10*3/uL 0.95   EOS ABS AUTO x10*3/uL 0.11   NEUTROS PCT AUTO % 65.8   LYMPHS PCT AUTO % 20.4   MONOS PCT AUTO % 11.4   EOS PCT AUTO % 1.3        Assessment/Plan:  63 y.o. 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     The patient's records and imaging have been reviewed in detail.  MD discussed with the patient the natural history of their disease at length.  The following has also been discussed with the patient:     # Cancer: recurrent likely metastatic (G1A2P8j) lung cancer: liver biopsy showed extensive necrosis. Liquid biopsy showed KRAS G12C mutation, which was identified in her original surgical resected  specimen. PDL-1 50%. Patient initially had stage IA2 (M5lQ0N5) RUL lung  adenocarcinoma s/p RUL lobectomy, unfortunately now likely metastatic recurrence. Liver biopsy on 2/16 showed poorly differentiated malignant neoplasm with extensive necrosis. We discussed treatment  options with her today with either standard of care pembrolizumab vs clinical trials. Patient expressed interests in enrolling into clinical trial. Enrolled into RCT [id number]. Doing well.      - Interval scan: 03/11/2024: CT CAP (+): 1. No new metastatic disease in the chest, abdomen, or pelvis.  2. Stable appearing nodularity in the left upper lobe contiguous with  an area of linear scar measuring 7 mm.  3. Sclerotic lesion left posterior 5th rib as seen on prior imaging.    # Hypothyroidism: G2. Likely 2/2 immunotherapy - resolved.  TSH 1.07 11/8/23.  Continue levothyroxine 75mcg daily.      # Microcytic anemia 2/2 due to chronic disease: patient denies any hematochezia  or hematuria. No melena. hx of hiatal hernia. Per patient. Last EGD was done in 2021 and was normal. Colonoscopy was a few years ago.   EGD showed hiatal hernia 03/2023. Improved Hgb overall. Iron studies show low-normal ferritin and TSAT of 14%. Continue PO iron 3x/week. Tolerates well. Stable.      # Mucositis (chronic).  Pt reports since start of immunotherapy.  Chronic sore secondary to ill fitting dentures recommended s&s rinse.  Continue Anbesol and/or Oragel for pain.  Will continue to monitor.  BMX added.  2/1/24  - resolved      # Grade 1 hyperthyroidism: resolved, now hypothyroid (as above).     # Constipation: managed well with stool softener and PRN lactulose.        # Clinical Trials: April 11, 2023 initiated treatment on [id number] this is cycle 1 of first-line treatment.  She has been randomized to  arm A which is pembrolizumab alone for up to 2 years or progression followed by Alimta/carboplatin for second line therapy. Consent has been signed. 3/11 CT scan shows favorable response. Continue treatment.      # Access: Peripheral veins.     # Pain: she has a displaced 8th Rib on the left side -Advised patient to take percocet daily and decrease Advil use. PRN Percocet Rx in place.  Rx last sent 12/21/23. Currently managed with PRN gummies.      # Bone Health: L fifth ribs lesion stable.    # Arthralgia (grade 1).  Uric acid lab (-).  Intermittent arthralgia to left hand.  Continue PRN Advil or Percocet for severe pain.      # Left rib or chest wall pain over the past month. 11/30/23 CXR - RESULTS: Mediastinal surgical clips are noted. The cardiac silhouette is within normal limits. Mediastinal contours are unremarkable. The lungs demonstrate no infiltrate or atelectasis. There is no evidence for pleural effusion. Remote fracture is noted involving the right 7th rib. There are degenerative changes of the thoracic spine.  - Continue PRN Percocet and/or gummies for pain control.      # Psychosocial: Coping well, no acute issues.  Good support from family & friends.  Social work support available.     # GI/CINV: No acute issues.  Eating and  voiding well.  Nutrition consult available.    # Sinusitis:  Pressure to maxillary and frontal sinuses.  +Increased mucous, yellow in color.  Denies fevers.  Hx of recurrent sinus infections.  Rx called to pt's pharmacy.  Doxycycline 100mg BID x7-10 days.  Extended course d/t recent smoke exposure.  3/14: Resolved.       # Smoking: former smoker     # Fertility: N/A.        # Heme/ESAs: N/A.     # GOC: Patient is aware of palliative  intent goals of care.     # Language: English.     # Dispo: RTC in 3 weeks for next cycle.  Pt instructed to call with concerns/questions .

## 2024-04-04 NOTE — PROGRESS NOTES
"Patient here today for follow up with her daughter. She's noticed her hair isn't growing back.     Fatigue: \"most days pretty good..I'm starting to get tired going into treatments and after\"  PO intake: adequate  Weight: appears stable  N/V/D/C: denies    Medications reviewed with patient.       " EGD

## 2024-04-11 ENCOUNTER — OFFICE VISIT (OUTPATIENT)
Dept: OBSTETRICS AND GYNECOLOGY | Facility: CLINIC | Age: 64
End: 2024-04-11
Payer: COMMERCIAL

## 2024-04-11 VITALS — DIASTOLIC BLOOD PRESSURE: 64 MMHG | BODY MASS INDEX: 22.6 KG/M2 | SYSTOLIC BLOOD PRESSURE: 118 MMHG | WEIGHT: 136 LBS

## 2024-04-11 DIAGNOSIS — N95.2 POSTMENOPAUSAL ATROPHIC VAGINITIS: Primary | ICD-10-CM

## 2024-04-11 DIAGNOSIS — S31.41XA LACERATION OF VULVA, INITIAL ENCOUNTER: ICD-10-CM

## 2024-04-11 PROCEDURE — 1036F TOBACCO NON-USER: CPT | Performed by: MIDWIFE

## 2024-04-11 PROCEDURE — 99203 OFFICE O/P NEW LOW 30 MIN: CPT | Performed by: MIDWIFE

## 2024-04-11 RX ORDER — ESTRADIOL 0.1 MG/G
CREAM VAGINAL
Qty: 42.5 G | Refills: 1 | Status: SHIPPED | OUTPATIENT
Start: 2024-04-11

## 2024-04-11 NOTE — PROGRESS NOTES
Subjective   Patient ID: Hayley Potter is a 63 y.o. female who presents for Vaginal tear. This occurred about a week ago after first IC in 11 yrs with partner of 8 mos.    HPI  PMHx: pt currently in a clinical tx trial for tx of stage 4 lung CA, she says her oncologist has already told her that estradiol cream use is okay for her; last pap 2014 NIL Neg  SocHx:  11 yrs ago, with new partner 8 mos they sing together and smoke weed together  ROS: no pelvic pain, no urinary c/o, NAD  Review of Systems   Genitourinary:         Vulvar laceration       Objective   Physical Exam  Constitutional:       Appearance: Normal appearance. She is normal weight.   HENT:      Head: Normocephalic.   Pulmonary:      Effort: Pulmonary effort is normal.   Genitourinary:         Comments: Healing laceration  Neurological:      Mental Status: She is alert.   Psychiatric:         Behavior: Behavior normal.         Thought Content: Thought content normal.         Assessment/Plan   Diagnoses and all orders for this visit:  Postmenopausal atrophic vaginitis  -     estradiol (Estrace) 0.01 % (0.1 mg/gram) vaginal cream; Use 1/4 applicator-full of cream per vagina Monday Wednesday and Friday for 2 weeks, then taper to as needed use  Please return to office for FU in 3 mos  Laceration of vulva, initial encounter  -     estradiol (Estrace) 0.01 % (0.1 mg/gram) vaginal cream; Use 1/4 applicator-full of cream per vagina Monday Wednesday and Friday for 2 weeks, then taper to as needed use  Please return to office for FU in 3 mos    Exam findings and vulvovaginal skin care discussed at length, including use of age appropriate lubricants for IC  RTO for FU and AE/pap in 3 mos       KALYN Cha, ND 04/11/24 2:39 PM

## 2024-04-15 ENCOUNTER — TELEPHONE (OUTPATIENT)
Dept: OBSTETRICS AND GYNECOLOGY | Facility: CLINIC | Age: 64
End: 2024-04-15
Payer: COMMERCIAL

## 2024-04-24 ENCOUNTER — LAB (OUTPATIENT)
Dept: LAB | Facility: LAB | Age: 64
End: 2024-04-24
Payer: COMMERCIAL

## 2024-04-24 DIAGNOSIS — C34.11 MALIGNANT NEOPLASM OF UPPER LOBE OF RIGHT LUNG (MULTI): ICD-10-CM

## 2024-04-24 LAB
BASOPHILS # BLD AUTO: 0.06 X10*3/UL (ref 0–0.1)
BASOPHILS NFR BLD AUTO: 0.8 %
EOSINOPHIL # BLD AUTO: 0.13 X10*3/UL (ref 0–0.7)
EOSINOPHIL NFR BLD AUTO: 1.8 %
ERYTHROCYTE [DISTWIDTH] IN BLOOD BY AUTOMATED COUNT: 13 % (ref 11.5–14.5)
HCT VFR BLD AUTO: 45.8 % (ref 36–46)
HGB BLD-MCNC: 14.5 G/DL (ref 12–16)
IMM GRANULOCYTES # BLD AUTO: 0.02 X10*3/UL (ref 0–0.7)
IMM GRANULOCYTES NFR BLD AUTO: 0.3 % (ref 0–0.9)
LYMPHOCYTES # BLD AUTO: 1.52 X10*3/UL (ref 1.2–4.8)
LYMPHOCYTES NFR BLD AUTO: 20.6 %
MCH RBC QN AUTO: 30.7 PG (ref 26–34)
MCHC RBC AUTO-ENTMCNC: 31.7 G/DL (ref 32–36)
MCV RBC AUTO: 97 FL (ref 80–100)
MONOCYTES # BLD AUTO: 0.87 X10*3/UL (ref 0.1–1)
MONOCYTES NFR BLD AUTO: 11.8 %
NEUTROPHILS # BLD AUTO: 4.77 X10*3/UL (ref 1.2–7.7)
NEUTROPHILS NFR BLD AUTO: 64.7 %
NRBC BLD-RTO: 0 /100 WBCS (ref 0–0)
PLATELET # BLD AUTO: 454 X10*3/UL (ref 150–450)
RBC # BLD AUTO: 4.73 X10*6/UL (ref 4–5.2)
WBC # BLD AUTO: 7.4 X10*3/UL (ref 4.4–11.3)

## 2024-04-24 PROCEDURE — 85025 COMPLETE CBC W/AUTO DIFF WBC: CPT

## 2024-04-24 PROCEDURE — 83690 ASSAY OF LIPASE: CPT

## 2024-04-24 PROCEDURE — 82150 ASSAY OF AMYLASE: CPT

## 2024-04-24 PROCEDURE — 80053 COMPREHEN METABOLIC PANEL: CPT

## 2024-04-24 PROCEDURE — 36415 COLL VENOUS BLD VENIPUNCTURE: CPT

## 2024-04-25 ENCOUNTER — APPOINTMENT (OUTPATIENT)
Dept: HEMATOLOGY/ONCOLOGY | Facility: CLINIC | Age: 64
End: 2024-04-25
Payer: COMMERCIAL

## 2024-04-25 ENCOUNTER — EDUCATION (OUTPATIENT)
Dept: HEMATOLOGY/ONCOLOGY | Facility: CLINIC | Age: 64
End: 2024-04-25

## 2024-04-25 ENCOUNTER — INFUSION (OUTPATIENT)
Dept: HEMATOLOGY/ONCOLOGY | Facility: CLINIC | Age: 64
End: 2024-04-25
Payer: COMMERCIAL

## 2024-04-25 ENCOUNTER — OFFICE VISIT (OUTPATIENT)
Dept: HEMATOLOGY/ONCOLOGY | Facility: CLINIC | Age: 64
End: 2024-04-25
Payer: COMMERCIAL

## 2024-04-25 VITALS
TEMPERATURE: 97.1 F | SYSTOLIC BLOOD PRESSURE: 136 MMHG | WEIGHT: 136.91 LBS | BODY MASS INDEX: 22.75 KG/M2 | HEART RATE: 74 BPM | DIASTOLIC BLOOD PRESSURE: 81 MMHG | OXYGEN SATURATION: 95 %

## 2024-04-25 DIAGNOSIS — C34.11 MALIGNANT NEOPLASM OF UPPER LOBE OF RIGHT LUNG (MULTI): ICD-10-CM

## 2024-04-25 DIAGNOSIS — C34.90 MALIGNANT NEOPLASM OF LUNG, UNSPECIFIED LATERALITY, UNSPECIFIED PART OF LUNG (MULTI): ICD-10-CM

## 2024-04-25 DIAGNOSIS — Z53.20: ICD-10-CM

## 2024-04-25 LAB
ALBUMIN SERPL BCP-MCNC: 4.3 G/DL (ref 3.4–5)
ALP SERPL-CCNC: 59 U/L (ref 33–136)
ALT SERPL W P-5'-P-CCNC: 17 U/L (ref 7–45)
AMYLASE SERPL-CCNC: 30 U/L (ref 29–103)
ANION GAP SERPL CALC-SCNC: 15 MMOL/L (ref 10–20)
AST SERPL W P-5'-P-CCNC: 22 U/L (ref 9–39)
BILIRUB SERPL-MCNC: 0.4 MG/DL (ref 0–1.2)
BUN SERPL-MCNC: 6 MG/DL (ref 6–23)
CALCIUM SERPL-MCNC: 10.1 MG/DL (ref 8.6–10.6)
CHLORIDE SERPL-SCNC: 101 MMOL/L (ref 98–107)
CO2 SERPL-SCNC: 30 MMOL/L (ref 21–32)
CREAT SERPL-MCNC: 0.91 MG/DL (ref 0.5–1.05)
EGFRCR SERPLBLD CKD-EPI 2021: 71 ML/MIN/1.73M*2
GLUCOSE SERPL-MCNC: 83 MG/DL (ref 74–99)
LIPASE SERPL-CCNC: 17 U/L (ref 9–82)
POTASSIUM SERPL-SCNC: 5.3 MMOL/L (ref 3.5–5.3)
PROT SERPL-MCNC: 6.9 G/DL (ref 6.4–8.2)
SODIUM SERPL-SCNC: 141 MMOL/L (ref 136–145)

## 2024-04-25 PROCEDURE — 96413 CHEMO IV INFUSION 1 HR: CPT

## 2024-04-25 PROCEDURE — 96374 THER/PROPH/DIAG INJ IV PUSH: CPT | Mod: INF

## 2024-04-25 PROCEDURE — 99213 OFFICE O/P EST LOW 20 MIN: CPT | Performed by: STUDENT IN AN ORGANIZED HEALTH CARE EDUCATION/TRAINING PROGRAM

## 2024-04-25 PROCEDURE — 96365 THER/PROPH/DIAG IV INF INIT: CPT | Mod: INF

## 2024-04-25 PROCEDURE — 2500000004 HC RX 250 GENERAL PHARMACY W/ HCPCS (ALT 636 FOR OP/ED): Mod: SE | Performed by: STUDENT IN AN ORGANIZED HEALTH CARE EDUCATION/TRAINING PROGRAM

## 2024-04-25 RX ORDER — HEPARIN SODIUM,PORCINE/PF 10 UNIT/ML
50 SYRINGE (ML) INTRAVENOUS AS NEEDED
Status: DISCONTINUED | OUTPATIENT
Start: 2024-04-25 | End: 2024-04-25 | Stop reason: HOSPADM

## 2024-04-25 RX ORDER — DIPHENHYDRAMINE HYDROCHLORIDE 50 MG/ML
50 INJECTION INTRAMUSCULAR; INTRAVENOUS AS NEEDED
Status: DISCONTINUED | OUTPATIENT
Start: 2024-04-25 | End: 2024-04-25 | Stop reason: HOSPADM

## 2024-04-25 RX ORDER — FAMOTIDINE 10 MG/ML
20 INJECTION INTRAVENOUS ONCE AS NEEDED
Status: DISCONTINUED | OUTPATIENT
Start: 2024-04-25 | End: 2024-04-25 | Stop reason: HOSPADM

## 2024-04-25 RX ORDER — PROCHLORPERAZINE EDISYLATE 5 MG/ML
10 INJECTION INTRAMUSCULAR; INTRAVENOUS EVERY 6 HOURS PRN
Status: DISCONTINUED | OUTPATIENT
Start: 2024-04-25 | End: 2024-04-25 | Stop reason: HOSPADM

## 2024-04-25 RX ORDER — ALBUTEROL SULFATE 0.83 MG/ML
3 SOLUTION RESPIRATORY (INHALATION) AS NEEDED
Status: DISCONTINUED | OUTPATIENT
Start: 2024-04-25 | End: 2024-04-25 | Stop reason: HOSPADM

## 2024-04-25 RX ORDER — DIPHENHYDRAMINE HCL 25 MG
50 CAPSULE ORAL AS NEEDED
Status: DISCONTINUED | OUTPATIENT
Start: 2024-04-25 | End: 2024-04-25 | Stop reason: HOSPADM

## 2024-04-25 RX ORDER — HEPARIN 100 UNIT/ML
500 SYRINGE INTRAVENOUS AS NEEDED
Status: CANCELLED | OUTPATIENT
Start: 2024-04-25

## 2024-04-25 RX ORDER — HEPARIN 100 UNIT/ML
500 SYRINGE INTRAVENOUS AS NEEDED
Status: DISCONTINUED | OUTPATIENT
Start: 2024-04-25 | End: 2024-04-25 | Stop reason: HOSPADM

## 2024-04-25 RX ORDER — HEPARIN SODIUM,PORCINE/PF 10 UNIT/ML
50 SYRINGE (ML) INTRAVENOUS AS NEEDED
Status: CANCELLED | OUTPATIENT
Start: 2024-04-25

## 2024-04-25 RX ORDER — EPINEPHRINE 0.3 MG/.3ML
0.3 INJECTION SUBCUTANEOUS EVERY 5 MIN PRN
Status: DISCONTINUED | OUTPATIENT
Start: 2024-04-25 | End: 2024-04-25 | Stop reason: HOSPADM

## 2024-04-25 RX ORDER — PROCHLORPERAZINE MALEATE 10 MG
10 TABLET ORAL EVERY 6 HOURS PRN
Status: DISCONTINUED | OUTPATIENT
Start: 2024-04-25 | End: 2024-04-25 | Stop reason: HOSPADM

## 2024-04-25 RX ORDER — ACETAMINOPHEN 325 MG/1
650 TABLET ORAL AS NEEDED
Status: DISCONTINUED | OUTPATIENT
Start: 2024-04-25 | End: 2024-04-25 | Stop reason: HOSPADM

## 2024-04-25 RX ADMIN — Medication 200 MG: at 14:44

## 2024-04-25 ASSESSMENT — ENCOUNTER SYMPTOMS
FLANK PAIN: 1
GASTROINTESTINAL NEGATIVE: 1
NERVOUS/ANXIOUS: 0
FATIGUE: 1
NEUROLOGICAL NEGATIVE: 1
BACK PAIN: 1
HEMATOLOGIC/LYMPHATIC NEGATIVE: 1
COUGH: 1

## 2024-04-25 ASSESSMENT — PAIN SCALES - GENERAL: PAINLEVEL: 4

## 2024-04-25 NOTE — RESEARCH NOTES
Research Note Treatment Day    Hayley Potter is here today for treatment on [id number]. Today is C19D1. Procedures completed per protocol. AE's and con-meds reviewed with patient. Patient is aware of treatment plan.    [x]   Received treatment as planned   OR  []    Treatment delayed; patient calendar updated as required   Treatment delayed because:    []   AE    []   Physician Discretion    []   Clinical Deterioration or Progression     []   Other    Education Documentation  Treatment Plan and Schedule, taught by Rafiq Delgado RN at 4/25/2024  3:38 PM.  Learner: Family, Patient  Readiness: Acceptance  Method: Explanation  Response: Verbalizes Understanding    Education Comments  No comments found.    Pt and her daughter presented to clinic for cycle 19 on [id number].  Labs were done yesterday and reviewed. Pt feels well except she stated she sneezed hard on Saturday and hurt a muscle in her back.  She is using some of her percocet to help with the pain she states.  MD in to see pt and complete assessment.  Lidocaine patches and heating pad were suggested to the pt to help with the muscle pain.

## 2024-04-25 NOTE — PROGRESS NOTES
"Patient here today for follow up with her daughter. She is having right/middle back pain. She's been using Percocet to help with the pain. Pain is very limiting.     Fatigue: \"fine, it's spring time\", does well with [place] weather  PO intake: adequate  Weight: recently gained 3lbs, she tries to maintain 135 - 145lbs  N/V/D/C: denies    Medications reviewed with patient.       "

## 2024-04-25 NOTE — PROGRESS NOTES
Patient ID: Hayley Potter is a 63 y.o. female.    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 She was initially found to have spiculated 2.9cm mass in the RUL on the CT chest on 03/02/2020. She underwent CT guided RUL biopsy with pathology showed lung tissue with scan and focal aypicla grandular proliferation  suspicious for adenocarcinoma. IHC positive for CK7, TTF1 and napsin A. CK20 negative. PET/CT on 04/15/2020 showed RUL hypermetabolic RUL mass. No evidence of distant metastases. She underwent RUL lobectomy with mediastinal LN dissection with Dr. Wheat  on 04/21/2020 which showed invasive well to moderately differentiated adenocarcinoma, tumor measuring 2.5x2.3.x1.7cm. No VPI or LVI. All margins were negative. LN (0/11).      Unfortunately, on the surveillance CT chest (+) on 08/24/2022, there was a new irregular marginated 6mm GUSTABO nodule, which increased in size significantly on the repeat CT chest (-) on 01/06/2023, measuring 12mm.  There is also an additional new 9mm nodule  in the L lung as well as new mediastinal and possible L hilar LAD. PET/CT on 02/16/2023 showed hypermetabolic L hilar and mediastinal LAD. 1st  GUSTABO nodule now 8mm likely inflammatory. 2nd GUSTABO nodule showed stable size with SUV 4.4. Soft tissue mass associated with  L 11th rib fracture, SUV 11.5. Mass within the liver SUV 13.1, suspicious for mets. Hypermetabolic activity also noted  in  the stomach fundus, association with a hiatal hernia, in the cecum and ascending colon without masses seen. She underwent US guided liver biopsy on 02/16/2023 - poorly-differentiated carcinoma.     Treatment Summary:  04/21/2020: RUL lobectomy with medistainal LN dissection (Dr. Wheat)  04/11/2023: C1 pembrolizumab 200mg IV (on trial)  05/02/2023: C2 Pembrolizumab 200mg IV (on trial)  05/23/2023: C3 Pembrolizumab 200mg IV (on trial)  06/13/2023: C4 Pembrolizumab 200mg IV (on trial)   07/05/2023: C5 Pembrolizumab 200mg IV (on trial)   07/26/2023: C6 Pembrolizumab 200mg IV (on trial)   08/16/2023: C7 Pembrolizumab 200mg IV (on trial)   09/07/2023: C8 Pembrolizumab 200mg IV (on trial)   09/28/2023: C9 Pembrolizumab 200mg IV (on trial)   10/19/2023: C10 Pembrolizumab 200mg IV (on trial)  11/09/2023: C11 Pembrolizumab 200mg IV (on trial)  11/30/2023: C12 Pembrolizumab 200mg IV (on trial)  12/21/2023: C13 Pembrolizumab 200mg IV (on trial)  01/11/2024: C14 Pembrolizumab 200mg IV (on trial)  02/01/2024: C15 Pembrolizumab 200mg IV (on trial)  02/22/2024: C16 Pembrolizumab 200mg IV (on trial)  03/14/2024: C17 Pembrolizumab 200mg IV (on trial)  04/04/2024: C18 Pembrolizumab 200mg IV (on trial)  04/25/2024: C19 Pembrolizumab 200mg IV (on trial)    Interval History (04/25/2024):    Pt present in clinic for readiness to treat visit.  Her dtr is present for visit. Twisted back muscle early during this week while sneezing.  Taking percocet for pain, which helps.  Chronic cough stable. Chronic rib pain stable. Energy level is fair. Eating and voiding well. No n/v/d/c. No fever or chills.       Review of Systems   Constitutional:  Positive for fatigue.   HENT:  Negative.     Respiratory:  Positive for cough.    Gastrointestinal: Negative.    Musculoskeletal:  Positive for back pain and flank pain.   Neurological: Negative.    Hematological: Negative.    Psychiatric/Behavioral:  The patient is not nervous/anxious.       PMHx: vocal  cord cancer, GERD and COPD, OA  PSHx: tonsillectomy, tubal ligation, lumpectomy, L ankle and R hand surgery  FHx: strong family history of breast cancer including male breast cancer.   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 She quit etoh . She smokes Marijuana every day.     Allergies:  Cephalexin    Medications:  Medication list reviewed with patient and updated in EMR    Vital Signs:  /81 (BP Location: Left arm, Patient Position: Sitting, BP Cuff Size: Adult long)   Pulse 74   Temp 36.2 °C (97.1 °F) (Temporal)   Wt 62.1 kg (136 lb 14.5 oz)   SpO2 95%   BMI 22.75 kg/m²     Wt Readings from Last 5 Encounters:   24 62.1 kg (136 lb 14.5 oz)   24 61.7 kg (136 lb)   24 62.1 kg (136 lb 12.7 oz)   24 64.7 kg (142 lb 8.4 oz)   24 62.4 kg (137 lb 7.3 oz)      Physical Exam:  ECO  Pain: mild 2-3    General: Well appearing, no acute distress  Neurology: Alert and oriented x3, CN II-XII grossly intact  Psychology: Affect appropriate  Eyes: PERRL, EOMI  ENT: Mucus membranes moist and intact, no ulcers  Lymphatics: No LAD   Neck: Neck supple  Respiratory: Lungs clear bilaterally, no wheezes, no rales, no rhonchi  Cardiovascular: Normal rate and rhythm, no murmur  Abdomen: Soft, non-tender, non-distended, normoactive, no ascites  Musculoskeletal: Normal gait and stance  Extremities: No clubbing, no cyanosis, no edema  Skin: No rashes or skin concerns    Results from last 7 days   Lab Units 24  1416   GLUCOSE mg/dL 83   SODIUM mmol/L 141   POTASSIUM mmol/L 5.3   CHLORIDE mmol/L 101   CO2 mmol/L 30   BUN mg/dL 6   CREATININE mg/dL 0.91   EGFR mL/min/1.73m*2 71   CALCIUM mg/dL 10.1   ALBUMIN g/dL 4.3   PROTEIN TOTAL g/dL 6.9   BILIRUBIN TOTAL mg/dL 0.4   ALK PHOS U/L 59   ALT U/L 17   AST U/L 22     Results from last 7 days   Lab Units 24  1416   WBC AUTO x10*3/uL 7.4   HEMOGLOBIN g/dL 14.5   HEMATOCRIT % 45.8   PLATELETS  AUTO x10*3/uL 454*   NEUTROS ABS x10*3/uL 4.77   LYMPHS ABS AUTO x10*3/uL 1.52   MONOS ABS AUTO x10*3/uL 0.87   EOS ABS AUTO x10*3/uL 0.13   NEUTROS PCT AUTO % 64.7   LYMPHS PCT AUTO % 20.6   MONOS PCT AUTO % 11.8   EOS PCT AUTO % 1.8      Assessment/Plan:  63 y.o. 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     The patient's records and imaging have been reviewed in detail.  MD discussed with the patient the natural history of their disease at length.  The following has also been discussed with the patient:     # Cancer: recurrent likely metastatic ([id number]) lung cancer: liver biopsy showed extensive necrosis. Liquid biopsy showed KRAS G12C mutation, which was identified in her original surgical resected  specimen. PDL-1 50%. Patient initially had stage IA2 ([id number]) RUL lung  adenocarcinoma s/p RUL lobectomy, unfortunately now likely metastatic recurrence. Liver biopsy on 2/16 showed poorly differentiated malignant neoplasm with extensive necrosis. We discussed treatment  options with her today with either standard of care pembrolizumab vs clinical trials. Patient expressed interests in enrolling into clinical trial. Enrolled into RCT [id number]. Doing well.      - Interval scan: 03/11/2024: CT CAP (+): 1. No new metastatic disease in the chest, abdomen, or pelvis.  2. Stable appearing nodularity in the left upper lobe contiguous with an area of linear scar measuring 7 mm.  3. Sclerotic lesion left posterior 5th rib as seen on prior imaging.    # Hypothyroidism: G2. Likely 2/2 immunotherapy - resolved.  TSH 1.07 11/8/23.  Continue levothyroxine 75mcg daily.      # Microcytic anemia 2/2 due to chronic disease: patient denies any hematochezia  or hematuria. No melena. hx of hiatal hernia. Per patient. Last EGD was done in 2021 and was normal. Colonoscopy was a few years ago.   EGD showed hiatal hernia 03/2023. Improved Hgb overall. Iron studies show low-normal ferritin and TSAT of 14%. Continue PO iron 3x/week. Tolerates well. Stable.      # Mucositis (chronic).  Pt reports since start of immunotherapy.  Chronic sore secondary to ill fitting dentures recommended s&s rinse.  Continue Anbesol and/or Oragel for pain.  Will continue to monitor.  BMX added.  2/1/24  - resolved      # Grade 1 hyperthyroidism: resolved, now hypothyroid (as above).     # Constipation: managed well with stool softener and PRN lactulose.        # Clinical Trials: April 11, 2023 initiated treatment on [id number] this is cycle 1 of first-line treatment.  She has been randomized to  arm A which is pembrolizumab alone for up to 2 years or progression followed by Alimta/carboplatin for second line therapy. Consent has been signed. 3/11 CT scan shows favorable response. Continue treatment.      # Access: Peripheral veins.     # Pain: she has a displaced 8th Rib on the left side -Advised patient to take percocet daily and decrease Advil use. PRN Percocet Rx in place.  Rx last sent 12/21/23. Currently managed with PRN gummies.  New pain on the R upper back, controlled with percocet. Will try lidocaine patch OTC.     # Bone Health: L fifth ribs lesion stable.    # Arthralgia (grade 1).  Uric acid lab (-).  Intermittent arthralgia to left hand.  Continue PRN Advil or Percocet for severe pain.      # Left rib or chest wall pain over the past month. 11/30/23 CXR - RESULTS: Mediastinal surgical clips are noted. The cardiac silhouette is within normal limits. Mediastinal contours are unremarkable. The lungs demonstrate no infiltrate or atelectasis. There is no evidence for pleural effusion. Remote fracture is noted involving the right 7th rib. There are degenerative changes of the thoracic spine.  - Continue PRN Percocet and/or gummies for pain control.      # Psychosocial: Coping well, no acute issues.  Good support from family &  friends.  Social work support available.     # GI/CINV: No acute issues.  Eating and voiding well.  Nutrition consult available.    # Sinusitis:  Pressure to maxillary and frontal sinuses.  +Increased mucous, yellow in color.  Denies fevers.  Hx of recurrent sinus infections.  Rx called to pt's pharmacy.  Doxycycline 100mg BID x7-10 days.  Extended course d/t recent smoke exposure.  3/14: Resolved.       # Smoking: former smoker     # Fertility: N/A.        # Heme/ESAs: N/A.     # GOC: Patient is aware of palliative  intent goals of care.     # Language: English.     # Dispo: RTC in 3 weeks for next cycle.  Pt instructed to call with concerns/questions .

## 2024-05-08 ENCOUNTER — HOSPITAL ENCOUNTER (OUTPATIENT)
Dept: RADIOLOGY | Facility: HOSPITAL | Age: 64
Discharge: HOME | End: 2024-05-08
Payer: COMMERCIAL

## 2024-05-08 DIAGNOSIS — R05.8 PRODUCTIVE COUGH: ICD-10-CM

## 2024-05-08 DIAGNOSIS — R06.02 SOB (SHORTNESS OF BREATH): ICD-10-CM

## 2024-05-08 DIAGNOSIS — R07.1 CHEST PAIN ON BREATHING: ICD-10-CM

## 2024-05-08 DIAGNOSIS — C34.91 PRIMARY MALIGNANT NEOPLASM OF RIGHT LUNG METASTATIC TO OTHER SITE (MULTI): Primary | ICD-10-CM

## 2024-05-08 DIAGNOSIS — C34.91 PRIMARY MALIGNANT NEOPLASM OF RIGHT LUNG METASTATIC TO OTHER SITE (MULTI): ICD-10-CM

## 2024-05-08 PROCEDURE — 71250 CT THORAX DX C-: CPT

## 2024-05-08 PROCEDURE — 71250 CT THORAX DX C-: CPT | Mod: FOREIGN READ | Performed by: RADIOLOGY

## 2024-05-09 ENCOUNTER — TELEPHONE (OUTPATIENT)
Dept: HEMATOLOGY/ONCOLOGY | Facility: CLINIC | Age: 64
End: 2024-05-09
Payer: COMMERCIAL

## 2024-05-09 DIAGNOSIS — R05.1 ACUTE COUGH: Primary | ICD-10-CM

## 2024-05-09 DIAGNOSIS — R09.81 SINUS CONGESTION: ICD-10-CM

## 2024-05-09 RX ORDER — LEVOFLOXACIN 750 MG/1
750 TABLET ORAL DAILY
Qty: 7 TABLET | Refills: 0 | Status: SHIPPED | OUTPATIENT
Start: 2024-05-09 | End: 2024-05-16

## 2024-05-09 NOTE — TELEPHONE ENCOUNTER
Spoke with pt to update her on CT results not showing Pneumonitis, Per Mikie Hancock. Rx for Levaquin was sent to [facility] [place] Pt aware and states she will  and start today. Pt states she will call with any questions or concerns.

## 2024-05-15 ENCOUNTER — LAB (OUTPATIENT)
Dept: LAB | Facility: LAB | Age: 64
End: 2024-05-15
Payer: COMMERCIAL

## 2024-05-15 DIAGNOSIS — Z51.12 ENCOUNTER FOR ANTINEOPLASTIC IMMUNOTHERAPY: ICD-10-CM

## 2024-05-15 DIAGNOSIS — C34.91 PRIMARY MALIGNANT NEOPLASM OF RIGHT LUNG METASTATIC TO OTHER SITE (MULTI): ICD-10-CM

## 2024-05-15 DIAGNOSIS — C34.11 MALIGNANT NEOPLASM OF UPPER LOBE OF RIGHT LUNG (MULTI): ICD-10-CM

## 2024-05-15 LAB
BASOPHILS # BLD AUTO: 0.09 X10*3/UL (ref 0–0.1)
BASOPHILS NFR BLD AUTO: 1.3 %
EOSINOPHIL # BLD AUTO: 0.13 X10*3/UL (ref 0–0.7)
EOSINOPHIL NFR BLD AUTO: 1.9 %
ERYTHROCYTE [DISTWIDTH] IN BLOOD BY AUTOMATED COUNT: 12.6 % (ref 11.5–14.5)
HCT VFR BLD AUTO: 40.7 % (ref 36–46)
HGB BLD-MCNC: 13.3 G/DL (ref 12–16)
IMM GRANULOCYTES # BLD AUTO: 0.02 X10*3/UL (ref 0–0.7)
IMM GRANULOCYTES NFR BLD AUTO: 0.3 % (ref 0–0.9)
LYMPHOCYTES # BLD AUTO: 1.29 X10*3/UL (ref 1.2–4.8)
LYMPHOCYTES NFR BLD AUTO: 19.1 %
MCH RBC QN AUTO: 30.6 PG (ref 26–34)
MCHC RBC AUTO-ENTMCNC: 32.7 G/DL (ref 32–36)
MCV RBC AUTO: 94 FL (ref 80–100)
MONOCYTES # BLD AUTO: 0.88 X10*3/UL (ref 0.1–1)
MONOCYTES NFR BLD AUTO: 13 %
NEUTROPHILS # BLD AUTO: 4.35 X10*3/UL (ref 1.2–7.7)
NEUTROPHILS NFR BLD AUTO: 64.4 %
NRBC BLD-RTO: 0 /100 WBCS (ref 0–0)
PLATELET # BLD AUTO: 354 X10*3/UL (ref 150–450)
RBC # BLD AUTO: 4.34 X10*6/UL (ref 4–5.2)
WBC # BLD AUTO: 6.8 X10*3/UL (ref 4.4–11.3)

## 2024-05-15 PROCEDURE — 36415 COLL VENOUS BLD VENIPUNCTURE: CPT

## 2024-05-15 PROCEDURE — 82150 ASSAY OF AMYLASE: CPT

## 2024-05-15 PROCEDURE — 84443 ASSAY THYROID STIM HORMONE: CPT

## 2024-05-15 PROCEDURE — 80053 COMPREHEN METABOLIC PANEL: CPT

## 2024-05-15 PROCEDURE — 83690 ASSAY OF LIPASE: CPT

## 2024-05-15 PROCEDURE — 85025 COMPLETE CBC W/AUTO DIFF WBC: CPT

## 2024-05-16 ENCOUNTER — EDUCATION (OUTPATIENT)
Dept: HEMATOLOGY/ONCOLOGY | Facility: CLINIC | Age: 64
End: 2024-05-16

## 2024-05-16 ENCOUNTER — DOCUMENTATION (OUTPATIENT)
Dept: HEMATOLOGY/ONCOLOGY | Facility: CLINIC | Age: 64
End: 2024-05-16

## 2024-05-16 ENCOUNTER — OFFICE VISIT (OUTPATIENT)
Dept: HEMATOLOGY/ONCOLOGY | Facility: CLINIC | Age: 64
End: 2024-05-16
Payer: COMMERCIAL

## 2024-05-16 VITALS
WEIGHT: 135.36 LBS | SYSTOLIC BLOOD PRESSURE: 142 MMHG | TEMPERATURE: 97.4 F | BODY MASS INDEX: 22.5 KG/M2 | RESPIRATION RATE: 18 BRPM | HEART RATE: 71 BPM | DIASTOLIC BLOOD PRESSURE: 82 MMHG | OXYGEN SATURATION: 96 %

## 2024-05-16 DIAGNOSIS — C34.11 MALIGNANT NEOPLASM OF UPPER LOBE OF RIGHT LUNG (MULTI): Primary | ICD-10-CM

## 2024-05-16 LAB
ALBUMIN SERPL BCP-MCNC: 3.9 G/DL (ref 3.4–5)
ALP SERPL-CCNC: 56 U/L (ref 33–136)
ALT SERPL W P-5'-P-CCNC: 11 U/L (ref 7–45)
AMYLASE SERPL-CCNC: 29 U/L (ref 29–103)
ANION GAP SERPL CALC-SCNC: 11 MMOL/L (ref 10–20)
AST SERPL W P-5'-P-CCNC: 18 U/L (ref 9–39)
BILIRUB SERPL-MCNC: 0.3 MG/DL (ref 0–1.2)
BUN SERPL-MCNC: 6 MG/DL (ref 6–23)
CALCIUM SERPL-MCNC: 9.8 MG/DL (ref 8.6–10.6)
CHLORIDE SERPL-SCNC: 101 MMOL/L (ref 98–107)
CO2 SERPL-SCNC: 32 MMOL/L (ref 21–32)
CREAT SERPL-MCNC: 0.84 MG/DL (ref 0.5–1.05)
EGFRCR SERPLBLD CKD-EPI 2021: 78 ML/MIN/1.73M*2
GLUCOSE SERPL-MCNC: 63 MG/DL (ref 74–99)
LIPASE SERPL-CCNC: 17 U/L (ref 9–82)
POTASSIUM SERPL-SCNC: 4.6 MMOL/L (ref 3.5–5.3)
PROT SERPL-MCNC: 6.1 G/DL (ref 6.4–8.2)
SODIUM SERPL-SCNC: 139 MMOL/L (ref 136–145)
TSH SERPL-ACNC: 0.48 MIU/L (ref 0.44–3.98)

## 2024-05-16 PROCEDURE — 99214 OFFICE O/P EST MOD 30 MIN: CPT | Performed by: STUDENT IN AN ORGANIZED HEALTH CARE EDUCATION/TRAINING PROGRAM

## 2024-05-16 RX ORDER — DIPHENHYDRAMINE HCL 25 MG
50 CAPSULE ORAL AS NEEDED
Status: CANCELLED
Start: 2024-06-06

## 2024-05-16 RX ORDER — DEXTROMETHORPHAN HYDROBROMIDE, GUAIFENESIN 5; 100 MG/5ML; MG/5ML
650 LIQUID ORAL AS NEEDED
Start: 2024-06-27

## 2024-05-16 RX ORDER — ALBUTEROL SULFATE 0.83 MG/ML
3 SOLUTION RESPIRATORY (INHALATION) AS NEEDED
OUTPATIENT
Start: 2024-06-27

## 2024-05-16 RX ORDER — PROCHLORPERAZINE MALEATE 10 MG
10 TABLET ORAL EVERY 6 HOURS PRN
Status: CANCELLED | OUTPATIENT
Start: 2024-06-06

## 2024-05-16 RX ORDER — EPINEPHRINE 0.3 MG/.3ML
0.3 INJECTION SUBCUTANEOUS EVERY 5 MIN PRN
OUTPATIENT
Start: 2024-06-27

## 2024-05-16 RX ORDER — DIPHENHYDRAMINE HYDROCHLORIDE 50 MG/ML
50 INJECTION INTRAMUSCULAR; INTRAVENOUS AS NEEDED
OUTPATIENT
Start: 2024-06-27

## 2024-05-16 RX ORDER — FAMOTIDINE 10 MG/ML
20 INJECTION INTRAVENOUS ONCE AS NEEDED
Status: CANCELLED | OUTPATIENT
Start: 2024-06-06

## 2024-05-16 RX ORDER — DIPHENHYDRAMINE HYDROCHLORIDE 50 MG/ML
50 INJECTION INTRAMUSCULAR; INTRAVENOUS AS NEEDED
Status: CANCELLED | OUTPATIENT
Start: 2024-06-06

## 2024-05-16 RX ORDER — DIPHENHYDRAMINE HCL 25 MG
50 CAPSULE ORAL AS NEEDED
Start: 2024-06-27

## 2024-05-16 RX ORDER — PROCHLORPERAZINE EDISYLATE 5 MG/ML
10 INJECTION INTRAMUSCULAR; INTRAVENOUS EVERY 6 HOURS PRN
Status: CANCELLED | OUTPATIENT
Start: 2024-06-06

## 2024-05-16 RX ORDER — FAMOTIDINE 10 MG/ML
20 INJECTION INTRAVENOUS ONCE AS NEEDED
OUTPATIENT
Start: 2024-06-27

## 2024-05-16 RX ORDER — PROCHLORPERAZINE MALEATE 10 MG
10 TABLET ORAL EVERY 6 HOURS PRN
OUTPATIENT
Start: 2024-06-27

## 2024-05-16 RX ORDER — DEXTROMETHORPHAN HYDROBROMIDE, GUAIFENESIN 5; 100 MG/5ML; MG/5ML
650 LIQUID ORAL AS NEEDED
Status: CANCELLED
Start: 2024-06-06

## 2024-05-16 RX ORDER — ALBUTEROL SULFATE 0.83 MG/ML
3 SOLUTION RESPIRATORY (INHALATION) AS NEEDED
Status: CANCELLED | OUTPATIENT
Start: 2024-06-06

## 2024-05-16 RX ORDER — PROCHLORPERAZINE EDISYLATE 5 MG/ML
10 INJECTION INTRAMUSCULAR; INTRAVENOUS EVERY 6 HOURS PRN
OUTPATIENT
Start: 2024-06-27

## 2024-05-16 RX ORDER — EPINEPHRINE 0.3 MG/.3ML
0.3 INJECTION SUBCUTANEOUS EVERY 5 MIN PRN
Status: CANCELLED | OUTPATIENT
Start: 2024-06-06

## 2024-05-16 ASSESSMENT — ENCOUNTER SYMPTOMS
HEMATOLOGIC/LYMPHATIC NEGATIVE: 1
FATIGUE: 1
NEUROLOGICAL NEGATIVE: 1
COUGH: 1
BACK PAIN: 1
FLANK PAIN: 1
NERVOUS/ANXIOUS: 0
GASTROINTESTINAL NEGATIVE: 1

## 2024-05-16 ASSESSMENT — PAIN SCALES - GENERAL: PAINLEVEL: 3

## 2024-05-16 NOTE — PROGRESS NOTES
Patient here for follow up visit.  No concerns or complaints noted at this time.   Patient here with Daughter Jacqueline    Medications and Allergies reviewed and reconciled this visit.  Infusion scheduled after visit today.  Follow up per MD request.    Patient reports availability and use of mychart, Reviewed this is a good place to communicate with the team as well as review labs and upcoming orders.      No barriers to education noted, patient agrees to current plan and verbalized understanding using teach back method.

## 2024-05-16 NOTE — PROGRESS NOTES
Patient ID: Hayley Potter is a 63 y.o. female.    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 She was initially found to have spiculated 2.9cm mass in the RUL on the CT chest on 03/02/2020. She underwent CT guided RUL biopsy with pathology showed lung tissue with scan and focal aypicla grandular proliferation  suspicious for adenocarcinoma. IHC positive for CK7, TTF1 and napsin A. CK20 negative. PET/CT on 04/15/2020 showed RUL hypermetabolic RUL mass. No evidence of distant metastases. She underwent RUL lobectomy with mediastinal LN dissection with Dr. Wheat  on 04/21/2020 which showed invasive well to moderately differentiated adenocarcinoma, tumor measuring 2.5x2.3.x1.7cm. No VPI or LVI. All margins were negative. LN (0/11).      Unfortunately, on the surveillance CT chest (+) on 08/24/2022, there was a new irregular marginated 6mm GUSTABO nodule, which increased in size significantly on the repeat CT chest (-) on 01/06/2023, measuring 12mm.  There is also an additional new 9mm nodule  in the L lung as well as new mediastinal and possible L hilar LAD. PET/CT on 02/16/2023 showed hypermetabolic L hilar and mediastinal LAD. 1st  GUSTABO nodule now 8mm likely inflammatory. 2nd GUSTABO nodule showed stable size with SUV 4.4. Soft tissue mass associated with  L 11th rib fracture, SUV 11.5. Mass within the liver SUV 13.1, suspicious for mets. Hypermetabolic activity also noted  in  the stomach fundus, association with a hiatal hernia, in the cecum and ascending colon without masses seen. She underwent US guided liver biopsy on 02/16/2023 - poorly-differentiated carcinoma.     Treatment Summary:  04/21/2020: RUL lobectomy with medistainal LN dissection (Dr. Wheat)  04/11/2023: C1 pembrolizumab 200mg IV (on trial)  05/02/2023: C2 Pembrolizumab 200mg IV (on trial)  05/23/2023: C3 Pembrolizumab 200mg IV (on trial)  06/13/2023: C4 Pembrolizumab 200mg IV (on trial)   07/05/2023: C5 Pembrolizumab 200mg IV (on trial)   07/26/2023: C6 Pembrolizumab 200mg IV (on trial)   08/16/2023: C7 Pembrolizumab 200mg IV (on trial)   09/07/2023: C8 Pembrolizumab 200mg IV (on trial)   09/28/2023: C9 Pembrolizumab 200mg IV (on trial)   10/19/2023: C10 Pembrolizumab 200mg IV (on trial)  11/09/2023: C11 Pembrolizumab 200mg IV (on trial)  11/30/2023: C12 Pembrolizumab 200mg IV (on trial)  12/21/2023: C13 Pembrolizumab 200mg IV (on trial)  01/11/2024: C14 Pembrolizumab 200mg IV (on trial)  02/01/2024: C15 Pembrolizumab 200mg IV (on trial)  02/22/2024: C16 Pembrolizumab 200mg IV (on trial)  03/14/2024: C17 Pembrolizumab 200mg IV (on trial)  04/04/2024: C18 Pembrolizumab 200mg IV (on trial)  04/25/2024: C19 Pembrolizumab 200mg IV (on trial)    Interval History (04/25/2024):    Pt present in clinic for readiness to treat visit.  Her dtr is present for visit. She is having more sinus pain recently. She was given 1 week of levaquin with minimal improvement. She is about to call her ENT.   Chronic cough stable. Chronic rib pain stable. Energy level is fair. Eating and voiding well. No n/v/d/c. No fever or chills.       Review of Systems   Constitutional:  Positive for fatigue.   HENT:  Negative.     Respiratory:  Positive for cough.    Gastrointestinal: Negative.    Musculoskeletal:  Positive for back pain and flank pain.   Neurological: Negative.    Hematological: Negative.    Psychiatric/Behavioral:  The patient is not  nervous/anxious.       PMHx: vocal cord cancer, GERD and COPD, OA  PSHx: tonsillectomy, tubal ligation, lumpectomy, L ankle and R hand surgery  FHx: strong family history of breast cancer including male breast cancer.   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 She quit etoh . She smokes Marijuana every day.     Allergies:  Cephalexin    Medications:  Medication list reviewed with patient and updated in EMR    Vital Signs:  /82 (BP Location: Left arm, Patient Position: Sitting, BP Cuff Size: Adult long)   Pulse 71   Temp 36.3 °C (97.4 °F) (Temporal)   Resp 18   Wt 61.4 kg (135 lb 5.8 oz)   SpO2 96%   BMI 22.50 kg/m²     Wt Readings from Last 5 Encounters:   24 61.4 kg (135 lb 5.8 oz)   24 62.1 kg (136 lb 14.5 oz)   24 61.7 kg (136 lb)   24 62.1 kg (136 lb 12.7 oz)   24 64.7 kg (142 lb 8.4 oz)      Physical Exam:  ECO  Pain: mild 2-3    General: Well appearing, no acute distress  Neurology: Alert and oriented x3, CN II-XII grossly intact  Psychology: Affect appropriate  Eyes: PERRL, EOMI  ENT: Mucus membranes moist and intact, no ulcers  Lymphatics: No LAD   Neck: Neck supple  Respiratory: Lungs clear bilaterally, no wheezes, no rales, no rhonchi  Cardiovascular: Normal rate and rhythm, no murmur  Abdomen: Soft, non-tender, non-distended, normoactive, no ascites  Musculoskeletal: Normal gait and stance  Extremities: No clubbing, no cyanosis, no edema  Skin: No rashes or skin concerns    Results from last 7 days   Lab Units 05/15/24  1350   GLUCOSE mg/dL 63*   SODIUM mmol/L 139   POTASSIUM mmol/L 4.6   CHLORIDE mmol/L 101   CO2 mmol/L 32   BUN mg/dL 6   CREATININE mg/dL 0.84   EGFR mL/min/1.73m*2 78   CALCIUM mg/dL 9.8   ALBUMIN g/dL 3.9   PROTEIN TOTAL g/dL 6.1*   BILIRUBIN TOTAL mg/dL 0.3   ALK PHOS U/L 56   ALT U/L 11   AST U/L 18     Results from last 7 days   Lab Units 05/15/24  1350   WBC AUTO x10*3/uL 6.8    HEMOGLOBIN g/dL 13.3   HEMATOCRIT % 40.7   PLATELETS AUTO x10*3/uL 354   NEUTROS ABS x10*3/uL 4.35   LYMPHS ABS AUTO x10*3/uL 1.29   MONOS ABS AUTO x10*3/uL 0.88   EOS ABS AUTO x10*3/uL 0.13   NEUTROS PCT AUTO % 64.4   LYMPHS PCT AUTO % 19.1   MONOS PCT AUTO % 13.0   EOS PCT AUTO % 1.9      Assessment/Plan:  63 y.o. 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     The patient's records and imaging have been reviewed in detail.  MD discussed with the patient the natural history of their disease at length.  The following has also been discussed with the patient:     # Cancer: recurrent likely metastatic ([id number]) lung cancer: liver biopsy showed extensive necrosis. Liquid biopsy showed KRAS G12C mutation, which was identified in her original surgical resected  specimen. PDL-1 50%. Patient initially had stage IA2 ([id number]) RUL lung  adenocarcinoma s/p RUL lobectomy, unfortunately now likely metastatic recurrence. Liver biopsy on 2/16 showed poorly differentiated malignant neoplasm with extensive necrosis. We discussed treatment  options with her today with either standard of care pembrolizumab vs clinical trials. Patient expressed interests in enrolling into clinical trial. Enrolled into RCT [id number]. Doing well.      - Interval scan: 03/11/2024: CT CAP (+): 1. No new metastatic disease in the chest, abdomen, or pelvis.  2. Stable appearing nodularity in the left upper lobe contiguous with an area of linear scar measuring 7 mm.  3. Sclerotic lesion left posterior 5th rib as seen on prior imaging.    # Hypothyroidism: G2. Likely 2/2 immunotherapy - resolved.  TSH 1.07 11/8/23.  Continue levothyroxine 75mcg daily.      # Microcytic anemia 2/2 due to chronic disease: patient denies any hematochezia  or hematuria. No melena. hx of hiatal hernia. Per patient. Last EGD was done in  2021 and was normal. Colonoscopy was a few years ago.  EGD showed hiatal hernia 03/2023. Improved Hgb overall. Iron studies show low-normal ferritin and TSAT of 14%. Continue PO iron 3x/week. Tolerates well. Stable.      # Mucositis (chronic).  Pt reports since start of immunotherapy.  Chronic sore secondary to ill fitting dentures recommended s&s rinse.  Continue Anbesol and/or Oragel for pain.  Will continue to monitor.  BMX added.  2/1/24  - resolved      # Grade 1 hyperthyroidism: resolved, now hypothyroid (as above).     # Constipation: managed well with stool softener and PRN lactulose.        # Clinical Trials: April 11, 2023 initiated treatment on [id number] this is cycle 1 of first-line treatment.  She has been randomized to  arm A which is pembrolizumab alone for up to 2 years or progression followed by Alimta/carboplatin for second line therapy. Consent has been signed. 3/11 CT scan shows favorable response. Continue treatment.      # Access: Peripheral veins.     # Pain: she has a displaced 8th Rib on the left side -Advised patient to take percocet daily and decrease Advil use. PRN Percocet Rx in place.  Rx last sent 12/21/23. Currently managed with PRN gummies.  New pain on the R upper back, controlled with percocet. Will try lidocaine patch OTC.     # Bone Health: L fifth ribs lesion stable.    # Arthralgia (grade 1).  Uric acid lab (-).  Intermittent arthralgia to left hand.  Continue PRN Advil or Percocet for severe pain.      # Left rib or chest wall pain over the past month. 11/30/23 CXR - RESULTS: Mediastinal surgical clips are noted. The cardiac silhouette is within normal limits. Mediastinal contours are unremarkable. The lungs demonstrate no infiltrate or atelectasis. There is no evidence for pleural effusion. Remote fracture is noted involving the right 7th rib. There are degenerative changes of the thoracic spine.  - Continue PRN Percocet and/or gummies for pain control.      # Psychosocial:  Coping well, no acute issues.  Good support from family & friends.  Social work support available.     # GI/CINV: No acute issues.  Eating and voiding well.  Nutrition consult available.    # Sinusitis:  Pressure to maxillary and frontal sinuses.  +Increased mucous, yellow in color.  Denies fevers.  Hx of recurrent sinus infections.  Rx called to pt's pharmacy.  Doxycycline 100mg BID x7-10 days.  Extended course d/t recent smoke exposure.  3/14: Resolved.       # Smoking: former smoker     # Fertility: N/A.        # Heme/ESAs: N/A.     # GOC: Patient is aware of palliative  intent goals of care.     # Language: English.     # Dispo: RTC in 3 weeks for next cycle.  Pt instructed to call with concerns/questions .

## 2024-05-17 ENCOUNTER — INFUSION (OUTPATIENT)
Dept: HEMATOLOGY/ONCOLOGY | Facility: CLINIC | Age: 64
End: 2024-05-17
Payer: COMMERCIAL

## 2024-05-17 VITALS
BODY MASS INDEX: 22.72 KG/M2 | TEMPERATURE: 96.6 F | WEIGHT: 136.69 LBS | HEART RATE: 76 BPM | DIASTOLIC BLOOD PRESSURE: 88 MMHG | SYSTOLIC BLOOD PRESSURE: 138 MMHG

## 2024-05-17 DIAGNOSIS — C34.11 MALIGNANT NEOPLASM OF UPPER LOBE OF RIGHT LUNG (MULTI): ICD-10-CM

## 2024-05-17 PROCEDURE — 2500000004 HC RX 250 GENERAL PHARMACY W/ HCPCS (ALT 636 FOR OP/ED): Mod: SE | Performed by: STUDENT IN AN ORGANIZED HEALTH CARE EDUCATION/TRAINING PROGRAM

## 2024-05-17 PROCEDURE — 96374 THER/PROPH/DIAG INJ IV PUSH: CPT | Mod: INF

## 2024-05-17 RX ORDER — PROCHLORPERAZINE EDISYLATE 5 MG/ML
10 INJECTION INTRAMUSCULAR; INTRAVENOUS EVERY 6 HOURS PRN
Status: CANCELLED | OUTPATIENT
Start: 2024-05-17

## 2024-05-17 RX ORDER — FAMOTIDINE 10 MG/ML
20 INJECTION INTRAVENOUS ONCE AS NEEDED
Status: CANCELLED | OUTPATIENT
Start: 2024-05-17

## 2024-05-17 RX ORDER — EPINEPHRINE 0.3 MG/.3ML
0.3 INJECTION SUBCUTANEOUS EVERY 5 MIN PRN
Status: CANCELLED | OUTPATIENT
Start: 2024-05-17

## 2024-05-17 RX ORDER — PROCHLORPERAZINE MALEATE 10 MG
10 TABLET ORAL EVERY 6 HOURS PRN
Status: DISCONTINUED | OUTPATIENT
Start: 2024-05-17 | End: 2024-05-17 | Stop reason: HOSPADM

## 2024-05-17 RX ORDER — DIPHENHYDRAMINE HYDROCHLORIDE 50 MG/ML
50 INJECTION INTRAMUSCULAR; INTRAVENOUS AS NEEDED
Status: DISCONTINUED | OUTPATIENT
Start: 2024-05-17 | End: 2024-05-17 | Stop reason: HOSPADM

## 2024-05-17 RX ORDER — PROCHLORPERAZINE EDISYLATE 5 MG/ML
10 INJECTION INTRAMUSCULAR; INTRAVENOUS EVERY 6 HOURS PRN
Status: DISCONTINUED | OUTPATIENT
Start: 2024-05-17 | End: 2024-05-17 | Stop reason: HOSPADM

## 2024-05-17 RX ORDER — FAMOTIDINE 10 MG/ML
20 INJECTION INTRAVENOUS ONCE AS NEEDED
Status: DISCONTINUED | OUTPATIENT
Start: 2024-05-17 | End: 2024-05-17 | Stop reason: HOSPADM

## 2024-05-17 RX ORDER — EPINEPHRINE 0.3 MG/.3ML
0.3 INJECTION SUBCUTANEOUS EVERY 5 MIN PRN
Status: DISCONTINUED | OUTPATIENT
Start: 2024-05-17 | End: 2024-05-17 | Stop reason: HOSPADM

## 2024-05-17 RX ORDER — ACETAMINOPHEN 325 MG/1
650 TABLET ORAL AS NEEDED
Status: CANCELLED | OUTPATIENT
Start: 2024-05-17

## 2024-05-17 RX ORDER — PROCHLORPERAZINE MALEATE 10 MG
10 TABLET ORAL EVERY 6 HOURS PRN
Status: CANCELLED | OUTPATIENT
Start: 2024-05-17

## 2024-05-17 RX ORDER — DIPHENHYDRAMINE HYDROCHLORIDE 50 MG/ML
50 INJECTION INTRAMUSCULAR; INTRAVENOUS AS NEEDED
Status: CANCELLED | OUTPATIENT
Start: 2024-05-17

## 2024-05-17 RX ORDER — DIPHENHYDRAMINE HCL 25 MG
50 CAPSULE ORAL AS NEEDED
Status: CANCELLED | OUTPATIENT
Start: 2024-05-17

## 2024-05-17 RX ORDER — ALBUTEROL SULFATE 0.83 MG/ML
3 SOLUTION RESPIRATORY (INHALATION) AS NEEDED
Status: DISCONTINUED | OUTPATIENT
Start: 2024-05-17 | End: 2024-05-17 | Stop reason: HOSPADM

## 2024-05-17 RX ORDER — DIPHENHYDRAMINE HCL 25 MG
50 CAPSULE ORAL AS NEEDED
Status: DISCONTINUED | OUTPATIENT
Start: 2024-05-17 | End: 2024-05-17 | Stop reason: HOSPADM

## 2024-05-17 RX ORDER — ALBUTEROL SULFATE 0.83 MG/ML
3 SOLUTION RESPIRATORY (INHALATION) AS NEEDED
Status: CANCELLED | OUTPATIENT
Start: 2024-05-17

## 2024-05-17 RX ORDER — ACETAMINOPHEN 325 MG/1
650 TABLET ORAL AS NEEDED
Status: DISCONTINUED | OUTPATIENT
Start: 2024-05-17 | End: 2024-05-17 | Stop reason: HOSPADM

## 2024-05-17 RX ADMIN — Medication 200 MG: at 14:47

## 2024-05-17 ASSESSMENT — PAIN SCALES - GENERAL: PAINLEVEL: 3

## 2024-05-17 NOTE — RESEARCH NOTES
Research Note Treatment Day    Hayley Potter is here today for treatment on [id number]. Today is C20D1. Procedures completed per protocol. AE's and con-meds reviewed with patient. Patient is aware of treatment plan.    [x]   Received treatment as planned   OR  []    Treatment delayed; patient calendar updated as required   Treatment delayed because:    []   AE    []   Physician Discretion    []   Clinical Deterioration or Progression     []   Other    Education Documentation  Treatment Plan and Schedule, taught by Rafiq Delgado RN at 5/17/2024  2:54 PM.  Learner: Patient  Readiness: Acceptance  Method: Explanation  Response: Verbalizes Understanding    Education Comments  No comments found.    Pt and her daughter presented to clinic for cycle 20 on [id number]. Pt feels pretty well today. MD in to finish assessment.  Pt is aware of CT scans and upcoming apts.

## 2024-05-30 ENCOUNTER — TELEPHONE (OUTPATIENT)
Dept: HEMATOLOGY/ONCOLOGY | Facility: CLINIC | Age: 64
End: 2024-05-30
Payer: COMMERCIAL

## 2024-06-03 ENCOUNTER — HOSPITAL ENCOUNTER (OUTPATIENT)
Dept: RADIOLOGY | Facility: HOSPITAL | Age: 64
Discharge: HOME | End: 2024-06-03
Payer: COMMERCIAL

## 2024-06-03 DIAGNOSIS — C34.91 PRIMARY MALIGNANT NEOPLASM OF RIGHT LUNG METASTATIC TO OTHER SITE (MULTI): ICD-10-CM

## 2024-06-03 PROCEDURE — 74177 CT ABD & PELVIS W/CONTRAST: CPT

## 2024-06-03 PROCEDURE — 2550000001 HC RX 255 CONTRASTS: Performed by: STUDENT IN AN ORGANIZED HEALTH CARE EDUCATION/TRAINING PROGRAM

## 2024-06-03 RX ADMIN — IOHEXOL 75 ML: 350 INJECTION, SOLUTION INTRAVENOUS at 14:32

## 2024-06-05 ENCOUNTER — LAB (OUTPATIENT)
Dept: LAB | Facility: LAB | Age: 64
End: 2024-06-05
Payer: COMMERCIAL

## 2024-06-05 DIAGNOSIS — C34.11 MALIGNANT NEOPLASM OF UPPER LOBE OF RIGHT LUNG (MULTI): ICD-10-CM

## 2024-06-05 LAB
ALBUMIN SERPL BCP-MCNC: 4 G/DL (ref 3.4–5)
ALP SERPL-CCNC: 55 U/L (ref 33–136)
ALT SERPL W P-5'-P-CCNC: 12 U/L (ref 7–45)
AMYLASE SERPL-CCNC: 33 U/L (ref 29–103)
ANION GAP SERPL CALC-SCNC: 13 MMOL/L (ref 10–20)
AST SERPL W P-5'-P-CCNC: 17 U/L (ref 9–39)
BASOPHILS # BLD AUTO: 0.11 X10*3/UL (ref 0–0.1)
BASOPHILS NFR BLD AUTO: 1.6 %
BILIRUB SERPL-MCNC: 0.4 MG/DL (ref 0–1.2)
BUN SERPL-MCNC: 8 MG/DL (ref 6–23)
CALCIUM SERPL-MCNC: 10 MG/DL (ref 8.6–10.6)
CHLORIDE SERPL-SCNC: 101 MMOL/L (ref 98–107)
CO2 SERPL-SCNC: 29 MMOL/L (ref 21–32)
CREAT SERPL-MCNC: 0.89 MG/DL (ref 0.5–1.05)
EGFRCR SERPLBLD CKD-EPI 2021: 73 ML/MIN/1.73M*2
EOSINOPHIL # BLD AUTO: 0.13 X10*3/UL (ref 0–0.7)
EOSINOPHIL NFR BLD AUTO: 1.9 %
ERYTHROCYTE [DISTWIDTH] IN BLOOD BY AUTOMATED COUNT: 13.2 % (ref 11.5–14.5)
GLUCOSE SERPL-MCNC: 83 MG/DL (ref 74–99)
HCT VFR BLD AUTO: 43.1 % (ref 36–46)
HGB BLD-MCNC: 14.1 G/DL (ref 12–16)
IMM GRANULOCYTES # BLD AUTO: 0.02 X10*3/UL (ref 0–0.7)
IMM GRANULOCYTES NFR BLD AUTO: 0.3 % (ref 0–0.9)
LIPASE SERPL-CCNC: 21 U/L (ref 9–82)
LYMPHOCYTES # BLD AUTO: 1.51 X10*3/UL (ref 1.2–4.8)
LYMPHOCYTES NFR BLD AUTO: 22.3 %
MCH RBC QN AUTO: 30.9 PG (ref 26–34)
MCHC RBC AUTO-ENTMCNC: 32.7 G/DL (ref 32–36)
MCV RBC AUTO: 94 FL (ref 80–100)
MONOCYTES # BLD AUTO: 0.89 X10*3/UL (ref 0.1–1)
MONOCYTES NFR BLD AUTO: 13.1 %
NEUTROPHILS # BLD AUTO: 4.11 X10*3/UL (ref 1.2–7.7)
NEUTROPHILS NFR BLD AUTO: 60.8 %
NRBC BLD-RTO: 0 /100 WBCS (ref 0–0)
PLATELET # BLD AUTO: 458 X10*3/UL (ref 150–450)
POTASSIUM SERPL-SCNC: 5 MMOL/L (ref 3.5–5.3)
PROT SERPL-MCNC: 6.2 G/DL (ref 6.4–8.2)
RBC # BLD AUTO: 4.57 X10*6/UL (ref 4–5.2)
SODIUM SERPL-SCNC: 138 MMOL/L (ref 136–145)
WBC # BLD AUTO: 6.8 X10*3/UL (ref 4.4–11.3)

## 2024-06-06 ENCOUNTER — OFFICE VISIT (OUTPATIENT)
Dept: HEMATOLOGY/ONCOLOGY | Facility: CLINIC | Age: 64
End: 2024-06-06
Payer: COMMERCIAL

## 2024-06-06 ENCOUNTER — EDUCATION (OUTPATIENT)
Dept: HEMATOLOGY/ONCOLOGY | Facility: CLINIC | Age: 64
End: 2024-06-06

## 2024-06-06 ENCOUNTER — INFUSION (OUTPATIENT)
Dept: HEMATOLOGY/ONCOLOGY | Facility: CLINIC | Age: 64
End: 2024-06-06
Payer: COMMERCIAL

## 2024-06-06 VITALS
SYSTOLIC BLOOD PRESSURE: 123 MMHG | TEMPERATURE: 98.4 F | BODY MASS INDEX: 22.22 KG/M2 | DIASTOLIC BLOOD PRESSURE: 74 MMHG | OXYGEN SATURATION: 95 % | WEIGHT: 133.71 LBS | RESPIRATION RATE: 16 BRPM | HEART RATE: 78 BPM

## 2024-06-06 DIAGNOSIS — C34.11 MALIGNANT NEOPLASM OF UPPER LOBE OF RIGHT LUNG (MULTI): ICD-10-CM

## 2024-06-06 DIAGNOSIS — C34.90 MALIGNANT NEOPLASM OF LUNG, UNSPECIFIED LATERALITY, UNSPECIFIED PART OF LUNG (MULTI): ICD-10-CM

## 2024-06-06 DIAGNOSIS — Z53.20: ICD-10-CM

## 2024-06-06 DIAGNOSIS — C34.11 MALIGNANT NEOPLASM OF UPPER LOBE OF RIGHT LUNG (MULTI): Primary | ICD-10-CM

## 2024-06-06 DIAGNOSIS — Z51.12 ENCOUNTER FOR ANTINEOPLASTIC IMMUNOTHERAPY: ICD-10-CM

## 2024-06-06 PROCEDURE — 2500000004 HC RX 250 GENERAL PHARMACY W/ HCPCS (ALT 636 FOR OP/ED): Mod: SE | Performed by: STUDENT IN AN ORGANIZED HEALTH CARE EDUCATION/TRAINING PROGRAM

## 2024-06-06 PROCEDURE — 99215 OFFICE O/P EST HI 40 MIN: CPT | Performed by: STUDENT IN AN ORGANIZED HEALTH CARE EDUCATION/TRAINING PROGRAM

## 2024-06-06 PROCEDURE — 96365 THER/PROPH/DIAG IV INF INIT: CPT | Mod: INF

## 2024-06-06 RX ORDER — HEPARIN SODIUM,PORCINE/PF 10 UNIT/ML
50 SYRINGE (ML) INTRAVENOUS AS NEEDED
OUTPATIENT
Start: 2024-06-06

## 2024-06-06 RX ORDER — HEPARIN 100 UNIT/ML
500 SYRINGE INTRAVENOUS AS NEEDED
OUTPATIENT
Start: 2024-06-06

## 2024-06-06 RX ORDER — ALBUTEROL SULFATE 0.83 MG/ML
3 SOLUTION RESPIRATORY (INHALATION) AS NEEDED
OUTPATIENT
Start: 2024-08-08

## 2024-06-06 RX ORDER — EPINEPHRINE 0.3 MG/.3ML
0.3 INJECTION SUBCUTANEOUS EVERY 5 MIN PRN
Status: DISCONTINUED | OUTPATIENT
Start: 2024-06-06 | End: 2024-06-06 | Stop reason: HOSPADM

## 2024-06-06 RX ORDER — DIPHENHYDRAMINE HYDROCHLORIDE 50 MG/ML
50 INJECTION INTRAMUSCULAR; INTRAVENOUS AS NEEDED
OUTPATIENT
Start: 2024-07-18

## 2024-06-06 RX ORDER — ACETAMINOPHEN 325 MG/1
650 TABLET ORAL AS NEEDED
Status: DISCONTINUED | OUTPATIENT
Start: 2024-06-06 | End: 2024-06-06 | Stop reason: HOSPADM

## 2024-06-06 RX ORDER — HEPARIN 100 UNIT/ML
500 SYRINGE INTRAVENOUS AS NEEDED
Status: DISCONTINUED | OUTPATIENT
Start: 2024-06-06 | End: 2024-06-06 | Stop reason: HOSPADM

## 2024-06-06 RX ORDER — FAMOTIDINE 10 MG/ML
20 INJECTION INTRAVENOUS ONCE AS NEEDED
Status: DISCONTINUED | OUTPATIENT
Start: 2024-06-06 | End: 2024-06-06 | Stop reason: HOSPADM

## 2024-06-06 RX ORDER — DIPHENHYDRAMINE HYDROCHLORIDE 50 MG/ML
50 INJECTION INTRAMUSCULAR; INTRAVENOUS AS NEEDED
OUTPATIENT
Start: 2024-08-08

## 2024-06-06 RX ORDER — PROCHLORPERAZINE MALEATE 10 MG
10 TABLET ORAL EVERY 6 HOURS PRN
Status: DISCONTINUED | OUTPATIENT
Start: 2024-06-06 | End: 2024-06-06 | Stop reason: HOSPADM

## 2024-06-06 RX ORDER — EPINEPHRINE 0.3 MG/.3ML
0.3 INJECTION SUBCUTANEOUS EVERY 5 MIN PRN
OUTPATIENT
Start: 2024-07-18

## 2024-06-06 RX ORDER — DIPHENHYDRAMINE HCL 25 MG
50 CAPSULE ORAL AS NEEDED
Start: 2024-08-08

## 2024-06-06 RX ORDER — DIPHENHYDRAMINE HCL 25 MG
50 CAPSULE ORAL AS NEEDED
Start: 2024-07-18

## 2024-06-06 RX ORDER — EPINEPHRINE 0.3 MG/.3ML
0.3 INJECTION SUBCUTANEOUS EVERY 5 MIN PRN
OUTPATIENT
Start: 2024-08-08

## 2024-06-06 RX ORDER — PROCHLORPERAZINE MALEATE 10 MG
10 TABLET ORAL EVERY 6 HOURS PRN
OUTPATIENT
Start: 2024-07-18

## 2024-06-06 RX ORDER — ALBUTEROL SULFATE 0.83 MG/ML
3 SOLUTION RESPIRATORY (INHALATION) AS NEEDED
Status: DISCONTINUED | OUTPATIENT
Start: 2024-06-06 | End: 2024-06-06 | Stop reason: HOSPADM

## 2024-06-06 RX ORDER — DIPHENHYDRAMINE HCL 25 MG
50 CAPSULE ORAL AS NEEDED
Status: DISCONTINUED | OUTPATIENT
Start: 2024-06-06 | End: 2024-06-06 | Stop reason: HOSPADM

## 2024-06-06 RX ORDER — DEXTROMETHORPHAN HYDROBROMIDE, GUAIFENESIN 5; 100 MG/5ML; MG/5ML
650 LIQUID ORAL AS NEEDED
Start: 2024-08-08

## 2024-06-06 RX ORDER — DEXTROMETHORPHAN HYDROBROMIDE, GUAIFENESIN 5; 100 MG/5ML; MG/5ML
650 LIQUID ORAL AS NEEDED
Start: 2024-07-18

## 2024-06-06 RX ORDER — FAMOTIDINE 10 MG/ML
20 INJECTION INTRAVENOUS ONCE AS NEEDED
OUTPATIENT
Start: 2024-08-08

## 2024-06-06 RX ORDER — HEPARIN SODIUM,PORCINE/PF 10 UNIT/ML
50 SYRINGE (ML) INTRAVENOUS AS NEEDED
Status: DISCONTINUED | OUTPATIENT
Start: 2024-06-06 | End: 2024-06-06 | Stop reason: HOSPADM

## 2024-06-06 RX ORDER — ALBUTEROL SULFATE 0.83 MG/ML
3 SOLUTION RESPIRATORY (INHALATION) AS NEEDED
OUTPATIENT
Start: 2024-07-18

## 2024-06-06 RX ORDER — PROCHLORPERAZINE MALEATE 10 MG
10 TABLET ORAL EVERY 6 HOURS PRN
OUTPATIENT
Start: 2024-08-08

## 2024-06-06 RX ORDER — PROCHLORPERAZINE EDISYLATE 5 MG/ML
10 INJECTION INTRAMUSCULAR; INTRAVENOUS EVERY 6 HOURS PRN
OUTPATIENT
Start: 2024-08-08

## 2024-06-06 RX ORDER — DIPHENHYDRAMINE HYDROCHLORIDE 50 MG/ML
50 INJECTION INTRAMUSCULAR; INTRAVENOUS AS NEEDED
Status: DISCONTINUED | OUTPATIENT
Start: 2024-06-06 | End: 2024-06-06 | Stop reason: HOSPADM

## 2024-06-06 RX ORDER — PROCHLORPERAZINE EDISYLATE 5 MG/ML
10 INJECTION INTRAMUSCULAR; INTRAVENOUS EVERY 6 HOURS PRN
Status: DISCONTINUED | OUTPATIENT
Start: 2024-06-06 | End: 2024-06-06 | Stop reason: HOSPADM

## 2024-06-06 RX ORDER — FAMOTIDINE 10 MG/ML
20 INJECTION INTRAVENOUS ONCE AS NEEDED
OUTPATIENT
Start: 2024-07-18

## 2024-06-06 RX ORDER — PROCHLORPERAZINE EDISYLATE 5 MG/ML
10 INJECTION INTRAMUSCULAR; INTRAVENOUS EVERY 6 HOURS PRN
OUTPATIENT
Start: 2024-07-18

## 2024-06-06 RX ADMIN — Medication 200 MG: at 14:41

## 2024-06-06 ASSESSMENT — ENCOUNTER SYMPTOMS
GASTROINTESTINAL NEGATIVE: 1
NEUROLOGICAL NEGATIVE: 1
FLANK PAIN: 1
COUGH: 1
NERVOUS/ANXIOUS: 0
FATIGUE: 1
BACK PAIN: 1
HEMATOLOGIC/LYMPHATIC NEGATIVE: 1

## 2024-06-06 ASSESSMENT — PAIN SCALES - GENERAL: PAINLEVEL: 0-NO PAIN

## 2024-06-06 NOTE — RESEARCH NOTES
Research Note Treatment Day    Hayley Potter is here today for treatment on [id number]. Today is C21D1. Procedures completed per protocol. AE's and con-meds reviewed with patient. Patient is aware of treatment plan.    [x]   Received treatment as planned   OR  []    Treatment delayed; patient calendar updated as required   Treatment delayed because:    []   AE    []   Physician Discretion    []   Clinical Deterioration or Progression     []   Other    Education Documentation  Treatment Plan and Schedule, taught by Rafiq Delgado RN at 6/6/2024  5:13 PM.  Learner: Patient  Readiness: Acceptance  Method: Explanation  Response: Verbalizes Understanding    Education Comments  No comments found.    Pt presented to clinic with her daughter. Pt feels great today. No real issues noted , all toxicity about the same as last cycle.  No more nausea noted and no more issue with sinus at this visit. MD in to see pt to review CT scan and complete assessment. Pt is aware of plan.

## 2024-06-06 NOTE — PROGRESS NOTES
Patient here for follow up visit.  Notes easily bruising and a little more fatigued but otherwise in good spirits and offers no other concerns.     Patient here with granddaughter Jacqueline.    Medications and Allergies reviewed and reconciled this visit.    Visited with Adriana Blanchard Research this visit as well.  Follow up per MD request.    Patient reports she is not using mychart, Reviewed this is a good place to communicate with the team as well as review labs and upcoming orders.      No barriers to education noted, patient agrees to current plan and verbalized understanding using teach back method.

## 2024-06-06 NOTE — PROGRESS NOTES
Patient ID: Hayley Potter is a 63 y.o. female.    CC:  Management of stage IA3 ([id number]) adenocarcinoma of RUL s/p RUL lobectomy 04/2020, PDL-1 50%, NGS positive for KRAS G12C--> now with  metastatic recurrence involving liver, 11th L rib, and mediastinal LNs. 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 She was initially found to have spiculated 2.9cm mass in the RUL on the CT chest on 03/02/2020. She underwent CT guided RUL biopsy with pathology showed lung tissue with scan and focal aypicla grandular proliferation  suspicious for adenocarcinoma. IHC positive for CK7, TTF1 and napsin A. CK20 negative. PET/CT on 04/15/2020 showed RUL hypermetabolic RUL mass. No evidence of distant metastases. She underwent RUL lobectomy with mediastinal LN dissection with Dr. Wheat  on 04/21/2020 which showed invasive well to moderately differentiated adenocarcinoma, tumor measuring 2.5x2.3.x1.7cm. No VPI or LVI. All margins were negative. LN (0/11).      Unfortunately, on the surveillance CT chest (+) on 08/24/2022, there was a new irregular marginated 6mm GUSTABO nodule, which increased in size significantly on the repeat CT chest (-) on 01/06/2023, measuring 12mm.  There is also an additional new 9mm nodule  in the L lung as well as new mediastinal and possible L hilar LAD. PET/CT on 02/16/2023 showed hypermetabolic L hilar and mediastinal LAD. 1st  GUSTABO nodule now 8mm likely inflammatory. 2nd GUSTABO nodule showed stable size with SUV 4.4. Soft tissue mass associated with  L 11th rib fracture, SUV 11.5. Mass within the liver SUV 13.1, suspicious for mets. Hypermetabolic activity also noted  in  the stomach fundus, association with a hiatal hernia, in the cecum and ascending colon without masses seen. She underwent US guided liver biopsy on 02/16/2023 - poorly-differentiated carcinoma.     Treatment Summary:  04/21/2020: RUL lobectomy with medistainal LN dissection (Dr. Wheat)  04/11/2023: C1 pembrolizumab 200mg IV (on trial)  05/02/2023: C2 Pembrolizumab 200mg IV (on trial)  05/23/2023: C3 Pembrolizumab 200mg IV (on trial)  06/13/2023: C4 Pembrolizumab 200mg IV (on trial)   07/05/2023: C5 Pembrolizumab 200mg IV (on trial)   07/26/2023: C6 Pembrolizumab 200mg IV (on trial)   08/16/2023: C7 Pembrolizumab 200mg IV (on trial)   09/07/2023: C8 Pembrolizumab 200mg IV (on trial)   09/28/2023: C9 Pembrolizumab 200mg IV (on trial)   10/19/2023: C10 Pembrolizumab 200mg IV (on trial)  11/09/2023: C11 Pembrolizumab 200mg IV (on trial)  11/30/2023: C12 Pembrolizumab 200mg IV (on trial)  12/21/2023: C13 Pembrolizumab 200mg IV (on trial)  01/11/2024: C14 Pembrolizumab 200mg IV (on trial)  02/01/2024: C15 Pembrolizumab 200mg IV (on trial)  02/22/2024: C16 Pembrolizumab 200mg IV (on trial)  03/14/2024: C17 Pembrolizumab 200mg IV (on trial)  04/04/2024: C18 Pembrolizumab 200mg IV (on trial)  04/25/2024: C19 Pembrolizumab 200mg IV (on trial)  05/16/2024: C20 Pembrolizumab 200mg IV (on trial)  06/06/2024: C21 Pembrolizumab 200mg IV (on trial)    Interval History (06/06/2024):    Pt present in clinic for readiness to treat visit.  Her dtr is present for visit.  Chronic cough stable. Chronic rib pain stable. Energy level is fair. Eating and voiding well. No n/v/d/c. No fever or chills. No other acute complaints.       Review of Systems   Constitutional:  Positive for fatigue.   HENT:  Negative.     Respiratory:  Positive for cough.    Gastrointestinal: Negative.    Musculoskeletal:  Positive for back pain and flank pain.   Neurological: Negative.    Hematological: Negative.    Psychiatric/Behavioral:  The patient is not  nervous/anxious.       PMHx: vocal cord cancer, GERD and COPD, OA  PSHx: tonsillectomy, tubal ligation, lumpectomy, L ankle and R hand surgery  FHx: strong family history of breast cancer including male breast cancer.   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 She quit etoh . She smokes Marijuana every day.     Allergies:  Cephalexin    Medications:  Medication list reviewed with patient and updated in EMR    Vital Signs:  /74 (BP Location: Left arm, Patient Position: Sitting, BP Cuff Size: Small adult)   Pulse 78   Temp 36.9 °C (98.4 °F) (Temporal)   Resp 16   Wt 60.6 kg (133 lb 11.3 oz)   SpO2 95%   BMI 22.22 kg/m²     Wt Readings from Last 5 Encounters:   24 60.6 kg (133 lb 11.3 oz)   24 62 kg (136 lb 11 oz)   24 61.4 kg (135 lb 5.8 oz)   24 62.1 kg (136 lb 14.5 oz)   24 61.7 kg (136 lb)      Physical Exam:  ECO  Pain: mild 2-3    General: Well appearing, no acute distress  Neurology: Alert and oriented x3, CN II-XII grossly intact  Psychology: Affect appropriate  Eyes: PERRL, EOMI  ENT: Mucus membranes moist and intact, no ulcers  Lymphatics: No LAD   Neck: Neck supple  Respiratory: Lungs clear bilaterally, no wheezes, no rales, no rhonchi  Cardiovascular: Normal rate and rhythm, no murmur  Abdomen: Soft, non-tender, non-distended, normoactive, no ascites  Musculoskeletal: Normal gait and stance  Extremities: No clubbing, no cyanosis, no edema  Skin: No rashes or skin concerns    Results from last 7 days   Lab Units 24  1128   GLUCOSE mg/dL 83   SODIUM mmol/L 138   POTASSIUM mmol/L 5.0   CHLORIDE mmol/L 101   CO2 mmol/L 29   BUN mg/dL 8   CREATININE mg/dL 0.89   EGFR mL/min/1.73m*2 73   CALCIUM mg/dL 10.0   ALBUMIN g/dL 4.0   PROTEIN TOTAL g/dL 6.2*   BILIRUBIN TOTAL mg/dL 0.4   ALK PHOS U/L 55   ALT U/L 12   AST U/L 17     Results from last 7 days   Lab Units 24  1128   WBC AUTO x10*3/uL 6.8    HEMOGLOBIN g/dL 14.1   HEMATOCRIT % 43.1   PLATELETS AUTO x10*3/uL 458*   NEUTROS ABS x10*3/uL 4.11   LYMPHS ABS AUTO x10*3/uL 1.51   MONOS ABS AUTO x10*3/uL 0.89   EOS ABS AUTO x10*3/uL 0.13   NEUTROS PCT AUTO % 60.8   LYMPHS PCT AUTO % 22.3   MONOS PCT AUTO % 13.1   EOS PCT AUTO % 1.9      Assessment/Plan:  63 y.o. 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     The patient's records and imaging have been reviewed in detail.  MD discussed with the patient the natural history of their disease at length.  The following has also been discussed with the patient:     # Cancer: recurrent likely metastatic ([id number]) lung cancer: liver biopsy showed extensive necrosis. Liquid biopsy showed KRAS G12C mutation, which was identified in her original surgical resected  specimen. PDL-1 50%. Patient initially had stage IA2 ([id number]) RUL lung  adenocarcinoma s/p RUL lobectomy, unfortunately now likely metastatic recurrence. Liver biopsy on 2/16 showed poorly differentiated malignant neoplasm with extensive necrosis. We discussed treatment  options with her today with either standard of care pembrolizumab vs clinical trials. Patient expressed interests in enrolling into clinical trial. Enrolled into RCT [id number]. Doing well.      - Interval scan: 06/03/2024: CT CAP (+): 1. Stable left upper lobe scarring with associated subcentimeter nodular component. No new pulmonary nodules. 2. No evidence of new sites of metastatic disease in the chest, abdomen, or pelvis. 3. Additional stable chronic and incidental findings as above.    # Hypothyroidism: G2. Likely 2/2 immunotherapy - resolved.  TSH 1.07 11/8/23.  Continue levothyroxine 75mcg daily.      # Microcytic anemia 2/2 due to chronic disease: patient denies any hematochezia  or hematuria. No melena. hx of hiatal hernia. Per patient. Last  EGD was done in 2021 and was normal. Colonoscopy was a few years ago.  EGD showed hiatal hernia 03/2023. Improved Hgb overall. Iron studies show low-normal ferritin and TSAT of 14%. Continue PO iron 3x/week. Tolerates well. Stable.      # Mucositis (chronic).  Pt reports since start of immunotherapy.  Chronic sore secondary to ill fitting dentures recommended s&s rinse.  Continue Anbesol and/or Oragel for pain.  Will continue to monitor.  BMX added.  2/1/24  - resolved      # Grade 1 hyperthyroidism: resolved, now hypothyroid (as above).     # Constipation: managed well with stool softener and PRN lactulose.        # Clinical Trials: April 11, 2023 initiated treatment on [id number] this is cycle 1 of first-line treatment.  She has been randomized to  arm A which is pembrolizumab alone for up to 2 years or progression followed by Alimta/carboplatin for second line therapy. Consent has been signed. 3/11 CT scan shows favorable response. Continue treatment.      # Access: Peripheral veins.     # Pain: she has a displaced 8th Rib on the left side -Advised patient to take percocet daily and decrease Advil use. PRN Percocet Rx in place.  Rx last sent 12/21/23. Currently managed with PRN gummies.  New pain on the R upper back, controlled with percocet. Will try lidocaine patch OTC.     # Bone Health: L fifth ribs lesion stable.    # Arthralgia (grade 1).  Uric acid lab (-).  Intermittent arthralgia to left hand.  Continue PRN Advil or Percocet for severe pain.      # Left rib or chest wall pain over the past month. 11/30/23 CXR - RESULTS: Mediastinal surgical clips are noted. The cardiac silhouette is within normal limits. Mediastinal contours are unremarkable. The lungs demonstrate no infiltrate or atelectasis. There is no evidence for pleural effusion. Remote fracture is noted involving the right 7th rib. There are degenerative changes of the thoracic spine.  - Continue PRN Percocet and/or gummies for pain control.      #  Psychosocial: Coping well, no acute issues.  Good support from family & friends.  Social work support available.     # GI/CINV: No acute issues.  Eating and voiding well.  Nutrition consult available.    # Sinusitis:  Pressure to maxillary and frontal sinuses.  +Increased mucous, yellow in color.  Denies fevers.  Hx of recurrent sinus infections.  Rx called to pt's pharmacy.  Doxycycline 100mg BID x7-10 days.  Extended course d/t recent smoke exposure.  3/14: Resolved.       # Smoking: former smoker     # Fertility: N/A.        # Heme/ESAs: N/A.     # GOC: Patient is aware of palliative  intent goals of care.     # Language: English.     # Dispo: RTC in 3 weeks for next cycle.  Pt instructed to call with concerns/questions .

## 2024-06-26 ENCOUNTER — LAB (OUTPATIENT)
Dept: LAB | Facility: LAB | Age: 64
End: 2024-06-26
Payer: COMMERCIAL

## 2024-06-26 DIAGNOSIS — C34.11 MALIGNANT NEOPLASM OF UPPER LOBE OF RIGHT LUNG (MULTI): ICD-10-CM

## 2024-06-26 LAB
ALBUMIN SERPL BCP-MCNC: 4.4 G/DL (ref 3.4–5)
ALP SERPL-CCNC: 63 U/L (ref 33–136)
ALT SERPL W P-5'-P-CCNC: 15 U/L (ref 7–45)
AMYLASE SERPL-CCNC: 30 U/L (ref 29–103)
ANION GAP SERPL CALC-SCNC: 13 MMOL/L (ref 10–20)
AST SERPL W P-5'-P-CCNC: 20 U/L (ref 9–39)
BASOPHILS # BLD AUTO: 0.07 X10*3/UL (ref 0–0.1)
BASOPHILS NFR BLD AUTO: 1.1 %
BILIRUB SERPL-MCNC: 0.4 MG/DL (ref 0–1.2)
BUN SERPL-MCNC: 8 MG/DL (ref 6–23)
CALCIUM SERPL-MCNC: 10.4 MG/DL (ref 8.6–10.6)
CHLORIDE SERPL-SCNC: 102 MMOL/L (ref 98–107)
CO2 SERPL-SCNC: 31 MMOL/L (ref 21–32)
CREAT SERPL-MCNC: 0.84 MG/DL (ref 0.5–1.05)
EGFRCR SERPLBLD CKD-EPI 2021: 78 ML/MIN/1.73M*2
EOSINOPHIL # BLD AUTO: 0.14 X10*3/UL (ref 0–0.7)
EOSINOPHIL NFR BLD AUTO: 2.2 %
ERYTHROCYTE [DISTWIDTH] IN BLOOD BY AUTOMATED COUNT: 13.2 % (ref 11.5–14.5)
GLUCOSE SERPL-MCNC: 86 MG/DL (ref 74–99)
HCT VFR BLD AUTO: 43.2 % (ref 36–46)
HGB BLD-MCNC: 14.3 G/DL (ref 12–16)
IMM GRANULOCYTES # BLD AUTO: 0.01 X10*3/UL (ref 0–0.7)
IMM GRANULOCYTES NFR BLD AUTO: 0.2 % (ref 0–0.9)
LIPASE SERPL-CCNC: 19 U/L (ref 9–82)
LYMPHOCYTES # BLD AUTO: 1.5 X10*3/UL (ref 1.2–4.8)
LYMPHOCYTES NFR BLD AUTO: 23.3 %
MCH RBC QN AUTO: 31.2 PG (ref 26–34)
MCHC RBC AUTO-ENTMCNC: 33.1 G/DL (ref 32–36)
MCV RBC AUTO: 94 FL (ref 80–100)
MONOCYTES # BLD AUTO: 0.85 X10*3/UL (ref 0.1–1)
MONOCYTES NFR BLD AUTO: 13.2 %
NEUTROPHILS # BLD AUTO: 3.87 X10*3/UL (ref 1.2–7.7)
NEUTROPHILS NFR BLD AUTO: 60 %
NRBC BLD-RTO: 0 /100 WBCS (ref 0–0)
PLATELET # BLD AUTO: 439 X10*3/UL (ref 150–450)
POTASSIUM SERPL-SCNC: 5.3 MMOL/L (ref 3.5–5.3)
PROT SERPL-MCNC: 6.5 G/DL (ref 6.4–8.2)
RBC # BLD AUTO: 4.59 X10*6/UL (ref 4–5.2)
SODIUM SERPL-SCNC: 141 MMOL/L (ref 136–145)
WBC # BLD AUTO: 6.4 X10*3/UL (ref 4.4–11.3)

## 2024-06-27 ENCOUNTER — OFFICE VISIT (OUTPATIENT)
Dept: HEMATOLOGY/ONCOLOGY | Facility: CLINIC | Age: 64
End: 2024-06-27
Payer: COMMERCIAL

## 2024-06-27 ENCOUNTER — EDUCATION (OUTPATIENT)
Dept: HEMATOLOGY/ONCOLOGY | Facility: CLINIC | Age: 64
End: 2024-06-27

## 2024-06-27 ENCOUNTER — INFUSION (OUTPATIENT)
Dept: HEMATOLOGY/ONCOLOGY | Facility: CLINIC | Age: 64
End: 2024-06-27
Payer: COMMERCIAL

## 2024-06-27 VITALS
OXYGEN SATURATION: 95 % | DIASTOLIC BLOOD PRESSURE: 78 MMHG | RESPIRATION RATE: 18 BRPM | TEMPERATURE: 97.7 F | HEART RATE: 95 BPM | BODY MASS INDEX: 22.02 KG/M2 | SYSTOLIC BLOOD PRESSURE: 113 MMHG | WEIGHT: 132.5 LBS

## 2024-06-27 DIAGNOSIS — Z51.12 ENCOUNTER FOR ANTINEOPLASTIC IMMUNOTHERAPY: ICD-10-CM

## 2024-06-27 DIAGNOSIS — G89.3 CANCER ASSOCIATED PAIN: ICD-10-CM

## 2024-06-27 DIAGNOSIS — C34.11 MALIGNANT NEOPLASM OF UPPER LOBE OF RIGHT LUNG (MULTI): ICD-10-CM

## 2024-06-27 DIAGNOSIS — F41.9 ANXIETY: Primary | ICD-10-CM

## 2024-06-27 PROCEDURE — 96374 THER/PROPH/DIAG INJ IV PUSH: CPT | Mod: INF

## 2024-06-27 PROCEDURE — 99214 OFFICE O/P EST MOD 30 MIN: CPT | Performed by: NURSE PRACTITIONER

## 2024-06-27 PROCEDURE — 2560000001 HC RX 256 EXPERIMENTAL DRUGS: Mod: SE | Performed by: STUDENT IN AN ORGANIZED HEALTH CARE EDUCATION/TRAINING PROGRAM

## 2024-06-27 RX ORDER — PROCHLORPERAZINE EDISYLATE 5 MG/ML
10 INJECTION INTRAMUSCULAR; INTRAVENOUS EVERY 6 HOURS PRN
Status: DISCONTINUED | OUTPATIENT
Start: 2024-06-27 | End: 2024-06-27 | Stop reason: HOSPADM

## 2024-06-27 RX ORDER — DIPHENHYDRAMINE HYDROCHLORIDE 50 MG/ML
50 INJECTION INTRAMUSCULAR; INTRAVENOUS AS NEEDED
Status: DISCONTINUED | OUTPATIENT
Start: 2024-06-27 | End: 2024-06-27 | Stop reason: HOSPADM

## 2024-06-27 RX ORDER — EPINEPHRINE 0.3 MG/.3ML
0.3 INJECTION SUBCUTANEOUS EVERY 5 MIN PRN
Status: DISCONTINUED | OUTPATIENT
Start: 2024-06-27 | End: 2024-06-27 | Stop reason: HOSPADM

## 2024-06-27 RX ORDER — PROCHLORPERAZINE MALEATE 10 MG
10 TABLET ORAL EVERY 6 HOURS PRN
Status: DISCONTINUED | OUTPATIENT
Start: 2024-06-27 | End: 2024-06-27 | Stop reason: HOSPADM

## 2024-06-27 RX ORDER — FAMOTIDINE 10 MG/ML
20 INJECTION INTRAVENOUS ONCE AS NEEDED
Status: DISCONTINUED | OUTPATIENT
Start: 2024-06-27 | End: 2024-06-27 | Stop reason: HOSPADM

## 2024-06-27 RX ORDER — DIAZEPAM 5 MG/1
5 TABLET ORAL EVERY 8 HOURS PRN
Qty: 3 TABLET | Refills: 0 | Status: SHIPPED | OUTPATIENT
Start: 2024-06-27

## 2024-06-27 RX ORDER — ACETAMINOPHEN 325 MG/1
650 TABLET ORAL AS NEEDED
Status: DISCONTINUED | OUTPATIENT
Start: 2024-06-27 | End: 2024-06-27 | Stop reason: HOSPADM

## 2024-06-27 RX ORDER — ALBUTEROL SULFATE 0.83 MG/ML
3 SOLUTION RESPIRATORY (INHALATION) AS NEEDED
Status: DISCONTINUED | OUTPATIENT
Start: 2024-06-27 | End: 2024-06-27 | Stop reason: HOSPADM

## 2024-06-27 RX ORDER — DIPHENHYDRAMINE HCL 25 MG
50 CAPSULE ORAL AS NEEDED
Status: DISCONTINUED | OUTPATIENT
Start: 2024-06-27 | End: 2024-06-27 | Stop reason: HOSPADM

## 2024-06-27 ASSESSMENT — ENCOUNTER SYMPTOMS
BACK PAIN: 1
NEUROLOGICAL NEGATIVE: 1
COUGH: 1
FATIGUE: 1
GASTROINTESTINAL NEGATIVE: 1
HEMATOLOGIC/LYMPHATIC NEGATIVE: 1
FLANK PAIN: 1

## 2024-06-27 ASSESSMENT — PAIN SCALES - GENERAL: PAINLEVEL: 2

## 2024-06-27 NOTE — RESEARCH NOTES
Research Note Treatment Day    Hayley Potter is here today for treatment on [id number]. Today is C22D1. Procedures completed per protocol. AE's and con-meds reviewed with patient. Patient is aware of treatment plan.    [x]   Received treatment as planned   OR  []    Treatment delayed; patient calendar updated as required   Treatment delayed because:    []   AE    []   Physician Discretion    []   Clinical Deterioration or Progression     []   Other    Education Documentation  Treatment Plan and Schedule, taught by Rafiq Delgado RN at 6/27/2024  4:35 PM.  Learner: Patient  Readiness: Acceptance  Method: Explanation  Response: Verbalizes Understanding    Education Comments  No comments found.      Pt presented to clinic for cycle 22 on [id number]. Pt states she feels pretty well. Still with same complaint of fatigue grade 1, neuropathy unchanged and her dyspnea and cough are unchanged. Her pain in the rib area is a 2/10 and she is only using advil occasionally. She does have the arthralgia to her thumb area. NP in to complete assessment with pt. Pt is aware of next schedule

## 2024-06-27 NOTE — PROGRESS NOTES
Patient ID: Hayley Potter is a 63 y.o. female.    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 She was initially found to have spiculated 2.9cm mass in the RUL on the CT chest on 03/02/2020. She underwent CT guided RUL biopsy with pathology showed lung tissue with scan and focal aypicla grandular proliferation  suspicious for adenocarcinoma. IHC positive for CK7, TTF1 and napsin A. CK20 negative. PET/CT on 04/15/2020 showed RUL hypermetabolic RUL mass. No evidence of distant metastases. She underwent RUL lobectomy with mediastinal LN dissection with Dr. Wheat  on 04/21/2020 which showed invasive well to moderately differentiated adenocarcinoma, tumor measuring 2.5x2.3.x1.7cm. No VPI or LVI. All margins were negative. LN (0/11).      Unfortunately, on the surveillance CT chest (+) on 08/24/2022, there was a new irregular marginated 6mm GUSTABO nodule, which increased in size significantly on the repeat CT chest (-) on 01/06/2023, measuring 12mm.  There is also an additional new 9mm nodule  in the L lung as well as new mediastinal and possible L hilar LAD. PET/CT on 02/16/2023 showed hypermetabolic L hilar and mediastinal LAD. 1st  GUSTABO nodule now 8mm likely inflammatory. 2nd GUSTABO nodule showed stable size with SUV 4.4. Soft tissue mass associated with  L 11th rib fracture, SUV 11.5. Mass within the liver SUV 13.1, suspicious for mets. Hypermetabolic activity also noted  in  the stomach fundus, association with a hiatal hernia, in the cecum and ascending colon without masses seen. She underwent US guided liver biopsy on 02/16/2023 - poorly-differentiated carcinoma.     Treatment Summary:  04/21/2020: RUL lobectomy with medistainal LN dissection (Dr. Wheat)  04/11/2023: C1 pembrolizumab 200mg IV (on trial)  05/02/2023: C2 Pembrolizumab 200mg IV (on trial)  05/23/2023: C3 Pembrolizumab 200mg IV (on trial)  06/13/2023: C4 Pembrolizumab 200mg IV (on trial)   07/05/2023: C5 Pembrolizumab 200mg IV (on trial)   07/26/2023: C6 Pembrolizumab 200mg IV (on trial)   08/16/2023: C7 Pembrolizumab 200mg IV (on trial)   09/07/2023: C8 Pembrolizumab 200mg IV (on trial)   09/28/2023: C9 Pembrolizumab 200mg IV (on trial)   10/19/2023: C10 Pembrolizumab 200mg IV (on trial)  11/09/2023: C11 Pembrolizumab 200mg IV (on trial)  11/30/2023: C12 Pembrolizumab 200mg IV (on trial)  12/21/2023: C13 Pembrolizumab 200mg IV (on trial)  01/11/2024: C14 Pembrolizumab 200mg IV (on trial)  02/01/2024: C15 Pembrolizumab 200mg IV (on trial)  02/22/2024: C16 Pembrolizumab 200mg IV (on trial)  03/14/2024: C17 Pembrolizumab 200mg IV (on trial)  04/04/2024: C18 Pembrolizumab 200mg IV (on trial)  04/25/2024: C19 Pembrolizumab 200mg IV (on trial)  05/16/2024: C20 Pembrolizumab 200mg IV (on trial)  06/06/2024: C21 Pembrolizumab 200mg IV (on trial)  06/27/2024: C22 Pembrolizumab 200mg IV (on trial)    Interval History (06/27/2024):    Pt present in clinic for readiness to treat visit.  Her dtr is present for visit.  Breathing feels good.  Current cough and sinus drainage d/t pollen.  Managed with PRN mucinex. Appetite - good.  Tastes off, dysgeusia (grade 2).  Denies n/v/c/d. Pain today - rib 2/10, left wrist 4/10.  Both managed with PRN advil, or 5% Aspercreme.  No skin concerns.  Verbalized desire to decrease number of medications she is taking.  Verbalized she may self-taper gabapentin dosage.  Discussed will place supportive  onc referral.  Her father passed recently with  scheduled for tomorrow.  Asked for 1x dose valium to help her sleep.  Previous 10mg admin.  Will provide 5mg PRN dose x3.  No other concerns today. Labs WNL.  Ready for treatment.      Review of Systems   Constitutional:  Positive for fatigue.   HENT:  Negative.     Respiratory:  Positive for cough.         Nasal congestion d/t allergies   Gastrointestinal: Negative.    Musculoskeletal:  Positive for arthralgias, back pain and flank pain.   Skin: Negative.    Neurological: Negative.    Hematological: Negative.    Psychiatric/Behavioral:  The patient is nervous/anxious.       PMHx: vocal cord cancer, GERD and COPD, OA  PSHx: tonsillectomy, tubal ligation, lumpectomy, L ankle and R hand surgery  FHx: strong family history of breast cancer including male breast cancer.   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 She quit etoh . She smokes Marijuana every day.     Allergies:  Cephalexin    Medications:  Medication list reviewed with patient and updated in EMR    Vital Signs:  There were no vitals taken for this visit.    Wt Readings from Last 5 Encounters:   24 60.6 kg (133 lb 11.3 oz)   24 62 kg (136 lb 11 oz)   24 61.4 kg (135 lb 5.8 oz)   24 62.1 kg (136 lb 14.5 oz)   24 61.7 kg (136 lb)      Physical Exam:  ECO  Pain: mild - rib 2/10, left wrist 4/10 (grade 1)     Physical Exam  Vitals reviewed.   Constitutional:       Appearance: Normal appearance.   HENT:      Head: Normocephalic.      Nose: Nose normal.      Mouth/Throat:      Mouth: Mucous membranes are moist.      Pharynx: Oropharynx is clear.   Eyes:      Extraocular Movements: Extraocular movements intact.      Conjunctiva/sclera: Conjunctivae normal.      Pupils: Pupils are equal, round, and reactive to light.   Cardiovascular:      Rate and Rhythm: Normal rate and regular rhythm.      Pulses: Normal pulses.      Heart  sounds: Normal heart sounds.   Pulmonary:      Breath sounds: Normal breath sounds.      Comments: Hx RUL lobectomy 4/2020  Abdominal:      General: Bowel sounds are normal.      Palpations: Abdomen is soft.   Musculoskeletal:         General: Normal range of motion.      Cervical back: Normal range of motion and neck supple.   Skin:     General: Skin is warm and dry.      Capillary Refill: Capillary refill takes less than 2 seconds.   Neurological:      General: No focal deficit present.      Mental Status: She is alert and oriented to person, place, and time.   Psychiatric:         Mood and Affect: Mood normal.         Behavior: Behavior normal.         Thought Content: Thought content normal.         Judgment: Judgment normal.          Results from last 7 days   Lab Units 06/26/24  1055   GLUCOSE mg/dL 86   SODIUM mmol/L 141   POTASSIUM mmol/L 5.3   CHLORIDE mmol/L 102   CO2 mmol/L 31   BUN mg/dL 8   CREATININE mg/dL 0.84   EGFR mL/min/1.73m*2 78   CALCIUM mg/dL 10.4   ALBUMIN g/dL 4.4   PROTEIN TOTAL g/dL 6.5   BILIRUBIN TOTAL mg/dL 0.4   ALK PHOS U/L 63   ALT U/L 15   AST U/L 20     Results from last 7 days   Lab Units 06/26/24  1055   WBC AUTO x10*3/uL 6.4   HEMOGLOBIN g/dL 14.3   HEMATOCRIT % 43.2   PLATELETS AUTO x10*3/uL 439   NEUTROS ABS x10*3/uL 3.87   LYMPHS ABS AUTO x10*3/uL 1.50   MONOS ABS AUTO x10*3/uL 0.85   EOS ABS AUTO x10*3/uL 0.14   NEUTROS PCT AUTO % 60.0   LYMPHS PCT AUTO % 23.3   MONOS PCT AUTO % 13.2   EOS PCT AUTO % 2.2      Assessment/Plan:  63 y.o. 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     The patient's records and imaging have been reviewed in detail.  MD discussed with the patient the natural history of their disease at length.  The following has also been discussed with the patient:     # Cancer: recurrent likely metastatic ([id number])  lung cancer: liver biopsy showed extensive necrosis. Liquid biopsy showed KRAS G12C mutation, which was identified in her original surgical resected  specimen. PDL-1 50%. Patient initially had stage IA2 ([id number]) RUL lung  adenocarcinoma s/p RUL lobectomy, unfortunately now likely metastatic recurrence. Liver biopsy on 2/16 showed poorly differentiated malignant neoplasm with extensive necrosis. We discussed treatment  options with her today with either standard of care pembrolizumab vs clinical trials. Patient expressed interests in enrolling into clinical trial. Enrolled into RCT [id number]. Doing well.      - Interval scan: 06/03/2024: CT CAP (+): 1. Stable left upper lobe scarring with associated subcentimeter nodular component. No new pulmonary nodules. 2. No evidence of new sites of metastatic disease in the chest, abdomen, or pelvis. 3. Additional stable chronic and incidental findings as above.    # Hypothyroidism: G2. Likely 2/2 immunotherapy - resolved.  TSH 1.07 11/8/23.  Continue levothyroxine 75mcg daily.      # Microcytic anemia 2/2 due to chronic disease: patient denies any hematochezia  or hematuria. No melena. hx of hiatal hernia. Per patient. Last EGD was done in 2021 and was normal. Colonoscopy was a few years ago.  EGD showed hiatal hernia 03/2023. Improved Hgb overall. Iron studies show low-normal ferritin and TSAT of 14%. Continue PO iron 3x/week. Tolerates well. Stable.      # Mucositis (chronic).  Pt reports since start of immunotherapy.  Chronic sore secondary to ill fitting dentures recommended s&s rinse.  Continue Anbesol and/or Oragel for pain.  Will continue to monitor.  BMX added.  2/1/24  - resolved      # Grade 1 hyperthyroidism: resolved, now hypothyroid (as above).     # Constipation: managed well with stool softener and PRN lactulose.        # Clinical Trials: April 11, 2023 initiated treatment on [id number] this is cycle 1 of first-line treatment.  She has been randomized to  arm A which  is pembrolizumab alone for up to 2 years or progression followed by Alimta/carboplatin for second line therapy. Consent has been signed. 6/3/24 CT scan shows favorable response. Continue treatment.      # Access: Peripheral veins.     # Pain: she has a displaced 8th Rib on the left side -Advised patient to take percocet daily and decrease Advil use. PRN Percocet Rx in place.  Rx last sent 23. Currently managed with PRN gummies.  New pain on the R upper back, controlled with percocet. Will try lidocaine patch OTC. :  Pain currently managed with PRN Advil, Aspercreme, and gummies.  Routine Gabapentin for neuropathy pain.   Pt verbalized desire to reduce # of medications taken.  Wishes to reduce Gabapentin dosage.     Supportive onc referral placed for assistance.       # Anxiety:  Anxiety reported d/t recent death of her father, upcoming .  Pt requested 1x Valium dose, previous 10mg admin.  OARRS reviewed. Provided Rx for Valium 5mg x3 doses.      # Bone Health: L fifth ribs lesion stable.    # Arthralgia (grade 1).  Uric acid lab (-).  Intermittent arthralgia to left hand.  Continue PRN Advil, Aspercreme or Percocet (for severe pain).      # Left rib or chest wall pain over the past month. 23 CXR - RESULTS: Mediastinal surgical clips are noted. The cardiac silhouette is within normal limits. Mediastinal contours are unremarkable. The lungs demonstrate no infiltrate or atelectasis. There is no evidence for pleural effusion. Remote fracture is noted involving the right 7th rib. There are degenerative changes of the thoracic spine.  - Continue PRN Percocet and/or gummies for pain control.      # Psychosocial: Coping well, no acute issues.  Good support from family & friends.  Social work support available.     # GI/CINV: No acute issues.  Eating and voiding well.  Nutrition consult available.    # Sinusitis:  Pressure to maxillary and frontal sinuses.  +Increased mucous, yellow in color.  Denies  fevers.  Hx of recurrent sinus infections.  Rx called to pt's pharmacy.  Doxycycline 100mg BID x7-10 days.  Extended course d/t recent smoke exposure.  3/14: Resolved.       # Smoking: former smoker     # Fertility: N/A.        # Heme/ESAs: N/A.     # GOC: Patient is aware of palliative  intent goals of care.     # Language: English.     # Dispo: RTC in 3 weeks for next cycle.  Pt instructed to call with concerns/questions .

## 2024-06-28 PROBLEM — F41.9 ANXIETY: Status: ACTIVE | Noted: 2024-06-28

## 2024-06-28 ASSESSMENT — ENCOUNTER SYMPTOMS
NERVOUS/ANXIOUS: 1
ARTHRALGIAS: 1

## 2024-07-03 ENCOUNTER — TELEPHONE (OUTPATIENT)
Dept: HEMATOLOGY/ONCOLOGY | Facility: CLINIC | Age: 64
End: 2024-07-03
Payer: COMMERCIAL

## 2024-07-03 NOTE — TELEPHONE ENCOUNTER
Follow-up TC to pt regarding tapering her Gabapentin dose.  At time of last visit, supportive onc referral placed.  Supportive onc declined referred at this time.  Recommended pt follow-up with PCP (Rx provider).      Pt verbalized she will reach out to neurologist regarding taper and/or new medication options.  Verbalized she will not taper medication herself.     No other concerns at time of call.  Existing appt scheduled 7/18/24.

## 2024-07-15 ENCOUNTER — HOSPITAL ENCOUNTER (EMERGENCY)
Facility: HOSPITAL | Age: 64
Discharge: HOME | End: 2024-07-15
Payer: COMMERCIAL

## 2024-07-15 ENCOUNTER — APPOINTMENT (OUTPATIENT)
Dept: RADIOLOGY | Facility: HOSPITAL | Age: 64
End: 2024-07-15
Payer: COMMERCIAL

## 2024-07-15 VITALS
WEIGHT: 135 LBS | DIASTOLIC BLOOD PRESSURE: 90 MMHG | HEART RATE: 67 BPM | RESPIRATION RATE: 20 BRPM | HEIGHT: 65 IN | SYSTOLIC BLOOD PRESSURE: 133 MMHG | BODY MASS INDEX: 22.49 KG/M2 | OXYGEN SATURATION: 97 % | TEMPERATURE: 98.4 F

## 2024-07-15 DIAGNOSIS — S90.31XA CONTUSION OF RIGHT FOOT, INITIAL ENCOUNTER: Primary | ICD-10-CM

## 2024-07-15 PROCEDURE — 73630 X-RAY EXAM OF FOOT: CPT | Mod: RIGHT SIDE | Performed by: RADIOLOGY

## 2024-07-15 PROCEDURE — 99283 EMERGENCY DEPT VISIT LOW MDM: CPT

## 2024-07-15 PROCEDURE — 73630 X-RAY EXAM OF FOOT: CPT | Mod: RT

## 2024-07-15 ASSESSMENT — PAIN DESCRIPTION - DESCRIPTORS: DESCRIPTORS: ACHING;SHARP;THROBBING

## 2024-07-15 ASSESSMENT — PAIN - FUNCTIONAL ASSESSMENT: PAIN_FUNCTIONAL_ASSESSMENT: 0-10

## 2024-07-15 ASSESSMENT — PAIN DESCRIPTION - ONSET: ONSET: ONGOING

## 2024-07-15 ASSESSMENT — LIFESTYLE VARIABLES
EVER HAD A DRINK FIRST THING IN THE MORNING TO STEADY YOUR NERVES TO GET RID OF A HANGOVER: NO
TOTAL SCORE: 0
HAVE YOU EVER FELT YOU SHOULD CUT DOWN ON YOUR DRINKING: NO
HAVE PEOPLE ANNOYED YOU BY CRITICIZING YOUR DRINKING: NO
EVER FELT BAD OR GUILTY ABOUT YOUR DRINKING: NO

## 2024-07-15 ASSESSMENT — PAIN DESCRIPTION - PAIN TYPE: TYPE: ACUTE PAIN

## 2024-07-15 ASSESSMENT — PAIN DESCRIPTION - LOCATION: LOCATION: TOE (COMMENT WHICH ONE)

## 2024-07-15 ASSESSMENT — PAIN DESCRIPTION - PROGRESSION: CLINICAL_PROGRESSION: NOT CHANGED

## 2024-07-15 ASSESSMENT — PAIN DESCRIPTION - ORIENTATION: ORIENTATION: RIGHT

## 2024-07-15 ASSESSMENT — PAIN DESCRIPTION - FREQUENCY: FREQUENCY: CONSTANT/CONTINUOUS

## 2024-07-15 ASSESSMENT — COLUMBIA-SUICIDE SEVERITY RATING SCALE - C-SSRS
2. HAVE YOU ACTUALLY HAD ANY THOUGHTS OF KILLING YOURSELF?: NO
6. HAVE YOU EVER DONE ANYTHING, STARTED TO DO ANYTHING, OR PREPARED TO DO ANYTHING TO END YOUR LIFE?: NO
1. IN THE PAST MONTH, HAVE YOU WISHED YOU WERE DEAD OR WISHED YOU COULD GO TO SLEEP AND NOT WAKE UP?: NO

## 2024-07-15 ASSESSMENT — PAIN SCALES - GENERAL: PAINLEVEL_OUTOF10: 10 - WORST POSSIBLE PAIN

## 2024-07-15 NOTE — ED PROVIDER NOTES
HPI   Chief Complaint   Patient presents with    Fall     Fell off bed, hit right foot and right great toe. Presents with right great toe and right foot ecchymosis and swollen.       Patient is a 64-year-old female presents emergency department for evaluation of fall with right foot injury.  Patient states that she was sitting on her bed and reaching across the bed for something on the other side when her bottom slid off the side of the bed.  She states that she tried to catch herself with her right foot, but ended up twisting her right foot underneath of her and she landed on it.  She sustained an injury to the right great toe and foot.  She has been able to walk on it, but describes it constant dull aching pain and wants to ensure that nothing is broken.  She denies any other injuries at this time.  She denies any pain in the neck, back, chest, abdomen, pelvis, upper extremities, or left lower extremity.  She is not currently on any blood thinners and did not hit her head or lose any consciousness.      History provided by:  Patient   used: No                        [place] Coma Scale Score: 15                     Patient History   Past Medical History:   Diagnosis Date    Personal history of malignant neoplasm of larynx     History of malignant neoplasm of larynx    Personal history of other diseases of the respiratory system     History of chronic obstructive lung disease    Personal history of other diseases of the respiratory system     History of asthma    Personal history of other malignant neoplasm of bronchus and lung     History of malignant neoplasm of lung     Past Surgical History:   Procedure Laterality Date    BREAST LUMPECTOMY Left     2001 benign    CT GUIDED IMAGING FOR NEEDLE PLACEMENT  04/03/2020    CT GUIDED IMAGING FOR NEEDLE PLACEMENT [facility]    OTHER SURGICAL HISTORY  10/08/2019    Ankle surgery    OTHER SURGICAL HISTORY  10/08/2019    Tubal ligation    OTHER  SURGICAL HISTORY  09/26/2022    Lumpectomy    OTHER SURGICAL HISTORY  02/23/2021    Tonsillectomy with adenoidectomy    US GUIDED PERCUTANEOUS PLACEMENT  02/16/2023    US GUIDED PERCUTANEOUS PLACEMENT [facility]     Family History   Problem Relation Name Age of Onset    Cervical cancer Mother          FIGO stage IA2    Stroke Mother      Emphysema Mother      Breast cancer Mother  50    Stroke Brother       Social History     Tobacco Use    Smoking status: Never     Passive exposure: Never    Smokeless tobacco: Never   Vaping Use    Vaping status: Never Used   Substance Use Topics    Alcohol use: Never    Drug use: Yes     Types: Marijuana       Physical Exam   ED Triage Vitals [07/15/24 1824]   Temperature Heart Rate Respirations BP   36.9 °C (98.4 °F) 67 20 133/90      Pulse Ox Temp Source Heart Rate Source Patient Position   97 % Tympanic Monitor Sitting      BP Location FiO2 (%)     Left arm --       Physical Exam  Constitutional:       Appearance: Normal appearance.   Cardiovascular:      Rate and Rhythm: Normal rate and regular rhythm.   Pulmonary:      Effort: Pulmonary effort is normal.      Breath sounds: Normal breath sounds.   Musculoskeletal:         General: Tenderness and signs of injury present. Normal range of motion.      Comments: Right foot with ecchymosis and tenderness to the base of right great toe and across distal metatarsals.  Tenderness to palpation across metatarsals.  Good capillary refill to all digits.  Good distal pulses right lower extremity.  No pain or tenderness to palpation over right ankle or distal right lower extremity.   Skin:     General: Skin is warm and dry.   Neurological:      General: No focal deficit present.      Mental Status: She is alert and oriented to person, place, and time.         ED Course & MDM   Diagnoses as of 07/16/24 1127   Contusion of right foot, initial encounter       Medical Decision Making  Patient is a 64-year-old female presents emergency  department for evaluation of right foot injury.    Lab work not warranted at today's visit.    Scans done today were interpreted/confirmed by radiologist and also interpreted by me which included x-ray right foot.  X-ray shows no acute bony abnormalities.    Medications declined by patient at today's visit    I saw this patient independently.  X-rays today show no underlying acute bony abnormalities.  Patient is send symptoms most consistent with contusion versus sprain of right foot.  She is educated on continued symptom management and was offered yamini taping or postop shoe, but she declines.  She does not want any medications at this time but she just wants to ensure that nothing was broken.  She is agreeable to plan and discharge at this time and will be discharged to follow-up closely outpatient with primary care provider.  Emergent pathologies were considered for this patient, although I have low suspicion for anything acutely emergent given patient's clinical presentation, history, physical exam, stable vital signs, and relatively unremarkable workup.  Discharging patient home is reasonable plan of care for outpatient management.    All labs, imaging, and diagnostic studies were reviewed by me and patient was counseled on clinical impression, expectations, and plan.  Patient was educated to follow-up with PCP in the following 1-2 days.  All questions from patient were answered. They elicited understanding and were agreeable to course of treatment.  Patient was discharged in stable condition and given strict return precautions.    ** Disclaimer:  Parts of this document were written utilizing a voice to text dictation software.  Note may contain minor transcription or typographical errors that were inadvertently transcribed by the computer software.        Procedure  Procedures     Melonie Ordonez PA-C  07/16/24 113

## 2024-07-15 NOTE — ED TRIAGE NOTES
Fell off bed, hit right foot and right great toe. Presents with right great toe and right foot ecchymosis and swollen.

## 2024-07-16 NOTE — DISCHARGE INSTRUCTIONS
Continue ice, elevation, and compression as well as Tylenol and ibuprofen help with pain.  Follow-up closely with your primary care provider in the following 1 to 2 days

## 2024-07-17 ENCOUNTER — LAB (OUTPATIENT)
Dept: LAB | Facility: LAB | Age: 64
End: 2024-07-17
Payer: COMMERCIAL

## 2024-07-17 DIAGNOSIS — C34.11 MALIGNANT NEOPLASM OF UPPER LOBE OF RIGHT LUNG (MULTI): ICD-10-CM

## 2024-07-17 LAB
ALBUMIN SERPL-MCNC: 4.2 G/DL (ref 3.5–5)
ALP BLD-CCNC: 68 U/L (ref 35–125)
ALT SERPL-CCNC: 16 U/L (ref 5–40)
AMYLASE SERPL-CCNC: 45 U/L (ref 28–100)
ANION GAP SERPL CALC-SCNC: 10 MMOL/L
AST SERPL-CCNC: 25 U/L (ref 5–40)
BASOPHILS # BLD AUTO: 0.08 X10*3/UL (ref 0–0.1)
BASOPHILS NFR BLD AUTO: 0.8 %
BILIRUB SERPL-MCNC: 0.3 MG/DL (ref 0.1–1.2)
BUN SERPL-MCNC: 8 MG/DL (ref 8–25)
CALCIUM SERPL-MCNC: 9.8 MG/DL (ref 8.5–10.4)
CHLORIDE SERPL-SCNC: 102 MMOL/L (ref 97–107)
CO2 SERPL-SCNC: 28 MMOL/L (ref 24–31)
CREAT SERPL-MCNC: 0.9 MG/DL (ref 0.4–1.6)
EGFRCR SERPLBLD CKD-EPI 2021: 72 ML/MIN/1.73M*2
EOSINOPHIL # BLD AUTO: 0.08 X10*3/UL (ref 0–0.7)
EOSINOPHIL NFR BLD AUTO: 0.8 %
ERYTHROCYTE [DISTWIDTH] IN BLOOD BY AUTOMATED COUNT: 13.8 % (ref 11.5–14.5)
GLUCOSE SERPL-MCNC: 87 MG/DL (ref 65–99)
HCT VFR BLD AUTO: 40.8 % (ref 36–46)
HGB BLD-MCNC: 13.2 G/DL (ref 12–16)
IMM GRANULOCYTES # BLD AUTO: 0.03 X10*3/UL (ref 0–0.7)
IMM GRANULOCYTES NFR BLD AUTO: 0.3 % (ref 0–0.9)
LIPASE SERPL-CCNC: 23 U/L (ref 16–63)
LYMPHOCYTES # BLD AUTO: 1.33 X10*3/UL (ref 1.2–4.8)
LYMPHOCYTES NFR BLD AUTO: 13.5 %
MCH RBC QN AUTO: 31.1 PG (ref 26–34)
MCHC RBC AUTO-ENTMCNC: 32.4 G/DL (ref 32–36)
MCV RBC AUTO: 96 FL (ref 80–100)
MONOCYTES # BLD AUTO: 0.73 X10*3/UL (ref 0.1–1)
MONOCYTES NFR BLD AUTO: 7.4 %
NEUTROPHILS # BLD AUTO: 7.57 X10*3/UL (ref 1.2–7.7)
NEUTROPHILS NFR BLD AUTO: 77.2 %
NRBC BLD-RTO: 0 /100 WBCS (ref 0–0)
PLATELET # BLD AUTO: 398 X10*3/UL (ref 150–450)
POTASSIUM SERPL-SCNC: 5.5 MMOL/L (ref 3.4–5.1)
PROT SERPL-MCNC: 6.3 G/DL (ref 5.9–7.9)
RBC # BLD AUTO: 4.24 X10*6/UL (ref 4–5.2)
SODIUM SERPL-SCNC: 140 MMOL/L (ref 133–145)
WBC # BLD AUTO: 9.8 X10*3/UL (ref 4.4–11.3)

## 2024-07-17 PROCEDURE — 80053 COMPREHEN METABOLIC PANEL: CPT

## 2024-07-17 PROCEDURE — 82150 ASSAY OF AMYLASE: CPT

## 2024-07-17 PROCEDURE — 83690 ASSAY OF LIPASE: CPT

## 2024-07-17 PROCEDURE — 85025 COMPLETE CBC W/AUTO DIFF WBC: CPT

## 2024-07-18 ENCOUNTER — APPOINTMENT (OUTPATIENT)
Dept: HEMATOLOGY/ONCOLOGY | Facility: CLINIC | Age: 64
End: 2024-07-18
Payer: COMMERCIAL

## 2024-07-18 ENCOUNTER — INFUSION (OUTPATIENT)
Dept: HEMATOLOGY/ONCOLOGY | Facility: CLINIC | Age: 64
End: 2024-07-18
Payer: COMMERCIAL

## 2024-07-18 ENCOUNTER — OFFICE VISIT (OUTPATIENT)
Dept: HEMATOLOGY/ONCOLOGY | Facility: CLINIC | Age: 64
End: 2024-07-18
Payer: COMMERCIAL

## 2024-07-18 VITALS
OXYGEN SATURATION: 97 % | SYSTOLIC BLOOD PRESSURE: 129 MMHG | RESPIRATION RATE: 18 BRPM | DIASTOLIC BLOOD PRESSURE: 76 MMHG | TEMPERATURE: 97.9 F | BODY MASS INDEX: 23.09 KG/M2 | HEART RATE: 62 BPM | WEIGHT: 138.78 LBS

## 2024-07-18 DIAGNOSIS — Z51.12 ENCOUNTER FOR ANTINEOPLASTIC IMMUNOTHERAPY: Primary | ICD-10-CM

## 2024-07-18 DIAGNOSIS — C34.11 MALIGNANT NEOPLASM OF UPPER LOBE OF RIGHT LUNG (MULTI): ICD-10-CM

## 2024-07-18 PROCEDURE — 99214 OFFICE O/P EST MOD 30 MIN: CPT | Performed by: NURSE PRACTITIONER

## 2024-07-18 PROCEDURE — 2560000001 HC RX 256 EXPERIMENTAL DRUGS: Mod: SE | Performed by: STUDENT IN AN ORGANIZED HEALTH CARE EDUCATION/TRAINING PROGRAM

## 2024-07-18 PROCEDURE — 96374 THER/PROPH/DIAG INJ IV PUSH: CPT | Mod: INF

## 2024-07-18 RX ORDER — ACETAMINOPHEN 325 MG/1
650 TABLET ORAL AS NEEDED
Status: DISCONTINUED | OUTPATIENT
Start: 2024-07-18 | End: 2024-07-18 | Stop reason: HOSPADM

## 2024-07-18 RX ORDER — EPINEPHRINE 0.3 MG/.3ML
0.3 INJECTION SUBCUTANEOUS EVERY 5 MIN PRN
Status: DISCONTINUED | OUTPATIENT
Start: 2024-07-18 | End: 2024-07-18 | Stop reason: HOSPADM

## 2024-07-18 RX ORDER — DIPHENHYDRAMINE HCL 25 MG
50 CAPSULE ORAL AS NEEDED
Status: DISCONTINUED | OUTPATIENT
Start: 2024-07-18 | End: 2024-07-18 | Stop reason: HOSPADM

## 2024-07-18 RX ORDER — PROCHLORPERAZINE MALEATE 10 MG
10 TABLET ORAL EVERY 6 HOURS PRN
Status: DISCONTINUED | OUTPATIENT
Start: 2024-07-18 | End: 2024-07-18 | Stop reason: HOSPADM

## 2024-07-18 RX ORDER — DIPHENHYDRAMINE HYDROCHLORIDE 50 MG/ML
50 INJECTION INTRAMUSCULAR; INTRAVENOUS AS NEEDED
Status: DISCONTINUED | OUTPATIENT
Start: 2024-07-18 | End: 2024-07-18 | Stop reason: HOSPADM

## 2024-07-18 RX ORDER — FAMOTIDINE 10 MG/ML
20 INJECTION INTRAVENOUS ONCE AS NEEDED
Status: DISCONTINUED | OUTPATIENT
Start: 2024-07-18 | End: 2024-07-18 | Stop reason: HOSPADM

## 2024-07-18 RX ORDER — ALBUTEROL SULFATE 0.83 MG/ML
3 SOLUTION RESPIRATORY (INHALATION) AS NEEDED
Status: DISCONTINUED | OUTPATIENT
Start: 2024-07-18 | End: 2024-07-18 | Stop reason: HOSPADM

## 2024-07-18 RX ORDER — PROCHLORPERAZINE EDISYLATE 5 MG/ML
10 INJECTION INTRAMUSCULAR; INTRAVENOUS EVERY 6 HOURS PRN
Status: DISCONTINUED | OUTPATIENT
Start: 2024-07-18 | End: 2024-07-18 | Stop reason: HOSPADM

## 2024-07-18 ASSESSMENT — ENCOUNTER SYMPTOMS
COUGH: 1
FATIGUE: 1
BACK PAIN: 1
FLANK PAIN: 1
GASTROINTESTINAL NEGATIVE: 1
NEUROLOGICAL NEGATIVE: 1
HEMATOLOGIC/LYMPHATIC NEGATIVE: 1
ARTHRALGIAS: 1

## 2024-07-18 ASSESSMENT — PAIN SCALES - GENERAL: PAINLEVEL: 7

## 2024-07-18 NOTE — PROGRESS NOTES
Patient ID: Hayley Potter is a 63 y.o. female.    CC:  Management of stage IA3 ([id number]) adenocarcinoma of RUL s/p RUL lobectomy 04/2020, PDL-1 50%, NGS positive for KRAS G12C--> now with  metastatic recurrence involving liver, 11th L rib, and mediastinal LNs. 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 She was initially found to have spiculated 2.9cm mass in the RUL on the CT chest on 03/02/2020. She underwent CT guided RUL biopsy with pathology showed lung tissue with scan and focal aypicla grandular proliferation  suspicious for adenocarcinoma. IHC positive for CK7, TTF1 and napsin A. CK20 negative. PET/CT on 04/15/2020 showed RUL hypermetabolic RUL mass. No evidence of distant metastases. She underwent RUL lobectomy with mediastinal LN dissection with Dr. Wheat  on 04/21/2020 which showed invasive well to moderately differentiated adenocarcinoma, tumor measuring 2.5x2.3.x1.7cm. No VPI or LVI. All margins were negative. LN (0/11).      Unfortunately, on the surveillance CT chest (+) on 08/24/2022, there was a new irregular marginated 6mm GUSTABO nodule, which increased in size significantly on the repeat CT chest (-) on 01/06/2023, measuring 12mm.  There is also an additional new 9mm nodule  in the L lung as well as new mediastinal and possible L hilar LAD. PET/CT on 02/16/2023 showed hypermetabolic L hilar and mediastinal LAD. 1st  GUSTABO nodule now 8mm likely inflammatory. 2nd GUSTABO nodule showed stable size with SUV 4.4. Soft tissue mass associated with  L 11th rib fracture, SUV 11.5. Mass within the liver SUV 13.1, suspicious for mets. Hypermetabolic activity also noted  in  the stomach fundus, association with a hiatal hernia, in the cecum and ascending colon without masses seen. She underwent US guided liver biopsy on 02/16/2023 - poorly-differentiated carcinoma.     Treatment Summary:  04/21/2020: RUL lobectomy with medistainal LN dissection (Dr. Wheat)  04/11/2023: C1 pembrolizumab 200mg IV (on trial)  05/02/2023: C2 Pembrolizumab 200mg IV (on trial)  05/23/2023: C3 Pembrolizumab 200mg IV (on trial)  06/13/2023: C4 Pembrolizumab 200mg IV (on trial)   07/05/2023: C5 Pembrolizumab 200mg IV (on trial)   07/26/2023: C6 Pembrolizumab 200mg IV (on trial)   08/16/2023: C7 Pembrolizumab 200mg IV (on trial)   09/07/2023: C8 Pembrolizumab 200mg IV (on trial)   09/28/2023: C9 Pembrolizumab 200mg IV (on trial)   10/19/2023: C10 Pembrolizumab 200mg IV (on trial)  11/09/2023: C11 Pembrolizumab 200mg IV (on trial)  11/30/2023: C12 Pembrolizumab 200mg IV (on trial)  12/21/2023: C13 Pembrolizumab 200mg IV (on trial)  01/11/2024: C14 Pembrolizumab 200mg IV (on trial)  02/01/2024: C15 Pembrolizumab 200mg IV (on trial)  02/22/2024: C16 Pembrolizumab 200mg IV (on trial)  03/14/2024: C17 Pembrolizumab 200mg IV (on trial)  04/04/2024: C18 Pembrolizumab 200mg IV (on trial)  04/25/2024: C19 Pembrolizumab 200mg IV (on trial)  05/16/2024: C20 Pembrolizumab 200mg IV (on trial)  06/06/2024: C21 Pembrolizumab 200mg IV (on trial)  06/27/2024: C22 Pembrolizumab 200mg IV (on trial)  07/15/2024: ED visit right foot contusion   07/18/2024: C23 Pembrolizumab 200mg IV (on trial)    Interval History (07/18/2024):    Pt present in clinic for readiness to treat visit.  Her dtr is present for visit.  Breathing is good except on high humidity days.  Cough improved.  Appetites good, dysgeusia (g1).  Denies n/v/c/d.  BM's soft.  Last BM this morning.    Gabapentin dose adjusted - 600mg in the morning and 300mg HS dose.  Starting today.  One month trial.  Managed by PCP.  No fevers, chills, or CP.  No rashes. Podiatrist  visit yesterday - reports she hyperextended her right foot.  X-ray (-).  Able to bear wt.  Bothersome if she flexes and extends her foot, or moves her great toe.  Taking 600 mg advil TID x 5 days.  Wearing loose fitting shoes.  During fall, she landed on her right buttock.  Reports bruise to buttock.  Rates pain 3-4/10.  Increased right flank/rib pain r/t fall.  Rates her pain 3/10. Also reports a burning sensation to left, mid back.  Occurs 1-2x/wk.  No other concerns today.  Labs WNL. Ready for treatment.      Review of Systems   Constitutional:  Positive for fatigue.   HENT:  Negative.     Respiratory:  Positive for cough.         Nasal congestion d/t allergies   Gastrointestinal: Negative.    Musculoskeletal:  Positive for arthralgias, back pain and flank pain.   Skin: Negative.         Contusion, hyperextended her right foot, bruising to foot   Neurological: Negative.    Hematological: Negative.    Psychiatric/Behavioral:  The patient is not nervous/anxious.       PMHx: vocal cord cancer, GERD and COPD, OA  PSHx: tonsillectomy, tubal ligation, lumpectomy, L ankle and R hand surgery  FHx: strong family history of breast cancer including male breast cancer.   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 She quit etoh . She smokes Marijuana every day.     Allergies:  Cephalexin    Medications:  Medication list reviewed with patient and updated in EMR    Vital Signs:  /76 (BP Location: Left arm, Patient Position: Sitting, BP Cuff Size: Small adult)   Pulse 62   Temp 36.6 °C (97.9 °F) (Temporal)   Resp 18   Wt 62.9 kg (138 lb 12.5 oz)   SpO2 97%   BMI 23.09 kg/m²     Wt Readings from Last 5 Encounters:   24 62.9 kg (138 lb 12.5 oz)   07/15/24 61.2 kg (135 lb)   24 60.1 kg (132 lb 7.9 oz)   24 60.6 kg (133 lb 11.3 oz)   24 62 kg (136 lb 11 oz)      Physical Exam:  ECO  Pain: mild - Bruise to buttock, pain 3-4/10.  Increased right  flank/rib pain r/t fall, pain 3/10.     Physical Exam  Vitals reviewed.   Constitutional:       Appearance: Normal appearance.   HENT:      Head: Normocephalic.      Nose: Nose normal.      Mouth/Throat:      Mouth: Mucous membranes are moist.      Pharynx: Oropharynx is clear.   Eyes:      Extraocular Movements: Extraocular movements intact.      Conjunctiva/sclera: Conjunctivae normal.      Pupils: Pupils are equal, round, and reactive to light.   Cardiovascular:      Rate and Rhythm: Normal rate and regular rhythm.      Pulses: Normal pulses.      Heart sounds: Normal heart sounds.   Pulmonary:      Breath sounds: Normal breath sounds.      Comments: Hx RUL lobectomy 4/2020  Abdominal:      General: Bowel sounds are normal.      Palpations: Abdomen is soft.   Musculoskeletal:         General: Tenderness (right foot) present. Normal range of motion.      Cervical back: Normal range of motion and neck supple.   Skin:     General: Skin is warm and dry.      Capillary Refill: Capillary refill takes less than 2 seconds.      Findings: Bruising (right foot) present.   Neurological:      General: No focal deficit present.      Mental Status: She is alert and oriented to person, place, and time.   Psychiatric:         Mood and Affect: Mood normal.         Behavior: Behavior normal.         Thought Content: Thought content normal.         Judgment: Judgment normal.        Results from last 7 days   Lab Units 07/17/24  1443   GLUCOSE mg/dL 87   SODIUM mmol/L 140   POTASSIUM mmol/L 5.5*   CHLORIDE mmol/L 102   CO2 mmol/L 28   BUN mg/dL 8   CREATININE mg/dL 0.90   EGFR mL/min/1.73m*2 72   CALCIUM mg/dL 9.8   ALBUMIN g/dL 4.2   PROTEIN TOTAL g/dL 6.3   BILIRUBIN TOTAL mg/dL 0.3   ALK PHOS U/L 68   ALT U/L 16   AST U/L 25     Results from last 7 days   Lab Units 07/17/24  1443   WBC AUTO x10*3/uL 9.8   HEMOGLOBIN g/dL 13.2   HEMATOCRIT % 40.8   PLATELETS AUTO x10*3/uL 398   NEUTROS ABS x10*3/uL 7.57   LYMPHS ABS AUTO x10*3/uL  1.33   MONOS ABS AUTO x10*3/uL 0.73   EOS ABS AUTO x10*3/uL 0.08   NEUTROS PCT AUTO % 77.2   LYMPHS PCT AUTO % 13.5   MONOS PCT AUTO % 7.4   EOS PCT AUTO % 0.8      Assessment/Plan:  64 y.o. 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     The patient's records and imaging have been reviewed in detail.  MD discussed with the patient the natural history of their disease at length.  The following has also been discussed with the patient:     # Cancer: recurrent likely metastatic ([id number]) lung cancer: liver biopsy showed extensive necrosis. Liquid biopsy showed KRAS G12C mutation, which was identified in her original surgical resected  specimen. PDL-1 50%. Patient initially had stage IA2 ([id number]) RUL lung  adenocarcinoma s/p RUL lobectomy, unfortunately now likely metastatic recurrence. Liver biopsy on 2/16 showed poorly differentiated malignant neoplasm with extensive necrosis. We discussed treatment  options with her today with either standard of care pembrolizumab vs clinical trials. Patient expressed interests in enrolling into clinical trial. Enrolled into RCT [id number]. Doing well.      - Interval scan: 06/03/2024: CT CAP (+): 1. Stable left upper lobe scarring with associated subcentimeter nodular component. No new pulmonary nodules. 2. No evidence of new sites of metastatic disease in the chest, abdomen, or pelvis. 3. Additional stable chronic and incidental findings as above.    # Hypothyroidism: G2. Likely 2/2 immunotherapy - resolved.  TSH 1.07 11/8/23.  Continue levothyroxine 75mcg daily.      # Microcytic anemia 2/2 due to chronic disease: patient denies any hematochezia  or hematuria. No melena. hx of hiatal hernia. Per patient. Last EGD was done in 2021 and was normal. Colonoscopy was a few years ago.  EGD showed hiatal hernia 03/2023. Improved Hgb overall.  Iron studies show low-normal ferritin and TSAT of 14%. Continue PO iron 3x/week. Tolerates well. Stable.      # Mucositis (chronic).  Pt reports since start of immunotherapy.  Chronic sore secondary to ill fitting dentures recommended s&s rinse.  Continue Anbesol and/or Oragel for pain.  Will continue to monitor.  BMX added.  24  - resolved      # Grade 1 hyperthyroidism: resolved, now hypothyroid (as above).     # Constipation: managed well with stool softener and PRN lactulose.        # Clinical Trials: 2023 initiated treatment on [id number] this is cycle 1 of first-line treatment.  She has been randomized to  arm A which is pembrolizumab alone for up to 2 years or progression followed by Alimta/carboplatin for second line therapy. Consent has been signed. 6/3/24 CT scan shows favorable response. Continue treatment.      # Access: Peripheral veins.     # Pain: she has a displaced 8th Rib on the left side -Advised patient to take percocet daily and decrease Advil use. PRN Percocet Rx in place.  Rx last sent 23. Currently managed with PRN gummies.  New pain on the R upper back, controlled with percocet. Will try lidocaine patch OTC. :  Pain currently managed with PRN Advil, Aspercreme, and gummies.  Routine Gabapentin for neuropathy pain.   Pt verbalized desire to reduce # of medications taken.  Wishes to reduce Gabapentin dosage.   Supportive onc referral placed for assistance.   :  Bruise to buttock, pain 3-4/10.  Increased right flank/rib pain r/t fall, pain 3/10.  Managed with Advil 600mg TID.      # Anxiety:  Anxiety reported d/t recent death of her father, upcoming .  Pt requested 1x Valium dose, previous 10mg admin.  OARRS reviewed. Provided Rx for Valium 5mg x3 doses.      # Bone Health: L fifth ribs lesion stable.    # Arthralgia (grade 1).  Uric acid lab (-).  Intermittent arthralgia to left hand.  Continue PRN Advil, Aspercreme or Percocet (for severe pain).      # Left rib  or chest wall pain over the past month. 11/30/23 CXR - RESULTS: Mediastinal surgical clips are noted. The cardiac silhouette is within normal limits. Mediastinal contours are unremarkable. The lungs demonstrate no infiltrate or atelectasis. There is no evidence for pleural effusion. Remote fracture is noted involving the right 7th rib. There are degenerative changes of the thoracic spine.  - Continue PRN Percocet and/or gummies for pain control.      # Right foot contusion, hyperextension:  Able to bear wt.  Continue to follow with podiatrist with any questions.  Continue Advil x5 days for inflammation.    # Psychosocial: Coping well, no acute issues.  Good support from family & friends.  Social work support available.     # GI/CINV: No acute issues.  Eating and voiding well.  Nutrition consult available.    # Sinusitis:  Pressure to maxillary and frontal sinuses.  +Increased mucous, yellow in color.  Denies fevers.  Hx of recurrent sinus infections.  Rx called to pt's pharmacy.  Doxycycline 100mg BID x7-10 days.  Extended course d/t recent smoke exposure.  3/14: Resolved.       # Smoking: former smoker     # Fertility: N/A.        # Heme/ESAs: N/A.     # GOC: Patient is aware of palliative  intent goals of care.     # Language: English.     # Dispo: RTC in 3 weeks for next cycle.  Pt instructed to call with concerns/questions .

## 2024-07-19 DIAGNOSIS — Z51.12 ENCOUNTER FOR ANTINEOPLASTIC IMMUNOTHERAPY: ICD-10-CM

## 2024-07-19 ASSESSMENT — ENCOUNTER SYMPTOMS: NERVOUS/ANXIOUS: 0

## 2024-07-29 ENCOUNTER — APPOINTMENT (OUTPATIENT)
Dept: OBSTETRICS AND GYNECOLOGY | Facility: CLINIC | Age: 64
End: 2024-07-29
Payer: COMMERCIAL

## 2024-07-29 VITALS — DIASTOLIC BLOOD PRESSURE: 68 MMHG | SYSTOLIC BLOOD PRESSURE: 124 MMHG | WEIGHT: 137 LBS | BODY MASS INDEX: 22.8 KG/M2

## 2024-07-29 DIAGNOSIS — Z12.31 SCREENING MAMMOGRAM FOR BREAST CANCER: ICD-10-CM

## 2024-07-29 DIAGNOSIS — Z12.4 PAP SMEAR FOR CERVICAL CANCER SCREENING: ICD-10-CM

## 2024-07-29 DIAGNOSIS — Z01.419 WELL WOMAN EXAM: Primary | ICD-10-CM

## 2024-07-29 PROCEDURE — 99396 PREV VISIT EST AGE 40-64: CPT | Performed by: MIDWIFE

## 2024-07-29 PROCEDURE — 88175 CYTOPATH C/V AUTO FLUID REDO: CPT

## 2024-07-29 PROCEDURE — 87624 HPV HI-RISK TYP POOLED RSLT: CPT

## 2024-07-29 PROCEDURE — 1036F TOBACCO NON-USER: CPT | Performed by: MIDWIFE

## 2024-07-29 RX ORDER — GABAPENTIN 300 MG/1
1 CAPSULE ORAL
COMMUNITY
Start: 2024-07-09

## 2024-07-29 NOTE — PROGRESS NOTES
Subjective   Patient ID: Hayley Potter is a 64 y.o. female who presents for Annual Exam.  HPI  PMHx: pt currently in a clinical tx trial for tx of stage 4 lung CA, she says her oncologist has already told her that estradiol cream use is okay for her; last pap 2014 NIL Neg; Estrace cream used to help with atrophic vaginitis-- no refills needed at this time  SocHx:  11 yrs ago, with new partner 8 mos they sing together and smoke weed together  ROS: no pelvic pain, no urinary c/o, NAD        Review of Systems    Objective   Physical Exam  Vitals and nursing note reviewed.   Constitutional:       Appearance: Normal appearance.   HENT:      Nose: Nose normal.   Eyes:      Pupils: Pupils are equal, round, and reactive to light.   Neck:      Thyroid: No thyroid mass.   Cardiovascular:      Rate and Rhythm: Normal rate and regular rhythm.   Pulmonary:      Effort: Pulmonary effort is normal.      Breath sounds: Normal breath sounds.   Chest:      Chest wall: No mass.   Breasts:     Right: Normal.      Left: Normal.   Abdominal:      Palpations: Abdomen is soft. There is no mass.      Tenderness: There is no abdominal tenderness.   Genitourinary:     General: Normal vulva.      Labia:         Right: No rash, tenderness or lesion.         Left: No rash, tenderness or lesion.       Vagina: Normal.      Cervix: Normal.      Uterus: Normal.       Adnexa: Right adnexa normal and left adnexa normal.      Comments: Vulvovaginal atrophy  Musculoskeletal:         General: Normal range of motion.   Lymphadenopathy:      Cervical: No cervical adenopathy.      Lower Body: No right inguinal adenopathy. No left inguinal adenopathy.   Skin:     General: Skin is warm and dry.   Neurological:      Mental Status: She is alert and oriented to person, place, and time.      Motor: Motor function is intact.      Gait: Gait is intact.   Psychiatric:         Mood and Affect: Mood normal.         Assessment/Plan   Diagnoses and all orders for  this visit:  Well woman exam  -     THINPREP PAP TEST  Screening mammogram for breast cancer  -     BI mammo bilateral screening tomosynthesis; Future  Pap smear for cervical cancer screening  -     THINPREP PAP TEST    Reassurance given re exam   RTO AE/PRN       DUTCH Cha-IBIS, ND 07/29/24 1:47 PM

## 2024-07-31 DIAGNOSIS — C34.11 MALIGNANT NEOPLASM OF UPPER LOBE OF RIGHT LUNG (MULTI): ICD-10-CM

## 2024-08-06 DIAGNOSIS — C34.91 PRIMARY MALIGNANT NEOPLASM OF RIGHT LUNG METASTATIC TO OTHER SITE (MULTI): ICD-10-CM

## 2024-08-06 LAB
CYTOLOGY CMNT CVX/VAG CYTO-IMP: NORMAL
HPV HR 12 DNA GENITAL QL NAA+PROBE: POSITIVE
HPV HR GENOTYPES PNL CVX NAA+PROBE: POSITIVE
HPV16 DNA SPEC QL NAA+PROBE: NEGATIVE
HPV18 DNA SPEC QL NAA+PROBE: NEGATIVE
LAB AP HPV GENOTYPE QUESTION: YES
LAB AP HPV HR: NORMAL
LABORATORY COMMENT REPORT: NORMAL
PATH REPORT.TOTAL CANCER: NORMAL

## 2024-08-07 ENCOUNTER — LAB (OUTPATIENT)
Dept: LAB | Facility: LAB | Age: 64
End: 2024-08-07
Payer: COMMERCIAL

## 2024-08-07 DIAGNOSIS — C34.11 MALIGNANT NEOPLASM OF UPPER LOBE OF RIGHT LUNG (MULTI): ICD-10-CM

## 2024-08-07 DIAGNOSIS — Z51.12 ENCOUNTER FOR ANTINEOPLASTIC IMMUNOTHERAPY: ICD-10-CM

## 2024-08-07 LAB
ALBUMIN SERPL BCP-MCNC: 4.2 G/DL (ref 3.4–5)
ALP SERPL-CCNC: 66 U/L (ref 33–136)
ALT SERPL W P-5'-P-CCNC: 15 U/L (ref 7–45)
AMYLASE SERPL-CCNC: 36 U/L (ref 29–103)
ANION GAP SERPL CALC-SCNC: 15 MMOL/L (ref 10–20)
AST SERPL W P-5'-P-CCNC: 23 U/L (ref 9–39)
BASOPHILS # BLD AUTO: 0.07 X10*3/UL (ref 0–0.1)
BASOPHILS NFR BLD AUTO: 1.1 %
BILIRUB SERPL-MCNC: 0.4 MG/DL (ref 0–1.2)
BUN SERPL-MCNC: 8 MG/DL (ref 6–23)
CALCIUM SERPL-MCNC: 10.5 MG/DL (ref 8.6–10.6)
CHLORIDE SERPL-SCNC: 102 MMOL/L (ref 98–107)
CO2 SERPL-SCNC: 30 MMOL/L (ref 21–32)
CREAT SERPL-MCNC: 0.82 MG/DL (ref 0.5–1.05)
EGFRCR SERPLBLD CKD-EPI 2021: 80 ML/MIN/1.73M*2
EOSINOPHIL # BLD AUTO: 0.12 X10*3/UL (ref 0–0.7)
EOSINOPHIL NFR BLD AUTO: 1.8 %
ERYTHROCYTE [DISTWIDTH] IN BLOOD BY AUTOMATED COUNT: 13.3 % (ref 11.5–14.5)
GLUCOSE SERPL-MCNC: 82 MG/DL (ref 74–99)
HCT VFR BLD AUTO: 43.3 % (ref 36–46)
HGB BLD-MCNC: 14.1 G/DL (ref 12–16)
IMM GRANULOCYTES # BLD AUTO: 0.02 X10*3/UL (ref 0–0.7)
IMM GRANULOCYTES NFR BLD AUTO: 0.3 % (ref 0–0.9)
LIPASE SERPL-CCNC: 25 U/L (ref 9–82)
LYMPHOCYTES # BLD AUTO: 1.25 X10*3/UL (ref 1.2–4.8)
LYMPHOCYTES NFR BLD AUTO: 19.1 %
MCH RBC QN AUTO: 31.7 PG (ref 26–34)
MCHC RBC AUTO-ENTMCNC: 32.6 G/DL (ref 32–36)
MCV RBC AUTO: 97 FL (ref 80–100)
MONOCYTES # BLD AUTO: 0.81 X10*3/UL (ref 0.1–1)
MONOCYTES NFR BLD AUTO: 12.4 %
NEUTROPHILS # BLD AUTO: 4.28 X10*3/UL (ref 1.2–7.7)
NEUTROPHILS NFR BLD AUTO: 65.3 %
NRBC BLD-RTO: 0 /100 WBCS (ref 0–0)
PLATELET # BLD AUTO: 417 X10*3/UL (ref 150–450)
POTASSIUM SERPL-SCNC: 5 MMOL/L (ref 3.5–5.3)
PROT SERPL-MCNC: 6.7 G/DL (ref 6.4–8.2)
RBC # BLD AUTO: 4.45 X10*6/UL (ref 4–5.2)
SODIUM SERPL-SCNC: 142 MMOL/L (ref 136–145)
TSH SERPL-ACNC: 1.07 MIU/L (ref 0.44–3.98)
WBC # BLD AUTO: 6.6 X10*3/UL (ref 4.4–11.3)

## 2024-08-07 PROCEDURE — 85025 COMPLETE CBC W/AUTO DIFF WBC: CPT

## 2024-08-07 PROCEDURE — 83690 ASSAY OF LIPASE: CPT

## 2024-08-07 PROCEDURE — 82150 ASSAY OF AMYLASE: CPT

## 2024-08-07 PROCEDURE — 84443 ASSAY THYROID STIM HORMONE: CPT

## 2024-08-07 PROCEDURE — 80053 COMPREHEN METABOLIC PANEL: CPT

## 2024-08-08 ENCOUNTER — INFUSION (OUTPATIENT)
Dept: HEMATOLOGY/ONCOLOGY | Facility: CLINIC | Age: 64
End: 2024-08-08
Payer: COMMERCIAL

## 2024-08-08 ENCOUNTER — EDUCATION (OUTPATIENT)
Dept: HEMATOLOGY/ONCOLOGY | Facility: CLINIC | Age: 64
End: 2024-08-08

## 2024-08-08 ENCOUNTER — OFFICE VISIT (OUTPATIENT)
Dept: HEMATOLOGY/ONCOLOGY | Facility: CLINIC | Age: 64
End: 2024-08-08
Payer: COMMERCIAL

## 2024-08-08 ENCOUNTER — APPOINTMENT (OUTPATIENT)
Dept: HEMATOLOGY/ONCOLOGY | Facility: CLINIC | Age: 64
End: 2024-08-08
Payer: COMMERCIAL

## 2024-08-08 VITALS
DIASTOLIC BLOOD PRESSURE: 76 MMHG | RESPIRATION RATE: 16 BRPM | WEIGHT: 137.13 LBS | OXYGEN SATURATION: 95 % | TEMPERATURE: 97.5 F | SYSTOLIC BLOOD PRESSURE: 125 MMHG | HEART RATE: 72 BPM | BODY MASS INDEX: 22.82 KG/M2

## 2024-08-08 DIAGNOSIS — Z51.12 ENCOUNTER FOR ANTINEOPLASTIC IMMUNOTHERAPY: ICD-10-CM

## 2024-08-08 DIAGNOSIS — C34.11 MALIGNANT NEOPLASM OF UPPER LOBE OF RIGHT LUNG (MULTI): ICD-10-CM

## 2024-08-08 DIAGNOSIS — C34.11 MALIGNANT NEOPLASM OF UPPER LOBE OF RIGHT LUNG (MULTI): Primary | ICD-10-CM

## 2024-08-08 PROCEDURE — 99214 OFFICE O/P EST MOD 30 MIN: CPT | Performed by: NURSE PRACTITIONER

## 2024-08-08 PROCEDURE — 2560000001 HC RX 256 EXPERIMENTAL DRUGS: Mod: SE | Performed by: STUDENT IN AN ORGANIZED HEALTH CARE EDUCATION/TRAINING PROGRAM

## 2024-08-08 PROCEDURE — 96365 THER/PROPH/DIAG IV INF INIT: CPT | Mod: INF

## 2024-08-08 RX ORDER — EPINEPHRINE 0.3 MG/.3ML
0.3 INJECTION SUBCUTANEOUS EVERY 5 MIN PRN
OUTPATIENT
Start: 2024-08-29

## 2024-08-08 RX ORDER — EPINEPHRINE 0.3 MG/.3ML
0.3 INJECTION SUBCUTANEOUS EVERY 5 MIN PRN
Status: DISCONTINUED | OUTPATIENT
Start: 2024-08-08 | End: 2024-08-08 | Stop reason: HOSPADM

## 2024-08-08 RX ORDER — PROCHLORPERAZINE MALEATE 10 MG
10 TABLET ORAL EVERY 6 HOURS PRN
Status: DISCONTINUED | OUTPATIENT
Start: 2024-08-08 | End: 2024-08-08 | Stop reason: HOSPADM

## 2024-08-08 RX ORDER — PROCHLORPERAZINE EDISYLATE 5 MG/ML
10 INJECTION INTRAMUSCULAR; INTRAVENOUS EVERY 6 HOURS PRN
OUTPATIENT
Start: 2024-08-29

## 2024-08-08 RX ORDER — DIPHENHYDRAMINE HYDROCHLORIDE 50 MG/ML
50 INJECTION INTRAMUSCULAR; INTRAVENOUS AS NEEDED
OUTPATIENT
Start: 2024-08-29

## 2024-08-08 RX ORDER — ALBUTEROL SULFATE 0.83 MG/ML
3 SOLUTION RESPIRATORY (INHALATION) AS NEEDED
OUTPATIENT
Start: 2024-08-29

## 2024-08-08 RX ORDER — DIPHENHYDRAMINE HCL 25 MG
50 CAPSULE ORAL AS NEEDED
Status: DISCONTINUED | OUTPATIENT
Start: 2024-08-08 | End: 2024-08-08 | Stop reason: HOSPADM

## 2024-08-08 RX ORDER — DIPHENHYDRAMINE HCL 25 MG
50 CAPSULE ORAL AS NEEDED
Start: 2024-08-29

## 2024-08-08 RX ORDER — FAMOTIDINE 10 MG/ML
20 INJECTION INTRAVENOUS ONCE AS NEEDED
OUTPATIENT
Start: 2024-08-29

## 2024-08-08 RX ORDER — PROCHLORPERAZINE EDISYLATE 5 MG/ML
10 INJECTION INTRAMUSCULAR; INTRAVENOUS EVERY 6 HOURS PRN
Status: DISCONTINUED | OUTPATIENT
Start: 2024-08-08 | End: 2024-08-08 | Stop reason: HOSPADM

## 2024-08-08 RX ORDER — FAMOTIDINE 10 MG/ML
20 INJECTION INTRAVENOUS ONCE AS NEEDED
Status: DISCONTINUED | OUTPATIENT
Start: 2024-08-08 | End: 2024-08-08 | Stop reason: HOSPADM

## 2024-08-08 RX ORDER — PROCHLORPERAZINE MALEATE 10 MG
10 TABLET ORAL EVERY 6 HOURS PRN
OUTPATIENT
Start: 2024-08-29

## 2024-08-08 RX ORDER — ALBUTEROL SULFATE 0.83 MG/ML
3 SOLUTION RESPIRATORY (INHALATION) AS NEEDED
Status: DISCONTINUED | OUTPATIENT
Start: 2024-08-08 | End: 2024-08-08 | Stop reason: HOSPADM

## 2024-08-08 RX ORDER — DEXTROMETHORPHAN HYDROBROMIDE, GUAIFENESIN 5; 100 MG/5ML; MG/5ML
650 LIQUID ORAL AS NEEDED
Start: 2024-08-29

## 2024-08-08 RX ORDER — DIPHENHYDRAMINE HYDROCHLORIDE 50 MG/ML
50 INJECTION INTRAMUSCULAR; INTRAVENOUS AS NEEDED
Status: DISCONTINUED | OUTPATIENT
Start: 2024-08-08 | End: 2024-08-08 | Stop reason: HOSPADM

## 2024-08-08 RX ORDER — ACETAMINOPHEN 325 MG/1
650 TABLET ORAL AS NEEDED
Status: DISCONTINUED | OUTPATIENT
Start: 2024-08-08 | End: 2024-08-08 | Stop reason: HOSPADM

## 2024-08-08 ASSESSMENT — ENCOUNTER SYMPTOMS
GASTROINTESTINAL NEGATIVE: 1
FLANK PAIN: 1
NEUROLOGICAL NEGATIVE: 1
HEMATOLOGIC/LYMPHATIC NEGATIVE: 1
NERVOUS/ANXIOUS: 0
FATIGUE: 1
ARTHRALGIAS: 1

## 2024-08-08 ASSESSMENT — PAIN SCALES - GENERAL: PAINLEVEL: 2

## 2024-08-08 NOTE — RESEARCH NOTES
Research Note Treatment Day    Hayley Potter is here today for treatment on [id number]. Today is C24D1. Procedures completed per protocol. AE's and con-meds reviewed with patient. Patient is aware of treatment plan.    [x]   Received treatment as planned   OR  []    Treatment delayed; patient calendar updated as required   Treatment delayed because:    []   AE    []   Physician Discretion    []   Clinical Deterioration or Progression     []   Other    Education Documentation  Treatment Plan and Schedule, taught by Rafiq Delgado RN at 8/8/2024  5:28 PM.  Learner: Patient  Readiness: Acceptance  Method: Explanation  Response: Verbalizes Understanding    Education Comments  No comments found.    Pt presented to clinic for cycle 24 day 1 of [id number].  Pt feels pretty good. No major complaints, She is aware of her upcoming scans after this cycle. NP in to see pt for complete assessment

## 2024-08-11 ASSESSMENT — ENCOUNTER SYMPTOMS: COUGH: 1

## 2024-08-14 ENCOUNTER — OFFICE VISIT (OUTPATIENT)
Dept: OBSTETRICS AND GYNECOLOGY | Facility: CLINIC | Age: 64
End: 2024-08-14
Payer: COMMERCIAL

## 2024-08-14 ENCOUNTER — APPOINTMENT (OUTPATIENT)
Dept: OBSTETRICS AND GYNECOLOGY | Facility: CLINIC | Age: 64
End: 2024-08-14
Payer: COMMERCIAL

## 2024-08-14 VITALS
DIASTOLIC BLOOD PRESSURE: 78 MMHG | WEIGHT: 137 LBS | HEIGHT: 65 IN | BODY MASS INDEX: 22.82 KG/M2 | SYSTOLIC BLOOD PRESSURE: 116 MMHG

## 2024-08-14 DIAGNOSIS — R87.612 LGSIL ON PAP SMEAR OF CERVIX: Primary | ICD-10-CM

## 2024-08-14 DIAGNOSIS — R87.810 CERVICAL HIGH RISK HUMAN PAPILLOMAVIRUS (HPV) DNA TEST POSITIVE: ICD-10-CM

## 2024-08-14 PROCEDURE — 87624 HPV HI-RISK TYP POOLED RSLT: CPT

## 2024-08-14 PROCEDURE — 3008F BODY MASS INDEX DOCD: CPT | Performed by: OBSTETRICS & GYNECOLOGY

## 2024-08-14 PROCEDURE — 57456 ENDOCERV CURETTAGE W/SCOPE: CPT | Performed by: OBSTETRICS & GYNECOLOGY

## 2024-08-14 PROCEDURE — 88175 CYTOPATH C/V AUTO FLUID REDO: CPT

## 2024-08-14 NOTE — PROGRESS NOTES
Patient ID: Hayley Potter is a 64 y.o. female.    Colposcopy    Date/Time: 8/14/2024 2:27 PM    Performed by: Ricardo Desai MD  Authorized by: Ricardo Desai MD    Consent:     Patient questions answered: yes      Risks and benefits of the procedure and its alternatives discussed: yes      Consent obtained:  Verbal    Consent given by:  Patient  Pre-procedure:     Prep solution(s): acetic acid    Procedure:     Colposcopy with: endocervical curettage      Cervix visibility: fully visualized      SCJ visibility: fully visualized    Post-procedure:     Patient tolerance of procedure:  Patient tolerated the procedure well with no immediate complications    Educational handouts given: yes

## 2024-08-14 NOTE — ADDENDUM NOTE
Addended by: CARMEN MOISE on: 8/14/2024 04:10 PM     Modules accepted: Orders    
back pain/injury/fall

## 2024-08-26 ENCOUNTER — HOSPITAL ENCOUNTER (OUTPATIENT)
Dept: RADIOLOGY | Facility: HOSPITAL | Age: 64
Discharge: HOME | End: 2024-08-26
Payer: COMMERCIAL

## 2024-08-26 DIAGNOSIS — C34.91 PRIMARY MALIGNANT NEOPLASM OF RIGHT LUNG METASTATIC TO OTHER SITE (MULTI): ICD-10-CM

## 2024-08-26 PROCEDURE — 2550000001 HC RX 255 CONTRASTS: Performed by: STUDENT IN AN ORGANIZED HEALTH CARE EDUCATION/TRAINING PROGRAM

## 2024-08-26 PROCEDURE — 74177 CT ABD & PELVIS W/CONTRAST: CPT

## 2024-08-27 LAB
CYTOLOGY CMNT CVX/VAG CYTO-IMP: NORMAL
HPV HR 12 DNA GENITAL QL NAA+PROBE: POSITIVE
HPV HR GENOTYPES PNL CVX NAA+PROBE: POSITIVE
HPV16 DNA SPEC QL NAA+PROBE: NEGATIVE
HPV18 DNA SPEC QL NAA+PROBE: NEGATIVE
LAB AP HPV GENOTYPE QUESTION: YES
LAB AP HPV HR: NORMAL
LABORATORY COMMENT REPORT: NORMAL
MENSTRUAL HX REPORTED: NORMAL
PATH REPORT.TOTAL CANCER: NORMAL
RESIDENT REVIEW: NORMAL

## 2024-08-27 NOTE — PROGRESS NOTES
Bone Marrow Transplant Progress Note      Patient Name: Trevor Holden  MRN: [id number]  Date: 8/27/2024    Autologous SCT D+33     Diagnosis: Multiple Myeloma w/ plasmacytoma IgG Kappa  Induction:   Joan-RVD with Revlimid maintenance  Conditioning: Melphalan 200  Day 0: 7/25/24  Stem Cell Dose:  5.0 x10^6 CD34+ cells  Day of Engraftment: 8/5/24  Complications: None    History of Present Illness  Trevor is a 61 year old  male patient of Dr. Travis Collins with past history of hypercholesterolemia, and newly diagnosed Multiple Myeloma with plasmacytoma in spine. He reported having pain in the middle of the back just between the shoulder blades for months starting around August 2023, he had been seeing physical therapy and was told he had some ribs out of place in the back which they have been trying to relocate and doing massage and electrical stimulation. He presented to the [facility] on 11/1/23 with worsening back pain,  PET/CT confirmed multiple thoracic lesions with pathologic compression fracture at T6, lesion in his cervical spine. Serum protein electrophoresis showed biochemical evidence of plasmacytoma with a gamma globinopathy. He had a T6 kyphoplasty on 11/16/23 and surgical pathology revealed sheets of plasma cells 95% cellularity. Bone marrow biopsy on 11/28/23 had 10 -15% plasma cells, FISH with gain 1q, monosomy 13, and hyperdiploidy. He received induction with Joan-RVD, repeat bone marrow biopsy post-induction on 4/30/24 documented no evidence of disease. He had a second kyphoplasty on 5/23/24 which also was negative for malignancy.   He started maintenance therapy with Revlimid and Dexamethasone. He was referred to BMT Dr. Honorio Mace for Autologous SCT consultation.      7/23/24 Auto D-2: Trevor is admitted today to start conditioning for autologous stem cell transplant. Dry weight 86.9kg and comorbidity score 5. Denies fevers, chills, night sweats, chest pain, GI issues,  issues or  Patient ID: Hayley Potter is a 63 y.o. female.    CC:  Management of stage IA3 ([id number]) adenocarcinoma of RUL s/p RUL lobectomy 04/2020, PDL-1 50%, NGS positive for KRAS G12C--> now with  metastatic recurrence involving liver, 11th L rib, and mediastinal LNs. 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 She was initially found to have spiculated 2.9cm mass in the RUL on the CT chest on 03/02/2020. She underwent CT guided RUL biopsy with pathology showed lung tissue with scan and focal aypicla grandular proliferation  suspicious for adenocarcinoma. IHC positive for CK7, TTF1 and napsin A. CK20 negative. PET/CT on 04/15/2020 showed RUL hypermetabolic RUL mass. No evidence of distant metastases. She underwent RUL lobectomy with mediastinal LN dissection with Dr. Wheat  on 04/21/2020 which showed invasive well to moderately differentiated adenocarcinoma, tumor measuring 2.5x2.3.x1.7cm. No VPI or LVI. All margins were negative. LN (0/11).      Unfortunately, on the surveillance CT chest (+) on 08/24/2022, there was a new irregular marginated 6mm GUSTABO nodule, which increased in size significantly on the repeat CT chest (-) on 01/06/2023, measuring 12mm.  There is also an additional new 9mm nodule  in the L lung as well as new mediastinal and possible L hilar LAD. PET/CT on 02/16/2023 showed hypermetabolic L hilar and mediastinal LAD. 1st  GUSTABO nodule now 8mm likely inflammatory. 2nd GUSTABO nodule showed stable size with SUV 4.4. Soft tissue mass associated with  L 11th rib fracture, SUV 11.5. Mass within the liver SUV 13.1, suspicious for mets. Hypermetabolic activity also noted  in  respiratory complaints. Clinical history, medications, pertinent lab and radiographic studies reviewed. He will transplant in 2 days with plan to discharge home with his wife Jj as the caregiver. Plan for melphalan to start today.    7/24/24 Auto D-1: Trevor is doing well with no complaints. He tolerated melphalan yesterday and we will continue with conditioning chemotherapy. Denies fevers, chills, night sweats, chest pain, GI issues,  issues or respiratory complaints. Clinical history, medications, pertinent lab and radiographic studies reviewed. Will discontinue lovenox due to adequate physical activity.      7/25/24: Auto Day 0: Trevor is doing very well. Walking ~4 miles per day. Denies fevers, chills, nausea,vomiting, diarrhea. PO intake great; will hold maintenance fluids. Plan for autologous stem cell transplant today.        7/26/24: Auto Day+1: Trevor continues to do well with no complaints. Denies fevers, chills, nausea,vomiting, diarrhea. He is asymptomatically orthostatic and will encourage fluid intake. He had 1 liquid stool and we will continue to monitor. Will monitor Tbili.    7/27/24: Auto Day+2: Trevor eating and drinking well while staying physically active. Denies fevers, chills, nausea,vomiting, diarrhea. He is asymptomatically orthostatic and will encourage fluid intake. Will monitor Tbili.       7/28/24: Auto Day+3: Trevor continues to do well with no complaints. Denies fevers, chills, nausea,vomiting, diarrhea. He is having formed bowel movements and continues walking. He is eating less and experiencing mild fatigue; Will suppliment nutritional shakes.    07/29/24: Autologous SCT D+4: Trevor experienced an episode of dizziness with orthostatic vital check this morning. His blood pressure dropped to 52/36, he was given a 500 mL bolus, afterwhich vitals had normalized and he had no other complaints of dizziness. He continues to ambulate around the halls. He does report mild nausea,  the stomach fundus, association with a hiatal hernia, in the cecum and ascending colon without masses seen. She underwent US guided liver biopsy on 02/16/2023 - poorly-differentiated carcinoma.     Treatment Summary:  04/21/2020: RUL lobectomy with medistainal LN dissection (Dr. Wheat)  04/11/2023: C1 pembrolizumab 200mg IV (on trial)  05/02/2023: C2 Pembrolizumab 200mg IV (on trial)  05/23/2023: C3 Pembrolizumab 200mg IV (on trial)  06/13/2023: C4 Pembrolizumab 200mg IV (on trial)   07/05/2023: C5 Pembrolizumab 200mg IV (on trial)   07/26/2023: C6 Pembrolizumab 200mg IV (on trial)   08/16/2023: C7 Pembrolizumab 200mg IV (on trial)   09/07/2023: C8 Pembrolizumab 200mg IV (on trial)   09/28/2023: C9 Pembrolizumab 200mg IV (on trial)   10/19/2023: C10 Pembrolizumab 200mg IV (on trial)  11/09/2023: C11 Pembrolizumab 200mg IV (on trial)  11/30/2023: C12 Pembrolizumab 200mg IV (on trial)  12/21/2023: C13 Pembrolizumab 200mg IV (on trial)  01/11/2024: C14 Pembrolizumab 200mg IV (on trial)  02/01/2024: C15 Pembrolizumab 200mg IV (on trial)  02/22/2024: C16 Pembrolizumab 200mg IV (on trial)  03/14/2024: C17 Pembrolizumab 200mg IV (on trial)  04/04/2024: C18 Pembrolizumab 200mg IV (on trial)  04/25/2024: C19 Pembrolizumab 200mg IV (on trial)  05/16/2024: C20 Pembrolizumab 200mg IV (on trial)  06/06/2024: C21 Pembrolizumab 200mg IV (on trial)  06/27/2024: C22 Pembrolizumab 200mg IV (on trial)  07/15/2024: ED visit right foot contusion   07/18/2024: C23 Pembrolizumab 200mg IV (on trial)  08/08/2024: C24 Pembrolizumab 200mg IV (on trial)      Interval History (08/10/2024):    Pt present in clinic for readiness to treat visit.  Her dtr is present for visit.  Concern today - reports an abnormal pap test.  She has a colposcopy scheduled for next week. Denies abnormal bleeding.   Breathing feels good.  Appetite - two meals/day.  Wave of nausea, last five minutes, one mouthful, week before last.  Occurred one hour after morning  encouraged to use prn medication and will schedule Zofran prior to meals.     07/30/24: Autologous SCT D+5: Andres reports feeling fatigued, but continues to eat well and ambulate around the unit. He was orthostatic this morning, ordered a 500 ml bolus. He denies nausea, vomiting, fever, chills, and pain.     07/31/24: Autologous SCT D+6: Trevor continues to walk laps around the unit. He remains afebrile and denies vomiting, chills, shortness of breath, or chest pain. His nausea is controlled with Zofran and IV compazine and diarrhea is controlled with Imodium. Encouraged to continue current level of activity and eating 3 meals a day.     8/1/24: Autologous SCT D+7: Trevor is feeling well. He complains of nausea, but it resolves with scheduled Zofran and IV compazine prn before meals and diarrhea that is controlled with Imodium. Encouraged to continue eating well.    8/2/24: Autologous SCT D+8: Trevor is feeling well. He complains of improving nausea. Remains orthostatic but asymptomatic and he's ambulating in the halls well. He has some loose bowel movements and we will check cdiff today. WBC 0.1 awaiting count recovery.    8/3/24: Autologous SCT D+9: Trevor is feeling well. He complains of liquid stools; cdiff pending. WBC 0.1 awaiting count recovery. BP trending low; 1L bolus.    8/4/24: Autologous SCT D+10: Trevor is feeling well. He hit his head on the toilet paper dispenser last night and obtained a small laceration that has scabbed over. No notable fall, LOS, acute bleed, AMS, localized edema; will continue to monitor. Stools are becoming mildly more formed. He appears to be engrafting as his ANC is 0.3 today as we anticipate further count recovery.     8/5/24: Autologous SCT D+11: Trevor is feeling well today. He has no complaints. Denies N/V/D, fever, chills, SOB, chest pain, abdominal pain. He is eating and ambulating well. His counts have recovered. Will transfuse platelets in preparation for  meds.  Then sensation resolved.  Routine BM's.  Denies diarrhea.  Bruise to right foot.  No change.  Continues to have pain to her right great toe.  Pt able to bear weight.  At times walks barefoot.  No intervention for pain.  Right flank pain, steady 2/10. No other concerns.  Labs WNL.  Ready for treatment.       Review of Systems   Constitutional:  Positive for fatigue.   HENT:  Negative.     Respiratory:  Positive for cough.    Gastrointestinal: Negative.    Musculoskeletal:  Positive for arthralgias and flank pain.        Left foot pain   Skin: Negative.         Contusion, hyperextended her right foot, bruising to foot - bruising improved.   Neurological: Negative.    Hematological: Negative.    Psychiatric/Behavioral:  The patient is not nervous/anxious.       PMHx: vocal cord cancer, GERD and COPD, OA  PSHx: tonsillectomy, tubal ligation, lumpectomy, L ankle and R hand surgery  FHx: strong family history of breast cancer including male breast cancer.   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 She quit etoh . She smokes Marijuana every day.     Allergies:  Cephalexin    Medications:  Medication list reviewed with patient and updated in EMR    Vital Signs:  /76 (BP Location: Left arm, Patient Position: Sitting, BP Cuff Size: Adult long)   Pulse 72   Temp 36.4 °C (97.5 °F) (Temporal)   Resp 16   Wt 62.2 kg (137 lb 2 oz)   SpO2 95%   BMI 22.82 kg/m²     Wt Readings from Last 5 Encounters:   24 62.2 kg (137 lb 2 oz)   24 62.1 kg (137 lb)   24 62.9 kg (138 lb 12.5 oz)   07/15/24 61.2 kg (135 lb)   24 60.1 kg (132 lb 7.9 oz)      Physical Exam:  ECO  Pain: mild - 2/10 flank pain.     Physical Exam  Vitals reviewed.   Constitutional:       Appearance: Normal appearance.   HENT:      Head: Normocephalic.      Nose: Nose normal.      Mouth/Throat:      Mouth: Mucous membranes are moist.      Pharynx: Oropharynx is clear.   Eyes:       Extraocular Movements: Extraocular movements intact.      Conjunctiva/sclera: Conjunctivae normal.      Pupils: Pupils are equal, round, and reactive to light.   Cardiovascular:      Rate and Rhythm: Normal rate and regular rhythm.      Pulses: Normal pulses.      Heart sounds: Normal heart sounds.   Pulmonary:      Breath sounds: Normal breath sounds.      Comments: Hx RUL lobectomy 4/2020  Abdominal:      General: Bowel sounds are normal.      Palpations: Abdomen is soft.   Musculoskeletal:         General: Tenderness (right foot) present. Normal range of motion.      Cervical back: Normal range of motion and neck supple.   Skin:     General: Skin is warm and dry.      Capillary Refill: Capillary refill takes less than 2 seconds.      Findings: Bruising (right foot) present.   Neurological:      General: No focal deficit present.      Mental Status: She is alert and oriented to person, place, and time.   Psychiatric:         Mood and Affect: Mood normal.         Behavior: Behavior normal.         Thought Content: Thought content normal.         Judgment: Judgment normal.        Results from last 7 days   Lab Units 08/07/24  1047   GLUCOSE mg/dL 82   SODIUM mmol/L 142   POTASSIUM mmol/L 5.0   CHLORIDE mmol/L 102   CO2 mmol/L 30   BUN mg/dL 8   CREATININE mg/dL 0.82   EGFR mL/min/1.73m*2 80   CALCIUM mg/dL 10.5   ALBUMIN g/dL 4.2   PROTEIN TOTAL g/dL 6.7   BILIRUBIN TOTAL mg/dL 0.4   ALK PHOS U/L 66   ALT U/L 15   AST U/L 23     Results from last 7 days   Lab Units 08/07/24  1047   WBC AUTO x10*3/uL 6.6   HEMOGLOBIN g/dL 14.1   HEMATOCRIT % 43.3   PLATELETS AUTO x10*3/uL 417   NEUTROS ABS x10*3/uL 4.28   LYMPHS ABS AUTO x10*3/uL 1.25   MONOS ABS AUTO x10*3/uL 0.81   EOS ABS AUTO x10*3/uL 0.12   NEUTROS PCT AUTO % 65.3   LYMPHS PCT AUTO % 19.1   MONOS PCT AUTO % 12.4   EOS PCT AUTO % 1.8      Assessment/Plan:  64 y.o. female with past medical history as stated referred for management of hx of vocal cord cancer  trifusion removal, and plan to discharge home.    8/12/24: Autologous SCT D+18: Trevor presents to the BMT clinic for follow up after discharge from . He is feeling well, has lower energy levels but is staying active with walking and biking. Denies N/V/D, fever, chills, SOB, chest pain, abdominal pain.     08/27/24: Autologous SCT D+33: Trevor presents to the BMT clinic for continued follow up. He reports feeling really well. His energy levels are improving. He has been biking, working. His appetite is starting to improve. He denies nausea, vomiting, diarrhea, fevers, chills, shortness of breath, or chest pain. His CBC and CMP are stable. Follow up in 4 weeks will recheck plasma cell profile at that time.     Review of Systems   Constitutional:  Positive for activity change and appetite change. Negative for chills, diaphoresis, fatigue, fever and unexpected weight change.   HENT:  Negative for mouth sores, nosebleeds and sore throat.    Respiratory:  Negative for cough, shortness of breath and wheezing.    Cardiovascular:  Negative for chest pain, palpitations and leg swelling.   Gastrointestinal:  Negative for abdominal distention, abdominal pain, blood in stool, constipation, diarrhea, nausea and vomiting.   Genitourinary:  Negative for difficulty urinating and dysuria.   Musculoskeletal:  Negative for joint swelling and myalgias.   Skin:  Negative for pallor and rash.   Allergic/Immunologic: Positive for immunocompromised state.   Neurological:  Negative for dizziness, tremors, syncope, weakness, numbness and headaches.   Hematological:  Does not bruise/bleed easily.   Psychiatric/Behavioral:  Negative for confusion and sleep disturbance. The patient is not nervous/anxious.    All other systems reviewed and are negative.    ALLERGIES:   Allergen Reactions    Penicillins RASH     Current Outpatient Medications   Medication Sig Dispense Refill    valACYclovir (VALTREX) 500 MG tablet Take 1 tablet by mouth in  (dx in 2016 s/p resection), OA GERD and COPD presents today for the follow up of her lung cancer.     The patient's records and imaging have been reviewed in detail.  MD discussed with the patient the natural history of their disease at length.  The following has also been discussed with the patient:     # Cancer: recurrent likely metastatic ([id number]) lung cancer: liver biopsy showed extensive necrosis. Liquid biopsy showed KRAS G12C mutation, which was identified in her original surgical resected  specimen. PDL-1 50%. Patient initially had stage IA2 ([id number]) RUL lung  adenocarcinoma s/p RUL lobectomy, unfortunately now likely metastatic recurrence. Liver biopsy on 2/16 showed poorly differentiated malignant neoplasm with extensive necrosis. We discussed treatment  options with her today with either standard of care pembrolizumab vs clinical trials. Patient expressed interests in enrolling into clinical trial. Enrolled into RCT [id number]. Doing well.      - Interval scan: 06/03/2024: CT CAP (+): 1. Stable left upper lobe scarring with associated subcentimeter nodular component. No new pulmonary nodules. 2. No evidence of new sites of metastatic disease in the chest, abdomen, or pelvis. 3. Additional stable chronic and incidental findings as above.  Repeat CT scan scheduled 8/26/24.     # Hypothyroidism: G2. Likely 2/2 immunotherapy - resolved.  TSH 1.07 11/8/23.  Continue levothyroxine 75mcg daily.      # Microcytic anemia 2/2 due to chronic disease: patient denies any hematochezia  or hematuria. No melena. hx of hiatal hernia. Per patient. Last EGD was done in 2021 and was normal. Colonoscopy was a few years ago.  EGD showed hiatal hernia 03/2023. Improved Hgb overall. Iron studies show low-normal ferritin and TSAT of 14%. Continue PO iron 3x/week. Tolerates well. Stable.      # Mucositis (chronic).  Pt reports since start of immunotherapy.  Chronic sore secondary to ill fitting dentures recommended s&s rinse.   the morning and 1 tablet in the evening. (Patient not taking: Reported on 8/27/2024) 180 tablet 3     No current facility-administered medications for this visit.       Vital Last Value 24 Hour Range   Temperature   Temp  Min: 97.3 °F (36.3 °C)  Max: 98.2 °F (36.8 °C)   Pulse (!) 114 (08/27/24 1155) Pulse  Min: 87  Max: 102   Respiratory 16 (08/27/24 1155) Resp  Min: 16  Max: 18   Non-Invasive  Blood Pressure 115/68 (08/27/24 1155) BP  Min: 95/64  Max: 121/70   Pulse Oximetry 99 % (08/27/24 1155) SpO2  Min: 95 %  Max: 97 %   Arterial   Blood Pressure   No data recorded      Wt Readings from Last 1 Encounters:   08/27/24 80.2 kg (176 lb 11.2 oz)          Physical Exam  Vitals and nursing note reviewed.   Constitutional:       General: He is not in acute distress.     Appearance: Normal appearance. He is not ill-appearing.   HENT:      Head: Normocephalic and atraumatic.      Right Ear: External ear normal.      Left Ear: External ear normal.      Nose: Nose normal.      Mouth/Throat:      Mouth: Mucous membranes are moist.      Pharynx: Oropharynx is clear.      Neck: Normal range of motion.   Eyes:      Extraocular Movements: Extraocular movements intact.      Pupils: Pupils are equal, round, and reactive to light.   Cardiovascular:      Rate and Rhythm: Normal rate and regular rhythm.      Pulses: Normal pulses.      Heart sounds: Normal heart sounds.   Pulmonary:      Effort: Pulmonary effort is normal.      Breath sounds: Normal breath sounds.   Abdominal:      General: There is no distension.      Palpations: Abdomen is soft.      Tenderness: There is no abdominal tenderness.   Musculoskeletal:         General: Normal range of motion.   Skin:     General: Skin is warm and dry.   Neurological:      General: No focal deficit present.      Mental Status: He is alert and oriented to person, place, and time.   Psychiatric:         Mood and Affect: Mood normal.         Behavior: Behavior normal.         Thought  Content: Thought content normal.         Judgment: Judgment normal.     Laboratory Results  No results found for this or any previous visit (from the past 24 hour(s)).        Imaging  No orders to display     Assessment and Plan  There are no active hospital problems to display for this patient.    IgG Kappa Multiple Myeloma with T6 Plasmacytoma:  - T6 surgical pathology 11/16/23: sheets of plasma cells 95% cellularity   - Bone Marrow Biopsy 11/28/23: plasma cell neoplasm 10-15%; FISH with gain 1q, monosomy 13, and hyperdiploidy   - Bone Marrow Biopsy 4/30/24: normocellular, negative for plasma cells   - T3 surgical pathology 5/23/24: negative for plasma cells or malignancy   - hx of T6 kyphoplasty  - Induction Rvd and daratumumab   - Maintenance Revlimid 25mg and dexamethasone 4mg   - Autologous SCT 7/25/24, Melphalan conditioning, stem cell dose 5.0 x10^6 CD34+ cells      Hematology:  - Transfuse 1u pRBCs for hemoglobin < 7.0  - Transfuse 1u platelets for platelets < 10, or <20 if actively bleeding      Infectious Disease Prophylaxis:  - Valtrex 500 mg PO Q12H    Hypogammaglobulinemia:   - IVIG prn IgG level < 400     PLAN:  1. Discontinue fluconazole  2. Follow up in 4 weeks with CBC, CMP, plasma cell profile    Time  Total time I spent today on this visit is 45 minutes. This includes pre-charting, chart review, face-to-face visit, documenting, and referring/communicating with other health care professionals.      Case discussed with Dr. Honorio Mace.     Lyric Patterson PA-C      Continue Anbesol and/or Oragel for pain.  Will continue to monitor.  BMX added.  24  - resolved      # Grade 1 hyperthyroidism: resolved, now hypothyroid (as above).     # Constipation: managed well with stool softener and PRN lactulose.        # Clinical Trials: 2023 initiated treatment on [id number] this is cycle 1 of first-line treatment.  She has been randomized to  arm A which is pembrolizumab alone for up to 2 years or progression followed by Alimta/carboplatin for second line therapy. Consent has been signed. 6/3/24 CT scan shows favorable response. Continue treatment.      # Access: Peripheral veins.     # Pain: she has a displaced 8th Rib on the left side -Advised patient to take percocet daily and decrease Advil use. PRN Percocet Rx in place.  Rx last sent 23. Currently managed with PRN gummies.  New pain on the R upper back, controlled with percocet. Will try lidocaine patch OTC. :  Pain currently managed with PRN Advil, Aspercreme, and gummies.  Routine Gabapentin for neuropathy pain.   Pt verbalized desire to reduce # of medications taken.  Wishes to reduce Gabapentin dosage.   Supportive onc referral placed for assistance.   :  Bruise to buttock, pain 3-4/10.  Increased right flank/rib pain r/t fall, pain 3/10.  Managed with Advil 600mg TID.  :  Right flank pain 2/10.  Managed with PRN interventions.      # Anxiety:  Anxiety reported d/t recent death of her father, upcoming .  Pt requested 1x Valium dose, previous 10mg admin.  OARRS reviewed. Provided Rx for Valium 5mg x3 doses.      # Bone Health: L fifth ribs lesion stable.    # Arthralgia (grade 1).  Uric acid lab (-).  Intermittent arthralgia to left hand.  Continue PRN Advil, Aspercreme or Percocet (for severe pain).      # Left rib or chest wall pain over the past month. 23 CXR - RESULTS: Mediastinal surgical clips are noted. The cardiac silhouette is within normal limits. Mediastinal contours are  unremarkable. The lungs demonstrate no infiltrate or atelectasis. There is no evidence for pleural effusion. Remote fracture is noted involving the right 7th rib. There are degenerative changes of the thoracic spine.  - Continue PRN Percocet and/or gummies for pain control.      # Right foot contusion, hyperextension:  Able to bear wt.  Continue to follow with podiatrist with any questions.  Continue Advil x5 days for inflammation.    # Psychosocial: Coping well, no acute issues.  Good support from family & friends.  Social work support available.     # GI/CINV: No acute issues.  Eating and voiding well.  Nutrition consult available.    # Sinusitis:  Pressure to maxillary and frontal sinuses.  +Increased mucous, yellow in color.  Denies fevers.  Hx of recurrent sinus infections.  Rx called to pt's pharmacy.  Doxycycline 100mg BID x7-10 days.  Extended course d/t recent smoke exposure.  3/14: Resolved.       # Smoking: former smoker     # Fertility: N/A.        # Heme/ESAs: N/A.     # GOC: Patient is aware of palliative  intent goals of care.     # Language: English.     # Dispo: RTC in 3 weeks for next cycle.  Pt instructed to call with concerns/questions .

## 2024-08-28 ENCOUNTER — LAB (OUTPATIENT)
Dept: LAB | Facility: LAB | Age: 64
End: 2024-08-28
Payer: COMMERCIAL

## 2024-08-28 DIAGNOSIS — C34.11 MALIGNANT NEOPLASM OF UPPER LOBE OF RIGHT LUNG (MULTI): ICD-10-CM

## 2024-08-28 LAB
ALBUMIN SERPL BCP-MCNC: 4.4 G/DL (ref 3.4–5)
ALP SERPL-CCNC: 51 U/L (ref 33–136)
ALT SERPL W P-5'-P-CCNC: 16 U/L (ref 7–45)
AMYLASE SERPL-CCNC: 29 U/L (ref 29–103)
ANION GAP SERPL CALC-SCNC: 13 MMOL/L (ref 10–20)
AST SERPL W P-5'-P-CCNC: 21 U/L (ref 9–39)
BASOPHILS # BLD AUTO: 0.05 X10*3/UL (ref 0–0.1)
BASOPHILS NFR BLD AUTO: 0.6 %
BILIRUB SERPL-MCNC: 0.5 MG/DL (ref 0–1.2)
BUN SERPL-MCNC: 8 MG/DL (ref 6–23)
CALCIUM SERPL-MCNC: 9.9 MG/DL (ref 8.6–10.6)
CHLORIDE SERPL-SCNC: 99 MMOL/L (ref 98–107)
CO2 SERPL-SCNC: 28 MMOL/L (ref 21–32)
CREAT SERPL-MCNC: 0.79 MG/DL (ref 0.5–1.05)
EGFRCR SERPLBLD CKD-EPI 2021: 84 ML/MIN/1.73M*2
EOSINOPHIL # BLD AUTO: 0.07 X10*3/UL (ref 0–0.7)
EOSINOPHIL NFR BLD AUTO: 0.9 %
ERYTHROCYTE [DISTWIDTH] IN BLOOD BY AUTOMATED COUNT: 13.1 % (ref 11.5–14.5)
GLUCOSE SERPL-MCNC: 115 MG/DL (ref 74–99)
HCT VFR BLD AUTO: 43.8 % (ref 36–46)
HGB BLD-MCNC: 14.4 G/DL (ref 12–16)
IMM GRANULOCYTES # BLD AUTO: 0.01 X10*3/UL (ref 0–0.7)
IMM GRANULOCYTES NFR BLD AUTO: 0.1 % (ref 0–0.9)
LIPASE SERPL-CCNC: 17 U/L (ref 9–82)
LYMPHOCYTES # BLD AUTO: 1.35 X10*3/UL (ref 1.2–4.8)
LYMPHOCYTES NFR BLD AUTO: 17.2 %
MCH RBC QN AUTO: 31.2 PG (ref 26–34)
MCHC RBC AUTO-ENTMCNC: 32.9 G/DL (ref 32–36)
MCV RBC AUTO: 95 FL (ref 80–100)
MONOCYTES # BLD AUTO: 0.77 X10*3/UL (ref 0.1–1)
MONOCYTES NFR BLD AUTO: 9.8 %
NEUTROPHILS # BLD AUTO: 5.58 X10*3/UL (ref 1.2–7.7)
NEUTROPHILS NFR BLD AUTO: 71.4 %
NRBC BLD-RTO: 0 /100 WBCS (ref 0–0)
PLATELET # BLD AUTO: 457 X10*3/UL (ref 150–450)
POTASSIUM SERPL-SCNC: 4.1 MMOL/L (ref 3.5–5.3)
PROT SERPL-MCNC: 6.6 G/DL (ref 6.4–8.2)
RBC # BLD AUTO: 4.62 X10*6/UL (ref 4–5.2)
SODIUM SERPL-SCNC: 136 MMOL/L (ref 136–145)
WBC # BLD AUTO: 7.8 X10*3/UL (ref 4.4–11.3)

## 2024-08-28 PROCEDURE — 80053 COMPREHEN METABOLIC PANEL: CPT

## 2024-08-28 PROCEDURE — 83690 ASSAY OF LIPASE: CPT

## 2024-08-28 PROCEDURE — 85025 COMPLETE CBC W/AUTO DIFF WBC: CPT

## 2024-08-28 PROCEDURE — 82150 ASSAY OF AMYLASE: CPT

## 2024-08-29 ENCOUNTER — OFFICE VISIT (OUTPATIENT)
Dept: HEMATOLOGY/ONCOLOGY | Facility: CLINIC | Age: 64
End: 2024-08-29
Payer: COMMERCIAL

## 2024-08-29 ENCOUNTER — EDUCATION (OUTPATIENT)
Dept: HEMATOLOGY/ONCOLOGY | Facility: CLINIC | Age: 64
End: 2024-08-29

## 2024-08-29 ENCOUNTER — INFUSION (OUTPATIENT)
Dept: HEMATOLOGY/ONCOLOGY | Facility: CLINIC | Age: 64
End: 2024-08-29
Payer: COMMERCIAL

## 2024-08-29 VITALS
HEART RATE: 75 BPM | DIASTOLIC BLOOD PRESSURE: 73 MMHG | RESPIRATION RATE: 16 BRPM | SYSTOLIC BLOOD PRESSURE: 116 MMHG | BODY MASS INDEX: 22.58 KG/M2 | WEIGHT: 135.69 LBS | TEMPERATURE: 96.5 F | OXYGEN SATURATION: 94 %

## 2024-08-29 DIAGNOSIS — Z00.6 EXAMINATION OF PARTICIPANT IN CLINICAL TRIAL: ICD-10-CM

## 2024-08-29 DIAGNOSIS — Z51.12 ENCOUNTER FOR ANTINEOPLASTIC IMMUNOTHERAPY: ICD-10-CM

## 2024-08-29 DIAGNOSIS — C34.11 MALIGNANT NEOPLASM OF UPPER LOBE OF RIGHT LUNG (MULTI): ICD-10-CM

## 2024-08-29 DIAGNOSIS — C34.11 MALIGNANT NEOPLASM OF UPPER LOBE OF RIGHT LUNG (MULTI): Primary | ICD-10-CM

## 2024-08-29 PROCEDURE — 99214 OFFICE O/P EST MOD 30 MIN: CPT | Mod: 25 | Performed by: STUDENT IN AN ORGANIZED HEALTH CARE EDUCATION/TRAINING PROGRAM

## 2024-08-29 PROCEDURE — 96365 THER/PROPH/DIAG IV INF INIT: CPT | Mod: INF

## 2024-08-29 PROCEDURE — 2560000001 HC RX 256 EXPERIMENTAL DRUGS: Mod: SE | Performed by: NURSE PRACTITIONER

## 2024-08-29 RX ORDER — DIPHENHYDRAMINE HCL 25 MG
50 CAPSULE ORAL AS NEEDED
Status: DISCONTINUED | OUTPATIENT
Start: 2024-08-29 | End: 2024-08-29 | Stop reason: HOSPADM

## 2024-08-29 RX ORDER — FAMOTIDINE 10 MG/ML
20 INJECTION INTRAVENOUS ONCE AS NEEDED
Status: DISCONTINUED | OUTPATIENT
Start: 2024-08-29 | End: 2024-08-29 | Stop reason: HOSPADM

## 2024-08-29 RX ORDER — EPINEPHRINE 0.3 MG/.3ML
0.3 INJECTION SUBCUTANEOUS EVERY 5 MIN PRN
Status: DISCONTINUED | OUTPATIENT
Start: 2024-08-29 | End: 2024-08-29 | Stop reason: HOSPADM

## 2024-08-29 RX ORDER — PROCHLORPERAZINE EDISYLATE 5 MG/ML
10 INJECTION INTRAMUSCULAR; INTRAVENOUS EVERY 6 HOURS PRN
Status: DISCONTINUED | OUTPATIENT
Start: 2024-08-29 | End: 2024-08-29 | Stop reason: HOSPADM

## 2024-08-29 RX ORDER — PROCHLORPERAZINE MALEATE 10 MG
10 TABLET ORAL EVERY 6 HOURS PRN
Status: DISCONTINUED | OUTPATIENT
Start: 2024-08-29 | End: 2024-08-29 | Stop reason: HOSPADM

## 2024-08-29 RX ORDER — ALBUTEROL SULFATE 0.83 MG/ML
3 SOLUTION RESPIRATORY (INHALATION) AS NEEDED
Status: DISCONTINUED | OUTPATIENT
Start: 2024-08-29 | End: 2024-08-29 | Stop reason: HOSPADM

## 2024-08-29 RX ORDER — ACETAMINOPHEN 325 MG/1
650 TABLET ORAL AS NEEDED
Status: DISCONTINUED | OUTPATIENT
Start: 2024-08-29 | End: 2024-08-29 | Stop reason: HOSPADM

## 2024-08-29 RX ORDER — DIPHENHYDRAMINE HYDROCHLORIDE 50 MG/ML
50 INJECTION INTRAMUSCULAR; INTRAVENOUS AS NEEDED
Status: DISCONTINUED | OUTPATIENT
Start: 2024-08-29 | End: 2024-08-29 | Stop reason: HOSPADM

## 2024-08-29 ASSESSMENT — PAIN SCALES - GENERAL: PAINLEVEL: 2

## 2024-08-29 ASSESSMENT — ENCOUNTER SYMPTOMS
GASTROINTESTINAL NEGATIVE: 1
FLANK PAIN: 0
NERVOUS/ANXIOUS: 0
FATIGUE: 0
ARTHRALGIAS: 0
HEMATOLOGIC/LYMPHATIC NEGATIVE: 1
COUGH: 1
NEUROLOGICAL NEGATIVE: 1

## 2024-08-29 NOTE — PROGRESS NOTES
Patient ID: Hayley Potter is a 63 y.o. female.    CC:  Management of stage IA3 ([id number]) adenocarcinoma of RUL s/p RUL lobectomy 04/2020, PDL-1 50%, NGS positive for KRAS G12C--> now with  metastatic recurrence involving liver, 11th L rib, and mediastinal LNs. 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 She was initially found to have spiculated 2.9cm mass in the RUL on the CT chest on 03/02/2020. She underwent CT guided RUL biopsy with pathology showed lung tissue with scan and focal aypicla grandular proliferation  suspicious for adenocarcinoma. IHC positive for CK7, TTF1 and napsin A. CK20 negative. PET/CT on 04/15/2020 showed RUL hypermetabolic RUL mass. No evidence of distant metastases. She underwent RUL lobectomy with mediastinal LN dissection with Dr. Wheat  on 04/21/2020 which showed invasive well to moderately differentiated adenocarcinoma, tumor measuring 2.5x2.3.x1.7cm. No VPI or LVI. All margins were negative. LN (0/11).      Unfortunately, on the surveillance CT chest (+) on 08/24/2022, there was a new irregular marginated 6mm GUSTABO nodule, which increased in size significantly on the repeat CT chest (-) on 01/06/2023, measuring 12mm.  There is also an additional new 9mm nodule  in the L lung as well as new mediastinal and possible L hilar LAD. PET/CT on 02/16/2023 showed hypermetabolic L hilar and mediastinal LAD. 1st  GUSTABO nodule now 8mm likely inflammatory. 2nd GUSTABO nodule showed stable size with SUV 4.4. Soft tissue mass associated with  L 11th rib fracture, SUV 11.5. Mass within the liver SUV 13.1, suspicious for mets. Hypermetabolic activity also noted  in  the stomach fundus, association with a hiatal hernia, in the cecum and ascending colon without masses seen. She underwent US guided liver biopsy on 02/16/2023 - poorly-differentiated carcinoma.     Treatment Summary:  04/21/2020: RUL lobectomy with medistainal LN dissection (Dr. Wheat)  04/11/2023: C1 pembrolizumab 200mg IV (on trial)  05/02/2023: C2 Pembrolizumab 200mg IV (on trial)  05/23/2023: C3 Pembrolizumab 200mg IV (on trial)  06/13/2023: C4 Pembrolizumab 200mg IV (on trial)   07/05/2023: C5 Pembrolizumab 200mg IV (on trial)   07/26/2023: C6 Pembrolizumab 200mg IV (on trial)   08/16/2023: C7 Pembrolizumab 200mg IV (on trial)   09/07/2023: C8 Pembrolizumab 200mg IV (on trial)   09/28/2023: C9 Pembrolizumab 200mg IV (on trial)   10/19/2023: C10 Pembrolizumab 200mg IV (on trial)  11/09/2023: C11 Pembrolizumab 200mg IV (on trial)  11/30/2023: C12 Pembrolizumab 200mg IV (on trial)  12/21/2023: C13 Pembrolizumab 200mg IV (on trial)  01/11/2024: C14 Pembrolizumab 200mg IV (on trial)  02/01/2024: C15 Pembrolizumab 200mg IV (on trial)  02/22/2024: C16 Pembrolizumab 200mg IV (on trial)  03/14/2024: C17 Pembrolizumab 200mg IV (on trial)  04/04/2024: C18 Pembrolizumab 200mg IV (on trial)  04/25/2024: C19 Pembrolizumab 200mg IV (on trial)  05/16/2024: C20 Pembrolizumab 200mg IV (on trial)  06/06/2024: C21 Pembrolizumab 200mg IV (on trial)  06/27/2024: C22 Pembrolizumab 200mg IV (on trial)  07/15/2024: ED visit right foot contusion   07/18/2024: C23 Pembrolizumab 200mg IV (on trial)  08/08/2024: C24 Pembrolizumab 200mg IV (on trial)  08/29/2024: C25 Pembrolizumab 200mg IV (on trial)      Interval History (08/29/2024):    Pt present in clinic for readiness to treat visit.  Her dtr is present for visit.  Stable cough  Breathing feels good.  Appetite - two meals/day.  Routine BM's.  Denies diarrhea.  NO rash. No fever or chills. No n/v. Still working in her yard.    Review of Systems   Constitutional:  Negative for fatigue.    HENT:  Negative.     Respiratory:  Positive for cough.    Gastrointestinal: Negative.    Musculoskeletal:  Negative for arthralgias and flank pain.   Skin: Negative.    Neurological: Negative.    Hematological: Negative.    Psychiatric/Behavioral:  The patient is not nervous/anxious.       PMHx: vocal cord cancer, GERD and COPD, OA  PSHx: tonsillectomy, tubal ligation, lumpectomy, L ankle and R hand surgery  FHx: strong family history of breast cancer including male breast cancer.   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 She quit etoh . She smokes Marijuana every day.     Allergies:  Cephalexin    Medications:  Medication list reviewed with patient and updated in EMR    Vital Signs:  /73 (BP Location: Left arm, Patient Position: Sitting, BP Cuff Size: Adult long)   Pulse 75   Temp 35.8 °C (96.5 °F) (Temporal)   Resp 16   Wt 61.5 kg (135 lb 11.1 oz)   SpO2 94%   BMI 22.58 kg/m²     Wt Readings from Last 5 Encounters:   24 61.5 kg (135 lb 11.1 oz)   24 62.1 kg (137 lb)   24 62.2 kg (137 lb 2 oz)   24 62.1 kg (137 lb)   24 62.9 kg (138 lb 12.5 oz)      Physical Exam:  ECO  Pain: mild - 2/10 flank pain.     Physical Exam  Vitals reviewed.   Constitutional:       Appearance: Normal appearance.   HENT:      Head: Normocephalic.      Nose: Nose normal.      Mouth/Throat:      Mouth: Mucous membranes are moist.      Pharynx: Oropharynx is clear.   Eyes:      Extraocular Movements: Extraocular movements intact.      Conjunctiva/sclera: Conjunctivae normal.      Pupils: Pupils are equal, round, and reactive to light.   Cardiovascular:      Rate and Rhythm: Normal rate and regular rhythm.      Pulses: Normal pulses.      Heart sounds: Normal heart sounds.   Pulmonary:      Breath sounds: Normal breath sounds.      Comments: Hx RUL lobectomy 2020  Abdominal:      General: Bowel sounds are normal.      Palpations: Abdomen is  soft.   Musculoskeletal:         General: Tenderness present. Normal range of motion.      Cervical back: Normal range of motion and neck supple.   Skin:     General: Skin is warm and dry.      Capillary Refill: Capillary refill takes less than 2 seconds.      Findings: No bruising.   Neurological:      General: No focal deficit present.      Mental Status: She is alert and oriented to person, place, and time.   Psychiatric:         Mood and Affect: Mood normal.         Behavior: Behavior normal.         Thought Content: Thought content normal.         Judgment: Judgment normal.        Results from last 7 days   Lab Units 08/28/24  1318   GLUCOSE mg/dL 115*   SODIUM mmol/L 136   POTASSIUM mmol/L 4.1   CHLORIDE mmol/L 99   CO2 mmol/L 28   BUN mg/dL 8   CREATININE mg/dL 0.79   EGFR mL/min/1.73m*2 84   CALCIUM mg/dL 9.9   ALBUMIN g/dL 4.4   PROTEIN TOTAL g/dL 6.6   BILIRUBIN TOTAL mg/dL 0.5   ALK PHOS U/L 51   ALT U/L 16   AST U/L 21     Results from last 7 days   Lab Units 08/28/24  1318   WBC AUTO x10*3/uL 7.8   HEMOGLOBIN g/dL 14.4   HEMATOCRIT % 43.8   PLATELETS AUTO x10*3/uL 457*   NEUTROS ABS x10*3/uL 5.58   LYMPHS ABS AUTO x10*3/uL 1.35   MONOS ABS AUTO x10*3/uL 0.77   EOS ABS AUTO x10*3/uL 0.07   NEUTROS PCT AUTO % 71.4   LYMPHS PCT AUTO % 17.2   MONOS PCT AUTO % 9.8   EOS PCT AUTO % 0.9      Assessment/Plan:  64 y.o. 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     The patient's records and imaging have been reviewed in detail.  MD discussed with the patient the natural history of their disease at length.  The following has also been discussed with the patient:     # Cancer: recurrent likely metastatic ([id number]) lung cancer: liver biopsy showed extensive necrosis. Liquid biopsy showed KRAS G12C mutation, which was identified in her original surgical resected   specimen. PDL-1 50%. Patient initially had stage IA2 (M1qL1T4) RUL lung  adenocarcinoma s/p RUL lobectomy, unfortunately now likely metastatic recurrence. Liver biopsy on 2/16 showed poorly differentiated malignant neoplasm with extensive necrosis. We discussed treatment  options with her today with either standard of care pembrolizumab vs clinical trials. Patient expressed interests in enrolling into clinical trial. Enrolled into RCT [id number]. Doing well.      - Interval scan: 08/26/2024: CT CAP (+): 1. Status post right upper lobectomy with no definite locoregional recurrence. Stable left upper lobe areas of subpleural scarring and nodularity. No new suspicious pulmonary nodules or masses. 2. Stable precarinal subcentimeter lymph node. No new enlarged intrathoracic lymphadenopathy. 3. Remote bilateral rib fractures as in prior.  1. No metastatic disease in the abdomen or pelvis.  Repeat in 3 months    # Hypothyroidism: G2. Likely 2/2 immunotherapy - resolved.  TSH 1.07 11/8/23.  Continue levothyroxine 75mcg daily.      # Microcytic anemia 2/2 due to chronic disease: patient denies any hematochezia  or hematuria. No melena. hx of hiatal hernia. Per patient. Last EGD was done in 2021 and was normal. Colonoscopy was a few years ago.  EGD showed hiatal hernia 03/2023. Improved Hgb overall. Iron studies show low-normal ferritin and TSAT of 14%. Continue PO iron 3x/week. Tolerates well. Stable.      # Mucositis (chronic).  Pt reports since start of immunotherapy.  Chronic sore secondary to ill fitting dentures recommended s&s rinse.  Continue Anbesol and/or Oragel for pain.  Will continue to monitor.  BMX added.  2/1/24  - resolved      # Constipation: managed well with stool softener and PRN lactulose.        # Clinical Trials: April 11, 2023 initiated treatment on [id number] this is cycle 1 of first-line treatment.  She has been randomized to  arm A which is pembrolizumab alone for up to 2 years or progression followed by  Alimta/carboplatin for second line therapy. Consent has been signed. 6/3/24 CT scan shows favorable response. Continue treatment.      # Access: Peripheral veins.     # Pain: she has a displaced 8th Rib on the left side -Advised patient to take percocet daily and decrease Advil use. PRN Percocet Rx in place.  Rx last sent 23. Currently managed with PRN gummies.  New pain on the R upper back, controlled with percocet. Will try lidocaine patch OTC. :  Pain currently managed with PRN Advil, Aspercreme, and gummies.  Routine Gabapentin for neuropathy pain.   Pt verbalized desire to reduce # of medications taken.  Wishes to reduce Gabapentin dosage.   Supportive onc referral placed for assistance.   :  Bruise to buttock, pain 3-4/10.  Increased right flank/rib pain r/t fall, pain 3/10.  Managed with Advil 600mg TID.  :  Right flank pain 2/10.  Managed with PRN interventions.      # Anxiety:  Anxiety reported d/t recent death of her father, upcoming .  Pt requested 1x Valium dose, previous 10mg admin.  OARRS reviewed. Provided Rx for Valium 5mg x3 doses.      # Bone Health: L fifth ribs lesion stable.    # Arthralgia (grade 1).  Uric acid lab (-).  Intermittent arthralgia to left hand.  Continue PRN Advil, Aspercreme or Percocet (for severe pain).      # Left rib or chest wall pain over the past month. 23 CXR - RESULTS: Mediastinal surgical clips are noted. The cardiac silhouette is within normal limits. Mediastinal contours are unremarkable. The lungs demonstrate no infiltrate or atelectasis. There is no evidence for pleural effusion. Remote fracture is noted involving the right 7th rib. There are degenerative changes of the thoracic spine.  - Continue PRN Percocet and/or gummies for pain control.      # Right foot contusion, hyperextension:  Able to bear wt.  Continue to follow with podiatrist with any questions.  Continue Advil x5 days for inflammation.Resolved    # Psychosocial: Coping  well, no acute issues.  Good support from family & friends.  Social work support available.     # GI/CINV: No acute issues.  Eating and voiding well.  Nutrition consult available.    # Sinusitis:  Pressure to maxillary and frontal sinuses.  +Increased mucous, yellow in color.  Denies fevers.  Hx of recurrent sinus infections.  Rx called to pt's pharmacy.  Doxycycline 100mg BID x7-10 days.  Extended course d/t recent smoke exposure.  3/14: Resolved.       # Smoking: former smoker     # Fertility: N/A.        # Heme/ESAs: N/A.     # GOC: Patient is aware of palliative  intent goals of care.     # Language: English.     # Dispo: RTC in 3 weeks for next cycle.

## 2024-09-04 ENCOUNTER — DOCUMENTATION (OUTPATIENT)
Dept: HEMATOLOGY/ONCOLOGY | Facility: CLINIC | Age: 64
End: 2024-09-04
Payer: COMMERCIAL

## 2024-09-04 DIAGNOSIS — C34.11 MALIGNANT NEOPLASM OF UPPER LOBE OF RIGHT LUNG (MULTI): Primary | ICD-10-CM

## 2024-09-04 DIAGNOSIS — J18.9 COMMUNITY ACQUIRED PNEUMONIA, UNSPECIFIED LATERALITY: ICD-10-CM

## 2024-09-04 RX ORDER — DEXTROMETHORPHAN HYDROBROMIDE, GUAIFENESIN 5; 100 MG/5ML; MG/5ML
650 LIQUID ORAL AS NEEDED
Start: 2024-09-19

## 2024-09-04 RX ORDER — EPINEPHRINE 0.3 MG/.3ML
0.3 INJECTION SUBCUTANEOUS EVERY 5 MIN PRN
OUTPATIENT
Start: 2024-09-19

## 2024-09-04 RX ORDER — PROCHLORPERAZINE EDISYLATE 5 MG/ML
10 INJECTION INTRAMUSCULAR; INTRAVENOUS EVERY 6 HOURS PRN
OUTPATIENT
Start: 2024-10-10

## 2024-09-04 RX ORDER — DIPHENHYDRAMINE HCL 25 MG
50 CAPSULE ORAL AS NEEDED
Start: 2024-10-10

## 2024-09-04 RX ORDER — PROCHLORPERAZINE EDISYLATE 5 MG/ML
10 INJECTION INTRAMUSCULAR; INTRAVENOUS EVERY 6 HOURS PRN
OUTPATIENT
Start: 2024-10-31

## 2024-09-04 RX ORDER — ALBUTEROL SULFATE 0.83 MG/ML
3 SOLUTION RESPIRATORY (INHALATION) AS NEEDED
OUTPATIENT
Start: 2024-10-31

## 2024-09-04 RX ORDER — EPINEPHRINE 0.3 MG/.3ML
0.3 INJECTION SUBCUTANEOUS EVERY 5 MIN PRN
OUTPATIENT
Start: 2024-10-10

## 2024-09-04 RX ORDER — LEVOFLOXACIN 750 MG/1
750 TABLET ORAL DAILY
Qty: 5 TABLET | Refills: 0 | Status: SHIPPED | OUTPATIENT
Start: 2024-09-04 | End: 2024-09-09

## 2024-09-04 RX ORDER — DIPHENHYDRAMINE HCL 25 MG
50 CAPSULE ORAL AS NEEDED
Start: 2024-10-31

## 2024-09-04 RX ORDER — PROCHLORPERAZINE MALEATE 10 MG
10 TABLET ORAL EVERY 6 HOURS PRN
OUTPATIENT
Start: 2024-10-31

## 2024-09-04 RX ORDER — ALBUTEROL SULFATE 0.83 MG/ML
3 SOLUTION RESPIRATORY (INHALATION) AS NEEDED
OUTPATIENT
Start: 2024-10-10

## 2024-09-04 RX ORDER — PROCHLORPERAZINE MALEATE 10 MG
10 TABLET ORAL EVERY 6 HOURS PRN
OUTPATIENT
Start: 2024-09-19

## 2024-09-04 RX ORDER — DEXTROMETHORPHAN HYDROBROMIDE, GUAIFENESIN 5; 100 MG/5ML; MG/5ML
650 LIQUID ORAL AS NEEDED
Start: 2024-10-10

## 2024-09-04 RX ORDER — DIPHENHYDRAMINE HYDROCHLORIDE 50 MG/ML
50 INJECTION INTRAMUSCULAR; INTRAVENOUS AS NEEDED
OUTPATIENT
Start: 2024-10-10

## 2024-09-04 RX ORDER — DIPHENHYDRAMINE HYDROCHLORIDE 50 MG/ML
50 INJECTION INTRAMUSCULAR; INTRAVENOUS AS NEEDED
OUTPATIENT
Start: 2024-10-31

## 2024-09-04 RX ORDER — FAMOTIDINE 10 MG/ML
20 INJECTION INTRAVENOUS ONCE AS NEEDED
OUTPATIENT
Start: 2024-10-31

## 2024-09-04 RX ORDER — ALBUTEROL SULFATE 0.83 MG/ML
3 SOLUTION RESPIRATORY (INHALATION) AS NEEDED
OUTPATIENT
Start: 2024-09-19

## 2024-09-04 RX ORDER — PROCHLORPERAZINE MALEATE 10 MG
10 TABLET ORAL EVERY 6 HOURS PRN
OUTPATIENT
Start: 2024-10-10

## 2024-09-04 RX ORDER — FAMOTIDINE 10 MG/ML
20 INJECTION INTRAVENOUS ONCE AS NEEDED
OUTPATIENT
Start: 2024-10-10

## 2024-09-04 RX ORDER — DIPHENHYDRAMINE HYDROCHLORIDE 50 MG/ML
50 INJECTION INTRAMUSCULAR; INTRAVENOUS AS NEEDED
OUTPATIENT
Start: 2024-09-19

## 2024-09-04 RX ORDER — DEXTROMETHORPHAN HYDROBROMIDE, GUAIFENESIN 5; 100 MG/5ML; MG/5ML
650 LIQUID ORAL AS NEEDED
Start: 2024-10-31

## 2024-09-04 RX ORDER — DIPHENHYDRAMINE HCL 25 MG
50 CAPSULE ORAL AS NEEDED
Start: 2024-09-19

## 2024-09-04 RX ORDER — FAMOTIDINE 10 MG/ML
20 INJECTION INTRAVENOUS ONCE AS NEEDED
OUTPATIENT
Start: 2024-09-19

## 2024-09-04 RX ORDER — PROCHLORPERAZINE EDISYLATE 5 MG/ML
10 INJECTION INTRAMUSCULAR; INTRAVENOUS EVERY 6 HOURS PRN
OUTPATIENT
Start: 2024-09-19

## 2024-09-04 RX ORDER — EPINEPHRINE 0.3 MG/.3ML
0.3 INJECTION SUBCUTANEOUS EVERY 5 MIN PRN
OUTPATIENT
Start: 2024-10-31

## 2024-09-04 NOTE — PROGRESS NOTES
Pt called about having a sinus infection , pressure and green drainage. No fevers noted. Sent a message to MD and she sent in script for levaquin for the pt. Called the pt and made her aware.

## 2024-09-04 NOTE — RESEARCH NOTES
Research Note Treatment Day    Hayley Potter is here today for treatment on [id number]. Today is C25D1. Procedures completed per protocol. AE's and con-meds reviewed with patient. Patient is aware of treatment plan.    [x]   Received treatment as planned   OR  []    Treatment delayed; patient calendar updated as required   Treatment delayed because:    []   AE    []   Physician Discretion    []   Clinical Deterioration or Progression     []   Other    Education Documentation  Treatment Plan and Schedule, taught by Rafiq Delgado RN at 9/4/2024 12:42 PM.  Learner: Patient  Readiness: Acceptance  Method: Explanation  Response: Verbalizes Understanding    Education Comments  No comments found.      Pt and her daughter presented to clinic for cycle 25 on [id number], she really has no new complaints. Still with grade 1 fatigue , grade 1 taste alteration, and neuropathy. MD in to review scans with the pt and complete assessment. Pt is aware of the treatment plan.

## 2024-09-16 ENCOUNTER — APPOINTMENT (OUTPATIENT)
Dept: OTOLARYNGOLOGY | Facility: CLINIC | Age: 64
End: 2024-09-16
Payer: COMMERCIAL

## 2024-09-16 VITALS — BODY MASS INDEX: 22.82 KG/M2 | TEMPERATURE: 96.6 F | WEIGHT: 137 LBS | HEIGHT: 65 IN

## 2024-09-16 DIAGNOSIS — R05.3 CHRONIC COUGH: ICD-10-CM

## 2024-09-16 DIAGNOSIS — J32.9 RECURRENT SINUS INFECTIONS: Primary | ICD-10-CM

## 2024-09-16 DIAGNOSIS — C34.11 MALIGNANT NEOPLASM OF UPPER LOBE OF RIGHT LUNG (MULTI): ICD-10-CM

## 2024-09-16 PROCEDURE — 1036F TOBACCO NON-USER: CPT | Performed by: OTOLARYNGOLOGY

## 2024-09-16 PROCEDURE — 99214 OFFICE O/P EST MOD 30 MIN: CPT | Performed by: OTOLARYNGOLOGY

## 2024-09-16 PROCEDURE — 3008F BODY MASS INDEX DOCD: CPT | Performed by: OTOLARYNGOLOGY

## 2024-09-16 NOTE — PROGRESS NOTES
Chief Complaint   Patient presents with    Follow-up     LOV 9/22  SINUS ISSUES       HPI:  Hayley Potter is a 64 y.o. female who presents with some complaints of having some recent sinus infections with symptoms of cough with some purulent material.  Her nose and sinuses are actually feeling good during this time.  She has responded to antibiotic therapy and she is currently feeling great without any symptoms.  This has happened about 2-3 times in the past year.  She does smoke marijuana daily.  She has allergies and also uses azelastine.  Currently denies any sore throat, hoarseness, dysphagia, nasal congestion, postnasal drip, nasal drainage.  Unable to use any corticosteroids because of her lung cancer trial medication.  She has a history of a T1 adenocarcinoma of the right upper lung status post right upper lobe lobectomy April 2020.  Unfortunately she is now with metastatic recurrence involving the liver, the 11th left rib, and mediastinal lymph nodes.  She is on a clinical trial of pembrolizumab.  She has a history of [facility] laryngoscopy with excision of a right true vocal cord polypoid mass on July 26, 2017 by Dr. Nguyen.   This was a benign squamous papilloma.     PMH:  Past Medical History:   Diagnosis Date    Personal history of malignant neoplasm of larynx     History of malignant neoplasm of larynx    Personal history of other diseases of the respiratory system     History of chronic obstructive lung disease    Personal history of other diseases of the respiratory system     History of asthma    Personal history of other malignant neoplasm of bronchus and lung     History of malignant neoplasm of lung     Past Surgical History:   Procedure Laterality Date    BREAST LUMPECTOMY Left     2001 benign    CT GUIDED IMAGING FOR NEEDLE PLACEMENT  04/03/2020    CT GUIDED IMAGING FOR NEEDLE PLACEMENT [facility]    OTHER SURGICAL HISTORY  10/08/2019    Ankle surgery    OTHER SURGICAL HISTORY   10/08/2019    Tubal ligation    OTHER SURGICAL HISTORY  09/26/2022    Lumpectomy    OTHER SURGICAL HISTORY  02/23/2021    Tonsillectomy with adenoidectomy    US GUIDED PERCUTANEOUS PLACEMENT  02/16/2023    US GUIDED PERCUTANEOUS PLACEMENT [facility]         Medications:     Current Outpatient Medications:     albuterol 90 mcg/actuation inhaler, 2 puffs 3 times a day as needed for wheezing or shortness of breath., Disp: , Rfl:     alendronate (Fosamax) 70 mg tablet, Take 1 tablet (70 mg) by mouth every 7 days. Take in the morning with a full glass of water, on an empty stomach, and do not take anything else by mouth or lie down for the next 30 min., Disp: , Rfl:     azelastine HCl (AZELASTINE NASL), Administer 0.15 % into affected nostril(s) 2 times a day as needed., Disp: , Rfl:     bisacodyl (Dulcolax, bisacodyl,) 5 mg EC tablet, 1 tablet (5 mg)., Disp: , Rfl:     budesonide-formoteroL (Symbicort) 160-4.5 mcg/actuation inhaler, Rinse mouth with water after use to reduce aftertaste and incidence of candidiasis. Do not swallow., Disp: , Rfl:     busPIRone (Buspar) 10 mg tablet, Take 1 tablet (10 mg) by mouth 2 times a day., Disp: , Rfl:     busPIRone (Buspar) 15 mg tablet, 1 tablet (15 mg) every 12 hours., Disp: , Rfl:     cholecalciferol (Vitamin D-3) 50,000 unit capsule, Take 1 capsule (50,000 Units) by mouth 1 (one) time per week., Disp: , Rfl:     cyclobenzaprine (Flexeril) 10 mg tablet, 1 tablet (10 mg)., Disp: , Rfl:     diazePAM (Valium) 5 mg tablet, Take 1 tablet (5 mg) by mouth every 8 hours if needed for anxiety for up to 3 doses., Disp: 3 tablet, Rfl: 0    diphenhydramine HCl (BENADRYL ALLERGY ORAL), , Disp: , Rfl:     docusate sodium (COLACE CLEAR ORAL), , Disp: , Rfl:     ferrous gluconate (Fergon) 324 (38 Fe) mg tablet, Take 1 tablet (38 mg of iron) by mouth 2 times a week. Monday and Friday, Disp: , Rfl:     gabapentin (Neurontin) 300 mg capsule, Take 1 capsule (300 mg) by mouth every 12  hours., Disp: , Rfl:     gabapentin (Neurontin) 600 mg tablet, Take 1 tablet (600 mg) by mouth 2 times a day., Disp: , Rfl:     Petra-Lanta 200-200-20 mg/5 mL oral suspension, , Disp: , Rfl:     ibuprofen (AdviL) 200 mg tablet, 1 tablet with food or milk as needed Orally Three times a day, Disp: , Rfl:     ipratropium-albuteroL (Duo-Neb) 0.5-2.5 mg/3 mL nebulizer solution, , Disp: , Rfl:     levothyroxine (Synthroid, Levoxyl) 75 mcg tablet, Take 1 tablet (75 mcg) by mouth once daily., Disp: 90 tablet, Rfl: 1    lidocaine-silicone, adhesive 5 % combo pack, , Disp: , Rfl:     montelukast (Singulair) 10 mg tablet, 1 tablet Orally Once a day, Disp: , Rfl:     multivitamin with minerals tablet, Take 1 tablet by mouth once daily., Disp: , Rfl:     omeprazole (PriLOSEC) 40 mg DR capsule, TAKE 1 CAPSULE BY MOUTH 30 MINUTES BEFORE MORNING MEAL TWICE A DAY, Disp: , Rfl:     PEMBROLIZUMAB IV, Infuse into a venous catheter., Disp: , Rfl:     sennosides (Senokot) 8.6 mg tablet, Take 1 tablet (8.6 mg) by mouth once daily., Disp: , Rfl:     sodium chloride (Ocean) 0.65 % nasal spray, , Disp: , Rfl:     Sutab 1.479-0.188- 0.225 gram tablet, USE AS DIRECTED, Disp: , Rfl:     tiotropium (Spiriva Respimat) 2.5 mcg/actuation inhaler, INHALE TWO PUFFS BY MOUTH EVERY DAY, Disp: , Rfl:     estradiol (Estrace) 0.01 % (0.1 mg/gram) vaginal cream, Use 1/4 applicator-full of cream per vagina Monday Wednesday and Friday for 2 weeks, then taper to as needed use Please return to office for FU in 3 mos (Patient not taking: Reported on 5/16/2024), Disp: 42.5 g, Rfl: 1    lactulose 20 gram/30 mL oral solution, Take 15 mL (10 g) by mouth if needed (daily as needed for constipation). (Patient not taking: Reported on 4/25/2024), Disp: 960 mL, Rfl: 5    oxyCODONE-acetaminophen (Percocet) 5-325 mg tablet, Take 1 tablet by mouth every 6 hours if needed for severe pain (7 - 10) for up to 10 days., Disp: 30 tablet, Rfl: 0  No current facility-administered  "medications for this visit.    Facility-Administered Medications Ordered in Other Visits:     acetaminophen (Tylenol) tablet 650 mg, 650 mg, oral, PRN, Kayla Cook MD MPH    albuterol 2.5 mg /3 mL (0.083 %) nebulizer solution 3 mL, 3 mL, nebulization, PRN, Kayla Cook MD MPH    dextrose 5 % in water (D5W) bolus, 500 mL, intravenous, PRN, Kayla Cook MD MPH    diphenhydrAMINE (BENADryl) capsule 50 mg, 50 mg, oral, PRN, Kayla Cook MD MPH    diphenhydrAMINE (BENADryl) injection 50 mg, 50 mg, intravenous, PRN, Kayla Cook MD MPH    EPINEPHrine (Epipen) injection syringe 0.3 mg, 0.3 mg, intramuscular, q5 min PRN, Kayla Cook MD MPH    famotidine PF (Pepcid) injection 20 mg, 20 mg, intravenous, Once PRN, Kayla Coko MD MPH    methylPREDNISolone sod succinate (SOLU-Medrol) 40 mg/mL injection 40 mg, 40 mg, intravenous, PRN, Kayla Cook MD MPH    prochlorperazine (Compazine) injection 10 mg, 10 mg, intravenous, q6h PRN, Kayla Cook MD MPH    prochlorperazine (Compazine) tablet 10 mg, 10 mg, oral, q6h PRN, Kayla Cook MD MPH    sodium chloride 0.9 % bolus 500 mL, 500 mL, intravenous, PRN, Kayla Cook MD MPH     Allergies:  Allergies   Allergen Reactions    Cat Dander Runny nose     Irritated eyes    Cephalexin Other     YEAST INFECTION        ROS:  Review of systems normal unless stated otherwise in the HPI and/or PMH.    Physical Exam:  Temperature 35.9 °C (96.6 °F), height 1.651 m (5' 5\"), weight 62.1 kg (137 lb). Body mass index is 22.8 kg/m².     GENERAL APPEARANCE: Well developed and well nourished.  Alert and oriented in no acute distress.  Normal vocal quality.      HEAD/FACE: No erythema or edema or facial tenderness.  Normal facial nerve function bilaterally.    EAR:       EXTERNAL: Normal pinnas and external auditory canals without lesion or obstructing wax.       MIDDLE EAR: Tympanic membranes intact and mobile with normal landmarks.  Middle ear space appears well aerated.       TUBE STATUS: N/A       MASTOID CAVITY: N/A     "   HEARING: Gross hearing assessment is within normal limits.      NOSE:       VISUALIZED USING: Anterior rhinoscopy with headlight and nasal speculum.       DORSUM: Midline, nontraumatic appearance.       MUCOSA: Normal-appearing.       SECRETIONS: Normal.       SEPTUM: Midline and nonobstructing.       INFERIOR TURBINATES: Normal.       MIDDLE TURBINATES/MEATUS: N/A       BLEEDING: N/A         ORAL CAVITY/PHARYNX:       TEETH: Adequate dentition.       TONGUE: No mass or lesion.  Normal mobility.       FLOOR OF MOUTH: No mass or lesion.       PALATE: Normal hard palate, soft palate, and uvula.       OROPHARYNX: Normal without mass or lesion.       BUCCAL MUCOSA/GBS: Normal without mass or lesion.       LIPS: Normal.    LARYNX/HYPOPHARYNX/NASOPHARYNX: N/A    NECK: No palpable masses or abnormal adenopathy.  Trachea is midline.    THYROID: No thyromegaly or palpable nodule.    SALIVARY GLANDS: Normal bilateral parotid and submandibular glands by inspection and palpation.    TMJ's: Normal.    NEURO: Cranial nerve exam grossly normal bilaterally.       Assessment/Plan   Hayley was seen today for follow-up.  Diagnoses and all orders for this visit:  Recurrent sinus infections (Primary)  Chronic cough  Malignant neoplasm of upper lobe of right lung (Multi)     Currently doing well without any symptoms or complaints.  Recommend continued use of her azelastine as well as recommend Kamryn pot flushing daily.  She reports having some sinus surgery in the past and does not wish to have any further surgery for her sinuses.  At this point I do not think we need to do any further imaging but lets observe.  If she does continue have recurrent sinus infections more than like for 5 over a year.  Then we would consider CT imaging if she would be amenable to potential sinus surgery.  Follow up if symptoms worsen or fail to improve.     Carroll Catherine MD

## 2024-09-17 ENCOUNTER — EDUCATION (OUTPATIENT)
Dept: HEMATOLOGY/ONCOLOGY | Facility: CLINIC | Age: 64
End: 2024-09-17
Payer: COMMERCIAL

## 2024-09-17 DIAGNOSIS — C34.11 MALIGNANT NEOPLASM OF UPPER LOBE OF RIGHT LUNG (MULTI): ICD-10-CM

## 2024-09-18 ENCOUNTER — LAB (OUTPATIENT)
Dept: LAB | Facility: LAB | Age: 64
End: 2024-09-18
Payer: COMMERCIAL

## 2024-09-18 DIAGNOSIS — E03.9 HYPOTHYROIDISM, UNSPECIFIED: Primary | ICD-10-CM

## 2024-09-18 DIAGNOSIS — C34.11 MALIGNANT NEOPLASM OF UPPER LOBE OF RIGHT LUNG (MULTI): ICD-10-CM

## 2024-09-18 LAB
ALBUMIN SERPL BCP-MCNC: 3.9 G/DL (ref 3.4–5)
ALP SERPL-CCNC: 49 U/L (ref 33–136)
ALT SERPL W P-5'-P-CCNC: 16 U/L (ref 7–45)
AMYLASE SERPL-CCNC: 33 U/L (ref 29–103)
ANION GAP SERPL CALCULATED.3IONS-SCNC: 12 MMOL/L (ref 10–20)
AST SERPL W P-5'-P-CCNC: 18 U/L (ref 9–39)
BASOPHILS # BLD AUTO: 0.06 X10*3/UL (ref 0–0.1)
BASOPHILS NFR BLD AUTO: 0.7 %
BILIRUB SERPL-MCNC: 0.5 MG/DL (ref 0–1.2)
BUN SERPL-MCNC: 7 MG/DL (ref 6–23)
CALCIUM SERPL-MCNC: 9.2 MG/DL (ref 8.6–10.3)
CHLORIDE SERPL-SCNC: 105 MMOL/L (ref 98–107)
CO2 SERPL-SCNC: 26 MMOL/L (ref 21–32)
CREAT SERPL-MCNC: 0.81 MG/DL (ref 0.5–1.05)
EGFRCR SERPLBLD CKD-EPI 2021: 81 ML/MIN/1.73M*2
EOSINOPHIL # BLD AUTO: 0.07 X10*3/UL (ref 0–0.7)
EOSINOPHIL NFR BLD AUTO: 0.8 %
ERYTHROCYTE [DISTWIDTH] IN BLOOD BY AUTOMATED COUNT: 12.8 % (ref 11.5–14.5)
GLUCOSE SERPL-MCNC: 87 MG/DL (ref 74–99)
HCT VFR BLD AUTO: 41.2 % (ref 36–46)
HGB BLD-MCNC: 14 G/DL (ref 12–16)
IMM GRANULOCYTES # BLD AUTO: 0.03 X10*3/UL (ref 0–0.7)
IMM GRANULOCYTES NFR BLD AUTO: 0.3 % (ref 0–0.9)
LIPASE SERPL-CCNC: 18 U/L (ref 9–82)
LYMPHOCYTES # BLD AUTO: 1.43 X10*3/UL (ref 1.2–4.8)
LYMPHOCYTES NFR BLD AUTO: 16.5 %
MCH RBC QN AUTO: 31.9 PG (ref 26–34)
MCHC RBC AUTO-ENTMCNC: 34 G/DL (ref 32–36)
MCV RBC AUTO: 94 FL (ref 80–100)
MONOCYTES # BLD AUTO: 0.78 X10*3/UL (ref 0.1–1)
MONOCYTES NFR BLD AUTO: 9 %
NEUTROPHILS # BLD AUTO: 6.29 X10*3/UL (ref 1.2–7.7)
NEUTROPHILS NFR BLD AUTO: 72.7 %
NRBC BLD-RTO: 0 /100 WBCS (ref 0–0)
PLATELET # BLD AUTO: 398 X10*3/UL (ref 150–450)
POTASSIUM SERPL-SCNC: 4.2 MMOL/L (ref 3.5–5.3)
PROT SERPL-MCNC: 6.3 G/DL (ref 6.4–8.2)
RBC # BLD AUTO: 4.39 X10*6/UL (ref 4–5.2)
SODIUM SERPL-SCNC: 139 MMOL/L (ref 136–145)
TSH SERPL-ACNC: 0.67 MIU/L (ref 0.44–3.98)
WBC # BLD AUTO: 8.7 X10*3/UL (ref 4.4–11.3)

## 2024-09-18 PROCEDURE — 85025 COMPLETE CBC W/AUTO DIFF WBC: CPT

## 2024-09-18 PROCEDURE — 82150 ASSAY OF AMYLASE: CPT

## 2024-09-18 PROCEDURE — 84443 ASSAY THYROID STIM HORMONE: CPT

## 2024-09-18 PROCEDURE — 80053 COMPREHEN METABOLIC PANEL: CPT

## 2024-09-18 PROCEDURE — 36415 COLL VENOUS BLD VENIPUNCTURE: CPT

## 2024-09-18 PROCEDURE — 83690 ASSAY OF LIPASE: CPT

## 2024-09-19 ENCOUNTER — OFFICE VISIT (OUTPATIENT)
Dept: HEMATOLOGY/ONCOLOGY | Facility: CLINIC | Age: 64
End: 2024-09-19
Payer: COMMERCIAL

## 2024-09-19 ENCOUNTER — INFUSION (OUTPATIENT)
Dept: HEMATOLOGY/ONCOLOGY | Facility: CLINIC | Age: 64
End: 2024-09-19
Payer: COMMERCIAL

## 2024-09-19 ENCOUNTER — APPOINTMENT (OUTPATIENT)
Dept: HEMATOLOGY/ONCOLOGY | Facility: CLINIC | Age: 64
End: 2024-09-19
Payer: COMMERCIAL

## 2024-09-19 ENCOUNTER — EDUCATION (OUTPATIENT)
Dept: HEMATOLOGY/ONCOLOGY | Facility: CLINIC | Age: 64
End: 2024-09-19

## 2024-09-19 VITALS
WEIGHT: 135.14 LBS | RESPIRATION RATE: 17 BRPM | OXYGEN SATURATION: 95 % | BODY MASS INDEX: 22.49 KG/M2 | HEART RATE: 73 BPM | TEMPERATURE: 97.2 F | DIASTOLIC BLOOD PRESSURE: 79 MMHG | SYSTOLIC BLOOD PRESSURE: 110 MMHG

## 2024-09-19 DIAGNOSIS — C34.11 MALIGNANT NEOPLASM OF UPPER LOBE OF RIGHT LUNG (MULTI): ICD-10-CM

## 2024-09-19 DIAGNOSIS — Z51.12 ENCOUNTER FOR ANTINEOPLASTIC IMMUNOTHERAPY: Primary | ICD-10-CM

## 2024-09-19 DIAGNOSIS — G47.09 OTHER INSOMNIA: ICD-10-CM

## 2024-09-19 PROCEDURE — 99215 OFFICE O/P EST HI 40 MIN: CPT | Performed by: NURSE PRACTITIONER

## 2024-09-19 PROCEDURE — 96374 THER/PROPH/DIAG INJ IV PUSH: CPT | Mod: INF

## 2024-09-19 PROCEDURE — 96365 THER/PROPH/DIAG IV INF INIT: CPT | Mod: INF

## 2024-09-19 PROCEDURE — 2560000001 HC RX 256 EXPERIMENTAL DRUGS: Mod: SE | Performed by: STUDENT IN AN ORGANIZED HEALTH CARE EDUCATION/TRAINING PROGRAM

## 2024-09-19 RX ORDER — DIPHENHYDRAMINE HYDROCHLORIDE 50 MG/ML
50 INJECTION INTRAMUSCULAR; INTRAVENOUS AS NEEDED
Status: DISCONTINUED | OUTPATIENT
Start: 2024-09-19 | End: 2024-09-19 | Stop reason: HOSPADM

## 2024-09-19 RX ORDER — PROCHLORPERAZINE MALEATE 10 MG
10 TABLET ORAL EVERY 6 HOURS PRN
Status: DISCONTINUED | OUTPATIENT
Start: 2024-09-19 | End: 2024-09-19 | Stop reason: HOSPADM

## 2024-09-19 RX ORDER — DIPHENHYDRAMINE HCL 25 MG
50 CAPSULE ORAL AS NEEDED
Status: DISCONTINUED | OUTPATIENT
Start: 2024-09-19 | End: 2024-09-19 | Stop reason: HOSPADM

## 2024-09-19 RX ORDER — ALBUTEROL SULFATE 0.83 MG/ML
3 SOLUTION RESPIRATORY (INHALATION) AS NEEDED
Status: DISCONTINUED | OUTPATIENT
Start: 2024-09-19 | End: 2024-09-19 | Stop reason: HOSPADM

## 2024-09-19 RX ORDER — PROCHLORPERAZINE EDISYLATE 5 MG/ML
10 INJECTION INTRAMUSCULAR; INTRAVENOUS EVERY 6 HOURS PRN
Status: DISCONTINUED | OUTPATIENT
Start: 2024-09-19 | End: 2024-09-19 | Stop reason: HOSPADM

## 2024-09-19 RX ORDER — FAMOTIDINE 10 MG/ML
20 INJECTION INTRAVENOUS ONCE AS NEEDED
Status: DISCONTINUED | OUTPATIENT
Start: 2024-09-19 | End: 2024-09-19 | Stop reason: HOSPADM

## 2024-09-19 RX ORDER — EPINEPHRINE 0.3 MG/.3ML
0.3 INJECTION SUBCUTANEOUS EVERY 5 MIN PRN
Status: DISCONTINUED | OUTPATIENT
Start: 2024-09-19 | End: 2024-09-19 | Stop reason: HOSPADM

## 2024-09-19 RX ORDER — ACETAMINOPHEN 325 MG/1
650 TABLET ORAL AS NEEDED
Status: DISCONTINUED | OUTPATIENT
Start: 2024-09-19 | End: 2024-09-19 | Stop reason: HOSPADM

## 2024-09-19 ASSESSMENT — ENCOUNTER SYMPTOMS
CARDIOVASCULAR NEGATIVE: 1
HEMATOLOGIC/LYMPHATIC NEGATIVE: 1
FATIGUE: 0
FLANK PAIN: 1
NERVOUS/ANXIOUS: 0
DIARRHEA: 1
ARTHRALGIAS: 0
SLEEP DISTURBANCE: 1
CONSTITUTIONAL NEGATIVE: 1
NEUROLOGICAL NEGATIVE: 1
COUGH: 1

## 2024-09-19 ASSESSMENT — PAIN SCALES - GENERAL: PAINLEVEL: 2

## 2024-09-19 NOTE — RESEARCH NOTES
Research Note Treatment Day    Hayley Potter is here today for treatment on [id number]. Today is C26D1. Procedures completed per protocol. AE's and con-meds reviewed with patient. Patient is aware of treatment plan.    [x]   Received treatment as planned   OR  []    Treatment delayed; patient calendar updated as required   Treatment delayed because:    []   AE    []   Physician Discretion    []   Clinical Deterioration or Progression     []   Other    Education Documentation  Treatment Plan and Schedule, taught by Rafiq Delgado RN at 9/19/2024  4:01 PM.  Learner: Family, Patient  Readiness: Acceptance  Method: Explanation  Response: Verbalizes Understanding    Education Comments  No comments found.    Pt and her daughter arrived to clinic for cycle 26 on [id number]. Pt was reconsented for the protocol.  She denies any changes. She stated she had one day this week where she had diarrhea but that resolved.  It was on Tuesday.  She blames it on starting her protein drinks a week ago.  She is aware of the plan going forward and is aware of her next follow up and infusion. NP in to finish the assessment.

## 2024-09-19 NOTE — PROGRESS NOTES
Patient ID: Hayley Potter is a 63 y.o. female.    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 She was initially found to have spiculated 2.9cm mass in the RUL on the CT chest on 03/02/2020. She underwent CT guided RUL biopsy with pathology showed lung tissue with scan and focal aypicla grandular proliferation  suspicious for adenocarcinoma. IHC positive for CK7, TTF1 and napsin A. CK20 negative. PET/CT on 04/15/2020 showed RUL hypermetabolic RUL mass. No evidence of distant metastases. She underwent RUL lobectomy with mediastinal LN dissection with Dr. Wheat  on 04/21/2020 which showed invasive well to moderately differentiated adenocarcinoma, tumor measuring 2.5x2.3.x1.7cm. No VPI or LVI. All margins were negative. LN (0/11).      Unfortunately, on the surveillance CT chest (+) on 08/24/2022, there was a new irregular marginated 6mm GUSTABO nodule, which increased in size significantly on the repeat CT chest (-) on 01/06/2023, measuring 12mm.  There is also an additional new 9mm nodule  in the L lung as well as new mediastinal and possible L hilar LAD. PET/CT on 02/16/2023 showed hypermetabolic L hilar and mediastinal LAD. 1st  GUSTABO nodule now 8mm likely inflammatory. 2nd GUSTABO nodule showed stable size with SUV 4.4. Soft tissue mass associated with  L 11th rib fracture, SUV 11.5. Mass within the liver SUV 13.1, suspicious for mets. Hypermetabolic activity also noted  in  the stomach fundus, association with a hiatal hernia, in the cecum and ascending colon without masses seen. She underwent US guided liver biopsy on 02/16/2023 - poorly-differentiated carcinoma.     Treatment Summary:  04/21/2020: RUL lobectomy with medistainal LN dissection (Dr. Wheat)  04/11/2023: C1 pembrolizumab 200mg IV (on trial)  05/02/2023: C2 Pembrolizumab 200mg IV (on trial)  05/23/2023: C3 Pembrolizumab 200mg IV (on trial)  06/13/2023: C4 Pembrolizumab 200mg IV (on trial)   07/05/2023: C5 Pembrolizumab 200mg IV (on trial)   07/26/2023: C6 Pembrolizumab 200mg IV (on trial)   08/16/2023: C7 Pembrolizumab 200mg IV (on trial)   09/07/2023: C8 Pembrolizumab 200mg IV (on trial)   09/28/2023: C9 Pembrolizumab 200mg IV (on trial)   10/19/2023: C10 Pembrolizumab 200mg IV (on trial)  11/09/2023: C11 Pembrolizumab 200mg IV (on trial)  11/30/2023: C12 Pembrolizumab 200mg IV (on trial)  12/21/2023: C13 Pembrolizumab 200mg IV (on trial)  01/11/2024: C14 Pembrolizumab 200mg IV (on trial)  02/01/2024: C15 Pembrolizumab 200mg IV (on trial)  02/22/2024: C16 Pembrolizumab 200mg IV (on trial)  03/14/2024: C17 Pembrolizumab 200mg IV (on trial)  04/04/2024: C18 Pembrolizumab 200mg IV (on trial)  04/25/2024: C19 Pembrolizumab 200mg IV (on trial)  05/16/2024: C20 Pembrolizumab 200mg IV (on trial)  06/06/2024: C21 Pembrolizumab 200mg IV (on trial)  06/27/2024: C22 Pembrolizumab 200mg IV (on trial)  07/15/2024: ED visit right foot contusion   07/18/2024: C23 Pembrolizumab 200mg IV (on trial)  08/08/2024: C24 Pembrolizumab 200mg IV (on trial)  08/29/2024: C25 Pembrolizumab 200mg IV (on trial)  09/19/2024: C26 Pembrolizumab 200mg IV (on trial)    Interval History (09/19/2024):    Pt present in clinic for readiness to treat visit.  Her dtr is present for visit.  Reports stomach bug on Tuesday.  Diarrhea that day.  No further episodes.  Started drinking whey protein drink.  Mixing with milk.  Increased dairy in her diet.  Taking  1x/day.  Stopped.  Plans to resume next week.  Appetite is back to her norm.  Denies n/v/c.  BM's back to her norm.  Cough with thick green sputum. Seen by Dr. Catherine ENT 24 for recurrent sinus infection.  ATB course completed.  Symptoms resolved.  Ongoing difficulty sleeping.  Current gabapentin 300mg daytime dose and 600mg HS dose.  No other concerns today.  Labs WNL.  Ready for treatment today.      Review of Systems   Constitutional: Negative.  Negative for fatigue.   HENT:  Negative.     Respiratory:  Positive for cough.    Cardiovascular: Negative.    Gastrointestinal:  Positive for diarrhea.   Musculoskeletal:  Positive for flank pain (tenderness to right flank). Negative for arthralgias.   Skin: Negative.    Neurological: Negative.    Hematological: Negative.    Psychiatric/Behavioral:  Positive for sleep disturbance. The patient is not nervous/anxious.       PMHx: vocal cord cancer, GERD and COPD, OA  PSHx: tonsillectomy, tubal ligation, lumpectomy, L ankle and R hand surgery  FHx: strong family history of breast cancer including male breast cancer.   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 She quit etoh . She smokes Marijuana every day.     Allergies:  Cephalexin    Medications:  Medication list reviewed with patient and updated in EMR    Vital Signs:  /79 (BP Location: Right arm, Patient Position: Sitting, BP Cuff Size: Adult)   Pulse 73   Temp 36.2 °C (97.2 °F) (Temporal)   Resp 17   Wt 61.3 kg (135 lb 2.3 oz)   SpO2 95%   BMI 22.49 kg/m²     Wt Readings from Last 5 Encounters:   24 61.3 kg (135 lb 2.3 oz)   24 62.1 kg (137 lb)   24 61.5 kg (135 lb 11.1 oz)   24 62.1 kg (137 lb)   24 62.2 kg (137 lb 2 oz)      Physical Exam:  ECO  Pain:     Physical Exam  Vitals reviewed.   Constitutional:       Appearance: Normal appearance.   HENT:      Head: Normocephalic.      Nose: Nose normal.       Mouth/Throat:      Mouth: Mucous membranes are moist.      Pharynx: Oropharynx is clear.   Eyes:      Extraocular Movements: Extraocular movements intact.      Conjunctiva/sclera: Conjunctivae normal.      Pupils: Pupils are equal, round, and reactive to light.   Cardiovascular:      Rate and Rhythm: Normal rate and regular rhythm.      Pulses: Normal pulses.      Heart sounds: Normal heart sounds.   Pulmonary:      Breath sounds: Normal breath sounds.      Comments: Hx RUL lobectomy 4/2020  Abdominal:      General: Bowel sounds are normal.      Palpations: Abdomen is soft.   Musculoskeletal:         General: No tenderness. Normal range of motion.      Cervical back: Normal range of motion and neck supple.   Skin:     General: Skin is warm and dry.      Capillary Refill: Capillary refill takes less than 2 seconds.      Findings: No bruising.   Neurological:      General: No focal deficit present.      Mental Status: She is alert and oriented to person, place, and time.   Psychiatric:         Mood and Affect: Mood normal.         Behavior: Behavior normal.         Thought Content: Thought content normal.         Judgment: Judgment normal.        Results from last 7 days   Lab Units 09/18/24  1237   GLUCOSE mg/dL 87   SODIUM mmol/L 139   POTASSIUM mmol/L 4.2   CHLORIDE mmol/L 105   CO2 mmol/L 26   BUN mg/dL 7   CREATININE mg/dL 0.81   EGFR mL/min/1.73m*2 81   CALCIUM mg/dL 9.2   ALBUMIN g/dL 3.9   PROTEIN TOTAL g/dL 6.3*   BILIRUBIN TOTAL mg/dL 0.5   ALK PHOS U/L 49   ALT U/L 16   AST U/L 18     Results from last 7 days   Lab Units 09/18/24  1237   WBC AUTO x10*3/uL 8.7   HEMOGLOBIN g/dL 14.0   HEMATOCRIT % 41.2   PLATELETS AUTO x10*3/uL 398   NEUTROS ABS x10*3/uL 6.29   LYMPHS ABS AUTO x10*3/uL 1.43   MONOS ABS AUTO x10*3/uL 0.78   EOS ABS AUTO x10*3/uL 0.07   NEUTROS PCT AUTO % 72.7   LYMPHS PCT AUTO % 16.5   MONOS PCT AUTO % 9.0   EOS PCT AUTO % 0.8      Assessment/Plan:  64 y.o. 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     The patient's records and imaging have been reviewed in detail.  MD discussed with the patient the natural history of their disease at length.  The following has also been discussed with the patient:     # Cancer: recurrent likely metastatic ([id number]) lung cancer: liver biopsy showed extensive necrosis. Liquid biopsy showed KRAS G12C mutation, which was identified in her original surgical resected  specimen. PDL-1 50%. Patient initially had stage IA2 ([id number]) RUL lung  adenocarcinoma s/p RUL lobectomy, unfortunately now likely metastatic recurrence. Liver biopsy on 2/16 showed poorly differentiated malignant neoplasm with extensive necrosis. We discussed treatment  options with her today with either standard of care pembrolizumab vs clinical trials. Patient expressed interests in enrolling into clinical trial. Enrolled into RCT [id number]. Doing well.      - Interval scan: 08/26/2024: CT CAP (+): 1. Status post right upper lobectomy with no definite locoregional recurrence. Stable left upper lobe areas of subpleural scarring and nodularity. No new suspicious pulmonary nodules or masses. 2. Stable precarinal subcentimeter lymph node. No new enlarged intrathoracic lymphadenopathy. 3. Remote bilateral rib fractures as in prior.  1. No metastatic disease in the abdomen or pelvis.  Repeat in 3 months    # Hypothyroidism: G2. Likely 2/2 immunotherapy - resolved.  TSH 1.07 11/8/23.  Continue levothyroxine 75mcg daily.      # Microcytic anemia 2/2 due to chronic disease: patient denies any hematochezia  or hematuria. No melena. hx of hiatal hernia. Per patient. Last EGD was done in 2021 and was normal. Colonoscopy was a few years ago.  EGD showed hiatal hernia 03/2023. Improved Hgb overall. Iron studies show low-normal ferritin and TSAT of 14%. Continue PO iron 3x/week.  Tolerates well. Stable.      # Mucositis (chronic).  Pt reports since start of immunotherapy.  Chronic sore secondary to ill fitting dentures recommended s&s rinse.  Continue Anbesol and/or Oragel for pain.  Will continue to monitor.  BMX added.  24  - resolved      # Constipation: managed well with stool softener and PRN lactulose.        # Clinical Trials: 2023 initiated treatment on [id number] this is cycle 1 of first-line treatment.  She has been randomized to  arm A which is pembrolizumab alone for up to 2 years or progression followed by Alimta/carboplatin for second line therapy. Consent has been signed. 6/3/24 CT scan shows favorable response. Continue treatment.      # Access: Peripheral veins.     # Pain: she has a displaced 8th Rib on the left side -Advised patient to take percocet daily and decrease Advil use. PRN Percocet Rx in place.  Rx last sent 23. Currently managed with PRN gummies.  New pain on the R upper back, controlled with percocet. Will try lidocaine patch OTC. :  Pain currently managed with PRN Advil, Aspercreme, and gummies.  Routine Gabapentin for neuropathy pain.   Pt verbalized desire to reduce # of medications taken.  Wishes to reduce Gabapentin dosage.   Supportive onc referral placed for assistance.   :  Bruise to buttock, pain 3-4/10.  Increased right flank/rib pain r/t fall, pain 3/10.  Managed with Advil 600mg TID.  :  Right flank pain 2/10.  Managed with PRN interventions.      # Anxiety:  Anxiety reported d/t recent death of her father, upcoming .  Pt requested 1x Valium dose, previous 10mg admin.  OARRS reviewed. Provided Rx for Valium 5mg x3 doses.      # Bone Health: L fifth ribs lesion stable.    # Arthralgia (grade 1).  Uric acid lab (-).  Intermittent arthralgia to left hand.  Continue PRN Advil, Aspercreme or Percocet (for severe pain).      # Left rib or chest wall pain over the past month. 23 CXR - RESULTS: Mediastinal surgical  clips are noted. The cardiac silhouette is within normal limits. Mediastinal contours are unremarkable. The lungs demonstrate no infiltrate or atelectasis. There is no evidence for pleural effusion. Remote fracture is noted involving the right 7th rib. There are degenerative changes of the thoracic spine.  - Continue PRN Percocet and/or gummies for pain control.      # Right foot contusion, hyperextension:  Able to bear wt.  Continue to follow with podiatrist with any questions.  Continue Advil x5 days for inflammation. Resolved    # Psychosocial: Coping well, no acute issues.  Good support from family & friends.  Social work support available.     # GI/CINV: No acute issues.  Eating and voiding well.  Nutrition consult available.    # Insomnia:  Both difficulty falling and staying asleep.  Recommended pt can trial Melatonin.  Start with 3mg dose.  Max dose 10mg.      # Sinusitis:  Pressure to maxillary and frontal sinuses.  +Increased mucous, yellow in color.  Denies fevers.  Hx of recurrent sinus infections.  Rx called to pt's pharmacy.  Doxycycline 100mg BID x7-10 days.  Extended course d/t recent smoke exposure.  3/14: Resolved.  9/19:  9/16/24 ENT appt for recurrent sinus infection.  Completed ATB course.  Resolved.       # Smoking: former smoker     # Fertility: N/A.        # Heme/ESAs: N/A.     # GOC: Patient is aware of palliative intent goals of care.     # Language: English.     # Dispo: RTC in 3 weeks for next cycle.

## 2024-10-03 DIAGNOSIS — C34.11 MALIGNANT NEOPLASM OF UPPER LOBE OF RIGHT LUNG (MULTI): Primary | ICD-10-CM

## 2024-10-03 RX ORDER — ALBUTEROL SULFATE 0.83 MG/ML
3 SOLUTION RESPIRATORY (INHALATION) AS NEEDED
OUTPATIENT
Start: 2025-01-02

## 2024-10-03 RX ORDER — DEXTROMETHORPHAN HYDROBROMIDE, GUAIFENESIN 5; 100 MG/5ML; MG/5ML
650 LIQUID ORAL AS NEEDED
Start: 2024-12-12

## 2024-10-03 RX ORDER — DIPHENHYDRAMINE HYDROCHLORIDE 50 MG/ML
50 INJECTION INTRAMUSCULAR; INTRAVENOUS AS NEEDED
OUTPATIENT
Start: 2025-01-02

## 2024-10-03 RX ORDER — DIPHENHYDRAMINE HYDROCHLORIDE 50 MG/ML
50 INJECTION INTRAMUSCULAR; INTRAVENOUS AS NEEDED
OUTPATIENT
Start: 2024-11-21

## 2024-10-03 RX ORDER — FAMOTIDINE 10 MG/ML
20 INJECTION INTRAVENOUS ONCE AS NEEDED
OUTPATIENT
Start: 2025-01-23

## 2024-10-03 RX ORDER — PROCHLORPERAZINE EDISYLATE 5 MG/ML
10 INJECTION INTRAMUSCULAR; INTRAVENOUS EVERY 6 HOURS PRN
OUTPATIENT
Start: 2024-12-12

## 2024-10-03 RX ORDER — DIPHENHYDRAMINE HYDROCHLORIDE 50 MG/ML
50 INJECTION INTRAMUSCULAR; INTRAVENOUS AS NEEDED
OUTPATIENT
Start: 2024-12-12

## 2024-10-03 RX ORDER — ALBUTEROL SULFATE 0.83 MG/ML
3 SOLUTION RESPIRATORY (INHALATION) AS NEEDED
OUTPATIENT
Start: 2025-01-23

## 2024-10-03 RX ORDER — ALBUTEROL SULFATE 0.83 MG/ML
3 SOLUTION RESPIRATORY (INHALATION) AS NEEDED
OUTPATIENT
Start: 2024-12-12

## 2024-10-03 RX ORDER — EPINEPHRINE 0.3 MG/.3ML
0.3 INJECTION SUBCUTANEOUS EVERY 5 MIN PRN
OUTPATIENT
Start: 2024-12-12

## 2024-10-03 RX ORDER — ALBUTEROL SULFATE 0.83 MG/ML
3 SOLUTION RESPIRATORY (INHALATION) AS NEEDED
OUTPATIENT
Start: 2024-11-21

## 2024-10-03 RX ORDER — DEXTROMETHORPHAN HYDROBROMIDE, GUAIFENESIN 5; 100 MG/5ML; MG/5ML
650 LIQUID ORAL AS NEEDED
Start: 2025-01-23

## 2024-10-03 RX ORDER — DIPHENHYDRAMINE HCL 50 MG
50 CAPSULE ORAL AS NEEDED
Start: 2025-01-23

## 2024-10-03 RX ORDER — PROCHLORPERAZINE MALEATE 10 MG
10 TABLET ORAL EVERY 6 HOURS PRN
OUTPATIENT
Start: 2024-11-21

## 2024-10-03 RX ORDER — PROCHLORPERAZINE EDISYLATE 5 MG/ML
10 INJECTION INTRAMUSCULAR; INTRAVENOUS EVERY 6 HOURS PRN
OUTPATIENT
Start: 2025-01-02

## 2024-10-03 RX ORDER — FAMOTIDINE 10 MG/ML
20 INJECTION INTRAVENOUS ONCE AS NEEDED
OUTPATIENT
Start: 2024-11-21

## 2024-10-03 RX ORDER — DIPHENHYDRAMINE HCL 50 MG
50 CAPSULE ORAL AS NEEDED
Start: 2025-01-02

## 2024-10-03 RX ORDER — FAMOTIDINE 10 MG/ML
20 INJECTION INTRAVENOUS ONCE AS NEEDED
OUTPATIENT
Start: 2025-01-02

## 2024-10-03 RX ORDER — DIPHENHYDRAMINE HYDROCHLORIDE 50 MG/ML
50 INJECTION INTRAMUSCULAR; INTRAVENOUS AS NEEDED
OUTPATIENT
Start: 2025-01-23

## 2024-10-03 RX ORDER — PROCHLORPERAZINE MALEATE 10 MG
10 TABLET ORAL EVERY 6 HOURS PRN
OUTPATIENT
Start: 2024-12-12

## 2024-10-03 RX ORDER — EPINEPHRINE 0.3 MG/.3ML
0.3 INJECTION SUBCUTANEOUS EVERY 5 MIN PRN
OUTPATIENT
Start: 2025-01-23

## 2024-10-03 RX ORDER — PROCHLORPERAZINE EDISYLATE 5 MG/ML
10 INJECTION INTRAMUSCULAR; INTRAVENOUS EVERY 6 HOURS PRN
OUTPATIENT
Start: 2024-11-21

## 2024-10-03 RX ORDER — DEXTROMETHORPHAN HYDROBROMIDE, GUAIFENESIN 5; 100 MG/5ML; MG/5ML
650 LIQUID ORAL AS NEEDED
Start: 2024-11-21

## 2024-10-03 RX ORDER — EPINEPHRINE 0.3 MG/.3ML
0.3 INJECTION SUBCUTANEOUS EVERY 5 MIN PRN
OUTPATIENT
Start: 2025-01-02

## 2024-10-03 RX ORDER — PROCHLORPERAZINE MALEATE 10 MG
10 TABLET ORAL EVERY 6 HOURS PRN
OUTPATIENT
Start: 2025-01-23

## 2024-10-03 RX ORDER — FAMOTIDINE 10 MG/ML
20 INJECTION INTRAVENOUS ONCE AS NEEDED
OUTPATIENT
Start: 2024-12-12

## 2024-10-03 RX ORDER — PROCHLORPERAZINE MALEATE 10 MG
10 TABLET ORAL EVERY 6 HOURS PRN
OUTPATIENT
Start: 2025-01-02

## 2024-10-03 RX ORDER — DIPHENHYDRAMINE HCL 50 MG
50 CAPSULE ORAL AS NEEDED
Start: 2024-12-12

## 2024-10-03 RX ORDER — DIPHENHYDRAMINE HCL 50 MG
50 CAPSULE ORAL AS NEEDED
Start: 2024-11-21

## 2024-10-03 RX ORDER — PROCHLORPERAZINE EDISYLATE 5 MG/ML
10 INJECTION INTRAMUSCULAR; INTRAVENOUS EVERY 6 HOURS PRN
OUTPATIENT
Start: 2025-01-23

## 2024-10-03 RX ORDER — DEXTROMETHORPHAN HYDROBROMIDE, GUAIFENESIN 5; 100 MG/5ML; MG/5ML
650 LIQUID ORAL AS NEEDED
Start: 2025-01-02

## 2024-10-03 RX ORDER — EPINEPHRINE 0.3 MG/.3ML
0.3 INJECTION SUBCUTANEOUS EVERY 5 MIN PRN
OUTPATIENT
Start: 2024-11-21

## 2024-10-09 ENCOUNTER — LAB (OUTPATIENT)
Dept: LAB | Facility: LAB | Age: 64
End: 2024-10-09
Payer: COMMERCIAL

## 2024-10-09 DIAGNOSIS — C34.11 MALIGNANT NEOPLASM OF UPPER LOBE OF RIGHT LUNG (MULTI): ICD-10-CM

## 2024-10-09 DIAGNOSIS — C34.91 PRIMARY MALIGNANT NEOPLASM OF RIGHT LUNG METASTATIC TO OTHER SITE (MULTI): ICD-10-CM

## 2024-10-09 LAB
ALBUMIN SERPL BCP-MCNC: 4 G/DL (ref 3.4–5)
ALP SERPL-CCNC: 59 U/L (ref 33–136)
ALT SERPL W P-5'-P-CCNC: 19 U/L (ref 7–45)
AMYLASE SERPL-CCNC: 36 U/L (ref 29–103)
ANION GAP SERPL CALCULATED.3IONS-SCNC: 11 MMOL/L (ref 10–20)
AST SERPL W P-5'-P-CCNC: 22 U/L (ref 9–39)
BASOPHILS # BLD AUTO: 0.06 X10*3/UL (ref 0–0.1)
BASOPHILS NFR BLD AUTO: 0.7 %
BILIRUB SERPL-MCNC: 0.3 MG/DL (ref 0–1.2)
BUN SERPL-MCNC: 10 MG/DL (ref 6–23)
CALCIUM SERPL-MCNC: 9.8 MG/DL (ref 8.6–10.3)
CHLORIDE SERPL-SCNC: 101 MMOL/L (ref 98–107)
CO2 SERPL-SCNC: 30 MMOL/L (ref 21–32)
CREAT SERPL-MCNC: 0.83 MG/DL (ref 0.5–1.05)
EGFRCR SERPLBLD CKD-EPI 2021: 79 ML/MIN/1.73M*2
EOSINOPHIL # BLD AUTO: 0.1 X10*3/UL (ref 0–0.7)
EOSINOPHIL NFR BLD AUTO: 1.1 %
ERYTHROCYTE [DISTWIDTH] IN BLOOD BY AUTOMATED COUNT: 12.5 % (ref 11.5–14.5)
GLUCOSE SERPL-MCNC: 80 MG/DL (ref 74–99)
HCT VFR BLD AUTO: 40.4 % (ref 36–46)
HGB BLD-MCNC: 13.8 G/DL (ref 12–16)
IMM GRANULOCYTES # BLD AUTO: 0.03 X10*3/UL (ref 0–0.7)
IMM GRANULOCYTES NFR BLD AUTO: 0.3 % (ref 0–0.9)
LIPASE SERPL-CCNC: 24 U/L (ref 9–82)
LYMPHOCYTES # BLD AUTO: 1.54 X10*3/UL (ref 1.2–4.8)
LYMPHOCYTES NFR BLD AUTO: 17 %
MCH RBC QN AUTO: 31.9 PG (ref 26–34)
MCHC RBC AUTO-ENTMCNC: 34.2 G/DL (ref 32–36)
MCV RBC AUTO: 94 FL (ref 80–100)
MONOCYTES # BLD AUTO: 0.82 X10*3/UL (ref 0.1–1)
MONOCYTES NFR BLD AUTO: 9 %
NEUTROPHILS # BLD AUTO: 6.53 X10*3/UL (ref 1.2–7.7)
NEUTROPHILS NFR BLD AUTO: 71.9 %
NRBC BLD-RTO: 0 /100 WBCS (ref 0–0)
PLATELET # BLD AUTO: 370 X10*3/UL (ref 150–450)
POTASSIUM SERPL-SCNC: 5.5 MMOL/L (ref 3.5–5.3)
PROT SERPL-MCNC: 6.2 G/DL (ref 6.4–8.2)
RBC # BLD AUTO: 4.32 X10*6/UL (ref 4–5.2)
SODIUM SERPL-SCNC: 136 MMOL/L (ref 136–145)
WBC # BLD AUTO: 9.1 X10*3/UL (ref 4.4–11.3)

## 2024-10-09 PROCEDURE — 36415 COLL VENOUS BLD VENIPUNCTURE: CPT

## 2024-10-09 PROCEDURE — 80053 COMPREHEN METABOLIC PANEL: CPT

## 2024-10-09 PROCEDURE — 85025 COMPLETE CBC W/AUTO DIFF WBC: CPT

## 2024-10-09 PROCEDURE — 82150 ASSAY OF AMYLASE: CPT

## 2024-10-09 PROCEDURE — 83690 ASSAY OF LIPASE: CPT

## 2024-10-09 ASSESSMENT — ENCOUNTER SYMPTOMS
CARDIOVASCULAR NEGATIVE: 1
DIARRHEA: 1
COUGH: 1
HEMATOLOGIC/LYMPHATIC NEGATIVE: 1
CONSTITUTIONAL NEGATIVE: 1
NERVOUS/ANXIOUS: 0
SLEEP DISTURBANCE: 1
FATIGUE: 0
FLANK PAIN: 1
NEUROLOGICAL NEGATIVE: 1
ARTHRALGIAS: 0

## 2024-10-10 ENCOUNTER — OFFICE VISIT (OUTPATIENT)
Dept: HEMATOLOGY/ONCOLOGY | Facility: CLINIC | Age: 64
End: 2024-10-10
Payer: COMMERCIAL

## 2024-10-10 ENCOUNTER — INFUSION (OUTPATIENT)
Dept: HEMATOLOGY/ONCOLOGY | Facility: CLINIC | Age: 64
End: 2024-10-10
Payer: COMMERCIAL

## 2024-10-10 ENCOUNTER — APPOINTMENT (OUTPATIENT)
Dept: HEMATOLOGY/ONCOLOGY | Facility: CLINIC | Age: 64
End: 2024-10-10
Payer: COMMERCIAL

## 2024-10-10 ENCOUNTER — EDUCATION (OUTPATIENT)
Dept: HEMATOLOGY/ONCOLOGY | Facility: CLINIC | Age: 64
End: 2024-10-10

## 2024-10-10 VITALS
HEART RATE: 64 BPM | BODY MASS INDEX: 22.45 KG/M2 | DIASTOLIC BLOOD PRESSURE: 85 MMHG | SYSTOLIC BLOOD PRESSURE: 143 MMHG | OXYGEN SATURATION: 98 % | TEMPERATURE: 97.5 F | RESPIRATION RATE: 18 BRPM | WEIGHT: 134.92 LBS

## 2024-10-10 DIAGNOSIS — Z00.6 EXAMINATION OF PARTICIPANT IN CLINICAL TRIAL: ICD-10-CM

## 2024-10-10 DIAGNOSIS — C34.11 MALIGNANT NEOPLASM OF UPPER LOBE OF RIGHT LUNG (MULTI): ICD-10-CM

## 2024-10-10 DIAGNOSIS — C34.91 PRIMARY MALIGNANT NEOPLASM OF RIGHT LUNG METASTATIC TO OTHER SITE (MULTI): Primary | ICD-10-CM

## 2024-10-10 DIAGNOSIS — Z51.12 ENCOUNTER FOR ANTINEOPLASTIC IMMUNOTHERAPY: ICD-10-CM

## 2024-10-10 PROCEDURE — 96374 THER/PROPH/DIAG INJ IV PUSH: CPT | Mod: INF

## 2024-10-10 PROCEDURE — 2560000001 HC RX 256 EXPERIMENTAL DRUGS: Mod: SE | Performed by: STUDENT IN AN ORGANIZED HEALTH CARE EDUCATION/TRAINING PROGRAM

## 2024-10-10 PROCEDURE — 99215 OFFICE O/P EST HI 40 MIN: CPT | Performed by: STUDENT IN AN ORGANIZED HEALTH CARE EDUCATION/TRAINING PROGRAM

## 2024-10-10 PROCEDURE — 96413 CHEMO IV INFUSION 1 HR: CPT

## 2024-10-10 RX ORDER — DIPHENHYDRAMINE HCL 25 MG
50 CAPSULE ORAL AS NEEDED
Status: DISCONTINUED | OUTPATIENT
Start: 2024-10-10 | End: 2024-10-10 | Stop reason: HOSPADM

## 2024-10-10 RX ORDER — PROCHLORPERAZINE MALEATE 10 MG
10 TABLET ORAL EVERY 6 HOURS PRN
Status: DISCONTINUED | OUTPATIENT
Start: 2024-10-10 | End: 2024-10-10 | Stop reason: HOSPADM

## 2024-10-10 RX ORDER — EPINEPHRINE 0.3 MG/.3ML
0.3 INJECTION SUBCUTANEOUS EVERY 5 MIN PRN
Status: DISCONTINUED | OUTPATIENT
Start: 2024-10-10 | End: 2024-10-10 | Stop reason: HOSPADM

## 2024-10-10 RX ORDER — MULTIVITAMIN
1 TABLET ORAL DAILY
COMMUNITY
End: 2024-10-10 | Stop reason: SDUPTHER

## 2024-10-10 RX ORDER — FAMOTIDINE 10 MG/ML
20 INJECTION INTRAVENOUS ONCE AS NEEDED
Status: DISCONTINUED | OUTPATIENT
Start: 2024-10-10 | End: 2024-10-10 | Stop reason: HOSPADM

## 2024-10-10 RX ORDER — PROCHLORPERAZINE EDISYLATE 5 MG/ML
10 INJECTION INTRAMUSCULAR; INTRAVENOUS EVERY 6 HOURS PRN
Status: DISCONTINUED | OUTPATIENT
Start: 2024-10-10 | End: 2024-10-10 | Stop reason: HOSPADM

## 2024-10-10 RX ORDER — DIPHENHYDRAMINE HYDROCHLORIDE 50 MG/ML
50 INJECTION INTRAMUSCULAR; INTRAVENOUS AS NEEDED
Status: DISCONTINUED | OUTPATIENT
Start: 2024-10-10 | End: 2024-10-10 | Stop reason: HOSPADM

## 2024-10-10 RX ORDER — ALBUTEROL SULFATE 0.83 MG/ML
3 SOLUTION RESPIRATORY (INHALATION) AS NEEDED
Status: DISCONTINUED | OUTPATIENT
Start: 2024-10-10 | End: 2024-10-10 | Stop reason: HOSPADM

## 2024-10-10 RX ORDER — ACETAMINOPHEN 325 MG/1
650 TABLET ORAL ONCE
Status: DISCONTINUED | OUTPATIENT
Start: 2024-10-10 | End: 2024-10-10 | Stop reason: HOSPADM

## 2024-10-10 ASSESSMENT — PAIN SCALES - GENERAL: PAINLEVEL: 2

## 2024-10-10 NOTE — PROGRESS NOTES
Patient here today for follow up.     Fatigue:  PO intake:  Weight:  N/V/D/C: occasional nausea with the feeling of needing to vomit, subsides after about 15 minutes    Medications reviewed with patient.

## 2024-10-10 NOTE — PROGRESS NOTES
Patient ID: Hayley Potter is a 63 y.o. female.    CC:  Management of stage IA3 ([id number]) adenocarcinoma of RUL s/p RUL lobectomy 04/2020, PDL-1 50%, NGS positive for KRAS G12C--> now with  metastatic recurrence involving liver, 11th L rib, and mediastinal LNs. 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 She was initially found to have spiculated 2.9cm mass in the RUL on the CT chest on 03/02/2020. She underwent CT guided RUL biopsy with pathology showed lung tissue with scan and focal aypicla grandular proliferation  suspicious for adenocarcinoma. IHC positive for CK7, TTF1 and napsin A. CK20 negative. PET/CT on 04/15/2020 showed RUL hypermetabolic RUL mass. No evidence of distant metastases. She underwent RUL lobectomy with mediastinal LN dissection with Dr. Wheat  on 04/21/2020 which showed invasive well to moderately differentiated adenocarcinoma, tumor measuring 2.5x2.3.x1.7cm. No VPI or LVI. All margins were negative. LN (0/11).      Unfortunately, on the surveillance CT chest (+) on 08/24/2022, there was a new irregular marginated 6mm GUSTABO nodule, which increased in size significantly on the repeat CT chest (-) on 01/06/2023, measuring 12mm.  There is also an additional new 9mm nodule  in the L lung as well as new mediastinal and possible L hilar LAD. PET/CT on 02/16/2023 showed hypermetabolic L hilar and mediastinal LAD. 1st  GUSTABO nodule now 8mm likely inflammatory. 2nd GUSTABO nodule showed stable size with SUV 4.4. Soft tissue mass associated with  L 11th rib fracture, SUV 11.5. Mass within the liver SUV 13.1, suspicious for mets. Hypermetabolic activity also noted  in  the stomach fundus, association with a hiatal hernia, in the cecum and ascending colon without masses seen. She underwent US guided liver biopsy on 02/16/2023 - poorly-differentiated carcinoma.     Treatment Summary:  04/21/2020: RUL lobectomy with medistainal LN dissection (Dr. Wheat)  04/11/2023: C1 pembrolizumab 200mg IV (on trial)  05/02/2023: C2 Pembrolizumab 200mg IV (on trial)  05/23/2023: C3 Pembrolizumab 200mg IV (on trial)  06/13/2023: C4 Pembrolizumab 200mg IV (on trial)   07/05/2023: C5 Pembrolizumab 200mg IV (on trial)   07/26/2023: C6 Pembrolizumab 200mg IV (on trial)   08/16/2023: C7 Pembrolizumab 200mg IV (on trial)   09/07/2023: C8 Pembrolizumab 200mg IV (on trial)   09/28/2023: C9 Pembrolizumab 200mg IV (on trial)   10/19/2023: C10 Pembrolizumab 200mg IV (on trial)  11/09/2023: C11 Pembrolizumab 200mg IV (on trial)  11/30/2023: C12 Pembrolizumab 200mg IV (on trial)  12/21/2023: C13 Pembrolizumab 200mg IV (on trial)  01/11/2024: C14 Pembrolizumab 200mg IV (on trial)  02/01/2024: C15 Pembrolizumab 200mg IV (on trial)  02/22/2024: C16 Pembrolizumab 200mg IV (on trial)  03/14/2024: C17 Pembrolizumab 200mg IV (on trial)  04/04/2024: C18 Pembrolizumab 200mg IV (on trial)  04/25/2024: C19 Pembrolizumab 200mg IV (on trial)  05/16/2024: C20 Pembrolizumab 200mg IV (on trial)  06/06/2024: C21 Pembrolizumab 200mg IV (on trial)  06/27/2024: C22 Pembrolizumab 200mg IV (on trial)  07/15/2024: ED visit right foot contusion   07/18/2024: C23 Pembrolizumab 200mg IV (on trial)  08/08/2024: C24 Pembrolizumab 200mg IV (on trial)  08/29/2024: C25 Pembrolizumab 200mg IV (on trial)  09/19/2024: C26 Pembrolizumab 200mg IV (on trial)  10/10/2024: C27 Pembrolizumab 200mg IV (on trial)    Interval History (10/10/2024):    Pt present in clinic for readiness to treat visit.  Her dtr is present for visit.  Continues to do well. No fever or chills. Chronic stable cough. RUQ pain, chronic, for which she increased the gabapentin  dose which is helping. No n/v. A few episodes of diarrhea which has resolved. Being active.      Review of Systems   Constitutional: Negative.  Negative for fatigue.   HENT:  Negative.     Respiratory:  Positive for cough.    Cardiovascular: Negative.    Gastrointestinal:  Positive for diarrhea.   Musculoskeletal:  Positive for flank pain (tenderness to right flank). Negative for arthralgias.   Skin: Negative.    Neurological: Negative.    Hematological: Negative.    Psychiatric/Behavioral:  Positive for sleep disturbance. The patient is not nervous/anxious.       PMHx: vocal cord cancer, GERD and COPD, OA  PSHx: tonsillectomy, tubal ligation, lumpectomy, L ankle and R hand surgery  FHx: strong family history of breast cancer including male breast cancer.   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 She quit etoh . She smokes Marijuana every day.     Allergies:  Cephalexin    Medications:  Medication list reviewed with patient and updated in EMR    Vital Signs:  There were no vitals taken for this visit.    Wt Readings from Last 5 Encounters:   24 61.3 kg (135 lb 2.3 oz)   24 62.1 kg (137 lb)   24 61.5 kg (135 lb 11.1 oz)   24 62.1 kg (137 lb)   24 62.2 kg (137 lb 2 oz)      Physical Exam:  ECO  Pain:     Physical Exam  Vitals reviewed.   Constitutional:       Appearance: Normal appearance.   HENT:      Head: Normocephalic.      Nose: Nose normal.      Mouth/Throat:      Mouth: Mucous membranes are moist.      Pharynx: Oropharynx is clear.   Eyes:      Extraocular Movements: Extraocular movements intact.      Conjunctiva/sclera: Conjunctivae normal.      Pupils: Pupils are equal, round, and reactive to light.   Cardiovascular:      Rate and Rhythm: Normal rate and regular rhythm.      Pulses: Normal pulses.      Heart sounds: Normal heart sounds.   Pulmonary:      Breath sounds: Normal breath sounds.      Comments: Hx RUL lobectomy  4/2020  Abdominal:      General: Bowel sounds are normal.      Palpations: Abdomen is soft.   Musculoskeletal:         General: No tenderness. Normal range of motion.      Cervical back: Normal range of motion and neck supple.   Skin:     General: Skin is warm and dry.      Capillary Refill: Capillary refill takes less than 2 seconds.      Findings: No bruising.   Neurological:      General: No focal deficit present.      Mental Status: She is alert and oriented to person, place, and time.   Psychiatric:         Mood and Affect: Mood normal.         Behavior: Behavior normal.         Thought Content: Thought content normal.         Judgment: Judgment normal.        Results from last 7 days   Lab Units 10/09/24  1334   GLUCOSE mg/dL 80   SODIUM mmol/L 136   POTASSIUM mmol/L 5.5*   CHLORIDE mmol/L 101   CO2 mmol/L 30   BUN mg/dL 10   CREATININE mg/dL 0.83   EGFR mL/min/1.73m*2 79   CALCIUM mg/dL 9.8   ALBUMIN g/dL 4.0   PROTEIN TOTAL g/dL 6.2*   BILIRUBIN TOTAL mg/dL 0.3   ALK PHOS U/L 59   ALT U/L 19   AST U/L 22     Results from last 7 days   Lab Units 10/09/24  1334   WBC AUTO x10*3/uL 9.1   HEMOGLOBIN g/dL 13.8   HEMATOCRIT % 40.4   PLATELETS AUTO x10*3/uL 370   NEUTROS ABS x10*3/uL 6.53   LYMPHS ABS AUTO x10*3/uL 1.54   MONOS ABS AUTO x10*3/uL 0.82   EOS ABS AUTO x10*3/uL 0.10   NEUTROS PCT AUTO % 71.9   LYMPHS PCT AUTO % 17.0   MONOS PCT AUTO % 9.0   EOS PCT AUTO % 1.1      Assessment/Plan:  64 y.o. 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     The patient's records and imaging have been reviewed in detail.  MD discussed with the patient the natural history of their disease at length.  The following has also been discussed with the patient:     # Cancer: recurrent likely metastatic ([id number]) lung cancer: liver biopsy showed extensive necrosis. Liquid biopsy showed  KRAS G12C mutation, which was identified in her original surgical resected  specimen. PDL-1 50%. Patient initially had stage IA2 ([id number]) RUL lung  adenocarcinoma s/p RUL lobectomy, unfortunately now likely metastatic recurrence. Liver biopsy on 2/16 showed poorly differentiated malignant neoplasm with extensive necrosis. We discussed treatment  options with her today with either standard of care pembrolizumab vs clinical trials. Patient expressed interests in enrolling into clinical trial. Enrolled into RCT [id number]. Doing well.      - Interval scan: 08/26/2024: CT CAP (+): 1. Status post right upper lobectomy with no definite locoregional recurrence. Stable left upper lobe areas of subpleural scarring and nodularity. No new suspicious pulmonary nodules or masses. 2. Stable precarinal subcentimeter lymph node. No new enlarged intrathoracic lymphadenopathy. 3. Remote bilateral rib fractures as in prior.  1. No metastatic disease in the abdomen or pelvis.  Repeat in 3 months    # Hypothyroidism: G2. Likely 2/2 immunotherapy - resolved.  TSH 1.07 11/8/23.  Continue levothyroxine 75mcg daily.      # Microcytic anemia 2/2 due to chronic disease: patient denies any hematochezia  or hematuria. No melena. hx of hiatal hernia. Per patient. Last EGD was done in 2021 and was normal. Colonoscopy was a few years ago.  EGD showed hiatal hernia 03/2023. Improved Hgb overall. Iron studies show low-normal ferritin and TSAT of 14%. Continue PO iron 3x/week. Tolerates well. Stable.      # Mucositis (chronic).  Pt reports since start of immunotherapy.  Chronic sore secondary to ill fitting dentures recommended s&s rinse.  Continue Anbesol and/or Oragel for pain.  Will continue to monitor.  BMX added.  2/1/24  - resolved      # Constipation: managed well with stool softener and PRN lactulose.        # Clinical Trials: April 11, 2023 initiated treatment on [id number] this is cycle 1 of first-line treatment.  She has been randomized to  arm A  which is pembrolizumab alone for up to 2 years or progression followed by Alimta/carboplatin for second line therapy. Consent has been signed. 6/3/24 CT scan shows favorable response. Continue treatment.      # Access: Peripheral veins.     # Pain: she has a displaced 8th Rib on the left side -Advised patient to take percocet daily and decrease Advil use. PRN Percocet Rx in place.  Rx last sent 23. Currently managed with PRN gummies.  New pain on the R upper back, controlled with percocet. Will try lidocaine patch OTC. :  Pain currently managed with PRN Advil, Aspercreme, and gummies.  Routine Gabapentin for neuropathy pain.   Pt verbalized desire to reduce # of medications taken.  Wishes to reduce Gabapentin dosage.   Supportive onc referral placed for assistance.   :  Bruise to buttock, pain 3-4/10.  Increased right flank/rib pain r/t fall, pain 3/10.  Managed with Advil 600mg TID.  :  Right flank pain 2/10.  Managed with PRN interventions.      # Anxiety:  Anxiety reported d/t recent death of her father, upcoming .  Pt requested 1x Valium dose, previous 10mg admin.  OARRS reviewed. Provided Rx for Valium 5mg x3 doses.      # Bone Health: L fifth ribs lesion stable.    # Arthralgia (grade 1).  Uric acid lab (-).  Intermittent arthralgia to left hand.  Continue PRN Advil, Aspercreme or Percocet (for severe pain).      # Left rib or chest wall pain over the past month. 23 CXR - RESULTS: Mediastinal surgical clips are noted. The cardiac silhouette is within normal limits. Mediastinal contours are unremarkable. The lungs demonstrate no infiltrate or atelectasis. There is no evidence for pleural effusion. Remote fracture is noted involving the right 7th rib. There are degenerative changes of the thoracic spine.  - Continue PRN Percocet and/or gummies for pain control.      # Right foot contusion, hyperextension:  Able to bear wt.  Continue to follow with podiatrist with any questions.   Continue Advil x5 days for inflammation. Resolved    # Psychosocial: Coping well, no acute issues.  Good support from family & friends.  Social work support available.     # GI/CINV: No acute issues.  Eating and voiding well.  Nutrition consult available.    # Insomnia:  Both difficulty falling and staying asleep.  Recommended pt can trial Melatonin.  Start with 3mg dose.  Max dose 10mg.      # Sinusitis:  Pressure to maxillary and frontal sinuses.  +Increased mucous, yellow in color.  Denies fevers.  Hx of recurrent sinus infections.  Rx called to pt's pharmacy.  Doxycycline 100mg BID x7-10 days.  Extended course d/t recent smoke exposure.  3/14: Resolved.  9/19:  9/16/24 ENT appt for recurrent sinus infection.  Completed ATB course.  Resolved.       # Smoking: former smoker     # Fertility: N/A.        # Heme/ESAs: N/A.     # GOC: Patient is aware of palliative intent goals of care.     # Language: English.     # Dispo: RTC in 3 weeks for next cycle.

## 2024-10-14 NOTE — RESEARCH NOTES
Research Note Treatment Day    Hayley Potter is here today for treatment on [id number]. Today is C27D1. Procedures completed per protocol. AE's and con-meds reviewed with patient. Patient is aware of treatment plan.    [x]   Received treatment as planned   OR  []    Treatment delayed; patient calendar updated as required   Treatment delayed because:    []   AE    []   Physician Discretion    []   Clinical Deterioration or Progression     []   Other    Education Documentation  Treatment Plan and Schedule, taught by Rafiq Delgado RN at 10/14/2024  8:09 AM.  Learner: Family, Patient  Readiness: Acceptance  Method: Explanation  Response: Verbalizes Understanding    Education Comments  No comments found.    Pt and her daughter presented to clinic for cycle 27 on [id number]. Pt is feeling pretty well today.  No major changes notes with pt. She is aware of scheduling for next cycle and when her scans are.  MD in to see pt for full assessment.

## 2024-10-30 ENCOUNTER — LAB (OUTPATIENT)
Dept: LAB | Facility: LAB | Age: 64
End: 2024-10-30
Payer: COMMERCIAL

## 2024-10-30 ENCOUNTER — TELEPHONE (OUTPATIENT)
Dept: HEMATOLOGY/ONCOLOGY | Facility: CLINIC | Age: 64
End: 2024-10-30

## 2024-10-30 DIAGNOSIS — C34.11 MALIGNANT NEOPLASM OF UPPER LOBE, RIGHT BRONCHUS OR LUNG: Primary | ICD-10-CM

## 2024-10-30 LAB
ALBUMIN SERPL BCP-MCNC: 4.1 G/DL (ref 3.4–5)
ALP SERPL-CCNC: 54 U/L (ref 33–136)
ALT SERPL W P-5'-P-CCNC: 18 U/L (ref 7–45)
AMYLASE SERPL-CCNC: 77 U/L (ref 29–103)
ANION GAP SERPL CALCULATED.3IONS-SCNC: 11 MMOL/L (ref 10–20)
AST SERPL W P-5'-P-CCNC: 20 U/L (ref 9–39)
BASOPHILS # BLD AUTO: 0.04 X10*3/UL (ref 0–0.1)
BASOPHILS NFR BLD AUTO: 0.6 %
BILIRUB SERPL-MCNC: 0.4 MG/DL (ref 0–1.2)
BUN SERPL-MCNC: 7 MG/DL (ref 6–23)
CALCIUM SERPL-MCNC: 9.8 MG/DL (ref 8.6–10.3)
CHLORIDE SERPL-SCNC: 99 MMOL/L (ref 98–107)
CO2 SERPL-SCNC: 29 MMOL/L (ref 21–32)
CREAT SERPL-MCNC: 0.84 MG/DL (ref 0.5–1.05)
EGFRCR SERPLBLD CKD-EPI 2021: 78 ML/MIN/1.73M*2
EOSINOPHIL # BLD AUTO: 0.04 X10*3/UL (ref 0–0.7)
EOSINOPHIL NFR BLD AUTO: 0.6 %
ERYTHROCYTE [DISTWIDTH] IN BLOOD BY AUTOMATED COUNT: 12.2 % (ref 11.5–14.5)
GLUCOSE SERPL-MCNC: 108 MG/DL (ref 74–99)
HCT VFR BLD AUTO: 41.8 % (ref 36–46)
HGB BLD-MCNC: 13.9 G/DL (ref 12–16)
IMM GRANULOCYTES # BLD AUTO: 0.02 X10*3/UL (ref 0–0.7)
IMM GRANULOCYTES NFR BLD AUTO: 0.3 % (ref 0–0.9)
LIPASE SERPL-CCNC: 17 U/L (ref 9–82)
LYMPHOCYTES # BLD AUTO: 1.32 X10*3/UL (ref 1.2–4.8)
LYMPHOCYTES NFR BLD AUTO: 20.5 %
MCH RBC QN AUTO: 31.7 PG (ref 26–34)
MCHC RBC AUTO-ENTMCNC: 33.3 G/DL (ref 32–36)
MCV RBC AUTO: 95 FL (ref 80–100)
MONOCYTES # BLD AUTO: 0.62 X10*3/UL (ref 0.1–1)
MONOCYTES NFR BLD AUTO: 9.6 %
NEUTROPHILS # BLD AUTO: 4.39 X10*3/UL (ref 1.2–7.7)
NEUTROPHILS NFR BLD AUTO: 68.4 %
NRBC BLD-RTO: 0 /100 WBCS (ref 0–0)
PLATELET # BLD AUTO: 401 X10*3/UL (ref 150–450)
POTASSIUM SERPL-SCNC: 4.5 MMOL/L (ref 3.5–5.3)
PROT SERPL-MCNC: 6.3 G/DL (ref 6.4–8.2)
RBC # BLD AUTO: 4.38 X10*6/UL (ref 4–5.2)
SODIUM SERPL-SCNC: 134 MMOL/L (ref 136–145)
WBC # BLD AUTO: 6.4 X10*3/UL (ref 4.4–11.3)

## 2024-10-30 PROCEDURE — 85025 COMPLETE CBC W/AUTO DIFF WBC: CPT

## 2024-10-30 PROCEDURE — 80053 COMPREHEN METABOLIC PANEL: CPT

## 2024-10-30 PROCEDURE — 83690 ASSAY OF LIPASE: CPT

## 2024-10-30 PROCEDURE — 82150 ASSAY OF AMYLASE: CPT

## 2024-10-31 ENCOUNTER — EDUCATION (OUTPATIENT)
Dept: HEMATOLOGY/ONCOLOGY | Facility: CLINIC | Age: 64
End: 2024-10-31

## 2024-10-31 ENCOUNTER — APPOINTMENT (OUTPATIENT)
Dept: HEMATOLOGY/ONCOLOGY | Facility: CLINIC | Age: 64
End: 2024-10-31
Payer: COMMERCIAL

## 2024-10-31 ENCOUNTER — INFUSION (OUTPATIENT)
Dept: HEMATOLOGY/ONCOLOGY | Facility: CLINIC | Age: 64
End: 2024-10-31
Payer: COMMERCIAL

## 2024-10-31 ENCOUNTER — OFFICE VISIT (OUTPATIENT)
Dept: HEMATOLOGY/ONCOLOGY | Facility: CLINIC | Age: 64
End: 2024-10-31
Payer: COMMERCIAL

## 2024-10-31 VITALS
HEART RATE: 84 BPM | SYSTOLIC BLOOD PRESSURE: 112 MMHG | BODY MASS INDEX: 22.38 KG/M2 | DIASTOLIC BLOOD PRESSURE: 77 MMHG | WEIGHT: 134.48 LBS | RESPIRATION RATE: 16 BRPM | OXYGEN SATURATION: 94 % | TEMPERATURE: 98.5 F

## 2024-10-31 DIAGNOSIS — C34.90 MALIGNANT NEOPLASM OF LUNG, UNSPECIFIED LATERALITY, UNSPECIFIED PART OF LUNG (MULTI): Primary | ICD-10-CM

## 2024-10-31 DIAGNOSIS — C34.90 MALIGNANT NEOPLASM OF LUNG, UNSPECIFIED LATERALITY, UNSPECIFIED PART OF LUNG (MULTI): ICD-10-CM

## 2024-10-31 DIAGNOSIS — C34.11 MALIGNANT NEOPLASM OF UPPER LOBE OF RIGHT LUNG (MULTI): ICD-10-CM

## 2024-10-31 LAB — MAGNESIUM SERPL-MCNC: 1.89 MG/DL (ref 1.6–2.4)

## 2024-10-31 PROCEDURE — 2560000001 HC RX 256 EXPERIMENTAL DRUGS: Mod: SE | Performed by: STUDENT IN AN ORGANIZED HEALTH CARE EDUCATION/TRAINING PROGRAM

## 2024-10-31 PROCEDURE — 99213 OFFICE O/P EST LOW 20 MIN: CPT | Mod: 25 | Performed by: NURSE PRACTITIONER

## 2024-10-31 PROCEDURE — 83735 ASSAY OF MAGNESIUM: CPT

## 2024-10-31 PROCEDURE — 96365 THER/PROPH/DIAG IV INF INIT: CPT | Mod: INF

## 2024-10-31 RX ORDER — ALBUTEROL SULFATE 0.83 MG/ML
3 SOLUTION RESPIRATORY (INHALATION) AS NEEDED
Status: DISCONTINUED | OUTPATIENT
Start: 2024-10-31 | End: 2024-10-31 | Stop reason: HOSPADM

## 2024-10-31 RX ORDER — PROCHLORPERAZINE EDISYLATE 5 MG/ML
10 INJECTION INTRAMUSCULAR; INTRAVENOUS EVERY 6 HOURS PRN
Status: DISCONTINUED | OUTPATIENT
Start: 2024-10-31 | End: 2024-10-31 | Stop reason: HOSPADM

## 2024-10-31 RX ORDER — HEPARIN SODIUM,PORCINE/PF 10 UNIT/ML
50 SYRINGE (ML) INTRAVENOUS AS NEEDED
Status: CANCELLED | OUTPATIENT
Start: 2024-10-31

## 2024-10-31 RX ORDER — HEPARIN 100 UNIT/ML
500 SYRINGE INTRAVENOUS AS NEEDED
Status: DISCONTINUED | OUTPATIENT
Start: 2024-10-31 | End: 2024-10-31 | Stop reason: HOSPADM

## 2024-10-31 RX ORDER — PROCHLORPERAZINE MALEATE 10 MG
10 TABLET ORAL EVERY 6 HOURS PRN
Status: DISCONTINUED | OUTPATIENT
Start: 2024-10-31 | End: 2024-10-31 | Stop reason: HOSPADM

## 2024-10-31 RX ORDER — HEPARIN SODIUM,PORCINE/PF 10 UNIT/ML
50 SYRINGE (ML) INTRAVENOUS AS NEEDED
OUTPATIENT
Start: 2024-10-31

## 2024-10-31 RX ORDER — ACETAMINOPHEN 325 MG/1
650 TABLET ORAL AS NEEDED
Status: DISCONTINUED | OUTPATIENT
Start: 2024-10-31 | End: 2024-10-31 | Stop reason: HOSPADM

## 2024-10-31 RX ORDER — FAMOTIDINE 10 MG/ML
20 INJECTION INTRAVENOUS ONCE AS NEEDED
Status: DISCONTINUED | OUTPATIENT
Start: 2024-10-31 | End: 2024-10-31 | Stop reason: HOSPADM

## 2024-10-31 RX ORDER — EPINEPHRINE 0.3 MG/.3ML
0.3 INJECTION SUBCUTANEOUS EVERY 5 MIN PRN
Status: DISCONTINUED | OUTPATIENT
Start: 2024-10-31 | End: 2024-10-31 | Stop reason: HOSPADM

## 2024-10-31 RX ORDER — HEPARIN 100 UNIT/ML
500 SYRINGE INTRAVENOUS AS NEEDED
Status: CANCELLED | OUTPATIENT
Start: 2024-10-31

## 2024-10-31 RX ORDER — DIPHENHYDRAMINE HYDROCHLORIDE 50 MG/ML
50 INJECTION INTRAMUSCULAR; INTRAVENOUS AS NEEDED
Status: DISCONTINUED | OUTPATIENT
Start: 2024-10-31 | End: 2024-10-31 | Stop reason: HOSPADM

## 2024-10-31 RX ORDER — HEPARIN 100 UNIT/ML
500 SYRINGE INTRAVENOUS AS NEEDED
OUTPATIENT
Start: 2024-10-31

## 2024-10-31 RX ORDER — HEPARIN SODIUM,PORCINE/PF 10 UNIT/ML
50 SYRINGE (ML) INTRAVENOUS AS NEEDED
Status: DISCONTINUED | OUTPATIENT
Start: 2024-10-31 | End: 2024-10-31 | Stop reason: HOSPADM

## 2024-10-31 RX ORDER — CYCLOBENZAPRINE HCL 10 MG
10 TABLET ORAL 2 TIMES DAILY PRN
Qty: 30 TABLET | Refills: 1 | Status: SHIPPED | OUTPATIENT
Start: 2024-10-31 | End: 2025-10-31

## 2024-10-31 RX ORDER — DIPHENHYDRAMINE HCL 25 MG
50 CAPSULE ORAL AS NEEDED
Status: DISCONTINUED | OUTPATIENT
Start: 2024-10-31 | End: 2024-10-31 | Stop reason: HOSPADM

## 2024-10-31 ASSESSMENT — PAIN SCALES - GENERAL: PAINLEVEL_OUTOF10: 2

## 2024-11-15 ENCOUNTER — HOSPITAL ENCOUNTER (OUTPATIENT)
Dept: RADIOLOGY | Facility: HOSPITAL | Age: 64
Discharge: HOME | End: 2024-11-15
Payer: COMMERCIAL

## 2024-11-15 DIAGNOSIS — C34.91 PRIMARY MALIGNANT NEOPLASM OF RIGHT LUNG METASTATIC TO OTHER SITE (MULTI): ICD-10-CM

## 2024-11-15 PROCEDURE — 2550000001 HC RX 255 CONTRASTS: Performed by: STUDENT IN AN ORGANIZED HEALTH CARE EDUCATION/TRAINING PROGRAM

## 2024-11-15 PROCEDURE — 74177 CT ABD & PELVIS W/CONTRAST: CPT

## 2024-11-20 ENCOUNTER — LAB (OUTPATIENT)
Dept: LAB | Facility: CLINIC | Age: 64
End: 2024-11-20
Payer: COMMERCIAL

## 2024-11-20 DIAGNOSIS — C34.11 MALIGNANT NEOPLASM OF UPPER LOBE OF RIGHT LUNG (MULTI): ICD-10-CM

## 2024-11-20 LAB
ALBUMIN SERPL BCP-MCNC: 4.1 G/DL (ref 3.4–5)
ALP SERPL-CCNC: 64 U/L (ref 33–136)
ALT SERPL W P-5'-P-CCNC: 18 U/L (ref 7–45)
AMYLASE SERPL-CCNC: 33 U/L (ref 29–103)
ANION GAP SERPL CALC-SCNC: 10 MMOL/L (ref 10–20)
AST SERPL W P-5'-P-CCNC: 21 U/L (ref 9–39)
BASOPHILS # BLD AUTO: 0.05 X10*3/UL (ref 0–0.1)
BASOPHILS NFR BLD AUTO: 0.7 %
BILIRUB SERPL-MCNC: 0.3 MG/DL (ref 0–1.2)
BUN SERPL-MCNC: 7 MG/DL (ref 6–23)
CALCIUM SERPL-MCNC: 9.6 MG/DL (ref 8.6–10.3)
CHLORIDE SERPL-SCNC: 99 MMOL/L (ref 98–107)
CO2 SERPL-SCNC: 31 MMOL/L (ref 21–32)
CREAT SERPL-MCNC: 0.77 MG/DL (ref 0.5–1.05)
EGFRCR SERPLBLD CKD-EPI 2021: 86 ML/MIN/1.73M*2
EOSINOPHIL # BLD AUTO: 0.1 X10*3/UL (ref 0–0.7)
EOSINOPHIL NFR BLD AUTO: 1.4 %
ERYTHROCYTE [DISTWIDTH] IN BLOOD BY AUTOMATED COUNT: 12.2 % (ref 11.5–14.5)
GLUCOSE SERPL-MCNC: 96 MG/DL (ref 74–99)
HCT VFR BLD AUTO: 42 % (ref 36–46)
HGB BLD-MCNC: 13.8 G/DL (ref 12–16)
IMM GRANULOCYTES # BLD AUTO: 0.03 X10*3/UL (ref 0–0.7)
IMM GRANULOCYTES NFR BLD AUTO: 0.4 % (ref 0–0.9)
LIPASE SERPL-CCNC: 26 U/L (ref 9–82)
LYMPHOCYTES # BLD AUTO: 1.34 X10*3/UL (ref 1.2–4.8)
LYMPHOCYTES NFR BLD AUTO: 19.1 %
MCH RBC QN AUTO: 31.7 PG (ref 26–34)
MCHC RBC AUTO-ENTMCNC: 32.9 G/DL (ref 32–36)
MCV RBC AUTO: 96 FL (ref 80–100)
MONOCYTES # BLD AUTO: 0.79 X10*3/UL (ref 0.1–1)
MONOCYTES NFR BLD AUTO: 11.3 %
NEUTROPHILS # BLD AUTO: 4.71 X10*3/UL (ref 1.2–7.7)
NEUTROPHILS NFR BLD AUTO: 67.1 %
NRBC BLD-RTO: 0 /100 WBCS (ref 0–0)
PLATELET # BLD AUTO: 366 X10*3/UL (ref 150–450)
POTASSIUM SERPL-SCNC: 4.1 MMOL/L (ref 3.5–5.3)
PROT SERPL-MCNC: 6.5 G/DL (ref 6.4–8.2)
RBC # BLD AUTO: 4.36 X10*6/UL (ref 4–5.2)
SODIUM SERPL-SCNC: 136 MMOL/L (ref 136–145)
WBC # BLD AUTO: 7 X10*3/UL (ref 4.4–11.3)

## 2024-11-20 PROCEDURE — 82150 ASSAY OF AMYLASE: CPT

## 2024-11-20 PROCEDURE — 80053 COMPREHEN METABOLIC PANEL: CPT

## 2024-11-20 PROCEDURE — 83690 ASSAY OF LIPASE: CPT

## 2024-11-20 PROCEDURE — 85025 COMPLETE CBC W/AUTO DIFF WBC: CPT

## 2024-11-20 PROCEDURE — 36415 COLL VENOUS BLD VENIPUNCTURE: CPT

## 2024-11-21 ENCOUNTER — OFFICE VISIT (OUTPATIENT)
Dept: HEMATOLOGY/ONCOLOGY | Facility: CLINIC | Age: 64
End: 2024-11-21
Payer: COMMERCIAL

## 2024-11-21 ENCOUNTER — EDUCATION (OUTPATIENT)
Dept: HEMATOLOGY/ONCOLOGY | Facility: CLINIC | Age: 64
End: 2024-11-21

## 2024-11-21 ENCOUNTER — INFUSION (OUTPATIENT)
Dept: HEMATOLOGY/ONCOLOGY | Facility: CLINIC | Age: 64
End: 2024-11-21
Payer: COMMERCIAL

## 2024-11-21 VITALS
OXYGEN SATURATION: 95 % | BODY MASS INDEX: 22.8 KG/M2 | DIASTOLIC BLOOD PRESSURE: 82 MMHG | SYSTOLIC BLOOD PRESSURE: 126 MMHG | HEART RATE: 67 BPM | RESPIRATION RATE: 18 BRPM | TEMPERATURE: 97.1 F | WEIGHT: 137.02 LBS

## 2024-11-21 DIAGNOSIS — C34.11 MALIGNANT NEOPLASM OF UPPER LOBE OF RIGHT LUNG (MULTI): Primary | ICD-10-CM

## 2024-11-21 DIAGNOSIS — C34.11 MALIGNANT NEOPLASM OF UPPER LOBE OF RIGHT LUNG (MULTI): ICD-10-CM

## 2024-11-21 PROCEDURE — 96413 CHEMO IV INFUSION 1 HR: CPT

## 2024-11-21 PROCEDURE — 99214 OFFICE O/P EST MOD 30 MIN: CPT | Performed by: NURSE PRACTITIONER

## 2024-11-21 PROCEDURE — 2560000001 HC RX 256 EXPERIMENTAL DRUGS: Mod: SE | Performed by: STUDENT IN AN ORGANIZED HEALTH CARE EDUCATION/TRAINING PROGRAM

## 2024-11-21 RX ORDER — EPINEPHRINE 0.3 MG/.3ML
0.3 INJECTION SUBCUTANEOUS EVERY 5 MIN PRN
Status: DISCONTINUED | OUTPATIENT
Start: 2024-11-21 | End: 2024-11-21 | Stop reason: HOSPADM

## 2024-11-21 RX ORDER — PROCHLORPERAZINE EDISYLATE 5 MG/ML
10 INJECTION INTRAMUSCULAR; INTRAVENOUS EVERY 6 HOURS PRN
Status: DISCONTINUED | OUTPATIENT
Start: 2024-11-21 | End: 2024-11-21 | Stop reason: HOSPADM

## 2024-11-21 RX ORDER — PROCHLORPERAZINE MALEATE 10 MG
10 TABLET ORAL EVERY 6 HOURS PRN
Status: DISCONTINUED | OUTPATIENT
Start: 2024-11-21 | End: 2024-11-21 | Stop reason: HOSPADM

## 2024-11-21 RX ORDER — ALBUTEROL SULFATE 0.83 MG/ML
3 SOLUTION RESPIRATORY (INHALATION) AS NEEDED
Status: DISCONTINUED | OUTPATIENT
Start: 2024-11-21 | End: 2024-11-21 | Stop reason: HOSPADM

## 2024-11-21 RX ORDER — DIPHENHYDRAMINE HCL 25 MG
50 CAPSULE ORAL AS NEEDED
Status: DISCONTINUED | OUTPATIENT
Start: 2024-11-21 | End: 2024-11-21 | Stop reason: HOSPADM

## 2024-11-21 RX ORDER — FAMOTIDINE 10 MG/ML
20 INJECTION INTRAVENOUS ONCE AS NEEDED
Status: DISCONTINUED | OUTPATIENT
Start: 2024-11-21 | End: 2024-11-21 | Stop reason: HOSPADM

## 2024-11-21 RX ORDER — ACETAMINOPHEN 325 MG/1
650 TABLET ORAL AS NEEDED
Status: DISCONTINUED | OUTPATIENT
Start: 2024-11-21 | End: 2024-11-21 | Stop reason: HOSPADM

## 2024-11-21 ASSESSMENT — PAIN SCALES - GENERAL: PAINLEVEL_OUTOF10: 3

## 2024-11-21 NOTE — PROGRESS NOTES
Patient ID: Hayley Potter is a 64 y.o. female.    CC:  Management of stage IA3 ([id number]) adenocarcinoma of RUL s/p RUL lobectomy 04/2020, PDL-1 50%, NGS positive for KRAS G12C--> now with  metastatic recurrence involving liver, 11th L rib, and mediastinal LNs. 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 She was initially found to have spiculated 2.9cm mass in the RUL on the CT chest on 03/02/2020. She underwent CT guided RUL biopsy with pathology showed lung tissue with scan and focal aypicla grandular proliferation  suspicious for adenocarcinoma. IHC positive for CK7, TTF1 and napsin A. CK20 negative. PET/CT on 04/15/2020 showed RUL hypermetabolic RUL mass. No evidence of distant metastases. She underwent RUL lobectomy with mediastinal LN dissection with Dr. Wheat  on 04/21/2020 which showed invasive well to moderately differentiated adenocarcinoma, tumor measuring 2.5x2.3.x1.7cm. No VPI or LVI. All margins were negative. LN (0/11).      Unfortunately, on the surveillance CT chest (+) on 08/24/2022, there was a new irregular marginated 6mm GUSTABO nodule, which increased in size significantly on the repeat CT chest (-) on 01/06/2023, measuring 12mm.  There is also an additional new 9mm nodule  in the L lung as well as new mediastinal and possible L hilar LAD. PET/CT on 02/16/2023 showed hypermetabolic L hilar and mediastinal LAD. 1st  GUSTABO nodule now 8mm likely inflammatory. 2nd GUSTABO nodule showed stable size with SUV 4.4. Soft tissue mass associated with  L 11th rib fracture, SUV 11.5. Mass within the liver SUV 13.1, suspicious for mets. Hypermetabolic activity also noted  in  the stomach fundus, association with a hiatal hernia, in the cecum and ascending colon without masses seen. She underwent US guided liver biopsy on 02/16/2023 - poorly-differentiated carcinoma.     Treatment Summary:  04/21/2020: RUL lobectomy with medistainal LN dissection (Dr. Wheat)  04/11/2023: C1 pembrolizumab 200mg IV (on trial)  05/02/2023: C2 Pembrolizumab 200mg IV (on trial)  05/23/2023: C3 Pembrolizumab 200mg IV (on trial)  06/13/2023: C4 Pembrolizumab 200mg IV (on trial)   07/05/2023: C5 Pembrolizumab 200mg IV (on trial)   07/26/2023: C6 Pembrolizumab 200mg IV (on trial)   08/16/2023: C7 Pembrolizumab 200mg IV (on trial)   09/07/2023: C8 Pembrolizumab 200mg IV (on trial)   09/28/2023: C9 Pembrolizumab 200mg IV (on trial)   10/19/2023: C10 Pembrolizumab 200mg IV (on trial)  11/09/2023: C11 Pembrolizumab 200mg IV (on trial)  11/30/2023: C12 Pembrolizumab 200mg IV (on trial)  12/21/2023: C13 Pembrolizumab 200mg IV (on trial)  01/11/2024: C14 Pembrolizumab 200mg IV (on trial)  02/01/2024: C15 Pembrolizumab 200mg IV (on trial)  02/22/2024: C16 Pembrolizumab 200mg IV (on trial)  03/14/2024: C17 Pembrolizumab 200mg IV (on trial)  04/04/2024: C18 Pembrolizumab 200mg IV (on trial)  04/25/2024: C19 Pembrolizumab 200mg IV (on trial)  05/16/2024: C20 Pembrolizumab 200mg IV (on trial)  06/06/2024: C21 Pembrolizumab 200mg IV (on trial)  06/27/2024: C22 Pembrolizumab 200mg IV (on trial)  07/15/2024: ED visit right foot contusion   07/18/2024: C23 Pembrolizumab 200mg IV (on trial)  08/08/2024: C24 Pembrolizumab 200mg IV (on trial)  08/29/2024: C25 Pembrolizumab 200mg IV (on trial)  09/19/2024: C26 Pembrolizumab 200mg IV (on trial)  10/10/2024: C27 Pembrolizumab 200mg IV (on trial)  10/31/2024: C28 Pembrolizumab 200mg IV (on trial)  11/21/2024: C29 Pembrolizumab 200mg IV (on trial)    Interval History (11/21/2024):    Pt present in clinic for readiness to treat visit.  Her daughter is present for visit.  Continues to do  well. No fever or chills. Chronic stable cough. Denies n/v. Increased reflux which she attributes to hiatal hernia. No constipation or diarrhea.   RUQ pain/left rib pain - chronic, believes Neurontin has helped, current dose 600mg BID considering restarting 300mg midday. Neurontin also helping generalized neuropathic pain and restless legs. Restless legs can cause sleep disturbance. Leg cramps on occasion.   Occasional left upper back pain burning last about 1 minute a few times a month.     Review of Systems:  A review of systems has been completed and are negative for complaints except what is stated in the HPI and/or past medical history     PMHx: vocal cord cancer, GERD and COPD, OA  PSHx: tonsillectomy, tubal ligation, lumpectomy, L ankle and R hand surgery  FHx: strong family history of breast cancer including male breast cancer.   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 Then worked as a  since 2005. She quit etoh 2010. She smokes Marijuana every day.     Allergies:  Cephalexin    Medications:    Current Outpatient Medications:     alendronate (Fosamax) 70 mg tablet, Take 1 tablet (70 mg) by mouth every 7 days. Take in the morning with a full glass of water, on an empty stomach, and do not take anything else by mouth or lie down for the next 30 min., Disp: , Rfl:     azelastine HCl (AZELASTINE NASL), Administer 0.15 % into affected nostril(s) 2 times a day as needed., Disp: , Rfl:     budesonide-formoteroL (Symbicort) 160-4.5 mcg/actuation inhaler, Rinse mouth with water after use to reduce aftertaste and incidence of candidiasis. Do not swallow., Disp: , Rfl:     busPIRone (Buspar) 15 mg tablet, 1 tablet (15 mg) every 12 hours., Disp: , Rfl:     cholecalciferol (Vitamin D-3) 50,000 unit capsule, Take 1 capsule (50,000 Units) by mouth 1 (one) time per week., Disp: , Rfl:     cyclobenzaprine (Flexeril) 10 mg tablet, Take 1 tablet (10 mg) by mouth 2 times a day as needed for muscle spasms.,  Disp: 30 tablet, Rfl: 1    diphenhydramine HCl (BENADRYL ALLERGY ORAL), , Disp: , Rfl:     ferrous gluconate (Fergon) 324 (38 Fe) mg tablet, Take 1 tablet (38 mg of iron) by mouth 2 times a week. Monday and Friday, Disp: , Rfl:     gabapentin (Neurontin) 600 mg tablet, Take 1 tablet (600 mg) by mouth 2 times a day., Disp: , Rfl:     levothyroxine (Synthroid, Levoxyl) 75 mcg tablet, Take 1 tablet (75 mcg) by mouth once daily., Disp: 90 tablet, Rfl: 1    montelukast (Singulair) 10 mg tablet, 1 tablet Orally Once a day, Disp: , Rfl:     multivitamin with minerals tablet, Take 1 tablet by mouth once daily., Disp: , Rfl:     omeprazole (PriLOSEC) 40 mg DR capsule, TAKE 1 CAPSULE BY MOUTH 30 MINUTES BEFORE MORNING MEAL TWICE A DAY, Disp: , Rfl:     oxyCODONE-acetaminophen (Percocet) 5-325 mg tablet, Take 1 tablet by mouth every 6 hours if needed for severe pain (7 - 10) for up to 10 days., Disp: 30 tablet, Rfl: 0    PEMBROLIZUMAB IV, Infuse into a venous catheter., Disp: , Rfl:     tiotropium (Spiriva Respimat) 2.5 mcg/actuation inhaler, INHALE TWO PUFFS BY MOUTH EVERY DAY, Disp: , Rfl:   No current facility-administered medications for this visit.    Facility-Administered Medications Ordered in Other Visits:     acetaminophen (Tylenol) tablet 650 mg, 650 mg, oral, PRN, Kayla Cook MD MPH    albuterol 2.5 mg /3 mL (0.083 %) nebulizer solution 3 mL, 3 mL, nebulization, PRN, Kayla Cook MD MPH    dextrose 5 % in water (D5W) bolus, 500 mL, intravenous, PRN, Kayla Cook MD MPH    diphenhydrAMINE (BENADryl) capsule 50 mg, 50 mg, oral, PRN, Kayla Cook MD MPH    diphenhydrAMINE (BENADryl) injection 50 mg, 50 mg, intravenous, PRN, Kayla Cook MD MPH    EPINEPHrine (Epipen) injection syringe 0.3 mg, 0.3 mg, intramuscular, q5 min PRN, Kayla Cook MD MPH    famotidine PF (Pepcid) injection 20 mg, 20 mg, intravenous, Once PRN, Kayla Cook MD MPH    methylPREDNISolone sod succinate (SOLU-Medrol) 40 mg/mL injection 40 mg, 40 mg, intravenous,  "PRN, Kayla Cook MD MPH    prochlorperazine (Compazine) injection 10 mg, 10 mg, intravenous, q6h PRN, Kayla Cook MD MPH    prochlorperazine (Compazine) tablet 10 mg, 10 mg, oral, q6h PRN, Kayla Cook MD MPH    sodium chloride 0.9 % bolus 500 mL, 500 mL, intravenous, PRN, Kayla Cook MD MPH    Vital Signs:  /82 (BP Location: Right arm, Patient Position: Sitting, BP Cuff Size: Adult long)   Pulse 67   Temp 36.2 °C (97.1 °F) (Temporal)   Resp 18   Wt 62.1 kg (137 lb 0.3 oz)   SpO2 95%   BMI 22.80 kg/m²     Wt Readings from Last 5 Encounters:   24 62.1 kg (137 lb 0.3 oz)   10/31/24 61 kg (134 lb 7.7 oz)   10/10/24 61.2 kg (134 lb 14.7 oz)   24 61.3 kg (135 lb 2.3 oz)   24 62.1 kg (137 lb)        Physical Exam:  ECO  Pain: 2/10 RUQ and left side \"under ribs\" wraps around back at times as above  Constitutional: Well developed, awake/alert/oriented x3, no distress, alert and cooperative  Eyes: PER. sclera anicteric  ENMT: Oral mucosa moist. No lesions or thrush   Respiratory/Thorax: Breathing is non-labored. Lungs are clear to auscultation bilaterally. No adventitious breath sounds. Hx RUL lobectomy 2020  Cardiovascular: S1-S2. Regular rate and rhythm. No murmurs, rubs, or gallops appreciated  Gastrointestinal: Abdomen soft nontender, nondistended, normal active bowel sounds.   Musculoskeletal: ROM intact, no joint swelling, normal strength  Extremities: normal extremities, no cyanosis, no edema, no clubbing  Lymphatics: no palpable lymphadenopathy  Neurologic: alert and oriented x3. Nonfocal exam. No myoclonus  Psychological: Pleasant, appropriate and easily engaged       Results:     Labs  Results from last 7 days   Lab Units 24  1309   WBC AUTO x10*3/uL 7.0   HEMOGLOBIN g/dL 13.8   HEMATOCRIT % 42.0   PLATELETS AUTO x10*3/uL 366   NEUTROS ABS x10*3/uL 4.71   LYMPHS ABS AUTO x10*3/uL 1.34   MONOS ABS AUTO x10*3/uL 0.79   EOS ABS AUTO x10*3/uL 0.10   NEUTROS PCT AUTO % 67.1   LYMPHS " PCT AUTO % 19.1   MONOS PCT AUTO % 11.3   EOS PCT AUTO % 1.4      Results from last 7 days   Lab Units 11/20/24  1309   GLUCOSE mg/dL 96   SODIUM mmol/L 136   POTASSIUM mmol/L 4.1   CHLORIDE mmol/L 99   CO2 mmol/L 31   BUN mg/dL 7   CREATININE mg/dL 0.77   EGFR mL/min/1.73m*2 86   CALCIUM mg/dL 9.6   ALBUMIN g/dL 4.1   PROTEIN TOTAL g/dL 6.5   BILIRUBIN TOTAL mg/dL 0.3   ALK PHOS U/L 64   ALT U/L 18   AST U/L 21     Imaging   CT C/A/P November 15, 2024     IMPRESSION:  CHEST:  1.  Stable postsurgical changes of right upper lobectomy.  2. Stable left upper lobe reticulonodular scarring.  3. No new sites of disease in the chest.      ABDOMEN-PELVIS:  1.  No metastatic disease in the abdomen and pelvis.  2. Chronic and incidental findings as described above.      Assessment/Plan:  64 y.o. 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     The patient's records and imaging have been reviewed in detail.  MD discussed with the patient the natural history of their disease at length.  The following has also been discussed with the patient:     # Cancer: recurrent likely metastatic ([id number]) lung cancer: liver biopsy showed extensive necrosis. Liquid biopsy showed KRAS G12C mutation, which was identified in her original surgical resected  specimen. PDL-1 50%. Patient initially had stage IA2 ([id number]) RUL lung  adenocarcinoma s/p RUL lobectomy, unfortunately now likely metastatic recurrence. Liver biopsy on 2/16 showed poorly differentiated malignant neoplasm with extensive necrosis. We discussed treatment  options with her today with either standard of care pembrolizumab vs clinical trials. Patient expressed interests in enrolling into clinical trial. Enrolled into RCT [id number]. Doing well.      - Interval scan: 08/26/2024: CT CAP (+): 1. Status post right upper lobectomy with no  definite locoregional recurrence. Stable left upper lobe areas of subpleural scarring and nodularity. No new suspicious pulmonary nodules or masses. 2. Stable precarinal subcentimeter lymph node. No new enlarged intrathoracic lymphadenopathy. 3. Remote bilateral rib fractures as in prior.  1. No metastatic disease in the abdomen or pelvis.  Repeat in 3 months (2/2024)    # Hypothyroidism: G2. Likely 2/2 immunotherapy - resolved.  TSH 1.07 11/8/23.  Continue levothyroxine 75mcg daily.      # Microcytic anemia 2/2 due to chronic disease and hiatal hernia: patient denies any hematochezia  or hematuria. No melena. hx of hiatal hernia. Per patient. Last EGD was done in 2021 and was normal. Colonoscopy was a few years ago.  EGD showed hiatal hernia 03/2023. Improved Hgb overall. Iron studies show low-normal ferritin and TSAT of 14%. Continue PO iron 3x/week. Tolerates well. Stable.      # Mucositis (chronic).  Pt reports since start of immunotherapy.  Chronic sore secondary to ill fitting dentures recommended s&s rinse.  Continue Anbesol and/or Oragel for pain.  Will continue to monitor.  BMX added.  2/1/24  - resolved   Working with oral surgeon. Expects 3 upcoming extractions and 3 crowns (November)      # Constipation: managed well with stool softener and PRN lactulose.        # Clinical Trials: April 11, 2023 initiated treatment on [id number] this is cycle 1 of first-line treatment.  She has been randomized to  arm A which is pembrolizumab alone for up to 2 years or progression followed by Alimta/carboplatin for second line therapy. Consent has been signed. 11/15/24 CT scan shows favorable response. Continue treatment.      # Access: Peripheral veins.     # Pain: she has a displaced 8th Rib on the left side -Advised patient to take percocet daily and decrease Advil use. PRN Percocet Rx in place.  Rx last sent 12/21/23. Currently managed with PRN gummies.  New pain on the R upper back, controlled with percocet. Will try  lidocaine patch OTC. :  Pain currently managed with PRN Advil, Aspercreme, and gummies.  Routine Gabapentin for neuropathy pain.   Pt verbalized desire to reduce # of medications taken.  Wishes to reduce Gabapentin dosage.   Supportive onc referral placed for assistance.   :  Bruise to buttock, pain 3-4/10.  Increased right flank/rib pain r/t fall, pain 3/10.  Managed with Advil 600mg TID.  :  Right flank pain 2/10.  Managed with PRN interventions.      # Anxiety:  Anxiety reported d/t recent death of her father, upcoming .  Pt requested 1x Valium dose, previous 10mg admin.  OARRS reviewed. Provided Rx for Valium 5mg x3 doses.  Not currently an issue.    # Bone Health: L fifth ribs lesion stable.    # Arthralgia (grade 1).  Uric acid lab (-).  Intermittent arthralgia to left hand.  Continue PRN Advil, Aspercreme or Percocet (for severe pain).      # Left rib or chest wall pain over the past month. 23 CXR - RESULTS: Mediastinal surgical clips are noted. The cardiac silhouette is within normal limits. Mediastinal contours are unremarkable. The lungs demonstrate no infiltrate or atelectasis. There is no evidence for pleural effusion. Remote fracture is noted involving the right 7th rib. There are degenerative changes of the thoracic spine.  - Continue PRN Percocet and/or gummies for pain control.      # Right foot contusion, hyperextension:  Able to bear wt.  Continue to follow with podiatrist with any questions.  Continue Advil x5 days for inflammation. Resolved    # Psychosocial: Coping well, no acute issues.  Good support from family & friends.  Social work support available.     # GI/CINV: No acute issues.  Eating and voiding well.  Nutrition consult available.    # Insomnia:  Both difficulty falling and staying asleep.  Recommended pt can trial Melatonin.  Start with 3mg dose.  Max dose 10mg.  Restless contributes     # Sinusitis:  Pressure to maxillary and frontal sinuses.  +Increased  mucous, yellow in color.  Denies fevers.  Hx of recurrent sinus infections.  Rx called to pt's pharmacy.  Doxycycline 100mg BID x7-10 days.  Extended course d/t recent smoke exposure.  3/14: Resolved.  9/19:  9/16/24 ENT appt for recurrent sinus infection.  Completed ATB course.  Resolved.       # Smoking: former smoker     # Fertility: N/A.        # Heme/ESAs: N/A.     # GOC: Patient is aware of palliative intent goals of care.     # Language: English.     # Dispo: RTC in 3 weeks for next cycle.          Mikie Hancock, APRN-CNP  [facility]  Thoracic + [facility]     I spent a total of 30+ minutes on the date of the service which included preparing to see the patient, face-to-face patient care, completing clinical documentation, obtaining and / or reviewing separately obtained history, counseling and educating the patient/family/caregiver, ordering medications, tests, or procedures, communicating with other healthcare providers (not separately reported), independently interpreting results, not separately reported, and communicating results to the patient/family/caregiver, Name and date of birth verified.

## 2024-11-21 NOTE — PROGRESS NOTES
Patient here for follow up visit with Mikie before her Pembro infusion.   Patient here with dtr.    She reports her appetite is great and has gained 2 pounds.    She states her right side neuropathy pain has increased since her Neurontin pain has increased since her Neurontin was increased and has requested for it to be increased. . Mikie np aware.     Pt also reported her usual state of being tired.         Medications and Allergies reviewed and reconciled this visit.    Follow up per MD request.    Patient reports availability and use of mychart, Reviewed this is a good place to communicate with the team as well as review labs and upcoming orders.      No barriers to education noted, patient agrees to current plan and verbalized understanding using teach back method.

## 2024-11-21 NOTE — PROGRESS NOTES
Hayley Potter completed treatment without any issues, tolerated well and is aware of follow-up appointments. No questions or concerns at this time.

## 2024-11-22 NOTE — RESEARCH NOTES
Research Note Treatment Day    Hayley Potter is here today for treatment on [id number]. Today is C29D1. Procedures completed per protocol. AE's and con-meds reviewed with patient. Patient is aware of treatment plan.    [x]   Received treatment as planned   OR  []    Treatment delayed; patient calendar updated as required   Treatment delayed because:    []   AE    []   Physician Discretion    []   Clinical Deterioration or Progression     []   Other    Education Documentation  Treatment Plan and Schedule, taught by Rafiq Delgado RN at 11/22/2024  1:55 PM.  Learner: Family, Patient  Readiness: Acceptance  Method: Explanation  Response: Verbalizes Understanding    Education Comments  No comments found.      Pt and her daughter presented to clinic for cycle 29 on [id number].Pt feels pretty well today except she expressed that for some reason today, she is feeling a little emotional and wants to cry but can't. NP in to see pt for complete assessment. Pt is aware that her scans are all stable.

## 2024-12-10 DIAGNOSIS — C34.11 MALIGNANT NEOPLASM OF UPPER LOBE OF RIGHT LUNG (MULTI): ICD-10-CM

## 2024-12-11 ENCOUNTER — LAB (OUTPATIENT)
Dept: LAB | Facility: CLINIC | Age: 64
End: 2024-12-11
Payer: COMMERCIAL

## 2024-12-11 DIAGNOSIS — C34.11 MALIGNANT NEOPLASM OF UPPER LOBE OF RIGHT LUNG (MULTI): ICD-10-CM

## 2024-12-11 LAB
ALBUMIN SERPL BCP-MCNC: 4.3 G/DL (ref 3.4–5)
ALP SERPL-CCNC: 61 U/L (ref 33–136)
ALT SERPL W P-5'-P-CCNC: 16 U/L (ref 7–45)
AMYLASE SERPL-CCNC: 30 U/L (ref 29–103)
ANION GAP SERPL CALC-SCNC: 13 MMOL/L (ref 10–20)
AST SERPL W P-5'-P-CCNC: 20 U/L (ref 9–39)
BASOPHILS # BLD AUTO: 0.05 X10*3/UL (ref 0–0.1)
BASOPHILS NFR BLD AUTO: 0.7 %
BILIRUB SERPL-MCNC: 0.4 MG/DL (ref 0–1.2)
BUN SERPL-MCNC: 8 MG/DL (ref 6–23)
CALCIUM SERPL-MCNC: 9.7 MG/DL (ref 8.6–10.3)
CHLORIDE SERPL-SCNC: 104 MMOL/L (ref 98–107)
CO2 SERPL-SCNC: 31 MMOL/L (ref 21–32)
CREAT SERPL-MCNC: 0.79 MG/DL (ref 0.5–1.05)
EGFRCR SERPLBLD CKD-EPI 2021: 84 ML/MIN/1.73M*2
EOSINOPHIL # BLD AUTO: 0.08 X10*3/UL (ref 0–0.7)
EOSINOPHIL NFR BLD AUTO: 1.1 %
ERYTHROCYTE [DISTWIDTH] IN BLOOD BY AUTOMATED COUNT: 12.2 % (ref 11.5–14.5)
GLUCOSE SERPL-MCNC: 90 MG/DL (ref 74–99)
HCT VFR BLD AUTO: 42.2 % (ref 36–46)
HGB BLD-MCNC: 14.1 G/DL (ref 12–16)
IMM GRANULOCYTES # BLD AUTO: 0.01 X10*3/UL (ref 0–0.7)
IMM GRANULOCYTES NFR BLD AUTO: 0.1 % (ref 0–0.9)
LIPASE SERPL-CCNC: 16 U/L (ref 9–82)
LYMPHOCYTES # BLD AUTO: 1.86 X10*3/UL (ref 1.2–4.8)
LYMPHOCYTES NFR BLD AUTO: 25.7 %
MCH RBC QN AUTO: 32 PG (ref 26–34)
MCHC RBC AUTO-ENTMCNC: 33.4 G/DL (ref 32–36)
MCV RBC AUTO: 96 FL (ref 80–100)
MONOCYTES # BLD AUTO: 0.79 X10*3/UL (ref 0.1–1)
MONOCYTES NFR BLD AUTO: 10.9 %
NEUTROPHILS # BLD AUTO: 4.44 X10*3/UL (ref 1.2–7.7)
NEUTROPHILS NFR BLD AUTO: 61.5 %
NRBC BLD-RTO: 0 /100 WBCS (ref 0–0)
PLATELET # BLD AUTO: 399 X10*3/UL (ref 150–450)
POTASSIUM SERPL-SCNC: 4.5 MMOL/L (ref 3.5–5.3)
PROT SERPL-MCNC: 6.9 G/DL (ref 6.4–8.2)
RBC # BLD AUTO: 4.4 X10*6/UL (ref 4–5.2)
SODIUM SERPL-SCNC: 143 MMOL/L (ref 136–145)
WBC # BLD AUTO: 7.2 X10*3/UL (ref 4.4–11.3)

## 2024-12-11 PROCEDURE — 82150 ASSAY OF AMYLASE: CPT

## 2024-12-11 PROCEDURE — 36415 COLL VENOUS BLD VENIPUNCTURE: CPT

## 2024-12-11 PROCEDURE — 83690 ASSAY OF LIPASE: CPT

## 2024-12-11 PROCEDURE — 80053 COMPREHEN METABOLIC PANEL: CPT

## 2024-12-11 PROCEDURE — 85025 COMPLETE CBC W/AUTO DIFF WBC: CPT

## 2024-12-12 ENCOUNTER — OFFICE VISIT (OUTPATIENT)
Dept: HEMATOLOGY/ONCOLOGY | Facility: CLINIC | Age: 64
End: 2024-12-12
Payer: COMMERCIAL

## 2024-12-12 ENCOUNTER — EDUCATION (OUTPATIENT)
Dept: HEMATOLOGY/ONCOLOGY | Facility: CLINIC | Age: 64
End: 2024-12-12

## 2024-12-12 ENCOUNTER — INFUSION (OUTPATIENT)
Dept: HEMATOLOGY/ONCOLOGY | Facility: CLINIC | Age: 64
End: 2024-12-12
Payer: COMMERCIAL

## 2024-12-12 VITALS
BODY MASS INDEX: 22.97 KG/M2 | TEMPERATURE: 97.2 F | HEART RATE: 74 BPM | OXYGEN SATURATION: 95 % | WEIGHT: 138.01 LBS | DIASTOLIC BLOOD PRESSURE: 77 MMHG | SYSTOLIC BLOOD PRESSURE: 120 MMHG | RESPIRATION RATE: 17 BRPM

## 2024-12-12 DIAGNOSIS — C34.11 MALIGNANT NEOPLASM OF UPPER LOBE OF RIGHT LUNG (MULTI): ICD-10-CM

## 2024-12-12 PROCEDURE — 99213 OFFICE O/P EST LOW 20 MIN: CPT | Performed by: NURSE PRACTITIONER

## 2024-12-12 PROCEDURE — 2560000001 HC RX 256 EXPERIMENTAL DRUGS: Mod: SE | Performed by: STUDENT IN AN ORGANIZED HEALTH CARE EDUCATION/TRAINING PROGRAM

## 2024-12-12 PROCEDURE — 96365 THER/PROPH/DIAG IV INF INIT: CPT | Mod: INF

## 2024-12-12 RX ORDER — HEPARIN SODIUM,PORCINE/PF 10 UNIT/ML
50 SYRINGE (ML) INTRAVENOUS AS NEEDED
OUTPATIENT
Start: 2024-12-12

## 2024-12-12 RX ORDER — FAMOTIDINE 10 MG/ML
20 INJECTION INTRAVENOUS ONCE AS NEEDED
Status: DISCONTINUED | OUTPATIENT
Start: 2024-12-12 | End: 2024-12-12 | Stop reason: HOSPADM

## 2024-12-12 RX ORDER — ALBUTEROL SULFATE 0.83 MG/ML
3 SOLUTION RESPIRATORY (INHALATION) AS NEEDED
Status: DISCONTINUED | OUTPATIENT
Start: 2024-12-12 | End: 2024-12-12 | Stop reason: HOSPADM

## 2024-12-12 RX ORDER — DIPHENHYDRAMINE HYDROCHLORIDE 50 MG/ML
50 INJECTION INTRAMUSCULAR; INTRAVENOUS AS NEEDED
Status: DISCONTINUED | OUTPATIENT
Start: 2024-12-12 | End: 2024-12-12 | Stop reason: HOSPADM

## 2024-12-12 RX ORDER — EPINEPHRINE 0.3 MG/.3ML
0.3 INJECTION SUBCUTANEOUS EVERY 5 MIN PRN
Status: DISCONTINUED | OUTPATIENT
Start: 2024-12-12 | End: 2024-12-12 | Stop reason: HOSPADM

## 2024-12-12 RX ORDER — HEPARIN 100 UNIT/ML
500 SYRINGE INTRAVENOUS AS NEEDED
OUTPATIENT
Start: 2024-12-12

## 2024-12-12 ASSESSMENT — PAIN SCALES - GENERAL: PAINLEVEL_OUTOF10: 3

## 2024-12-12 NOTE — PROGRESS NOTES
Patient here with her daughter for follow up with Mikie Hancock CNP. Medications and allergies reviewed with patient.     Patient reports that she is feeling well today. She denies chest pain or shortness of breath  Pain: under right rib cage   Nausea/vomiting: no  Diarrhea/constipation: no  Difficulty eating: no  Weight loss: no    Per orders patient to get pembrolizumab infusion today.   She is scheduled for follow up on 12/31 with Mikie.

## 2024-12-12 NOTE — PROGRESS NOTES
Patient ID: Hayley Potter is a 64 y.o. female.    CC:  Management of stage IA3 ([id number]) adenocarcinoma of RUL s/p RUL lobectomy 04/2020, PDL-1 50%, NGS positive for KRAS G12C--> now with  metastatic recurrence involving liver, 11th L rib, and mediastinal LNs. 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 She was initially found to have spiculated 2.9cm mass in the RUL on the CT chest on 03/02/2020. She underwent CT guided RUL biopsy with pathology showed lung tissue with scan and focal aypicla grandular proliferation  suspicious for adenocarcinoma. IHC positive for CK7, TTF1 and napsin A. CK20 negative. PET/CT on 04/15/2020 showed RUL hypermetabolic RUL mass. No evidence of distant metastases. She underwent RUL lobectomy with mediastinal LN dissection with Dr. Wheat  on 04/21/2020 which showed invasive well to moderately differentiated adenocarcinoma, tumor measuring 2.5x2.3.x1.7cm. No VPI or LVI. All margins were negative. LN (0/11).      Unfortunately, on the surveillance CT chest (+) on 08/24/2022, there was a new irregular marginated 6mm GUSTABO nodule, which increased in size significantly on the repeat CT chest (-) on 01/06/2023, measuring 12mm.  There is also an additional new 9mm nodule  in the L lung as well as new mediastinal and possible L hilar LAD. PET/CT on 02/16/2023 showed hypermetabolic L hilar and mediastinal LAD. 1st  GUSTABO nodule now 8mm likely inflammatory. 2nd GUSTABO nodule showed stable size with SUV 4.4. Soft tissue mass associated with  L 11th rib fracture, SUV 11.5. Mass within the liver SUV 13.1, suspicious for mets. Hypermetabolic activity also noted  in  the stomach fundus, association with a hiatal hernia, in the cecum and ascending colon without masses seen. She underwent US guided liver biopsy on 02/16/2023 - poorly-differentiated carcinoma.     Treatment Summary:  04/21/2020: RUL lobectomy with medistainal LN dissection (Dr. Wheat)  04/11/2023: C1 pembrolizumab 200mg IV (on trial)  05/02/2023: C2 Pembrolizumab 200mg IV (on trial)  05/23/2023: C3 Pembrolizumab 200mg IV (on trial)  06/13/2023: C4 Pembrolizumab 200mg IV (on trial)   07/05/2023: C5 Pembrolizumab 200mg IV (on trial)   07/26/2023: C6 Pembrolizumab 200mg IV (on trial)   08/16/2023: C7 Pembrolizumab 200mg IV (on trial)   09/07/2023: C8 Pembrolizumab 200mg IV (on trial)   09/28/2023: C9 Pembrolizumab 200mg IV (on trial)   10/19/2023: C10 Pembrolizumab 200mg IV (on trial)  11/09/2023: C11 Pembrolizumab 200mg IV (on trial)  11/30/2023: C12 Pembrolizumab 200mg IV (on trial)  12/21/2023: C13 Pembrolizumab 200mg IV (on trial)  01/11/2024: C14 Pembrolizumab 200mg IV (on trial)  02/01/2024: C15 Pembrolizumab 200mg IV (on trial)  02/22/2024: C16 Pembrolizumab 200mg IV (on trial)  03/14/2024: C17 Pembrolizumab 200mg IV (on trial)  04/04/2024: C18 Pembrolizumab 200mg IV (on trial)  04/25/2024: C19 Pembrolizumab 200mg IV (on trial)  05/16/2024: C20 Pembrolizumab 200mg IV (on trial)  06/06/2024: C21 Pembrolizumab 200mg IV (on trial)  06/27/2024: C22 Pembrolizumab 200mg IV (on trial)  07/15/2024: ED visit right foot contusion   07/18/2024: C23 Pembrolizumab 200mg IV (on trial)  08/08/2024: C24 Pembrolizumab 200mg IV (on trial)  08/29/2024: C25 Pembrolizumab 200mg IV (on trial)  09/19/2024: C26 Pembrolizumab 200mg IV (on trial)  10/10/2024: C27 Pembrolizumab 200mg IV (on trial)  10/31/2024: C28 Pembrolizumab 200mg IV (on trial)  11/21/2024: C29 Pembrolizumab 200mg IV (on trial)  12/12/2024: C30 Pembrolizumab 200mg IV (on trial)    Interval History (12/12/2024):    Pt present in clinic for readiness to treat visit.   Her daughter is present for visit.  Continues to do well. No fever or chills. Chronic stable cough. Denies n/v. Reflux which she attributes to hiatal hernia, stable. No constipation or diarrhea.   RUQ pain/left rib pain - chronic, believes Neurontin has helped, current dose 600mg BID considering restarting 300mg midday. Neurontin also helping generalized neuropathic pain and restless legs. Restless legs can cause sleep disturbance. Leg cramps on occasion.   Occasional left upper back pain burning last about 1 minute a few times a month.     Review of Systems:  A review of systems has been completed and are negative for complaints except what is stated in the HPI and/or past medical history     PMHx: vocal cord cancer, GERD and COPD, OA  PSHx: tonsillectomy, tubal ligation, lumpectomy, L ankle and R hand surgery  FHx: strong family history of breast cancer including male breast cancer.   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 Then worked as a  since 2005. She quit etoh 2010. She smokes Marijuana every day.     Allergies:  Cephalexin    Medications:    Current Outpatient Medications:     alendronate (Fosamax) 70 mg tablet, Take 1 tablet (70 mg) by mouth every 7 days. Take in the morning with a full glass of water, on an empty stomach, and do not take anything else by mouth or lie down for the next 30 min., Disp: , Rfl:     azelastine HCl (AZELASTINE NASL), Administer 0.15 % into affected nostril(s) 2 times a day as needed., Disp: , Rfl:     budesonide-formoteroL (Symbicort) 160-4.5 mcg/actuation inhaler, Rinse mouth with water after use to reduce aftertaste and incidence of candidiasis. Do not swallow., Disp: , Rfl:     busPIRone (Buspar) 15 mg tablet, 1 tablet (15 mg) every 12 hours., Disp: , Rfl:     cholecalciferol (Vitamin D-3) 50,000 unit capsule, Take 1 capsule (50,000 Units) by mouth 1 (one) time per week., Disp: , Rfl:     cyclobenzaprine (Flexeril) 10 mg tablet, Take 1 tablet (10 mg) by  mouth 2 times a day as needed for muscle spasms., Disp: 30 tablet, Rfl: 1    gabapentin (Neurontin) 600 mg tablet, Take 1 tablet (600 mg) by mouth 2 times a day., Disp: , Rfl:     levothyroxine (Synthroid, Levoxyl) 75 mcg tablet, Take 1 tablet (75 mcg) by mouth once daily., Disp: 90 tablet, Rfl: 1    montelukast (Singulair) 10 mg tablet, 1 tablet Orally Once a day, Disp: , Rfl:     multivitamin with minerals tablet, Take 1 tablet by mouth once daily., Disp: , Rfl:     omeprazole (PriLOSEC) 40 mg DR capsule, TAKE 1 CAPSULE BY MOUTH 30 MINUTES BEFORE MORNING MEAL TWICE A DAY, Disp: , Rfl:     PEMBROLIZUMAB IV, Infuse into a venous catheter., Disp: , Rfl:     tiotropium (Spiriva Respimat) 2.5 mcg/actuation inhaler, INHALE TWO PUFFS BY MOUTH EVERY DAY, Disp: , Rfl:     diphenhydramine HCl (BENADRYL ALLERGY ORAL), , Disp: , Rfl:     oxyCODONE-acetaminophen (Percocet) 5-325 mg tablet, Take 1 tablet by mouth every 6 hours if needed for severe pain (7 - 10) for up to 10 days. (Patient not taking: Reported on 12/12/2024), Disp: 30 tablet, Rfl: 0  No current facility-administered medications for this visit.    Facility-Administered Medications Ordered in Other Visits:     acetaminophen (Tylenol) tablet 650 mg, 650 mg, oral, PRNKayla MD MPH    albuterol 2.5 mg /3 mL (0.083 %) nebulizer solution 3 mL, 3 mL, nebulization, PRN, Kayla Cook MD MPH    albuterol 2.5 mg /3 mL (0.083 %) nebulizer solution 3 mL, 3 mL, nebulization, PRNKayla MD MPH    dextrose 5 % in water (D5W) bolus 500 mL, 500 mL, intravenous, PRNKayla MD MPH    dextrose 5 % in water (D5W) bolus, 500 mL, intravenous, PRN, Kayla Cook MD MPH    diphenhydrAMINE (BENADryl) capsule 50 mg, 50 mg, oral, PRNKayla MD MPH    diphenhydrAMINE (BENADryl) injection 50 mg, 50 mg, intravenous, PRN, Kayla Cook MD MPH    diphenhydrAMINE (BENADryl) injection 50 mg, 50 mg, intravenous, PRN, Kayla Cook MD MPH    EPINEPHrine (Epipen) injection syringe 0.3 mg,  "0.3 mg, intramuscular, q5 min PRN, Kayla Cook MD MPH    EPINEPHrine (Epipen) injection syringe 0.3 mg, 0.3 mg, intramuscular, q5 min PRN, Kayla Cook MD MPH    famotidine PF (Pepcid) injection 20 mg, 20 mg, intravenous, Once PRN, Kayla Cook MD MPH    famotidine PF (Pepcid) injection 20 mg, 20 mg, intravenous, Once PRN, Kayla Cook MD MPH    methylPREDNISolone sod succinate (SOLU-Medrol) 40 mg/mL injection 40 mg, 40 mg, intravenous, PRN, Kayla Cook MD MPH    methylPREDNISolone sod succinate (SOLU-Medrol) 40 mg/mL injection 40 mg, 40 mg, intravenous, PRN, Kayla Cook MD MPH    prochlorperazine (Compazine) injection 10 mg, 10 mg, intravenous, q6h PRN, Kayla Cook MD MPH    prochlorperazine (Compazine) tablet 10 mg, 10 mg, oral, q6h PRN, Kayla Cook MD MPH    sodium chloride 0.9 % bolus 500 mL, 500 mL, intravenous, PRN, Kayla Cook MD MPH    sodium chloride 0.9 % bolus 500 mL, 500 mL, intravenous, PRN, Kayla Cook MD MPH      Vital Signs:  /77 (BP Location: Right arm, Patient Position: Sitting, BP Cuff Size: Adult)   Pulse 74   Temp 36.2 °C (97.2 °F) (Temporal)   Resp 17   Wt 62.6 kg (138 lb 0.1 oz)   SpO2 95%   BMI 22.97 kg/m²     Wt Readings from Last 5 Encounters:   24 62.6 kg (138 lb 0.1 oz)   24 62.1 kg (137 lb 0.3 oz)   10/31/24 61 kg (134 lb 7.7 oz)   10/10/24 61.2 kg (134 lb 14.7 oz)   24 61.3 kg (135 lb 2.3 oz)        Physical Exam:  ECO  Pain: 3/10 RUQ and left side \"under ribs\" wraps around back at times as above  Constitutional: Well developed, awake/alert/oriented x3, no distress, alert and cooperative  Eyes: PER. sclera anicteric  ENMT: Oral mucosa moist. No lesions or thrush   Respiratory/Thorax: Breathing is non-labored. Lungs are clear to auscultation bilaterally. No adventitious breath sounds. Hx RUL lobectomy 2020  Cardiovascular: S1-S2. Regular rate and rhythm. No murmurs, rubs, or gallops appreciated  Gastrointestinal: Abdomen soft nontender, nondistended, normal active " bowel sounds.   Musculoskeletal: ROM intact, no joint swelling, normal strength  Extremities: normal extremities, no cyanosis, no edema, no clubbing  Lymphatics: no palpable lymphadenopathy  Neurologic: alert and oriented x3. Nonfocal exam. No myoclonus  Psychological: Pleasant, appropriate and easily engaged       Results:     Labs  Results from last 7 days   Lab Units 12/11/24  1544   WBC AUTO x10*3/uL 7.2   HEMOGLOBIN g/dL 14.1   HEMATOCRIT % 42.2   PLATELETS AUTO x10*3/uL 399   NEUTROS ABS x10*3/uL 4.44   LYMPHS ABS AUTO x10*3/uL 1.86   MONOS ABS AUTO x10*3/uL 0.79   EOS ABS AUTO x10*3/uL 0.08   NEUTROS PCT AUTO % 61.5   LYMPHS PCT AUTO % 25.7   MONOS PCT AUTO % 10.9   EOS PCT AUTO % 1.1      Results from last 7 days   Lab Units 12/11/24  1544   GLUCOSE mg/dL 90   SODIUM mmol/L 143   POTASSIUM mmol/L 4.5   CHLORIDE mmol/L 104   CO2 mmol/L 31   BUN mg/dL 8   CREATININE mg/dL 0.79   EGFR mL/min/1.73m*2 84   CALCIUM mg/dL 9.7   ALBUMIN g/dL 4.3   PROTEIN TOTAL g/dL 6.9   BILIRUBIN TOTAL mg/dL 0.4   ALK PHOS U/L 61   ALT U/L 16   AST U/L 20         Imaging   CT C/A/P November 15, 2024     IMPRESSION:  CHEST:  1.  Stable postsurgical changes of right upper lobectomy.  2. Stable left upper lobe reticulonodular scarring.  3. No new sites of disease in the chest.      ABDOMEN-PELVIS:  1.  No metastatic disease in the abdomen and pelvis.  2. Chronic and incidental findings as described above.      Assessment/Plan:  64 y.o. 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     The patient's records and imaging have been reviewed in detail.  MD discussed with the patient the natural history of their disease at length.  The following has also been discussed with the patient:     # Cancer: recurrent likely metastatic ([id number]) lung cancer: liver biopsy showed extensive necrosis. Liquid  biopsy showed KRAS G12C mutation, which was identified in her original surgical resected  specimen. PDL-1 50%. Patient initially had stage IA2 ([id number]) RUL lung  adenocarcinoma s/p RUL lobectomy, unfortunately now likely metastatic recurrence. Liver biopsy on 2/16 showed poorly differentiated malignant neoplasm with extensive necrosis. We discussed treatment  options with her today with either standard of care pembrolizumab vs clinical trials. Patient expressed interests in enrolling into clinical trial. Enrolled into RCT [id number]. Doing well.      - Interval scan: 08/26/2024: CT CAP (+): 1. Status post right upper lobectomy with no definite locoregional recurrence. Stable left upper lobe areas of subpleural scarring and nodularity. No new suspicious pulmonary nodules or masses. 2. Stable precarinal subcentimeter lymph node. No new enlarged intrathoracic lymphadenopathy. 3. Remote bilateral rib fractures as in prior.  1. No metastatic disease in the abdomen or pelvis.  Repeat in 3 months (2/2024)    # Hypothyroidism: G2. Likely 2/2 immunotherapy - resolved.  TSH 1.07 11/8/23.  Continue levothyroxine 75mcg daily.      # Microcytic anemia 2/2 due to chronic disease and hiatal hernia: patient denies any hematochezia  or hematuria. No melena. hx of hiatal hernia. Per patient. Last EGD was done in 2021 and was normal. Colonoscopy was a few years ago.  EGD showed hiatal hernia 03/2023. Improved Hgb overall. Iron studies show low-normal ferritin and TSAT of 14%. Continue PO iron 3x/week. Tolerates well. Stable.  12/12/2024 Anemia resolved. Have decreased oral iron from BID to every day will now discontinue oral iron.     # Mucositis (chronic).  Pt reports since start of immunotherapy.  Chronic sore secondary to ill fitting dentures recommended s&s rinse.  Continue Anbesol and/or Oragel for pain.  Will continue to monitor.  BMX added.  2/1/24  - resolved   Working with oral surgeon. Expects 3 upcoming extractions and 3  west (November)      # Constipation: managed well with stool softener and PRN lactulose.        # Clinical Trials: 2023 initiated treatment on [id number] this is cycle 1 of first-line treatment.  She has been randomized to  arm A which is pembrolizumab alone for up to 2 years or progression followed by Alimta/carboplatin for second line therapy. Consent has been signed. 11/15/24 CT scan shows favorable response. Continue treatment.      # Access: Peripheral veins.     # Pain: she has a displaced 8th Rib on the left side -Advised patient to take percocet daily and decrease Advil use. PRN Percocet Rx in place.  Rx last sent 23. Currently managed with PRN gummies.  New pain on the R upper back, controlled with percocet. Will try lidocaine patch OTC. :  Pain currently managed with PRN Advil, Aspercreme, and gummies.  Routine Gabapentin for neuropathy pain.   Pt verbalized desire to reduce # of medications taken.  Wishes to reduce Gabapentin dosage.   Supportive onc referral placed for assistance.   :  Bruise to buttock, pain 3-4/10.  Increased right flank/rib pain r/t fall, pain 3/10.  Managed with Advil 600mg TID.  :  Right flank pain 2/10.  Managed with PRN interventions.      # Anxiety:  Anxiety reported d/t recent death of her father, upcoming .  Pt requested 1x Valium dose, previous 10mg admin.  OARRS reviewed. Provided Rx for Valium 5mg x3 doses.  Not currently an issue.    # Bone Health: L fifth ribs lesion stable.    # Arthralgia (grade 1).  Uric acid lab (-).  Intermittent arthralgia to left hand.  Continue PRN Advil, Aspercreme or Percocet (for severe pain).      # Left rib or chest wall pain over the past month. 23 CXR - RESULTS: Mediastinal surgical clips are noted. The cardiac silhouette is within normal limits. Mediastinal contours are unremarkable. The lungs demonstrate no infiltrate or atelectasis. There is no evidence for pleural effusion. Remote fracture is noted  involving the right 7th rib. There are degenerative changes of the thoracic spine.  - Continue PRN Percocet and/or gummies for pain control.      # Right foot contusion, hyperextension:  Able to bear wt.  Continue to follow with podiatrist with any questions.  Continue Advil x5 days for inflammation. Resolved    # Psychosocial: Coping well, no acute issues.  Good support from family & friends.  Social work support available.     # GI/CINV: No acute issues.  Eating and voiding well.  Nutrition consult available.    # Insomnia:  Both difficulty falling and staying asleep.  Recommended pt can trial Melatonin.  Start with 3mg dose.  Max dose 10mg.  Restless contributes     # Sinusitis:  Pressure to maxillary and frontal sinuses.  +Increased mucous, yellow in color.  Denies fevers.  Hx of recurrent sinus infections.  Rx called to pt's pharmacy.  Doxycycline 100mg BID x7-10 days.  Extended course d/t recent smoke exposure.  3/14: Resolved.  9/19:  9/16/24 ENT appt for recurrent sinus infection.  Completed ATB course.  Resolved.       # Smoking: former smoker     # Fertility: N/A.        # Heme/ESAs: N/A.     # GOC: Patient is aware of palliative intent goals of care.     # Language: English.    # Health Maintenance: Patient plans to get flu and RSV vaccine she will get Flu in 2 weeks and RSV (1/17/25)      # Dispo: RTC in 3 weeks for next cycle.        Mikie Hancock, APRN-CNP  [facility]  Thoracic + [facility] Medical Oncology     I spent a total of 30+ minutes on the date of the service which included preparing to see the patient, face-to-face patient care, completing clinical documentation, obtaining and / or reviewing separately obtained history, counseling and educating the patient/family/caregiver, ordering medications, tests, or procedures, communicating with other healthcare providers (not separately reported), independently interpreting results, not separately reported, and communicating results to the  patient/family/caregiver, Name and date of birth verified.

## 2024-12-20 DIAGNOSIS — C34.11 MALIGNANT NEOPLASM OF UPPER LOBE OF RIGHT LUNG (MULTI): ICD-10-CM

## 2024-12-20 NOTE — RESEARCH NOTES
Research Note Treatment Day    Hayley Potter is here today for treatment on [id number]. Today is C30D1. Procedures completed per protocol. AE's and con-meds reviewed with patient. Patient is aware of treatment plan.    [x]   Received treatment as planned   OR  []    Treatment delayed; patient calendar updated as required   Treatment delayed because:    []   AE    []   Physician Discretion    []   Clinical Deterioration or Progression     []   Other    Education Documentation  Treatment Plan and Schedule, taught by Rafiq Delgado RN at 12/20/2024 12:59 PM.  Learner: Family, Patient  Readiness: Acceptance  Method: Explanation  Response: Verbalizes Understanding    Education Comments  No comments found.    Pt and her daughter presented to clinic for cycle 30 on [id number]. Pt is feeling well. Still has the grade 1 altered taste and grade 1 neuropathy, grade 1 dyspnea and cough and her current pain is 3/10 . She continues with arthralgia as well. Pt will stop her iron now per the NP and will see how she is next time. NP in to complete assessment.

## 2024-12-26 DIAGNOSIS — E03.2 HYPOTHYROIDISM DUE TO MEDICATION: ICD-10-CM

## 2024-12-27 ENCOUNTER — HOSPITAL ENCOUNTER (OUTPATIENT)
Dept: RADIOLOGY | Facility: HOSPITAL | Age: 64
Discharge: HOME | End: 2024-12-27
Payer: COMMERCIAL

## 2024-12-27 VITALS — HEIGHT: 66 IN | WEIGHT: 135 LBS | BODY MASS INDEX: 21.69 KG/M2

## 2024-12-27 DIAGNOSIS — Z12.31 SCREENING MAMMOGRAM FOR BREAST CANCER: ICD-10-CM

## 2024-12-27 PROCEDURE — 77067 SCR MAMMO BI INCL CAD: CPT

## 2024-12-31 ENCOUNTER — TELEPHONE (OUTPATIENT)
Dept: PALLIATIVE MEDICINE | Facility: CLINIC | Age: 64
End: 2024-12-31

## 2024-12-31 ENCOUNTER — OFFICE VISIT (OUTPATIENT)
Dept: HEMATOLOGY/ONCOLOGY | Facility: CLINIC | Age: 64
End: 2024-12-31
Payer: COMMERCIAL

## 2024-12-31 ENCOUNTER — LAB (OUTPATIENT)
Dept: LAB | Facility: CLINIC | Age: 64
End: 2024-12-31
Payer: COMMERCIAL

## 2024-12-31 ENCOUNTER — OFFICE VISIT (OUTPATIENT)
Dept: PALLIATIVE MEDICINE | Facility: CLINIC | Age: 64
End: 2024-12-31
Payer: COMMERCIAL

## 2024-12-31 ENCOUNTER — EDUCATION (OUTPATIENT)
Dept: HEMATOLOGY/ONCOLOGY | Facility: CLINIC | Age: 64
End: 2024-12-31

## 2024-12-31 VITALS
HEART RATE: 85 BPM | WEIGHT: 138.45 LBS | DIASTOLIC BLOOD PRESSURE: 72 MMHG | SYSTOLIC BLOOD PRESSURE: 127 MMHG | BODY MASS INDEX: 22.69 KG/M2 | RESPIRATION RATE: 16 BRPM | TEMPERATURE: 98.2 F | OXYGEN SATURATION: 93 %

## 2024-12-31 VITALS
HEART RATE: 85 BPM | OXYGEN SATURATION: 93 % | WEIGHT: 138.45 LBS | BODY MASS INDEX: 22.69 KG/M2 | DIASTOLIC BLOOD PRESSURE: 72 MMHG | SYSTOLIC BLOOD PRESSURE: 127 MMHG | RESPIRATION RATE: 17 BRPM | TEMPERATURE: 98.2 F

## 2024-12-31 DIAGNOSIS — F41.9 ANXIETY AND DEPRESSION: ICD-10-CM

## 2024-12-31 DIAGNOSIS — G89.3 CANCER RELATED PAIN: ICD-10-CM

## 2024-12-31 DIAGNOSIS — C34.11 MALIGNANT NEOPLASM OF UPPER LOBE OF RIGHT LUNG (MULTI): Primary | ICD-10-CM

## 2024-12-31 DIAGNOSIS — Z51.5 PALLIATIVE CARE ENCOUNTER: Primary | ICD-10-CM

## 2024-12-31 DIAGNOSIS — E03.2 HYPOTHYROIDISM DUE TO MEDICATION: ICD-10-CM

## 2024-12-31 DIAGNOSIS — F32.A ANXIETY AND DEPRESSION: ICD-10-CM

## 2024-12-31 DIAGNOSIS — Z71.89 GOALS OF CARE, COUNSELING/DISCUSSION: ICD-10-CM

## 2024-12-31 DIAGNOSIS — C34.11 MALIGNANT NEOPLASM OF UPPER LOBE OF RIGHT LUNG (MULTI): ICD-10-CM

## 2024-12-31 DIAGNOSIS — M79.2 NEUROPATHIC PAIN: ICD-10-CM

## 2024-12-31 LAB
ALBUMIN SERPL BCP-MCNC: 4.3 G/DL (ref 3.4–5)
ALP SERPL-CCNC: 64 U/L (ref 33–136)
ALT SERPL W P-5'-P-CCNC: 17 U/L (ref 7–45)
ANION GAP SERPL CALC-SCNC: 13 MMOL/L (ref 10–20)
AST SERPL W P-5'-P-CCNC: 21 U/L (ref 9–39)
BASOPHILS # BLD AUTO: 0.06 X10*3/UL (ref 0–0.1)
BASOPHILS NFR BLD AUTO: 0.7 %
BILIRUB SERPL-MCNC: 0.4 MG/DL (ref 0–1.2)
BUN SERPL-MCNC: 7 MG/DL (ref 6–23)
CALCIUM SERPL-MCNC: 9.7 MG/DL (ref 8.6–10.3)
CHLORIDE SERPL-SCNC: 102 MMOL/L (ref 98–107)
CO2 SERPL-SCNC: 29 MMOL/L (ref 21–32)
CREAT SERPL-MCNC: 0.84 MG/DL (ref 0.5–1.05)
EGFRCR SERPLBLD CKD-EPI 2021: 78 ML/MIN/1.73M*2
EOSINOPHIL # BLD AUTO: 0.12 X10*3/UL (ref 0–0.7)
EOSINOPHIL NFR BLD AUTO: 1.5 %
ERYTHROCYTE [DISTWIDTH] IN BLOOD BY AUTOMATED COUNT: 12.3 % (ref 11.5–14.5)
GLUCOSE SERPL-MCNC: 92 MG/DL (ref 74–99)
HCT VFR BLD AUTO: 44.3 % (ref 36–46)
HGB BLD-MCNC: 14.5 G/DL (ref 12–16)
IMM GRANULOCYTES # BLD AUTO: 0.02 X10*3/UL (ref 0–0.7)
IMM GRANULOCYTES NFR BLD AUTO: 0.2 % (ref 0–0.9)
LYMPHOCYTES # BLD AUTO: 1.6 X10*3/UL (ref 1.2–4.8)
LYMPHOCYTES NFR BLD AUTO: 19.6 %
MCH RBC QN AUTO: 31.3 PG (ref 26–34)
MCHC RBC AUTO-ENTMCNC: 32.7 G/DL (ref 32–36)
MCV RBC AUTO: 96 FL (ref 80–100)
MONOCYTES # BLD AUTO: 0.8 X10*3/UL (ref 0.1–1)
MONOCYTES NFR BLD AUTO: 9.8 %
NEUTROPHILS # BLD AUTO: 5.57 X10*3/UL (ref 1.2–7.7)
NEUTROPHILS NFR BLD AUTO: 68.2 %
NRBC BLD-RTO: 0 /100 WBCS (ref 0–0)
PLATELET # BLD AUTO: 382 X10*3/UL (ref 150–450)
POTASSIUM SERPL-SCNC: 4.5 MMOL/L (ref 3.5–5.3)
PROT SERPL-MCNC: 6.8 G/DL (ref 6.4–8.2)
RBC # BLD AUTO: 4.63 X10*6/UL (ref 4–5.2)
SODIUM SERPL-SCNC: 139 MMOL/L (ref 136–145)
WBC # BLD AUTO: 8.2 X10*3/UL (ref 4.4–11.3)

## 2024-12-31 PROCEDURE — 82150 ASSAY OF AMYLASE: CPT

## 2024-12-31 PROCEDURE — 84075 ASSAY ALKALINE PHOSPHATASE: CPT

## 2024-12-31 PROCEDURE — 85025 COMPLETE CBC W/AUTO DIFF WBC: CPT

## 2024-12-31 PROCEDURE — 83690 ASSAY OF LIPASE: CPT

## 2024-12-31 PROCEDURE — 36415 COLL VENOUS BLD VENIPUNCTURE: CPT

## 2024-12-31 PROCEDURE — 99213 OFFICE O/P EST LOW 20 MIN: CPT | Performed by: NURSE PRACTITIONER

## 2024-12-31 PROCEDURE — 84443 ASSAY THYROID STIM HORMONE: CPT

## 2024-12-31 PROCEDURE — 99215 OFFICE O/P EST HI 40 MIN: CPT | Performed by: NURSE PRACTITIONER

## 2024-12-31 RX ORDER — LORAZEPAM 0.5 MG/1
.5-1 TABLET ORAL EVERY 8 HOURS PRN
Qty: 60 TABLET | Refills: 0 | Status: SHIPPED | OUTPATIENT
Start: 2024-12-31 | End: 2025-01-30

## 2024-12-31 RX ORDER — BUSPIRONE HYDROCHLORIDE 30 MG/1
30 TABLET ORAL 2 TIMES DAILY
COMMUNITY

## 2024-12-31 ASSESSMENT — PAIN SCALES - GENERAL
PAINLEVEL_OUTOF10: 3
PAINLEVEL_OUTOF10: 3

## 2024-12-31 NOTE — PROGRESS NOTES
"SUPPORTIVE AND PALLIATIVE ONCOLOGY CONSULT - OUTPATIENT      SERVICE DATE: 12/31/2024    Referred by:  Mikie Hancock CNP  Medical Oncologist: Kayla Cook MD MPH   Radiation Oncologist: No care team member to display  Primary Physician: Don Angel  [phone]    REASON FOR CONSULT/CHIEF CONSULT COMPLAINT: pain management, other symptom control -anxiety, Introduction to Supportive and Palliative Oncology Services, and goals of care discussion    Cancer History  R lung CA dx 2020  -s/p R lobectomy April 2020  -currently on Pembro maintenance     Onc: Karissa  Pulm: Tom    Subjective   HISTORY OF PRESENT ILLNESS: Hayley Potter is a 64 y.o. female with Pmhx of COPD, GERD, anxiety, and R lung CA.     She presents to supportive oncology for symptom management.     Presents for NPV accompanied by her daughter, Jacqueline. C/o \"deep, burning, itch\" in her chest since surgery. About 2-3 months ago, pain started radiating into mid stomach area, feels like pressure, discomfort. Occasionally takes percocet, but pain \"has to be really bad to take it.\" Takes flexeril for back pain, only once in past month. Currently on gabapentin 600/300/600. Moving bowels daily, no bowel meds. Gets nausea a few times a month, has frequent reflux, especially after eating, taking prilosec 40mg bid. Appetite is low, sometimes feels very full even if she hasn't eaten, forces self to eat. When she is hungry, wants a full meal. States mood is biggest issue right now, has high anxiety every day, taking buspar bid, reports feelings of loneliness. Sleeping okay over past few weeks, typically wakes up in the middle of the night. Energy levels are up and down, would like to be doing something, recently signed up for senior [employer]. She has advance directives, she is full code, but would not want prolonged life support.     Pain Assessment:  Pain Score:   3  Location: Rib Cage (right side)  Education:  Discussed adjusting pain medication once anxiety better " controlled.       Symptom Assessment:  Pain:a little  Headache: none  Dizziness:none  Lack of energy: a little  Difficulty sleeping: a little  Worrying: somewhat  Anxiety: very much  Depression: a little  Pain in mouth/swallowing: none  Dry mouth: none  Taste changes: none  Shortness of breath: none  Lack of appetite: somewhat   Nausea: a little  Vomiting: none  Constipation: none  Diarrhea: none  Sore muscles: none  Numbness or tingling in hands/feet/other: a little  Weight loss: none      Information obtained from: chart review, interview of patient, and interview of family  ______________________________________________________________________     Oncology History   Malignant neoplasm of lung (Multi)   4/11/2023 -  Research Study Participant    ([facility]) [id number] Arm A & B 1st Line - Pembrolizumab, 21 Day Cycles  Plan Provider: Kayla Cook MD MPH  Treatment goal: [No plan goal]  Line of treatment: [No plan line of treatment]  Associated studies: Pembrolizumab +/- Chemotherapy in Induction/Maintenance or Postprogression in NSCLC     8/17/2023 Initial Diagnosis    Lung cancer (CMS/HCC)         Past Medical History:   Diagnosis Date    Personal history of malignant neoplasm of larynx     History of malignant neoplasm of larynx    Personal history of other diseases of the respiratory system     History of chronic obstructive lung disease    Personal history of other diseases of the respiratory system     History of asthma    Personal history of other malignant neoplasm of bronchus and lung     History of malignant neoplasm of lung     Past Surgical History:   Procedure Laterality Date    BREAST LUMPECTOMY Left     2001 benign    CT GUIDED IMAGING FOR NEEDLE PLACEMENT  04/03/2020    CT GUIDED IMAGING FOR NEEDLE PLACEMENT [facility]    OTHER SURGICAL HISTORY  10/08/2019    Ankle surgery    OTHER SURGICAL HISTORY  10/08/2019    Tubal ligation    OTHER SURGICAL HISTORY  09/26/2022    Lumpectomy    OTHER SURGICAL  HISTORY  02/23/2021    Tonsillectomy with adenoidectomy    US GUIDED PERCUTANEOUS PLACEMENT  02/16/2023    US GUIDED PERCUTANEOUS PLACEMENT [facility]     Family History   Problem Relation Name Age of Onset    Cervical cancer Mother          FIGO stage IA2    Stroke Mother      Emphysema Mother      Breast cancer Mother  50    Stroke Brother          SOCIAL HISTORY  -/ ; lives alone in a house, but daughter is there during the day often. 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 History of ETOH misuse, in recovery for 20 years. No illicits. Uses THC daily.     Religious and Importance of Religious:  [employer]ist, doesn't attend [employer]     REVIEW OF SYSTEMS  Review of systems negative unless noted in HPI.       Objective     Palliative Performance Scale % (PPS)       Current Outpatient Medications   Medication Instructions    alendronate (FOSAMAX) 70 mg, Every 7 days    azelastine HCl (AZELASTINE NASL) 0.15 %, 2 times daily PRN    budesonide-formoteroL (Symbicort) 160-4.5 mcg/actuation inhaler Rinse mouth with water after use to reduce aftertaste and incidence of candidiasis. Do not swallow.    busPIRone (BUSPAR) 15 mg, Every 12 hours    cholecalciferol (VITAMIN D-3) 50,000 Units, Once Weekly    cyclobenzaprine (FLEXERIL) 10 mg, oral, 2 times daily PRN    diphenhydramine HCl (BENADRYL ALLERGY ORAL) No dose, route, or frequency recorded.    gabapentin (Neurontin) 600 mg tablet 1 tablet, 2 times daily    levothyroxine (SYNTHROID, LEVOXYL) 75 mcg, oral, Daily    montelukast (Singulair) 10 mg tablet 1 tablet Orally Once a day    multivitamin with minerals tablet 1 tablet, Daily    omeprazole (PriLOSEC) 40 mg DR capsule TAKE 1 CAPSULE BY MOUTH 30 MINUTES BEFORE MORNING MEAL TWICE A DAY    oxyCODONE-acetaminophen (Percocet) 5-325 mg tablet 1 tablet, oral,  Every 6 hours PRN    PEMBROLIZUMAB IV Infuse into a venous catheter.    tiotropium (Spiriva Respimat) 2.5 mcg/actuation inhaler INHALE TWO PUFFS BY MOUTH EVERY DAY       Allergies:   Allergies   Allergen Reactions    Cat Dander Runny nose     Irritated eyes    Cephalexin Other     YEAST INFECTION                PHYSICAL EXAMINATION  Vital Signs:   Vital signs reviewed  Vitals:    12/31/24 1256   BP: 127/72   Pulse: 85   Resp: 16   Temp: 36.8 °C (98.2 °F)   SpO2: 93%     Pain Score:   3         Physical Exam  Vitals reviewed.   Constitutional:       Appearance: Normal appearance.   HENT:      Head: Normocephalic.   Cardiovascular:      Rate and Rhythm: Normal rate.   Pulmonary:      Effort: Pulmonary effort is normal.   Abdominal:      Palpations: Abdomen is soft.   Musculoskeletal:         General: Normal range of motion.   Skin:     General: Skin is warm and dry.   Neurological:      General: No focal deficit present.      Mental Status: She is alert and oriented to person, place, and time.   Psychiatric:         Mood and Affect: Mood is anxious.         Judgment: Judgment normal.      Comments: Appropriately tearful at times          ASSESSMENT/PLAN    Pain  Pain is: cancer related pain  Type: visceral and neuropathic  Pain control: sub-optimally controlled  Home regimen:   -continue gabapentin 600/300/600 tid.   -continue flexeril 10mg bid PRN.   -continue percocet 5/325 q6hrs PRN, not currently taking this medication.   -plan to discuss pain plan further once anxiety better controlled   Intolerances/previously tried:    Opioid Use  Medication Management:   - OARRS report reviewed with no aberrant behavior; consistent with  prescriptions/records and patient history  - MED 0.  Overdose Risk Score 160.   This has been discussed with patient.   - We will continue to closely monitor the patient for signs of prescription misuse including UDS, OARRS review and subjective reports at each visit.  - concurrent  benzodiazepine use with ativan, educated pt on medication safety when used in combination with opiates    - I am a provider who either is or has consulted and collaborated with a provider certified in Hospice and Palliative Medicine and have conducted a face-face visit and examination for this patient.  - Routine Urine Drug Screen completed deferred (MED <90)  appropriately positive for opioids and negative for illicit substances  - Controlled Substance Agreement completed deferred (MED <90)   - Specifically discussed that controlled substance prescriptions will only be provided by our group as outlined in the completed agreement  - Prescribed naloxone deferred (MED <90)   - Red Flags: history of ETOH misuse, in recovery for 20 years.     Nausea/ Reflux   Intermittent nausea without vomiting related to chemotherapy   -continue zofran 8mg q8hrs PRN, not currently taking this medication  -continue prilosec 40mg bid.     Constipation   At risk for constipation related to opioids,  currently not constipated   Usual bowel pattern: daily   Current regimen:   -educated pt on importance of maintaining regular bowel schedule  -start colace 100mg bid PRN for hard stools  -start senna 8.6mg, 1-2tabs, 1-2x/day  -start MOM 15mL 4x/day PRN    Altered Mood  Acute on chronic anxiety and depression related to health concerns   uncontrolled with home regimen  Current regimen:   -continue buspar 30mg bid.   -start ativan 0.5mg, 1-2 tabs q8hrs PRN. Rx sent today.   Intolerances/previously tried:  -effexor, caused low libido  -wellbutrin, did not like this medication    Decreased appetite  Related to malignancy, chemotherapy, and disease process  Current regimen:    -encouraged smaller, more frequent meals through out the day  -encouraged use of supplements such as Boost, Ensure, etc.      Supportive Interventions:     Introduction to Supportive and Palliative Oncology:  Spoke with pt and daughter   Introduced the role and philosophy of  Supportive and Palliative oncology in the evaluation and management of symptoms during cancer treatment  Palliative care was introduced as a service for patients with serious illness to help with symptoms, assist with goals of care conversations, navigate complex decision making, improve quality of life for patients, and provide support both patients and families.  Patient seemed to appreciate the extra layer of support.    Advance Directives  Existence of Advance Directives:Yes, but NOT documented in medical record  Decision maker: KALPANA is Jacqueline Potter (daughter): [phone]  Code Status: Full code, would not want life support for prolonged time period         Next Follow-Up Visit:  Return to clinic in 3 weeks    Signature and billing  Thank you for allowing us to participate in the care of this patient. Recommendations will be communicated back to the consulting service by way of shared electronic medical record or face-to-face.    Medical complexity was high level due to due to complexity of problems, extensive data review, and high risk of management/treatment.  Time was spent on the following: Prep Time, Time Directly with Patient/Family/Caregiver, Documentation Time. Total time spent: 65 mins      DATA   Diagnostic tests and information reviewed for today's visit:  Most recent labs       Some elements copied from oncology note on 12/12/24, the elements have been updated and all reflect current decision making from today, 12/31/2024.      Plan of Care discussed with: Patient and Family/Significant Other: daughter      SIGNATURE: VANESSA Edwards    Contact information:  Supportive and Palliative Oncology  Monday-Friday 8 AM-5 PM  Phone:  [phone], press option #5, then option #1.   Or Epic Secure Chat

## 2024-12-31 NOTE — PROGRESS NOTES
Patient ID: Hayley Potter is a 64 y.o. female.    CC:  Management of stage IA3 ([id number]) adenocarcinoma of RUL s/p RUL lobectomy 04/2020, PDL-1 50%, NGS positive for KRAS G12C--> now with  metastatic recurrence involving liver, 11th L rib, and mediastinal LNs. 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 She was initially found to have spiculated 2.9cm mass in the RUL on the CT chest on 03/02/2020. She underwent CT guided RUL biopsy with pathology showed lung tissue with scan and focal aypicla grandular proliferation  suspicious for adenocarcinoma. IHC positive for CK7, TTF1 and napsin A. CK20 negative. PET/CT on 04/15/2020 showed RUL hypermetabolic RUL mass. No evidence of distant metastases. She underwent RUL lobectomy with mediastinal LN dissection with Dr. Wheat  on 04/21/2020 which showed invasive well to moderately differentiated adenocarcinoma, tumor measuring 2.5x2.3.x1.7cm. No VPI or LVI. All margins were negative. LN (0/11).      Unfortunately, on the surveillance CT chest (+) on 08/24/2022, there was a new irregular marginated 6mm GUSTABO nodule, which increased in size significantly on the repeat CT chest (-) on 01/06/2023, measuring 12mm.  There is also an additional new 9mm nodule  in the L lung as well as new mediastinal and possible L hilar LAD. PET/CT on 02/16/2023 showed hypermetabolic L hilar and mediastinal LAD. 1st  GUSTABO nodule now 8mm likely inflammatory. 2nd GUSTABO nodule showed stable size with SUV 4.4. Soft tissue mass associated with  L 11th rib fracture, SUV 11.5. Mass within the liver SUV 13.1, suspicious for mets. Hypermetabolic activity also noted  in  the stomach fundus, association with a hiatal hernia, in the cecum and ascending colon without masses seen. She underwent US guided liver biopsy on 02/16/2023 - poorly-differentiated carcinoma.     Treatment Summary:  04/21/2020: RUL lobectomy with medistainal LN dissection (Dr. Wheat)  04/11/2023: C1 pembrolizumab 200mg IV (on trial)  05/02/2023: C2 Pembrolizumab 200mg IV (on trial)  05/23/2023: C3 Pembrolizumab 200mg IV (on trial)  06/13/2023: C4 Pembrolizumab 200mg IV (on trial)   07/05/2023: C5 Pembrolizumab 200mg IV (on trial)   07/26/2023: C6 Pembrolizumab 200mg IV (on trial)   08/16/2023: C7 Pembrolizumab 200mg IV (on trial)   09/07/2023: C8 Pembrolizumab 200mg IV (on trial)   09/28/2023: C9 Pembrolizumab 200mg IV (on trial)   10/19/2023: C10 Pembrolizumab 200mg IV (on trial)  11/09/2023: C11 Pembrolizumab 200mg IV (on trial)  11/30/2023: C12 Pembrolizumab 200mg IV (on trial)  12/21/2023: C13 Pembrolizumab 200mg IV (on trial)  01/11/2024: C14 Pembrolizumab 200mg IV (on trial)  02/01/2024: C15 Pembrolizumab 200mg IV (on trial)  02/22/2024: C16 Pembrolizumab 200mg IV (on trial)  03/14/2024: C17 Pembrolizumab 200mg IV (on trial)  04/04/2024: C18 Pembrolizumab 200mg IV (on trial)  04/25/2024: C19 Pembrolizumab 200mg IV (on trial)  05/16/2024: C20 Pembrolizumab 200mg IV (on trial)  06/06/2024: C21 Pembrolizumab 200mg IV (on trial)  06/27/2024: C22 Pembrolizumab 200mg IV (on trial)  07/15/2024: ED visit right foot contusion   07/18/2024: C23 Pembrolizumab 200mg IV (on trial)  08/08/2024: C24 Pembrolizumab 200mg IV (on trial)  08/29/2024: C25 Pembrolizumab 200mg IV (on trial)  09/19/2024: C26 Pembrolizumab 200mg IV (on trial)  10/10/2024: C27 Pembrolizumab 200mg IV (on trial)  10/31/2024: C28 Pembrolizumab 200mg IV (on trial)  11/21/2024: C29 Pembrolizumab 200mg IV (on trial)  12/12/2024: C30 Pembrolizumab 200mg IV (on trial)  01/02/2025: C31 Pembrolizumab 200mg IV (on trial)    Interval History (12/31/2024):     Pt present in clinic for readiness to treat visit.  Her daughter is present for visit.  Continues to do well. No fever or chills. Chronic stable cough, more annoying this week after PFTs.  Denies n/v. Reflux which she attributes to hiatal hernia, stable. No constipation or diarrhea.   RUQ pain/left rib pain - chronic, believes Neurontin has helped, current dose 600mg BID considering restarting 300mg midday. Neurontin also helping generalized neuropathic pain and restless legs. Restless legs can cause sleep disturbance. Leg cramps on occasion.   Occasional left upper back pain burning last about 1 minute a few times a month. Continued complaints of anxiety maybe in part driving pain, met with palliative care today.     Saw Dr Santos has discontinued Singular     Review of Systems:  A review of systems has been completed and are negative for complaints except what is stated in the HPI and/or past medical history     PMHx: vocal cord cancer, GERD and COPD, OA  PSHx: tonsillectomy, tubal ligation, lumpectomy, L ankle and R hand surgery  FHx: strong family history of breast cancer including male breast cancer.   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 Then worked as a  since 2005. She quit etoh 2010. She smokes Marijuana every day.     Allergies:  Cephalexin    Medications:    Current Outpatient Medications:     alendronate (Fosamax) 70 mg tablet, Take 1 tablet (70 mg) by mouth every 7 days. Take in the morning with a full glass of water, on an empty stomach, and do not take anything else by mouth or lie down for the next 30 min., Disp: , Rfl:     azelastine HCl (AZELASTINE NASL), Administer 0.15 % into affected nostril(s) 2 times a day as needed., Disp: , Rfl:     budesonide-formoteroL (Symbicort) 160-4.5 mcg/actuation inhaler, Rinse mouth with water after use to reduce aftertaste and incidence of candidiasis. Do not swallow., Disp: , Rfl:     busPIRone (Buspar) 30 mg tablet, Take 1 tablet (30 mg)  by mouth 2 times a day., Disp: , Rfl:     cholecalciferol (Vitamin D-3) 50,000 unit capsule, Take 1 capsule (50,000 Units) by mouth 1 (one) time per week., Disp: , Rfl:     cyclobenzaprine (Flexeril) 10 mg tablet, Take 1 tablet (10 mg) by mouth 2 times a day as needed for muscle spasms., Disp: 30 tablet, Rfl: 1    diphenhydramine HCl (BENADRYL ALLERGY ORAL), , Disp: , Rfl:     gabapentin (Neurontin) 600 mg tablet, Take 1 tablet (600 mg) by mouth 2 times a day., Disp: , Rfl:     levothyroxine (Synthroid, Levoxyl) 75 mcg tablet, Take 1 tablet (75 mcg) by mouth once daily., Disp: 90 tablet, Rfl: 1    LORazepam (Ativan) 0.5 mg tablet, Take 1-2 tablets (0.5-1 mg) by mouth every 8 hours if needed for anxiety (nausea)., Disp: 60 tablet, Rfl: 0    multivitamin with minerals tablet, Take 1 tablet by mouth once daily., Disp: , Rfl:     omeprazole (PriLOSEC) 40 mg DR capsule, TAKE 1 CAPSULE BY MOUTH 30 MINUTES BEFORE MORNING MEAL TWICE A DAY, Disp: , Rfl:     oxyCODONE-acetaminophen (Percocet) 5-325 mg tablet, Take 1 tablet by mouth every 6 hours if needed for severe pain (7 - 10) for up to 10 days. (Patient not taking: Reported on 12/12/2024), Disp: 30 tablet, Rfl: 0    PEMBROLIZUMAB IV, Infuse into a venous catheter., Disp: , Rfl:     tiotropium (Spiriva Respimat) 2.5 mcg/actuation inhaler, INHALE TWO PUFFS BY MOUTH EVERY DAY, Disp: , Rfl:   No current facility-administered medications for this visit.    Facility-Administered Medications Ordered in Other Visits:     acetaminophen (Tylenol) tablet 650 mg, 650 mg, oral, PRN, Kayla Cook MD MPH    albuterol 2.5 mg /3 mL (0.083 %) nebulizer solution 3 mL, 3 mL, nebulization, PRN, Kayla Cook MD MPH    dextrose 5 % in water (D5W) bolus, 500 mL, intravenous, PRN, Kayla Cook MD MPH    diphenhydrAMINE (BENADryl) capsule 50 mg, 50 mg, oral, PRN, Kayla Cook MD MPH    diphenhydrAMINE (BENADryl) injection 50 mg, 50 mg, intravenous, PRN, Kayla Cook MD MPH    EPINEPHrine (Epipen) injection  "syringe 0.3 mg, 0.3 mg, intramuscular, q5 min PRN, Kayla Cook MD MPH    famotidine PF (Pepcid) injection 20 mg, 20 mg, intravenous, Once PRN, Kayla Cook MD MPH    methylPREDNISolone sod succinate (SOLU-Medrol) 40 mg/mL injection 40 mg, 40 mg, intravenous, PRN, Kayla Cook MD MPH    prochlorperazine (Compazine) injection 10 mg, 10 mg, intravenous, q6h PRN, Kayla Cook MD MPH    prochlorperazine (Compazine) tablet 10 mg, 10 mg, oral, q6h PRN, Kayla Cook MD MPH    sodium chloride 0.9 % bolus 500 mL, 500 mL, intravenous, PRN, Kayla Cook MD MPH        Vital Signs:  /72 (BP Location: Left arm, Patient Position: Sitting, BP Cuff Size: Adult long)   Pulse 85   Temp 36.8 °C (98.2 °F) (Temporal)   Resp 17   Wt 62.8 kg (138 lb 7.2 oz)   SpO2 93%   BMI 22.69 kg/m²     Wt Readings from Last 5 Encounters:   24 62.8 kg (138 lb 7.2 oz)   24 62.8 kg (138 lb 7.2 oz)   24 61.2 kg (135 lb)   24 62.6 kg (138 lb 0.1 oz)   24 62.1 kg (137 lb 0.3 oz)          Physical Exam:  ECO  Pain: 2-3/10 RUQ and left side \"under ribs\" wraps around back at times as above  Constitutional: Well developed, awake/alert/oriented x3, no distress, alert and cooperative  Eyes: PER. sclera anicteric  ENMT: Oral mucosa moist. No lesions or thrush   Respiratory/Thorax: Breathing is non-labored. Lungs are clear to auscultation bilaterally. No adventitious breath sounds. Hx RUL lobectomy 2020  Cardiovascular: S1-S2. Regular rate and rhythm. No murmurs, rubs, or gallops appreciated  Gastrointestinal: Abdomen soft nontender, nondistended, normal active bowel sounds.   Musculoskeletal: ROM intact, no joint swelling, normal strength  Extremities: normal extremities, no cyanosis, no edema, no clubbing  Lymphatics: no palpable lymphadenopathy  Neurologic: alert and oriented x3. Nonfocal exam. No myoclonus  Psychological: Pleasant, appropriate and easily engaged       Results:     Labs  Results from last 7 days   Lab Units " 12/31/24  1454   WBC AUTO x10*3/uL 8.2   HEMOGLOBIN g/dL 14.5   HEMATOCRIT % 44.3   PLATELETS AUTO x10*3/uL 382   NEUTROS ABS x10*3/uL 5.57   LYMPHS ABS AUTO x10*3/uL 1.60   MONOS ABS AUTO x10*3/uL 0.80   EOS ABS AUTO x10*3/uL 0.12   NEUTROS PCT AUTO % 68.2   LYMPHS PCT AUTO % 19.6   MONOS PCT AUTO % 9.8   EOS PCT AUTO % 1.5      Results from last 7 days   Lab Units 12/31/24  1454   GLUCOSE mg/dL 92   SODIUM mmol/L 139   POTASSIUM mmol/L 4.5   CHLORIDE mmol/L 102   CO2 mmol/L 29   BUN mg/dL 7   CREATININE mg/dL 0.84   EGFR mL/min/1.73m*2 78   CALCIUM mg/dL 9.7   ALBUMIN g/dL 4.3   PROTEIN TOTAL g/dL 6.8   BILIRUBIN TOTAL mg/dL 0.4   ALK PHOS U/L 64   ALT U/L 17   AST U/L 21     TSH pending           Imaging   CT C/A/P November 15, 2024     IMPRESSION:  CHEST:  1.  Stable postsurgical changes of right upper lobectomy.  2. Stable left upper lobe reticulonodular scarring.  3. No new sites of disease in the chest.      ABDOMEN-PELVIS:  1.  No metastatic disease in the abdomen and pelvis.  2. Chronic and incidental findings as described above.      Assessment/Plan:  64 y.o. 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     The patient's records and imaging have been reviewed in detail.  MD discussed with the patient the natural history of their disease at length.  The following has also been discussed with the patient:     # Cancer: recurrent likely metastatic ([id number]) lung cancer: liver biopsy showed extensive necrosis. Liquid biopsy showed KRAS G12C mutation, which was identified in her original surgical resected  specimen. PDL-1 50%. Patient initially had stage IA2 ([id number]) RUL lung  adenocarcinoma s/p RUL lobectomy, unfortunately now likely metastatic recurrence. Liver biopsy on 2/16 showed poorly differentiated malignant neoplasm with extensive necrosis. We discussed  treatment  options with her today with either standard of care pembrolizumab vs clinical trials. Patient expressed interests in enrolling into clinical trial. Enrolled into RCT [id number]. Doing well.      - Interval scan: 08/26/2024: CT CAP (+): 1. Status post right upper lobectomy with no definite locoregional recurrence. Stable left upper lobe areas of subpleural scarring and nodularity. No new suspicious pulmonary nodules or masses. 2. Stable precarinal subcentimeter lymph node. No new enlarged intrathoracic lymphadenopathy. 3. Remote bilateral rib fractures as in prior.  1. No metastatic disease in the abdomen or pelvis.  Repeat in 3 months (2/2024) - ordered for 2/10/2025    # Hypothyroidism: G2. Likely 2/2 immunotherapy - resolved.  TSH 1.07 11/8/23.  Continue levothyroxine 75mcg daily.      # Microcytic anemia 2/2 due to chronic disease and hiatal hernia: patient denies any hematochezia  or hematuria. No melena. hx of hiatal hernia. Per patient. Last EGD was done in 2021 and was normal. Colonoscopy was a few years ago.  EGD showed hiatal hernia 03/2023. Improved Hgb overall. Iron studies show low-normal ferritin and TSAT of 14%. Continue PO iron 3x/week. Tolerates well. Stable.  12/12/2024 Anemia resolved. Have decreased oral iron from BID to every day will now discontinue oral iron.   Iron discontinued in December 2024    # Mucositis (chronic).  Pt reports since start of immunotherapy.  Chronic sore secondary to ill fitting dentures recommended s&s rinse.  Continue Anbesol and/or Oragel for pain.  Will continue to monitor.  BMX added.  2/1/24  - resolved   Working with oral surgeon. Expects 3 upcoming extractions and 3 crowns (November)      # Constipation: managed well with stool softener and PRN lactulose.        # Clinical Trials: April 11, 2023 initiated treatment on [id number] this is cycle 1 of first-line treatment.  She has been randomized to  arm A which is pembrolizumab alone for up to 2 years or  progression followed by Alimta/carboplatin for second line therapy. Consent has been signed. 11/15/24 CT scan shows favorable response. Continue treatment.      # Access: Peripheral veins.     # Pain: she has a displaced 8th Rib on the left side -Advised patient to take percocet daily and decrease Advil use. PRN Percocet Rx in place.  Rx last sent 23. Currently managed with PRN gummies.  New pain on the R upper back, controlled with percocet. Will try lidocaine patch OTC. :  Pain currently managed with PRN Advil, Aspercreme, and gummies.  Routine Gabapentin for neuropathy pain.   Pt verbalized desire to reduce # of medications taken.  Wishes to reduce Gabapentin dosage.   Supportive onc referral placed for assistance.   :  Bruise to buttock, pain 3-4/10.  Increased right flank/rib pain r/t fall, pain 3/10.  Managed with Advil 600mg TID.  :  Right flank pain 2/10.  Managed with PRN interventions.      # Anxiety:  Anxiety reported d/t recent death of her father, upcoming .  Pt requested 1x Valium dose, previous 10mg admin.  OARRS reviewed. Provided Rx for Valium 5mg x3 doses.    No following with palliative care    # Bone Health: L fifth ribs lesion stable.    # Arthralgia (grade 1).  Uric acid lab (-).  Intermittent arthralgia to left hand.  Continue PRN Advil, Aspercreme or Percocet (for severe pain).      # Left rib or chest wall pain over the past month. 23 CXR - RESULTS: Mediastinal surgical clips are noted. The cardiac silhouette is within normal limits. Mediastinal contours are unremarkable. The lungs demonstrate no infiltrate or atelectasis. There is no evidence for pleural effusion. Remote fracture is noted involving the right 7th rib. There are degenerative changes of the thoracic spine.  - Continue PRN Percocet and/or gummies for pain control.      # Right foot contusion, hyperextension:  Able to bear wt.  Continue to follow with podiatrist with any questions.  Continue Advil  x5 days for inflammation. Resolved    # Psychosocial: Coping well, no acute issues.  Good support from family & friends.  Social work support available.     # GI/CINV: No acute issues.  Eating and voiding well.  Nutrition consult available.    # Insomnia:  Both difficulty falling and staying asleep.  Recommended pt can trial Melatonin.  Start with 3mg dose.  Max dose 10mg.  Restless contributes     # Sinusitis:  Pressure to maxillary and frontal sinuses.  +Increased mucous, yellow in color.  Denies fevers.  Hx of recurrent sinus infections.  Rx called to pt's pharmacy.  Doxycycline 100mg BID x7-10 days.  Extended course d/t recent smoke exposure.  3/14: Resolved.  9/19:  9/16/24 ENT appt for recurrent sinus infection.  Completed ATB course.  Resolved.       # Smoking: former smoker     # Fertility: N/A.        # Heme/ESAs: N/A.     # GOC: Patient is aware of palliative intent goals of care.     # Language: English.    # Health Maintenance: Patient plans to get flu and RSV vaccine she will get Flu in 2 weeks and RSV (1/17/25)      # Dispo: RTC in 3 weeks for next cycle.        Mikie Hancock, APRN-CNP  [facility]  Thoracic + [facility] Medical Oncology     I spent a total of 30+ minutes on the date of the service which included preparing to see the patient, face-to-face patient care, completing clinical documentation, obtaining and / or reviewing separately obtained history, counseling and educating the patient/family/caregiver, ordering medications, tests, or procedures, communicating with other healthcare providers (not separately reported), independently interpreting results, not separately reported, and communicating results to the patient/family/caregiver, Name and date of birth verified.

## 2025-01-01 LAB
AMYLASE SERPL-CCNC: 36 U/L (ref 29–103)
LIPASE SERPL-CCNC: 20 U/L (ref 9–82)
TSH SERPL-ACNC: 1.03 MIU/L (ref 0.44–3.98)

## 2025-01-02 ENCOUNTER — INFUSION (OUTPATIENT)
Dept: HEMATOLOGY/ONCOLOGY | Facility: CLINIC | Age: 65
End: 2025-01-02
Payer: COMMERCIAL

## 2025-01-02 VITALS
WEIGHT: 140.65 LBS | OXYGEN SATURATION: 96 % | RESPIRATION RATE: 18 BRPM | HEART RATE: 84 BPM | BODY MASS INDEX: 23.05 KG/M2 | DIASTOLIC BLOOD PRESSURE: 75 MMHG | SYSTOLIC BLOOD PRESSURE: 143 MMHG | TEMPERATURE: 97 F

## 2025-01-02 DIAGNOSIS — C34.11 MALIGNANT NEOPLASM OF UPPER LOBE OF RIGHT LUNG (MULTI): ICD-10-CM

## 2025-01-02 PROCEDURE — 96365 THER/PROPH/DIAG IV INF INIT: CPT | Mod: INF

## 2025-01-02 PROCEDURE — 2560000001 HC RX 256 EXPERIMENTAL DRUGS: Mod: SE,TB | Performed by: STUDENT IN AN ORGANIZED HEALTH CARE EDUCATION/TRAINING PROGRAM

## 2025-01-02 RX ORDER — DIPHENHYDRAMINE HCL 25 MG
50 CAPSULE ORAL AS NEEDED
Status: DISCONTINUED | OUTPATIENT
Start: 2025-01-02 | End: 2025-01-02 | Stop reason: HOSPADM

## 2025-01-02 RX ORDER — ACETAMINOPHEN 325 MG/1
650 TABLET ORAL AS NEEDED
Status: DISCONTINUED | OUTPATIENT
Start: 2025-01-02 | End: 2025-01-02 | Stop reason: HOSPADM

## 2025-01-02 RX ORDER — PROCHLORPERAZINE EDISYLATE 5 MG/ML
10 INJECTION INTRAMUSCULAR; INTRAVENOUS EVERY 6 HOURS PRN
Status: DISCONTINUED | OUTPATIENT
Start: 2025-01-02 | End: 2025-01-02 | Stop reason: HOSPADM

## 2025-01-02 RX ORDER — PROCHLORPERAZINE MALEATE 10 MG
10 TABLET ORAL EVERY 6 HOURS PRN
Status: DISCONTINUED | OUTPATIENT
Start: 2025-01-02 | End: 2025-01-02 | Stop reason: HOSPADM

## 2025-01-02 RX ORDER — ALBUTEROL SULFATE 0.83 MG/ML
3 SOLUTION RESPIRATORY (INHALATION) AS NEEDED
Status: DISCONTINUED | OUTPATIENT
Start: 2025-01-02 | End: 2025-01-02 | Stop reason: HOSPADM

## 2025-01-02 RX ORDER — FAMOTIDINE 10 MG/ML
20 INJECTION INTRAVENOUS ONCE AS NEEDED
Status: DISCONTINUED | OUTPATIENT
Start: 2025-01-02 | End: 2025-01-02 | Stop reason: HOSPADM

## 2025-01-02 RX ORDER — EPINEPHRINE 0.3 MG/.3ML
0.3 INJECTION SUBCUTANEOUS EVERY 5 MIN PRN
Status: DISCONTINUED | OUTPATIENT
Start: 2025-01-02 | End: 2025-01-02 | Stop reason: HOSPADM

## 2025-01-02 RX ORDER — DIPHENHYDRAMINE HYDROCHLORIDE 50 MG/ML
50 INJECTION INTRAMUSCULAR; INTRAVENOUS AS NEEDED
Status: DISCONTINUED | OUTPATIENT
Start: 2025-01-02 | End: 2025-01-02 | Stop reason: HOSPADM

## 2025-01-02 RX ADMIN — Medication 200 MG: at 14:12

## 2025-01-02 ASSESSMENT — PAIN SCALES - GENERAL: PAINLEVEL_OUTOF10: 3

## 2025-01-07 ENCOUNTER — TELEPHONE (OUTPATIENT)
Dept: PALLIATIVE MEDICINE | Facility: CLINIC | Age: 65
End: 2025-01-07
Payer: COMMERCIAL

## 2025-01-07 NOTE — RESEARCH NOTES
Research Note Treatment Day    Hayley Potter is here today for treatment on [id number]. Today is C31D1. Procedures completed per protocol. AE's and con-meds reviewed with patient. Patient is aware of treatment plan.    [x]   Received treatment as planned   OR  []    Treatment delayed; patient calendar updated as required   Treatment delayed because:    []   AE    []   Physician Discretion    []   Clinical Deterioration or Progression     []   Other    Education Documentation  Treatment Plan and Schedule, taught by Rafiq Delgado RN at 1/7/2025  4:57 PM.  Learner: Family, Patient  Readiness: Acceptance  Method: Explanation  Response: Verbalizes Understanding    Education Comments  No comments found.    Pt and her daughter presented to clinic for cycle 31 on [id number]. Pt was reconsented per protocol requirement. Pt is feeling well today. She was seen early in clinic due to holiday and no provider available for her treatment infusion date. Pt will receive her infusion on Thursday. No really changes noted except pt has started on ativan for some anxiety issues. NP in to see pt for full assessment.

## 2025-01-07 NOTE — TELEPHONE ENCOUNTER
Phone call to patient to check in on anxiety. Recall patient had NPV with Jihan Licea CNP last week and was started on Ativan. Plan to manage anxiety a little better and then can address pain. Patient reports Ativan is working really well for anxiety. Patient still has episodes of anxiety and crying spells but when these occur, she takes Ativan and it helps to manage it (last episode this morning when she woke up). She reports taking 1 tab on Tues, 2 on Thurs, 1 on Sat, and 1 today so far. Patient aware of directions and that she can take more if needed. Patient does report the Ativan make her a little sleepy, as long as she stays busy it is fine but the day she took 2 doses she went to bed early. We discussed pain. Patient reports pain unchanged and says when she is relaxed the pain is fine, it's only during her episodes that the pain is worse. She currently is only taking Gabapentin (no longer taking Percocet and was told to limit Advil but has both if needed). Patient reports she takes gummies which help anxiety some but really help her pain. Patient says she doesn't want to take medications just to take medications and that some pain will always be there. Patient is not concerned about making changes for anxiety or pain medications today but aware I will review with provider and see if any recommendations and can update patient. Patient agreeable for me to call her next week to check in. She will call if any needs prior to then. She has FUV with Valeria on 1/21/25.

## 2025-01-14 NOTE — TELEPHONE ENCOUNTER
I called patient to check in. Patient reports she hasn't been taking Ativan because it makes her too sleepy. She reports the anxiety comes and goes. I asked if she wanted to discuss another medication for anxiety but patient says she will wait for visit next week. I encouraged her to call our office before then if she changes her mind.

## 2025-01-21 ENCOUNTER — TELEPHONE (OUTPATIENT)
Dept: PALLIATIVE MEDICINE | Facility: CLINIC | Age: 65
End: 2025-01-21
Payer: COMMERCIAL

## 2025-01-21 ENCOUNTER — TELEMEDICINE (OUTPATIENT)
Dept: PALLIATIVE MEDICINE | Facility: CLINIC | Age: 65
End: 2025-01-21
Payer: COMMERCIAL

## 2025-01-21 DIAGNOSIS — Z51.5 PALLIATIVE CARE ENCOUNTER: Primary | ICD-10-CM

## 2025-01-21 DIAGNOSIS — F41.9 ANXIETY: ICD-10-CM

## 2025-01-21 DIAGNOSIS — F32.A ANXIETY AND DEPRESSION: ICD-10-CM

## 2025-01-21 DIAGNOSIS — G89.3 CANCER RELATED PAIN: ICD-10-CM

## 2025-01-21 DIAGNOSIS — F41.9 ANXIETY AND DEPRESSION: ICD-10-CM

## 2025-01-21 DIAGNOSIS — C34.91 PRIMARY MALIGNANT NEOPLASM OF RIGHT LUNG METASTATIC TO OTHER SITE (MULTI): ICD-10-CM

## 2025-01-21 PROCEDURE — 99214 OFFICE O/P EST MOD 30 MIN: CPT | Performed by: NURSE PRACTITIONER

## 2025-01-21 RX ORDER — MIRTAZAPINE 7.5 MG/1
7.5 TABLET, FILM COATED ORAL NIGHTLY
Qty: 30 TABLET | Refills: 2 | Status: SHIPPED | OUTPATIENT
Start: 2025-01-21 | End: 2025-02-20

## 2025-01-21 NOTE — TELEPHONE ENCOUNTER
Patient updated and aware she will start the Mirtazapine 7.5mg at bedtime and is to continue Buspar for now. Spoke to Jihan Licea CNP and confirmed okay to take together as family had asked. Per Valeria, the 2 medications work together, and that's what needs to be monitored. She is starting patient at 7.5mg, instead of the full 15, to decrease potential interaction. Patient aware to call with any questions/concerns. To note, patient doesn't want anything that will effect libido.

## 2025-01-21 NOTE — PROGRESS NOTES
SUPPORTIVE AND PALLIATIVE ONCOLOGY OUTPATIENT FOLLOW-UP      SERVICE DATE: 1/21/2025    Virtual or Telephone Consent    A telephone visit (audio only) between the patient (at the originating site) and the provider (at the distant site) was utilized to provide this telehealth service.   Verbal consent was requested and obtained from Hayley Potter on this date, 01/21/25 for a telehealth visit.        Subjective   HISTORY OF PRESENT ILLNESS: Hayley Potter is a 64 y.o. female who presents with  ***     Pain Assessment:  Pain Score:    Location:    Education:      Symptom Assessment:  {ROS - Unable to Obtain:76949}  Pain:{ :39038}  Headache: {  :25472}  Dizziness:{ :38282}  Lack of energy: { :33005}  Difficulty sleeping: { :06101}  Worrying: {  :51708}  Anxiety: { :54195}  Depression: { :04373}  Pain in mouth/swallowing: { :24452}  Dry mouth: { :35526}  Taste changes: { :59260}  Shortness of breath: { :15318}  Lack of appetite: {  :20107}   Nausea: { :47074}  Vomiting: { :36165}  Constipation: { :11860}  Diarrhea: { :99509}  Sore muscles: { :18514}  Numbness or tingling in hands/feet/other: { :29645}  Weight loss: { :65789}  Other: { :33129}      Information obtained from: { :74610}  ______________________________________________________________________        Objective       {If you would like to pull in Lab results for the last 24 hours, type .[id number] :99}  {If you would like to pull in Imaging results, type .imgrslt :99}       PHYSICAL EXAMINATION   Vital Signs:   Vital signs reviewed  There were no vitals filed for this visit.  Pain Score:        Physical Exam    ASSESSMENT/PLAN    Pain  Pain is: { :91561}  Type: { :59895}  Pain control: { :00106}  Home regimen: ***  Intolerances/previously tried: ***  Personalized pain goal: ***  ORT-OUD Score: Total Score:    due to { :74733}  indicating Opioid Risk Category:   of future opioid use disorder.   ***    Opioid Use  Medication Management:   - OARRS report reviewed  with no aberrant behavior; consistent with  prescriptions/records and patient history  - MED ***.  Overdose Risk Score ***.   This has been discussed with patient.   - We will continue to closely monitor the patient for signs of prescription misuse including UDS, OARRS review and subjective reports at each visit.  - *** concurrent benzodiazepine use   - I am a provider who either is or has consulted and collaborated with a provider certified in Hospice and Palliative Medicine and have conducted a face-face visit and examination for this patient.  - Routine Urine Drug Screen completed *** appropriately positive for opioids and negative for illicit substances  - Controlled Substance Agreement completed ***  - Specifically discussed that controlled substance prescriptions will only be provided by our group as outlined in the completed agreement  - Prescribed naloxone ***  - Red Flags: ***     Nausea   { :95476} nausea {with or without:32291} vomiting related to { :98946} ***    Constipation  At risk for constipation related to opioids, ***,  { :77504}  Usual bowel pattern: ***  Home regimen: ***  LBM ***   ***     Altered Mood  {Acute/Chronic:50583} {anxiety/depression:51533} related to {related to:97904}   {controlled/uncontrolled:10725} with home regimen  Home regimen: ***    Sleeping Difficulty:  Impaired sleep related to ***  Home regimen:  ***  ***    Decreased appetite  Related to { :74697}  Nutrition { :15123}  Weight loss ***  Home regimen:  ***  ***    Supportive Interventions: { :12433}    Supportive and Palliative Oncology encounter:  Spoke with *** at bedside  Emotional support provided  Coordination of care  We will continue to follow and address symptoms as needed    Medical Decision Making/Goals of Care/Advance Care Planning:  Patient's current clinical condition, including diagnosis, prognosis, and management plan, and goals of care were discussed.   Life limiting disease: { :84015}  Family:  Supportive ***  Performance status: Major limitations due to { :34796}  Joys/meaning/strength: ***  Understanding of health: Demonstrates good prognostic understanding of disease process, understands plan for ***  Information:{ :32879}  Medical update:***  Prognosis:***  Goals: {Goals:90895}  Worries and fears now and future: { :83044}   Minimum acceptable outcome/QOL:  ***  Code status discussion:  ***    Advance Directives  Existence of Advance Directives:{ :81248}  Decision maker: { :16314} ***  Code Status: { :72214}    Next Follow-Up Visit:  Return to clinic in ***    Signature and billing  Medical complexity was { :36282} level due to due to complexity of problems, extensive data review, and high risk of management/treatment.  Time was spent on the following: { :87183}. Total time spent: ***      Data  Diagnostic tests and information reviewed for today's visit:  { :13765}         Some elements copied from *** note on ***, the elements have been updated and all reflect current decision making from today, 1/21/2025.      Plan of Care discussed with: { :39993}    SIGNATURE: Jihan Licea, APRN-CNP    Contact information:  Supportive and Palliative Oncology  Monday-Friday 8 AM-5 PM  Phone:  [phone], press option #5, then option #1.   Or Epic Secure Chat          Patient    SIGNATURE: Jihan Licea, APRN-CNP    Contact information:  Supportive and Palliative Oncology  Monday-Friday 8 AM-5 PM  Phone:  [phone], press option #5, then option #1.   Or Epic Secure Chat

## 2025-01-22 ENCOUNTER — LAB (OUTPATIENT)
Dept: LAB | Facility: CLINIC | Age: 65
End: 2025-01-22
Payer: COMMERCIAL

## 2025-01-22 DIAGNOSIS — C34.11 MALIGNANT NEOPLASM OF UPPER LOBE OF RIGHT LUNG (MULTI): ICD-10-CM

## 2025-01-22 LAB
ALBUMIN SERPL BCP-MCNC: 4.1 G/DL (ref 3.4–5)
ALP SERPL-CCNC: 62 U/L (ref 33–136)
ALT SERPL W P-5'-P-CCNC: 17 U/L (ref 7–45)
AMYLASE SERPL-CCNC: 38 U/L (ref 29–103)
ANION GAP SERPL CALC-SCNC: 11 MMOL/L (ref 10–20)
AST SERPL W P-5'-P-CCNC: 21 U/L (ref 9–39)
BASOPHILS # BLD AUTO: 0.08 X10*3/UL (ref 0–0.1)
BASOPHILS NFR BLD AUTO: 1.1 %
BILIRUB SERPL-MCNC: 0.4 MG/DL (ref 0–1.2)
BUN SERPL-MCNC: 11 MG/DL (ref 6–23)
CALCIUM SERPL-MCNC: 9.6 MG/DL (ref 8.6–10.3)
CHLORIDE SERPL-SCNC: 103 MMOL/L (ref 98–107)
CO2 SERPL-SCNC: 31 MMOL/L (ref 21–32)
CREAT SERPL-MCNC: 0.8 MG/DL (ref 0.5–1.05)
EGFRCR SERPLBLD CKD-EPI 2021: 82 ML/MIN/1.73M*2
EOSINOPHIL # BLD AUTO: 0.12 X10*3/UL (ref 0–0.7)
EOSINOPHIL NFR BLD AUTO: 1.7 %
ERYTHROCYTE [DISTWIDTH] IN BLOOD BY AUTOMATED COUNT: 12 % (ref 11.5–14.5)
GLUCOSE SERPL-MCNC: 68 MG/DL (ref 74–99)
HCT VFR BLD AUTO: 43 % (ref 36–46)
HGB BLD-MCNC: 14.2 G/DL (ref 12–16)
IMM GRANULOCYTES # BLD AUTO: 0.03 X10*3/UL (ref 0–0.7)
IMM GRANULOCYTES NFR BLD AUTO: 0.4 % (ref 0–0.9)
LIPASE SERPL-CCNC: 20 U/L (ref 9–82)
LYMPHOCYTES # BLD AUTO: 1.4 X10*3/UL (ref 1.2–4.8)
LYMPHOCYTES NFR BLD AUTO: 19.4 %
MCH RBC QN AUTO: 31.9 PG (ref 26–34)
MCHC RBC AUTO-ENTMCNC: 33 G/DL (ref 32–36)
MCV RBC AUTO: 97 FL (ref 80–100)
MONOCYTES # BLD AUTO: 0.75 X10*3/UL (ref 0.1–1)
MONOCYTES NFR BLD AUTO: 10.4 %
NEUTROPHILS # BLD AUTO: 4.83 X10*3/UL (ref 1.2–7.7)
NEUTROPHILS NFR BLD AUTO: 67 %
NRBC BLD-RTO: 0 /100 WBCS (ref 0–0)
PLATELET # BLD AUTO: 378 X10*3/UL (ref 150–450)
POTASSIUM SERPL-SCNC: 3.8 MMOL/L (ref 3.5–5.3)
PROT SERPL-MCNC: 6.5 G/DL (ref 6.4–8.2)
RBC # BLD AUTO: 4.45 X10*6/UL (ref 4–5.2)
SODIUM SERPL-SCNC: 141 MMOL/L (ref 136–145)
WBC # BLD AUTO: 7.2 X10*3/UL (ref 4.4–11.3)

## 2025-01-22 PROCEDURE — 83690 ASSAY OF LIPASE: CPT

## 2025-01-22 PROCEDURE — 82150 ASSAY OF AMYLASE: CPT

## 2025-01-22 PROCEDURE — 36415 COLL VENOUS BLD VENIPUNCTURE: CPT

## 2025-01-22 PROCEDURE — 80053 COMPREHEN METABOLIC PANEL: CPT

## 2025-01-22 PROCEDURE — 85025 COMPLETE CBC W/AUTO DIFF WBC: CPT

## 2025-01-23 ENCOUNTER — OFFICE VISIT (OUTPATIENT)
Dept: HEMATOLOGY/ONCOLOGY | Facility: CLINIC | Age: 65
End: 2025-01-23
Payer: COMMERCIAL

## 2025-01-23 ENCOUNTER — EDUCATION (OUTPATIENT)
Dept: HEMATOLOGY/ONCOLOGY | Facility: CLINIC | Age: 65
End: 2025-01-23

## 2025-01-23 ENCOUNTER — INFUSION (OUTPATIENT)
Dept: HEMATOLOGY/ONCOLOGY | Facility: CLINIC | Age: 65
End: 2025-01-23
Payer: COMMERCIAL

## 2025-01-23 VITALS
DIASTOLIC BLOOD PRESSURE: 82 MMHG | OXYGEN SATURATION: 93 % | RESPIRATION RATE: 18 BRPM | TEMPERATURE: 98.4 F | SYSTOLIC BLOOD PRESSURE: 135 MMHG | WEIGHT: 137.79 LBS | HEART RATE: 97 BPM | BODY MASS INDEX: 22.58 KG/M2

## 2025-01-23 DIAGNOSIS — C34.11 MALIGNANT NEOPLASM OF UPPER LOBE OF RIGHT LUNG (MULTI): ICD-10-CM

## 2025-01-23 PROCEDURE — 96374 THER/PROPH/DIAG INJ IV PUSH: CPT | Mod: INF

## 2025-01-23 PROCEDURE — 2560000001 HC RX 256 EXPERIMENTAL DRUGS: Mod: JZ,SE,TB | Performed by: STUDENT IN AN ORGANIZED HEALTH CARE EDUCATION/TRAINING PROGRAM

## 2025-01-23 PROCEDURE — 96365 THER/PROPH/DIAG IV INF INIT: CPT | Mod: INF

## 2025-01-23 PROCEDURE — 99213 OFFICE O/P EST LOW 20 MIN: CPT | Performed by: NURSE PRACTITIONER

## 2025-01-23 RX ORDER — PROCHLORPERAZINE MALEATE 10 MG
10 TABLET ORAL EVERY 6 HOURS PRN
Status: DISCONTINUED | OUTPATIENT
Start: 2025-01-23 | End: 2025-01-23 | Stop reason: HOSPADM

## 2025-01-23 RX ORDER — EPINEPHRINE 0.3 MG/.3ML
0.3 INJECTION SUBCUTANEOUS EVERY 5 MIN PRN
Status: DISCONTINUED | OUTPATIENT
Start: 2025-01-23 | End: 2025-01-23 | Stop reason: HOSPADM

## 2025-01-23 RX ORDER — DIPHENHYDRAMINE HYDROCHLORIDE 50 MG/ML
50 INJECTION INTRAMUSCULAR; INTRAVENOUS AS NEEDED
Status: DISCONTINUED | OUTPATIENT
Start: 2025-01-23 | End: 2025-01-23 | Stop reason: HOSPADM

## 2025-01-23 RX ORDER — DIPHENHYDRAMINE HCL 25 MG
50 CAPSULE ORAL AS NEEDED
Status: DISCONTINUED | OUTPATIENT
Start: 2025-01-23 | End: 2025-01-23 | Stop reason: HOSPADM

## 2025-01-23 RX ORDER — FAMOTIDINE 10 MG/ML
20 INJECTION INTRAVENOUS ONCE AS NEEDED
Status: DISCONTINUED | OUTPATIENT
Start: 2025-01-23 | End: 2025-01-23 | Stop reason: HOSPADM

## 2025-01-23 RX ORDER — ACETAMINOPHEN 325 MG/1
650 TABLET ORAL AS NEEDED
Status: DISCONTINUED | OUTPATIENT
Start: 2025-01-23 | End: 2025-01-23 | Stop reason: HOSPADM

## 2025-01-23 RX ORDER — PROCHLORPERAZINE EDISYLATE 5 MG/ML
10 INJECTION INTRAMUSCULAR; INTRAVENOUS EVERY 6 HOURS PRN
Status: DISCONTINUED | OUTPATIENT
Start: 2025-01-23 | End: 2025-01-23 | Stop reason: HOSPADM

## 2025-01-23 RX ORDER — ALBUTEROL SULFATE 0.83 MG/ML
3 SOLUTION RESPIRATORY (INHALATION) AS NEEDED
Status: DISCONTINUED | OUTPATIENT
Start: 2025-01-23 | End: 2025-01-23 | Stop reason: HOSPADM

## 2025-01-23 RX ADMIN — PEMBROLIZUMAB 200 MG: 25 INJECTION, SOLUTION INTRAVENOUS at 11:29

## 2025-01-23 ASSESSMENT — PAIN SCALES - GENERAL: PAINLEVEL_OUTOF10: 3

## 2025-01-23 NOTE — RESEARCH NOTES
Research Note Treatment Day    Hayley Potter is here today for treatment on <HO7811. Today is C32D1. Procedures completed per protocol. AE's and con-meds reviewed with patient. Patient is aware of treatment plan.    [x]   Received treatment as planned   OR  []    Treatment delayed; patient calendar updated as required   Treatment delayed because:    []   AE    []   Physician Discretion    []   Clinical Deterioration or Progression     []   Other    Education Documentation  Treatment Plan and Schedule, taught by Rafiq Delgado RN at 1/23/2025  2:53 PM.  Learner: Family, Patient  Readiness: Acceptance  Method: Explanation  Response: Verbalizes Understanding    Education Comments  No comments found.    Pt and her daughter presented to clinic for RU1123 cycle 32. Pt is feeling pretty good today. She still has the taste alteration grade 1, still anxiety grade 2, arthralgia grade 1, dyspnea and cough grade 1. NP in to see pt and complete assessment. Pt is aware of treatment plan.

## 2025-01-23 NOTE — PROGRESS NOTES
Patient ID: Hayley Potter is a 64 y.o. female.    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 She was initially found to have spiculated 2.9cm mass in the RUL on the CT chest on 03/02/2020. She underwent CT guided RUL biopsy with pathology showed lung tissue with scan and focal aypicla grandular proliferation  suspicious for adenocarcinoma. IHC positive for CK7, TTF1 and napsin A. CK20 negative. PET/CT on 04/15/2020 showed RUL hypermetabolic RUL mass. No evidence of distant metastases. She underwent RUL lobectomy with mediastinal LN dissection with Dr. Wheat  on 04/21/2020 which showed invasive well to moderately differentiated adenocarcinoma, tumor measuring 2.5x2.3.x1.7cm. No VPI or LVI. All margins were negative. LN (0/11).      Unfortunately, on the surveillance CT chest (+) on 08/24/2022, there was a new irregular marginated 6mm GUSTABO nodule, which increased in size significantly on the repeat CT chest (-) on 01/06/2023, measuring 12mm.  There is also an additional new 9mm nodule  in the L lung as well as new mediastinal and possible L hilar LAD. PET/CT on 02/16/2023 showed hypermetabolic L hilar and mediastinal LAD. 1st  GUSTABO nodule now 8mm likely inflammatory. 2nd GUSTABO nodule showed stable size with SUV 4.4. Soft tissue mass associated with  L 11th rib fracture, SUV 11.5. Mass within the liver SUV 13.1, suspicious for mets. Hypermetabolic activity also noted  in  the stomach fundus, association with a hiatal hernia, in the cecum and ascending colon without masses seen. She underwent US guided liver biopsy on 02/16/2023 - poorly-differentiated carcinoma.     Treatment Summary:  04/21/2020: RUL lobectomy with medistainal LN dissection (Dr. Wheat)  04/11/2023: C1 pembrolizumab 200mg IV (on trial)  05/02/2023: C2 Pembrolizumab 200mg IV (on trial)  05/23/2023: C3 Pembrolizumab 200mg IV (on trial)  06/13/2023: C4 Pembrolizumab 200mg IV (on trial)   07/05/2023: C5 Pembrolizumab 200mg IV (on trial)   07/26/2023: C6 Pembrolizumab 200mg IV (on trial)   08/16/2023: C7 Pembrolizumab 200mg IV (on trial)   09/07/2023: C8 Pembrolizumab 200mg IV (on trial)   09/28/2023: C9 Pembrolizumab 200mg IV (on trial)   10/19/2023: C10 Pembrolizumab 200mg IV (on trial)  11/09/2023: C11 Pembrolizumab 200mg IV (on trial)  11/30/2023: C12 Pembrolizumab 200mg IV (on trial)  12/21/2023: C13 Pembrolizumab 200mg IV (on trial)  01/11/2024: C14 Pembrolizumab 200mg IV (on trial)  02/01/2024: C15 Pembrolizumab 200mg IV (on trial)  02/22/2024: C16 Pembrolizumab 200mg IV (on trial)  03/14/2024: C17 Pembrolizumab 200mg IV (on trial)  04/04/2024: C18 Pembrolizumab 200mg IV (on trial)  04/25/2024: C19 Pembrolizumab 200mg IV (on trial)  05/16/2024: C20 Pembrolizumab 200mg IV (on trial)  06/06/2024: C21 Pembrolizumab 200mg IV (on trial)  06/27/2024: C22 Pembrolizumab 200mg IV (on trial)  07/15/2024: ED visit right foot contusion   07/18/2024: C23 Pembrolizumab 200mg IV (on trial)  08/08/2024: C24 Pembrolizumab 200mg IV (on trial)  08/29/2024: C25 Pembrolizumab 200mg IV (on trial)  09/19/2024: C26 Pembrolizumab 200mg IV (on trial)  10/10/2024: C27 Pembrolizumab 200mg IV (on trial)  10/31/2024: C28 Pembrolizumab 200mg IV (on trial)  11/21/2024: C29 Pembrolizumab 200mg IV (on trial)  12/12/2024: C30 Pembrolizumab 200mg IV (on trial)  01/02/2025: C31 Pembrolizumab 200mg IV (on trial)  01/23/2025: C32 Pembrolizumab 200mg  IV (on trial)    Interval History (01/23/2025):    Pt present in clinic for readiness to treat visit.  Her daughter is present for visit.  Continues to do well. No fever or chills. Chronic stable cough, more annoying this week after PFTs.  Denies n/v. Reflux which she attributes to hiatal hernia, stable. No constipation or diarrhea.   RUQ pain/left rib pain - chronic, believes Neurontin has helped,. Neurontin also helping generalized neuropathic pain and restless legs. Restless legs can cause sleep disturbance. Leg cramps on occasion. She was seen by palliative care mirtazapine was started for anxiety and sleep.   Occasional left upper back pain burning last about 1 minute a few times a month. Continued complaints of anxiety maybe in part driving pain, met with palliative care today.     Review of Systems:  A review of systems has been completed and are negative for complaints except what is stated in the HPI and/or past medical history     PMHx: vocal cord cancer, GERD and COPD, OA  PSHx: tonsillectomy, tubal ligation, lumpectomy, L ankle and R hand surgery  FHx: strong family history of breast cancer including male breast cancer.   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 Then worked as a  since 2005. She quit etoh 2010. She smokes Marijuana every day.     Allergies:  Cephalexin    Medications:    Current Outpatient Medications:     alendronate (Fosamax) 70 mg tablet, Take 1 tablet (70 mg) by mouth every 7 days. Take in the morning with a full glass of water, on an empty stomach, and do not take anything else by mouth or lie down for the next 30 min., Disp: , Rfl:     azelastine HCl (AZELASTINE NASL), Administer 0.15 % into affected nostril(s) 2 times a day as needed., Disp: , Rfl:     budesonide-formoteroL (Symbicort) 160-4.5 mcg/actuation inhaler, Rinse mouth with water after use to reduce aftertaste and incidence of candidiasis. Do not swallow., Disp: , Rfl:     busPIRone (Buspar) 30 mg  tablet, Take 1 tablet (30 mg) by mouth 2 times a day., Disp: , Rfl:     cholecalciferol (Vitamin D-3) 50,000 unit capsule, Take 1 capsule (50,000 Units) by mouth 1 (one) time per week., Disp: , Rfl:     cyclobenzaprine (Flexeril) 10 mg tablet, Take 1 tablet (10 mg) by mouth 2 times a day as needed for muscle spasms., Disp: 30 tablet, Rfl: 1    diphenhydramine HCl (BENADRYL ALLERGY ORAL), , Disp: , Rfl:     gabapentin (Neurontin) 600 mg tablet, Take 1 tablet (600 mg) by mouth 2 times a day., Disp: , Rfl:     levothyroxine (Synthroid, Levoxyl) 75 mcg tablet, Take 1 tablet (75 mcg) by mouth once daily., Disp: 90 tablet, Rfl: 1    LORazepam (Ativan) 0.5 mg tablet, Take 1-2 tablets (0.5-1 mg) by mouth every 8 hours if needed for anxiety (nausea)., Disp: 60 tablet, Rfl: 0    mirtazapine (Remeron) 7.5 mg tablet, Take 1 tablet (7.5 mg) by mouth once daily at bedtime., Disp: 30 tablet, Rfl: 2    multivitamin with minerals tablet, Take 1 tablet by mouth once daily., Disp: , Rfl:     omeprazole (PriLOSEC) 40 mg DR capsule, TAKE 1 CAPSULE BY MOUTH 30 MINUTES BEFORE MORNING MEAL TWICE A DAY, Disp: , Rfl:     oxyCODONE-acetaminophen (Percocet) 5-325 mg tablet, Take 1 tablet by mouth every 6 hours if needed for severe pain (7 - 10) for up to 10 days. (Patient not taking: Reported on 12/12/2024), Disp: 30 tablet, Rfl: 0    PEMBROLIZUMAB IV, Infuse into a venous catheter., Disp: , Rfl:     tiotropium (Spiriva Respimat) 2.5 mcg/actuation inhaler, INHALE TWO PUFFS BY MOUTH EVERY DAY, Disp: , Rfl:   No current facility-administered medications for this visit.    Facility-Administered Medications Ordered in Other Visits:     acetaminophen (Tylenol) tablet 650 mg, 650 mg, oral, PRN, Kayla Cook MD MPH    albuterol 2.5 mg /3 mL (0.083 %) nebulizer solution 3 mL, 3 mL, nebulization, PRN, Kayla Cook MD MPH    dextrose 5 % in water (D5W) bolus, 500 mL, intravenous, PRN, Kayla Cook MD MPH    diphenhydrAMINE (BENADryl) capsule 50 mg, 50 mg,  "oral, PRN, Kayla Cook MD MPH    diphenhydrAMINE (BENADryl) injection 50 mg, 50 mg, intravenous, PRN, Kayla Cook MD MPH    EPINEPHrine (Epipen) injection syringe 0.3 mg, 0.3 mg, intramuscular, q5 min PRN, Kayla Cook MD MPH    famotidine PF (Pepcid) injection 20 mg, 20 mg, intravenous, Once PRN, Kayla Cook MD MPH    methylPREDNISolone sod succinate (SOLU-Medrol) 40 mg/mL injection 40 mg, 40 mg, intravenous, PRN, Kayla oCok MD MPH    prochlorperazine (Compazine) injection 10 mg, 10 mg, intravenous, q6h PRN, Kayla Cook MD MPH    prochlorperazine (Compazine) tablet 10 mg, 10 mg, oral, q6h PRN, Kayla Cook MD MPH    sodium chloride 0.9 % bolus 500 mL, 500 mL, intravenous, PRN, Kayla Cook MD MPH      Vital Signs:  /82 (BP Location: Left arm, Patient Position: Sitting, BP Cuff Size: Adult long)   Pulse 97   Temp 36.9 °C (98.4 °F) (Temporal)   Resp 18   Wt 62.5 kg (137 lb 12.6 oz)   SpO2 93%   BMI 22.58 kg/m²     Wt Readings from Last 5 Encounters:   25 62.5 kg (137 lb 12.6 oz)   25 63.8 kg (140 lb 10.5 oz)   24 62.8 kg (138 lb 7.2 oz)   24 62.8 kg (138 lb 7.2 oz)   24 61.2 kg (135 lb)       Physical Exam:  ECO  Pain: 2/10 RUQ and left side \"under ribs\" wraps around back at times as above  Constitutional: Well developed, awake/alert/oriented x3, no distress, alert and cooperative  Eyes: PER. sclera anicteric  ENMT: Oral mucosa moist. No lesions or thrush   Respiratory/Thorax: Breathing is non-labored. Lungs are clear to auscultation bilaterally. No adventitious breath sounds. Hx RUL lobectomy 2020  Cardiovascular: S1-S2. Regular rate and rhythm. No murmurs, rubs, or gallops appreciated  Gastrointestinal: Abdomen soft nontender, nondistended, normal active bowel sounds.   Musculoskeletal: ROM intact, no joint swelling, normal strength  Extremities: normal extremities, no cyanosis, no edema, no clubbing  Lymphatics: no palpable lymphadenopathy  Neurologic: alert and oriented x3. " Nonfocal exam. No myoclonus  Psychological: Pleasant, appropriate and easily engaged       Results:     Labs  Lab Results   Component Value Date    WBC 7.2 01/22/2025    HGB 14.2 01/22/2025    HCT 43.0 01/22/2025    MCV 97 01/22/2025     01/22/2025      Lab Results   Component Value Date    NEUTROABS 4.83 01/22/2025      Lab Results   Component Value Date    GLUCOSE 68 (L) 01/22/2025    CALCIUM 9.6 01/22/2025     01/22/2025    K 3.8 01/22/2025    CO2 31 01/22/2025     01/22/2025    BUN 11 01/22/2025    CREATININE 0.80 01/22/2025    MG 1.89 10/31/2024     Lab Results   Component Value Date    ALT 17 01/22/2025    AST 21 01/22/2025    ALKPHOS 62 01/22/2025    BILITOT 0.4 01/22/2025      Lab Results   Component Value Date    ACTH 10.6 08/16/2023    CORTISOL 12.3 08/16/2023    TSH 1.03 12/31/2024    FREET4 0.52 (L) 08/15/2023         Imaging   CT C/A/P November 15, 2024     IMPRESSION:  CHEST:  1.  Stable postsurgical changes of right upper lobectomy.  2. Stable left upper lobe reticulonodular scarring.  3. No new sites of disease in the chest.      ABDOMEN-PELVIS:  1.  No metastatic disease in the abdomen and pelvis.  2. Chronic and incidental findings as described above.    Mammogram 12/27/2024  IMPRESSION:  No mammographic evidence of malignancy.        Assessment/Plan:  64 y.o. 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     The patient's records and imaging have been reviewed in detail.  MD discussed with the patient the natural history of their disease at length.  The following has also been discussed with the patient:     # Cancer: recurrent likely metastatic ([id number]) lung cancer: liver biopsy showed extensive necrosis. Liquid biopsy showed KRAS G12C mutation, which was identified in her original surgical resected  specimen. PDL-1 50%. Patient  initially had stage IA2 ([id number]) RUL lung  adenocarcinoma s/p RUL lobectomy, unfortunately now likely metastatic recurrence. Liver biopsy on 2/16 showed poorly differentiated malignant neoplasm with extensive necrosis. We discussed treatment  options with her today with either standard of care pembrolizumab vs clinical trials. Patient expressed interests in enrolling into clinical trial. Enrolled into RCT [id number]. Doing well.      - Interval scan: 08/26/2024: CT CAP (+): 1. Status post right upper lobectomy with no definite locoregional recurrence. Stable left upper lobe areas of subpleural scarring and nodularity. No new suspicious pulmonary nodules or masses. 2. Stable precarinal subcentimeter lymph node. No new enlarged intrathoracic lymphadenopathy. 3. Remote bilateral rib fractures as in prior.  1. No metastatic disease in the abdomen or pelvis.  Repeat in 3 months (2/2024) - ordered for 2/10/2025    # Hypothyroidism: G2. Likely 2/2 immunotherapy - resolved.  TSH 1.07 11/8/23.  Continue levothyroxine 75mcg daily.      # Microcytic anemia 2/2 due to chronic disease and hiatal hernia: patient denies any hematochezia  or hematuria. No melena. hx of hiatal hernia. Per patient. Last EGD was done in 2021 and was normal. Colonoscopy was a few years ago.  EGD showed hiatal hernia 03/2023. Improved Hgb overall. Iron studies show low-normal ferritin and TSAT of 14%. Continue PO iron 3x/week. Tolerates well. Stable.  12/12/2024 Anemia resolved. Have decreased oral iron from BID to every day will now discontinue oral iron.   Iron discontinued in December 2024    # Mucositis (chronic).  Pt reports since start of immunotherapy.  Chronic sore secondary to ill fitting dentures recommended s&s rinse.  Continue Anbesol and/or Oragel for pain.  Will continue to monitor.  BMX added.  2/1/24  - resolved   Working with oral surgeon. Expects 3 upcoming extractions and 3 crowns (November)      # Constipation: managed well with stool  softener and PRN lactulose.        # Clinical Trials: 2023 initiated treatment on [id number] this is cycle 1 of first-line treatment.  She has been randomized to  arm A which is pembrolizumab alone for up to 2 years or progression followed by Alimta/carboplatin for second line therapy. Consent has been signed. 11/15/24 CT scan shows favorable response. Continue treatment.      # Access: Peripheral veins.     # Pain: she has a displaced 8th Rib on the left side -Advised patient to take percocet daily and decrease Advil use. PRN Percocet Rx in place.  Rx last sent 23. Currently managed with PRN gummies.  New pain on the R upper back, controlled with percocet. Will try lidocaine patch OTC. :  Pain currently managed with PRN Advil, Aspercreme, and gummies.  Routine Gabapentin for neuropathy pain.   Pt verbalized desire to reduce # of medications taken.  Wishes to reduce Gabapentin dosage.   Supportive onc referral placed for assistance.   :  Bruise to buttock, pain 3-4/10.  Increased right flank/rib pain r/t fall, pain 3/10.  Managed with Advil 600mg TID.  :  Right flank pain 2/10.  Managed with PRN interventions.      # Anxiety:  Anxiety reported d/t recent death of her father, upcoming .  Pt requested 1x Valium dose, previous 10mg admin.  OARRS reviewed. Provided Rx for Valium 5mg x3 doses.    No following with palliative care  Started on Mirtazapine 7.5mg at HS    # Bone Health: L fifth ribs lesion stable.    # Arthralgia (grade 1).  Uric acid lab (-).  Intermittent arthralgia to left hand.  Continue PRN Advil, Aspercreme or Percocet (for severe pain).      # Left rib or chest wall pain over the past month. 23 CXR - RESULTS: Mediastinal surgical clips are noted. The cardiac silhouette is within normal limits. Mediastinal contours are unremarkable. The lungs demonstrate no infiltrate or atelectasis. There is no evidence for pleural effusion. Remote fracture is noted involving the  right 7th rib. There are degenerative changes of the thoracic spine.  - Continue PRN Percocet and/or gummies for pain control.      # Right foot contusion, hyperextension:  Able to bear wt.  Continue to follow with podiatrist with any questions.  Continue Advil x5 days for inflammation. Resolved    # Psychosocial: Coping well, no acute issues.  Good support from family & friends.  Social work support available.     # GI/CINV: No acute issues.  Eating and voiding well.  Nutrition consult available.    # Insomnia:  Both difficulty falling and staying asleep.  Recommended pt can trial Melatonin.  Start with 3mg dose.  Max dose 10mg.  Restless contributes     # Sinusitis:  Pressure to maxillary and frontal sinuses.  +Increased mucous, yellow in color.  Denies fevers.  Hx of recurrent sinus infections.  Rx called to pt's pharmacy.  Doxycycline 100mg BID x7-10 days.  Extended course d/t recent smoke exposure.  3/14: Resolved.  9/19:  9/16/24 ENT appt for recurrent sinus infection.  Completed ATB course.  Resolved.       # Smoking: former smoker     # Fertility: N/A.        # Heme/ESAs: N/A.     # GOC: Patient is aware of palliative intent goals of care.     # Language: English.    # Health Maintenance:   Patient plans to get flu and RSV vaccine   Mammogram completed 12/2024    # Dispo:   RTC in 3 weeks for next cycle.    CT scans scheduled for 2/10/2025      Mikie Hancock, APRN-CNP  [facility]  Thoracic + [facility] Medical Oncology     I spent a total of 30+ minutes on the date of the service which included preparing to see the patient, face-to-face patient care, completing clinical documentation, obtaining and / or reviewing separately obtained history, counseling and educating the patient/family/caregiver, ordering medications, tests, or procedures, communicating with other healthcare providers (not separately reported), independently interpreting results, not separately reported, and communicating results to the  patient/family/caregiver, Name and date of birth verified.

## 2025-02-06 DIAGNOSIS — C34.11 MALIGNANT NEOPLASM OF UPPER LOBE OF RIGHT LUNG (MULTI): Primary | ICD-10-CM

## 2025-02-06 RX ORDER — DEXTROMETHORPHAN HYDROBROMIDE, GUAIFENESIN 5; 100 MG/5ML; MG/5ML
650 LIQUID ORAL AS NEEDED
Start: 2025-03-06

## 2025-02-06 RX ORDER — PROCHLORPERAZINE MALEATE 10 MG
10 TABLET ORAL EVERY 6 HOURS PRN
OUTPATIENT
Start: 2025-02-13

## 2025-02-06 RX ORDER — ALBUTEROL SULFATE 0.83 MG/ML
3 SOLUTION RESPIRATORY (INHALATION) AS NEEDED
OUTPATIENT
Start: 2025-02-13

## 2025-02-06 RX ORDER — DIPHENHYDRAMINE HCL 50 MG
50 CAPSULE ORAL AS NEEDED
Start: 2025-03-06

## 2025-02-06 RX ORDER — DIPHENHYDRAMINE HYDROCHLORIDE 50 MG/ML
50 INJECTION INTRAMUSCULAR; INTRAVENOUS AS NEEDED
OUTPATIENT
Start: 2025-02-13

## 2025-02-06 RX ORDER — DIPHENHYDRAMINE HYDROCHLORIDE 50 MG/ML
50 INJECTION INTRAMUSCULAR; INTRAVENOUS AS NEEDED
OUTPATIENT
Start: 2025-03-06

## 2025-02-06 RX ORDER — DIPHENHYDRAMINE HCL 50 MG
50 CAPSULE ORAL AS NEEDED
Start: 2025-02-13

## 2025-02-06 RX ORDER — EPINEPHRINE 0.3 MG/.3ML
0.3 INJECTION SUBCUTANEOUS EVERY 5 MIN PRN
OUTPATIENT
Start: 2025-03-06

## 2025-02-06 RX ORDER — FAMOTIDINE 10 MG/ML
20 INJECTION INTRAVENOUS ONCE AS NEEDED
OUTPATIENT
Start: 2025-03-06

## 2025-02-06 RX ORDER — EPINEPHRINE 0.3 MG/.3ML
0.3 INJECTION SUBCUTANEOUS EVERY 5 MIN PRN
OUTPATIENT
Start: 2025-02-13

## 2025-02-06 RX ORDER — PROCHLORPERAZINE EDISYLATE 5 MG/ML
10 INJECTION INTRAMUSCULAR; INTRAVENOUS EVERY 6 HOURS PRN
OUTPATIENT
Start: 2025-03-06

## 2025-02-06 RX ORDER — PROCHLORPERAZINE MALEATE 10 MG
10 TABLET ORAL EVERY 6 HOURS PRN
OUTPATIENT
Start: 2025-03-06

## 2025-02-06 RX ORDER — PROCHLORPERAZINE EDISYLATE 5 MG/ML
10 INJECTION INTRAMUSCULAR; INTRAVENOUS EVERY 6 HOURS PRN
OUTPATIENT
Start: 2025-02-13

## 2025-02-06 RX ORDER — FAMOTIDINE 10 MG/ML
20 INJECTION INTRAVENOUS ONCE AS NEEDED
OUTPATIENT
Start: 2025-02-13

## 2025-02-06 RX ORDER — DEXTROMETHORPHAN HYDROBROMIDE, GUAIFENESIN 5; 100 MG/5ML; MG/5ML
650 LIQUID ORAL AS NEEDED
Start: 2025-02-13

## 2025-02-06 RX ORDER — ALBUTEROL SULFATE 0.83 MG/ML
3 SOLUTION RESPIRATORY (INHALATION) AS NEEDED
OUTPATIENT
Start: 2025-03-06

## 2025-02-10 ENCOUNTER — HOSPITAL ENCOUNTER (OUTPATIENT)
Dept: RADIOLOGY | Facility: HOSPITAL | Age: 65
Discharge: HOME | End: 2025-02-10
Payer: COMMERCIAL

## 2025-02-10 DIAGNOSIS — C34.11 MALIGNANT NEOPLASM OF UPPER LOBE OF RIGHT LUNG (MULTI): ICD-10-CM

## 2025-02-10 PROCEDURE — 2550000001 HC RX 255 CONTRASTS: Performed by: NURSE PRACTITIONER

## 2025-02-10 PROCEDURE — 74177 CT ABD & PELVIS W/CONTRAST: CPT

## 2025-02-10 RX ADMIN — IOHEXOL 75 ML: 350 INJECTION, SOLUTION INTRAVENOUS at 09:34

## 2025-02-11 ENCOUNTER — LAB (OUTPATIENT)
Dept: LAB | Facility: CLINIC | Age: 65
End: 2025-02-11
Payer: COMMERCIAL

## 2025-02-11 DIAGNOSIS — C34.11 MALIGNANT NEOPLASM OF UPPER LOBE OF RIGHT LUNG (MULTI): ICD-10-CM

## 2025-02-11 DIAGNOSIS — R35.0 FREQUENCY OF URINATION: Primary | ICD-10-CM

## 2025-02-11 DIAGNOSIS — R35.0 FREQUENCY OF URINATION: ICD-10-CM

## 2025-02-11 LAB
ALBUMIN SERPL BCP-MCNC: 3.9 G/DL (ref 3.4–5)
ALP SERPL-CCNC: 71 U/L (ref 33–136)
ALT SERPL W P-5'-P-CCNC: 13 U/L (ref 7–45)
ANION GAP SERPL CALC-SCNC: 12 MMOL/L (ref 10–20)
AST SERPL W P-5'-P-CCNC: 15 U/L (ref 9–39)
BASOPHILS # BLD AUTO: 0.05 X10*3/UL (ref 0–0.1)
BASOPHILS NFR BLD AUTO: 0.5 %
BILIRUB SERPL-MCNC: 0.5 MG/DL (ref 0–1.2)
BUN SERPL-MCNC: 9 MG/DL (ref 6–23)
CALCIUM SERPL-MCNC: 9.5 MG/DL (ref 8.6–10.3)
CHLORIDE SERPL-SCNC: 101 MMOL/L (ref 98–107)
CO2 SERPL-SCNC: 31 MMOL/L (ref 21–32)
CREAT SERPL-MCNC: 0.92 MG/DL (ref 0.5–1.05)
EGFRCR SERPLBLD CKD-EPI 2021: 70 ML/MIN/1.73M*2
EOSINOPHIL # BLD AUTO: 0.09 X10*3/UL (ref 0–0.7)
EOSINOPHIL NFR BLD AUTO: 0.9 %
ERYTHROCYTE [DISTWIDTH] IN BLOOD BY AUTOMATED COUNT: 11.9 % (ref 11.5–14.5)
GLUCOSE SERPL-MCNC: 95 MG/DL (ref 74–99)
HCT VFR BLD AUTO: 40.5 % (ref 36–46)
HGB BLD-MCNC: 13.1 G/DL (ref 12–16)
IMM GRANULOCYTES # BLD AUTO: 0.02 X10*3/UL (ref 0–0.7)
IMM GRANULOCYTES NFR BLD AUTO: 0.2 % (ref 0–0.9)
LYMPHOCYTES # BLD AUTO: 1.53 X10*3/UL (ref 1.2–4.8)
LYMPHOCYTES NFR BLD AUTO: 15.1 %
MCH RBC QN AUTO: 31.4 PG (ref 26–34)
MCHC RBC AUTO-ENTMCNC: 32.3 G/DL (ref 32–36)
MCV RBC AUTO: 97 FL (ref 80–100)
MONOCYTES # BLD AUTO: 1.72 X10*3/UL (ref 0.1–1)
MONOCYTES NFR BLD AUTO: 16.9 %
NEUTROPHILS # BLD AUTO: 6.74 X10*3/UL (ref 1.2–7.7)
NEUTROPHILS NFR BLD AUTO: 66.4 %
NRBC BLD-RTO: 0 /100 WBCS (ref 0–0)
PLATELET # BLD AUTO: 354 X10*3/UL (ref 150–450)
POTASSIUM SERPL-SCNC: 3.8 MMOL/L (ref 3.5–5.3)
PROT SERPL-MCNC: 6.9 G/DL (ref 6.4–8.2)
RBC # BLD AUTO: 4.17 X10*6/UL (ref 4–5.2)
SODIUM SERPL-SCNC: 140 MMOL/L (ref 136–145)
WBC # BLD AUTO: 10.2 X10*3/UL (ref 4.4–11.3)

## 2025-02-11 PROCEDURE — 81001 URINALYSIS AUTO W/SCOPE: CPT

## 2025-02-11 PROCEDURE — 83690 ASSAY OF LIPASE: CPT

## 2025-02-11 PROCEDURE — 87086 URINE CULTURE/COLONY COUNT: CPT

## 2025-02-11 PROCEDURE — 85025 COMPLETE CBC W/AUTO DIFF WBC: CPT

## 2025-02-11 PROCEDURE — 80053 COMPREHEN METABOLIC PANEL: CPT

## 2025-02-11 PROCEDURE — 82150 ASSAY OF AMYLASE: CPT

## 2025-02-11 PROCEDURE — 36415 COLL VENOUS BLD VENIPUNCTURE: CPT

## 2025-02-12 ENCOUNTER — OFFICE VISIT (OUTPATIENT)
Dept: UROLOGY | Facility: CLINIC | Age: 65
End: 2025-02-12
Payer: COMMERCIAL

## 2025-02-12 DIAGNOSIS — N28.89 URETERITIS: ICD-10-CM

## 2025-02-12 DIAGNOSIS — N30.90 CYSTITIS: Primary | ICD-10-CM

## 2025-02-12 LAB
AMYLASE SERPL-CCNC: 25 U/L (ref 29–103)
APPEARANCE UR: ABNORMAL
BACTERIA #/AREA URNS AUTO: ABNORMAL /HPF
BILIRUB UR STRIP.AUTO-MCNC: NEGATIVE MG/DL
COLOR UR: ABNORMAL
GLUCOSE UR STRIP.AUTO-MCNC: NORMAL MG/DL
HOLD SPECIMEN: NORMAL
KETONES UR STRIP.AUTO-MCNC: NEGATIVE MG/DL
LEUKOCYTE ESTERASE UR QL STRIP.AUTO: ABNORMAL
LIPASE SERPL-CCNC: 14 U/L (ref 9–82)
MUCOUS THREADS #/AREA URNS AUTO: ABNORMAL /LPF
NITRITE UR QL STRIP.AUTO: NEGATIVE
PH UR STRIP.AUTO: 5.5 [PH]
PROT UR STRIP.AUTO-MCNC: ABNORMAL MG/DL
RBC # UR STRIP.AUTO: ABNORMAL MG/DL
RBC #/AREA URNS AUTO: ABNORMAL /HPF
SP GR UR STRIP.AUTO: 1.01
UROBILINOGEN UR STRIP.AUTO-MCNC: NORMAL MG/DL
WBC #/AREA URNS AUTO: >50 /HPF
WBC CLUMPS #/AREA URNS AUTO: ABNORMAL /HPF

## 2025-02-12 NOTE — PROGRESS NOTES
Subjective   Patient ID: Hayley Potter is a 64 y.o. female. Was self referred.         HPI  64 y.o. female presents as a new patient in regards to mild bilateral hydronephrosis with urothelial thickening. She is a known lung cancer patient who is currently on maintenance Keytruda treatment (Pembrolizumab)    She is experiencing dysuria. She has not started/changed anything in the last few days. She is experiencing bilateral flank pain. She has been experiencing a low grade fever during the last few days during the evening.     The patient was prescribed ciprofloxacin however she did not  the prescription as she wanted to wait until the result of the urine culture were obtained.     She has a session of Keytruda tomorrow afternoon.     CT CHEST ABDOMEN PELVIS W IV CONTRAST; 2/10/2025   KIDNEYS AND URETERS:  The kidneys are normal in size and enhance symmetrically.  New mild  bilateral hydronephrosis with urothelial thickening and  hyperenhancement, right-greater-than-left. No nephroureterolithiasis  bilaterally.  BLADDER:  New circumferential urinary bladder wall thickening and mucosal  hyperenhancement.  IMPRESSION:  Lung cancer, restaging scan.  1. Stable postoperative changes of right upper lobectomy without  evidence of locoregional disease recurrence. No new sites of  metastatic disease within the chest, abdomen or pelvis.  2. New mild bilateral hydronephrosis with urothelial thickening and  hyperenhancement, right-greater-than-left. In addition, new  circumferential urinary bladder wall thickening and mucosal  hyperenhancement is noted. Findings are most consistent with  pyelitis/ureteritis and cystitis, respectively. Findings may relate  to the patient's immunotherapy.  3. Remaining chronic and incidental findings as described above.    Review of Systems  A complete review of systems was performed. All systems are noted to be negative unless indicated in the history of present illness, impression,  active problem list, or past histories.     Objective   Physical Exam  General: Well developed, well nourished, alert and cooperative, appears in no acute distress  Eyes: Non-injected conjunctiva, sclera clear, no proptosis  Cardiac: Extremities are warm and well perfused. No edema, cyanosis or pallor.   Lungs: Breathing is easy, non-labored. Speaking in clear and complete sentences. Normal diaphragmatic movement.  Abdomen: soft, non-tender  MSK: Ambulatory with steady gait, unassisted  Neuro: alert and oriented to person, place and time  Psych: Demonstrates good judgement and reason, without hallucinations, abnormal affect or abnormal behaviors.  Skin: no obvious lesions, no rashes.    Assessment/Plan   Diagnoses and all orders for this visit:  Cystitis  Ureteritis      64 y.o. female presents as a new patient in regards to mild bilateral hydronephrosis with urothelial thickening. She is experiencing dysuria with flank pain. She has not started/changed anything in the last few days. She has been experiencing a fever during the last few days during the evening.     I personally reviewed the medical records of the patient including the lab values, the note of the referring physician and I reviewed the report and visualized the images performed focusing on the CT chest, abdomen and pelvis on 02/10/2025. I compared this CT to the one that was completed on 11/15/2024. The patient's imaging demonstrates that the ureters are thickened and enhanced with the bladder being thickened and enhanced. I do not see any relevant hydronephrosis. I reviewed this finding with the patient and her daughter that this mean an infection.     The patient's urinalysis shows WBC and less RBC with bacteria. I have reviewed with the patient that the urine culture may take 3-4 days. As a result, I would order a urine PCR to expedite the process and start treatment accordingly.     The imaging and laboratory blood draw are leading us in the  direction of a UTI. An antibiotic was given to the patient, although she has not picked up the medication. I have recommended that the patient take the Ciprofloxacin.  She would like to wait until the test results are back.     I have reviewed with the patient, that the pain is not due to an obstruction but could be ureteritis or cystitis due to the immunotherapy versus UTI. The patient's oncology team should reach out to myself regarding treatment for the urine PCR results.  We will discuss further if the patient requires steroid use in relation to inflammation of the bladder or ureters. We also discussed that she may require pushing the session of Keytruda if the results are not obtained prior to. No follow up is required.     Plan:  Urine PCR and treat accordingly  Discuss with oncology team regarding withholding therapy until results are out  No follow up required       Scribe Attestation   By signing my name below, Talisha NATHAN, Scribe attestation that this documentation has been prepared under the direction and in the presence of Charmaine Dennis MD.

## 2025-02-13 ENCOUNTER — APPOINTMENT (OUTPATIENT)
Dept: HEMATOLOGY/ONCOLOGY | Facility: CLINIC | Age: 65
End: 2025-02-13
Payer: COMMERCIAL

## 2025-02-13 ENCOUNTER — OFFICE VISIT (OUTPATIENT)
Dept: HEMATOLOGY/ONCOLOGY | Facility: CLINIC | Age: 65
End: 2025-02-13
Payer: COMMERCIAL

## 2025-02-13 ENCOUNTER — EDUCATION (OUTPATIENT)
Dept: HEMATOLOGY/ONCOLOGY | Facility: CLINIC | Age: 65
End: 2025-02-13
Payer: COMMERCIAL

## 2025-02-13 ENCOUNTER — TELEPHONE (OUTPATIENT)
Dept: HEMATOLOGY/ONCOLOGY | Facility: CLINIC | Age: 65
End: 2025-02-13

## 2025-02-13 VITALS
WEIGHT: 141.87 LBS | DIASTOLIC BLOOD PRESSURE: 72 MMHG | RESPIRATION RATE: 16 BRPM | SYSTOLIC BLOOD PRESSURE: 112 MMHG | BODY MASS INDEX: 23.25 KG/M2 | OXYGEN SATURATION: 96 % | TEMPERATURE: 97.5 F | HEART RATE: 89 BPM

## 2025-02-13 DIAGNOSIS — Z29.89 IMMUNOTHERAPY: Primary | ICD-10-CM

## 2025-02-13 DIAGNOSIS — C34.11 MALIGNANT NEOPLASM OF UPPER LOBE OF RIGHT LUNG (MULTI): ICD-10-CM

## 2025-02-13 DIAGNOSIS — N30.90 CYSTITIS: ICD-10-CM

## 2025-02-13 LAB — BACTERIA UR CULT: ABNORMAL

## 2025-02-13 PROCEDURE — 99214 OFFICE O/P EST MOD 30 MIN: CPT | Performed by: NURSE PRACTITIONER

## 2025-02-13 RX ORDER — PREDNISONE 20 MG/1
60 TABLET ORAL DAILY
Qty: 30 TABLET | Refills: 0 | Status: SHIPPED | OUTPATIENT
Start: 2025-02-13 | End: 2025-03-15

## 2025-02-13 RX ORDER — SULFAMETHOXAZOLE AND TRIMETHOPRIM 800; 160 MG/1; MG/1
1 TABLET ORAL 2 TIMES DAILY
Qty: 24 TABLET | Refills: 0 | Status: SHIPPED | OUTPATIENT
Start: 2025-02-13 | End: 2025-02-25

## 2025-02-13 RX ORDER — SULFAMETHOXAZOLE AND TRIMETHOPRIM 800; 160 MG/1; MG/1
1 TABLET ORAL 3 TIMES WEEKLY
Qty: 12 TABLET | Refills: 0 | Status: SHIPPED | OUTPATIENT
Start: 2025-02-14 | End: 2025-02-13 | Stop reason: ALTCHOICE

## 2025-02-13 ASSESSMENT — PAIN SCALES - GENERAL: PAINLEVEL_OUTOF10: 0-NO PAIN

## 2025-02-13 NOTE — RESEARCH NOTES
Research Note Treatment Day    Hayley Potter is here today for treatment on [id number]. Today is C33D1. Procedures completed per protocol. AE's and con-meds reviewed with patient. Patient is aware of treatment plan.    []   Received treatment as planned   OR  [x]    Treatment delayed; patient calendar updated as required- treatment held   Treatment delayed because:    [x]   AE    []   Physician Discretion    []   Clinical Deterioration or Progression     []   Other    Education Documentation  General Medication Information, taught by Rafiq Delgado RN at 2/13/2025  3:56 PM.  Learner: Family, Patient  Readiness: Acceptance  Method: Explanation  Response: Verbalizes Understanding    Supportive Medications, taught by Rafiq Delgado RN at 2/13/2025  3:56 PM.  Learner: Family, Patient  Readiness: Acceptance  Method: Explanation  Response: Verbalizes Understanding    Treatment Plan and Schedule, taught by Rafiq Delgado RN at 2/13/2025  3:56 PM.  Learner: Family, Patient  Readiness: Acceptance  Method: Explanation  Response: Verbalizes Understanding    Education Comments  No comments found.    Pt and her daughter presented to clinic for cycle 33 on [id number]. Pt had recent scans completed and upon review pt has uretitis/ cystitis and she is having symptoms of flank pain, low grade fever. She was seen by urology as well. Pt will be initiated on steroids at 1mg/kg with a taper dose. Pt will have this treatment held and she will be seen weekly to assess her response to the steroids. She was also given antibiotics as well. Pt and daughter understood the plan.

## 2025-02-13 NOTE — TELEPHONE ENCOUNTER
Returned call to pharmacy. Clarified the prednisone taper orders. To receive the correct dose of taper patient requires 52 tabs of 20 mg prednisone. Pharmacy accepted clarification and is preparing the prescription.

## 2025-02-13 NOTE — PROGRESS NOTES
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 Patient of Dr Cook. Receives Pembrolizumab and Trial treatment [facility].    Labs completed for review today.   CT Monday.    Adriana Delgadillo available during visit.   Patient here with her daughter,  Abdominal pain right side under ribs down to bladder. Pain in lower bladder. Has seen Dr Dennis.    Not taking pain meds due to fear of constipation but taking the ativan for this new pain at this time.   Medications and Allergies reviewed and reconciled this visit.    Follow up per MD request.    Patient reports availability and use of mychart, Reviewed this is a good place to communicate with the team as well as review labs and upcoming orders.      No barriers to education noted, patient agrees to current plan and verbalized understanding using teach back method.

## 2025-02-13 NOTE — PROGRESS NOTES
Patient ID: Hayley Potter is a 64 y.o. female.    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 She was initially found to have spiculated 2.9cm mass in the RUL on the CT chest on 03/02/2020. She underwent CT guided RUL biopsy with pathology showed lung tissue with scan and focal aypicla grandular proliferation  suspicious for adenocarcinoma. IHC positive for CK7, TTF1 and napsin A. CK20 negative. PET/CT on 04/15/2020 showed RUL hypermetabolic RUL mass. No evidence of distant metastases. She underwent RUL lobectomy with mediastinal LN dissection with Dr. Wheat  on 04/21/2020 which showed invasive well to moderately differentiated adenocarcinoma, tumor measuring 2.5x2.3.x1.7cm. No VPI or LVI. All margins were negative. LN (0/11).      Unfortunately, on the surveillance CT chest (+) on 08/24/2022, there was a new irregular marginated 6mm GUSTABO nodule, which increased in size significantly on the repeat CT chest (-) on 01/06/2023, measuring 12mm.  There is also an additional new 9mm nodule  in the L lung as well as new mediastinal and possible L hilar LAD. PET/CT on 02/16/2023 showed hypermetabolic L hilar and mediastinal LAD. 1st  GUSTABO nodule now 8mm likely inflammatory. 2nd GUSTABO nodule showed stable size with SUV 4.4. Soft tissue mass associated with  L 11th rib fracture, SUV 11.5. Mass within the liver SUV 13.1, suspicious for mets. Hypermetabolic activity also noted  in  the stomach fundus, association with a hiatal hernia, in the cecum and ascending colon without masses seen. She underwent US guided liver biopsy on 02/16/2023 - poorly-differentiated carcinoma.      Treatment Summary:  04/21/2020: RUL lobectomy with medistainal LN dissection (Dr. Wheat)  04/11/2023: C1 pembrolizumab 200mg IV (on trial)  05/02/2023: C2 Pembrolizumab 200mg IV (on trial)  05/23/2023: C3 Pembrolizumab 200mg IV (on trial)  06/13/2023: C4 Pembrolizumab 200mg IV (on trial)   07/05/2023: C5 Pembrolizumab 200mg IV (on trial)   07/26/2023: C6 Pembrolizumab 200mg IV (on trial)   08/16/2023: C7 Pembrolizumab 200mg IV (on trial)   09/07/2023: C8 Pembrolizumab 200mg IV (on trial)   09/28/2023: C9 Pembrolizumab 200mg IV (on trial)   10/19/2023: C10 Pembrolizumab 200mg IV (on trial)  11/09/2023: C11 Pembrolizumab 200mg IV (on trial)  11/30/2023: C12 Pembrolizumab 200mg IV (on trial)  12/21/2023: C13 Pembrolizumab 200mg IV (on trial)  01/11/2024: C14 Pembrolizumab 200mg IV (on trial)  02/01/2024: C15 Pembrolizumab 200mg IV (on trial)  02/22/2024: C16 Pembrolizumab 200mg IV (on trial)  03/14/2024: C17 Pembrolizumab 200mg IV (on trial)  04/04/2024: C18 Pembrolizumab 200mg IV (on trial)  04/25/2024: C19 Pembrolizumab 200mg IV (on trial)  05/16/2024: C20 Pembrolizumab 200mg IV (on trial)  06/06/2024: C21 Pembrolizumab 200mg IV (on trial)  06/27/2024: C22 Pembrolizumab 200mg IV (on trial)  07/15/2024: ED visit right foot contusion   07/18/2024: C23 Pembrolizumab 200mg IV (on trial)  08/08/2024: C24 Pembrolizumab 200mg IV (on trial)  08/29/2024: C25 Pembrolizumab 200mg IV (on trial)  09/19/2024: C26 Pembrolizumab 200mg IV (on trial)  10/10/2024: C27 Pembrolizumab 200mg IV (on trial)  10/31/2024: C28 Pembrolizumab 200mg IV (on trial)  11/21/2024: C29 Pembrolizumab 200mg IV (on trial)  12/12/2024: C30 Pembrolizumab 200mg IV (on trial)  01/02/2025: C31 Pembrolizumab 200mg IV (on trial)  01/23/2025: C32 Pembrolizumab 200mg  IV (on trial)  02/13/2025: Hold tx ureteritis or cystitis vs UTI - prednisone started     Interval History (02/13/2025):    Pt present in clinic for readiness to treat visit.  Her daughter is present for visit.  Primary complaint is urinary symptoms and flank pain. Symptoms of dysuria and flank pain began on Saturday along with low grade fever. CT 2/10/2025 identified hydronephrosis and new circumferential urinary bladder wall thickening and mucosal hyperenhancement, consistent with pyelitis/ureteritis and cystitis.   Denies n/v. Reflux which she attributes to hiatal hernia, stable. No constipation or diarrhea.   RUQ pain/left rib pain - chronic, believes Neurontin has helped,. Neurontin also helping generalized neuropathic pain and restless legs. Restless legs can cause sleep disturbance. Leg cramps on occasion. She was seen by palliative care mirtazapine was started for anxiety and sleep.       Review of Systems:  A review of systems has been completed and are negative for complaints except what is stated in the HPI and/or past medical history     PMHx: vocal cord cancer, GERD and COPD, OA  PSHx: tonsillectomy, tubal ligation, lumpectomy, L ankle and R hand surgery  FHx: strong family history of breast cancer including male breast cancer.   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 Then worked as a  since 2005. She quit etoh 2010. She smokes Marijuana every day.     Allergies:  Cephalexin    Medications:    Current Outpatient Medications:     alendronate (Fosamax) 70 mg tablet, Take 1 tablet (70 mg) by mouth every 7 days. Take in the morning with a full glass of water, on an empty stomach, and do not take anything else by mouth or lie down for the next 30 min., Disp: , Rfl:     azelastine HCl (AZELASTINE NASL), Administer 0.15 % into affected nostril(s) 2 times a day as needed., Disp: , Rfl:     budesonide-formoteroL (Symbicort) 160-4.5 mcg/actuation inhaler, Rinse mouth with water after use to  reduce aftertaste and incidence of candidiasis. Do not swallow., Disp: , Rfl:     busPIRone (Buspar) 30 mg tablet, Take 1 tablet (30 mg) by mouth 2 times a day., Disp: , Rfl:     cholecalciferol (Vitamin D-3) 50,000 unit capsule, Take 1 capsule (50,000 Units) by mouth 1 (one) time per week., Disp: , Rfl:     cyclobenzaprine (Flexeril) 10 mg tablet, Take 1 tablet (10 mg) by mouth 2 times a day as needed for muscle spasms., Disp: 30 tablet, Rfl: 1    gabapentin (Neurontin) 600 mg tablet, Take 1 tablet (600 mg) by mouth 2 times a day., Disp: , Rfl:     mirtazapine (Remeron) 7.5 mg tablet, Take 1 tablet (7.5 mg) by mouth once daily at bedtime., Disp: 30 tablet, Rfl: 2    multivitamin with minerals tablet, Take 1 tablet by mouth once daily., Disp: , Rfl:     omeprazole (PriLOSEC) 40 mg DR capsule, TAKE 1 CAPSULE BY MOUTH 30 MINUTES BEFORE MORNING MEAL TWICE A DAY, Disp: , Rfl:     PEMBROLIZUMAB IV, Infuse into a venous catheter., Disp: , Rfl:     tiotropium (Spiriva Respimat) 2.5 mcg/actuation inhaler, INHALE TWO PUFFS BY MOUTH EVERY DAY, Disp: , Rfl:     diphenhydramine HCl (BENADRYL ALLERGY ORAL), , Disp: , Rfl:     levothyroxine (Synthroid, Levoxyl) 75 mcg tablet, Take 1 tablet (75 mcg) by mouth once daily., Disp: 90 tablet, Rfl: 1    LORazepam (Ativan) 0.5 mg tablet, Take 1-2 tablets (0.5-1 mg) by mouth every 8 hours if needed for anxiety (nausea)., Disp: 60 tablet, Rfl: 0    oxyCODONE-acetaminophen (Percocet) 5-325 mg tablet, Take 1 tablet by mouth every 6 hours if needed for severe pain (7 - 10) for up to 10 days. (Patient not taking: Reported on 12/12/2024), Disp: 30 tablet, Rfl: 0  No current facility-administered medications for this visit.    Facility-Administered Medications Ordered in Other Visits:     acetaminophen (Tylenol) tablet 650 mg, 650 mg, oral, PRN, Kayla Cook MD MPH    albuterol 2.5 mg /3 mL (0.083 %) nebulizer solution 3 mL, 3 mL, nebulization, PRN, Kayla Cook MD MPH    dextrose 5 % in water (D5W)  bolus, 500 mL, intravenous, PRN, Kayla Cook MD MPH    diphenhydrAMINE (BENADryl) capsule 50 mg, 50 mg, oral, PRN, Kayla Cook MD MPH    diphenhydrAMINE (BENADryl) injection 50 mg, 50 mg, intravenous, PRN, Kayla Cook MD MPH    EPINEPHrine (Epipen) injection syringe 0.3 mg, 0.3 mg, intramuscular, q5 min PRN, Kayla Cook MD MPH    famotidine PF (Pepcid) injection 20 mg, 20 mg, intravenous, Once PRN, Kayla Cook MD MPH    methylPREDNISolone sod succinate (SOLU-Medrol) 40 mg/mL injection 40 mg, 40 mg, intravenous, PRN, Kayla Cook MD MPH    prochlorperazine (Compazine) injection 10 mg, 10 mg, intravenous, q6h PRN, Kayla Cook MD MPH    prochlorperazine (Compazine) tablet 10 mg, 10 mg, oral, q6h PRN, Kayla Cook MD MPH    sodium chloride 0.9 % bolus 500 mL, 500 mL, intravenous, PRN, Kayla Cook MD MPH        Vital Signs:  /72 (BP Location: Right arm, Patient Position: Sitting, BP Cuff Size: Adult long)   Pulse 89   Temp 36.4 °C (97.5 °F) (Temporal)   Resp 16   Wt 64.4 kg (141 lb 13.9 oz)   SpO2 96%   BMI 23.25 kg/m²     Wt Readings from Last 5 Encounters:   25 64.4 kg (141 lb 13.9 oz)   25 62.5 kg (137 lb 12.6 oz)   25 63.8 kg (140 lb 10.5 oz)   24 62.8 kg (138 lb 7.2 oz)   24 62.8 kg (138 lb 7.2 oz)       Physical Exam:  ECO  Pain: 4-5/10 flank pain R>L  Constitutional: Well developed, awake/alert/oriented x3, no distress, alert and cooperative  Eyes: PER. sclera anicteric  ENMT: Oral mucosa moist. No lesions or thrush   Respiratory/Thorax: Breathing is non-labored. Lungs are clear to auscultation bilaterally. No adventitious breath sounds. Hx RUL lobectomy 2020  Cardiovascular: S1-S2. Regular rate and rhythm. No murmurs, rubs, or gallops appreciated  Gastrointestinal: Abdomen soft nontender, nondistended, normal active bowel sounds.   Musculoskeletal: ROM intact, no joint swelling, normal strength  Extremities: normal extremities, no cyanosis, no edema, no clubbing  Lymphatics:  no palpable lymphadenopathy  Neurologic: alert and oriented x3. Nonfocal exam. No myoclonus  Psychological: Pleasant, appropriate and easily engaged       Results:     Labs  Lab Results   Component Value Date    WBC 10.2 02/11/2025    HGB 13.1 02/11/2025    HCT 40.5 02/11/2025    MCV 97 02/11/2025     02/11/2025      Lab Results   Component Value Date    NEUTROABS 6.74 02/11/2025      Lab Results   Component Value Date    GLUCOSE 95 02/11/2025    CALCIUM 9.5 02/11/2025     02/11/2025    K 3.8 02/11/2025    CO2 31 02/11/2025     02/11/2025    BUN 9 02/11/2025    CREATININE 0.92 02/11/2025    MG 1.89 10/31/2024     Lab Results   Component Value Date    ALT 13 02/11/2025    AST 15 02/11/2025    ALKPHOS 71 02/11/2025    BILITOT 0.5 02/11/2025      Lab Results   Component Value Date    ACTH 10.6 08/16/2023    CORTISOL 12.3 08/16/2023    TSH 1.03 12/31/2024    FREET4 0.52 (L) 08/15/2023       Imaging   CT C/A/P February 10, 2025  IMPRESSION:  Lung cancer, restaging scan.  1. Stable postoperative changes of right upper lobectomy without  evidence of locoregional disease recurrence. No new sites of  metastatic disease within the chest, abdomen or pelvis.  2. New mild bilateral hydronephrosis with urothelial thickening and  hyperenhancement, right-greater-than-left. In addition, new  circumferential urinary bladder wall thickening and mucosal  hyperenhancement is noted. Findings are most consistent with  pyelitis/ureteritis and cystitis, respectively. Findings may relate  to the patient's immunotherapy.  3. Remaining chronic and incidental findings as described above.    CT C/A/P November 15, 2024  IMPRESSION:  CHEST:  1.  Stable postsurgical changes of right upper lobectomy.  2. Stable left upper lobe reticulonodular scarring.  3. No new sites of disease in the chest.      ABDOMEN-PELVIS:  1.  No metastatic disease in the abdomen and pelvis.  2. Chronic and incidental findings as described  above.    Mammogram 12/27/2024  IMPRESSION:  No mammographic evidence of malignancy.        Assessment/Plan:  64 y.o. 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     The patient's records and imaging have been reviewed in detail.  MD discussed with the patient the natural history of their disease at length.  The following has also been discussed with the patient:     # Cancer: recurrent likely metastatic ([id number]) lung cancer: liver biopsy showed extensive necrosis. Liquid biopsy showed KRAS G12C mutation, which was identified in her original surgical resected  specimen. PDL-1 50%. Patient initially had stage IA2 ([id number]) RUL lung  adenocarcinoma s/p RUL lobectomy, unfortunately now likely metastatic recurrence. Liver biopsy on 2/16 showed poorly differentiated malignant neoplasm with extensive necrosis. We discussed treatment  options with her today with either standard of care pembrolizumab vs clinical trials. Patient expressed interests in enrolling into clinical trial. Enrolled into RCT [id number]. Doing well.      - Imaging : 2/10/2025 Lung cancer, restaging scan.  1. Stable postoperative changes of right upper lobectomy without evidence of locoregional disease recurrence. No new sites of metastatic disease within the chest, abdomen or pelvis.  2. New mild bilateral hydronephrosis with urothelial thickening and hyperenhancement, right-greater-than-left. In addition, new circumferential urinary bladder wall thickening and mucosal hyperenhancement is noted. Findings are most consistent with  pyelitis/ureteritis and cystitis, respectively. Findings may relate  to the patient's immunotherapy.  3. Remaining chronic and incidental findings as described above.      # Hypothyroidism: G2. Likely 2/2 immunotherapy - resolved.  TSH 1.07 11/8/23.  Continue levothyroxine 75mcg  daily.      # Microcytic anemia 2/2 due to chronic disease and hiatal hernia: patient denies any hematochezia  or hematuria. No melena. hx of hiatal hernia. Per patient. Last EGD was done in 2021 and was normal. Colonoscopy was a few years ago.  EGD showed hiatal hernia 03/2023. Improved Hgb overall. Iron studies show low-normal ferritin and TSAT of 14%. Continue PO iron 3x/week. Tolerates well. Stable.  12/12/2024 Anemia resolved. Have decreased oral iron from BID to every day will now discontinue oral iron.   Iron discontinued in December 2024    # Mucositis (chronic).  Pt reports since start of immunotherapy.  Chronic sore secondary to ill fitting dentures recommended s&s rinse.  Continue Anbesol and/or Oragel for pain.  Will continue to monitor.  BMX added.  2/1/24  - resolved   Working with oral surgeon. Expects 3 upcoming extractions and 3 crowns (November)      # Constipation: managed well with stool softener and PRN lactulose.        # Clinical Trials: April 11, 2023 initiated treatment on [id number] this is cycle 1 of first-line treatment.  She has been randomized to  arm A which is pembrolizumab alone for up to 2 years or progression followed by Alimta/carboplatin for second line therapy. Consent has been signed. 11/15/24 CT scan shows favorable response. Continue treatment.   02/10/2025 CT with no concerning disease findings. However concern for ureteritis vs cystitis vs UTI. Pembrolizumab held.     # Access: Peripheral veins.     # Pain: she has a displaced 8th Rib on the left side -Advised patient to take percocet daily and decrease Advil use. PRN Percocet Rx in place.  Rx last sent 12/21/23. Currently managed with PRN gummies.  New pain on the R upper back, controlled with percocet. Will try lidocaine patch OTC. 6/27:  Pain currently managed with PRN Advil, Aspercreme, and gummies.  Routine Gabapentin for neuropathy pain.   Pt verbalized desire to reduce # of medications taken.  Wishes to reduce Gabapentin  dosage.   Supportive onc referral placed for assistance.   :  Bruise to buttock, pain 3-4/10.  Increased right flank/rib pain r/t fall, pain 3/10.  Managed with Advil 600mg TID.  :  Right flank pain 2/10.  Managed with PRN interventions.      # Anxiety:  Anxiety reported d/t recent death of her father, upcoming .  Pt requested 1x Valium dose, previous 10mg admin.  OARRS reviewed. Provided Rx for Valium 5mg x3 doses.    No following with palliative care  Started on Mirtazapine 7.5mg at HS    # Bone Health: L fifth ribs lesion stable.    # Arthralgia (grade 1).  Uric acid lab (-).  Intermittent arthralgia to left hand.  Continue PRN Advil, Aspercreme or Percocet (for severe pain).      # Left rib or chest wall pain over the past month. 23 CXR - RESULTS: Mediastinal surgical clips are noted. The cardiac silhouette is within normal limits. Mediastinal contours are unremarkable. The lungs demonstrate no infiltrate or atelectasis. There is no evidence for pleural effusion. Remote fracture is noted involving the right 7th rib. There are degenerative changes of the thoracic spine.  - Continue PRN Percocet and/or gummies for pain control.      # Right foot contusion, hyperextension:  Able to bear wt.  Continue to follow with podiatrist with any questions.  Continue Advil x5 days for inflammation. Resolved    # Psychosocial: Coping well, no acute issues.  Good support from family & friends.  Social work support available.     # GI/CINV: No acute issues.  Eating and voiding well.  Nutrition consult available.    # Insomnia:  Both difficulty falling and staying asleep.  Recommended pt can trial Melatonin.  Start with 3mg dose.  Max dose 10mg.  Restless contributes     # Sinusitis:  Pressure to maxillary and frontal sinuses.  +Increased mucous, yellow in color.  Denies fevers.  Hx of recurrent sinus infections.  Rx called to pt's pharmacy.  Doxycycline 100mg BID x7-10 days.  Extended course d/t recent smoke  exposure.  3/14: Resolved.  9/19:  9/16/24 ENT appt for recurrent sinus infection.  Completed ATB course.  Resolved.      # Urinary Symptoms: symptoms began 2/8/2025. CT scan 2/10/2025 as above. Saw Dr Dennis in urology on 2/12/2025. Urine PCR negative although interestingly UA +.  Ureteritis or cystitis vs UTI. Will start prednisone 1mg/kg and taper down by 10mg every 5th day. Continue PPI for stomach protection. Start Bactrim DS BID M-W-F for PCP prophylaxis.   Will see weekly during steroid taper.     # Smoking: former smoker     # Fertility: N/A.        # Heme/ESAs: N/A.     # GOC: Patient is aware of palliative intent goals of care.     # Language: English.    # Health Maintenance:   Patient plans to get flu and RSV vaccine   Mammogram completed 12/2024    # Dispo:   Hold pembrolizumab  FUV weekly while on prednisone taper      Mikie Hancock, APRN-CNP  [facility]  Thoracic + H&N Medical Oncology     I spent a total of 30+ minutes on the date of the service which included preparing to see the patient, face-to-face patient care, completing clinical documentation, obtaining and / or reviewing separately obtained history, counseling and educating the patient/family/caregiver, ordering medications, tests, or procedures, communicating with other healthcare providers (not separately reported), independently interpreting results, not separately reported, and communicating results to the patient/family/caregiver, Name and date of birth verified.

## 2025-02-20 ENCOUNTER — OFFICE VISIT (OUTPATIENT)
Dept: HEMATOLOGY/ONCOLOGY | Facility: CLINIC | Age: 65
End: 2025-02-20
Payer: COMMERCIAL

## 2025-02-20 VITALS
OXYGEN SATURATION: 94 % | SYSTOLIC BLOOD PRESSURE: 109 MMHG | RESPIRATION RATE: 18 BRPM | TEMPERATURE: 97.9 F | HEART RATE: 82 BPM | BODY MASS INDEX: 23.28 KG/M2 | WEIGHT: 142.09 LBS | DIASTOLIC BLOOD PRESSURE: 74 MMHG

## 2025-02-20 DIAGNOSIS — C34.11 MALIGNANT NEOPLASM OF UPPER LOBE OF RIGHT LUNG (MULTI): ICD-10-CM

## 2025-02-20 PROCEDURE — 99213 OFFICE O/P EST LOW 20 MIN: CPT | Performed by: NURSE PRACTITIONER

## 2025-02-20 ASSESSMENT — PAIN SCALES - GENERAL: PAINLEVEL_OUTOF10: 4

## 2025-02-20 NOTE — PROGRESS NOTES
Patient here with her daughter for follow up with Mikie Hancock CNP. Medications and allergies reviewed with patient.     Patient reports urinary frequency and urgency with lower back pain. She states that she has fatigue, wheezing and shortness of breath, has been using albuterol 2x day for the last week. Patient denies fever or chills. She is currently on 50mg prednisone, started Wednesday 2/19.    Per orders patient to continue prednisone taper and follow up in 1 week. She is to start Bactrim twice a day for 5 days and then MWF twice a day.    Patient verbalized understanding using the teach back method, no further questions at this time.

## 2025-02-20 NOTE — PROGRESS NOTES
Patient ID: Hayley Potter is a 64 y.o. female.    CC:  Management of stage IA3 ([id number]) adenocarcinoma of RUL s/p RUL lobectomy 04/2020, PDL-1 50%, NGS positive for KRAS G12C--> now with  metastatic recurrence involving liver, 11th L rib, and mediastinal LNs. 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 She was initially found to have spiculated 2.9cm mass in the RUL on the CT chest on 03/02/2020. She underwent CT guided RUL biopsy with pathology showed lung tissue with scan and focal aypicla grandular proliferation  suspicious for adenocarcinoma. IHC positive for CK7, TTF1 and napsin A. CK20 negative. PET/CT on 04/15/2020 showed RUL hypermetabolic RUL mass. No evidence of distant metastases. She underwent RUL lobectomy with mediastinal LN dissection with Dr. Wheat  on 04/21/2020 which showed invasive well to moderately differentiated adenocarcinoma, tumor measuring 2.5x2.3.x1.7cm. No VPI or LVI. All margins were negative. LN (0/11).      Unfortunately, on the surveillance CT chest (+) on 08/24/2022, there was a new irregular marginated 6mm GUSTABO nodule, which increased in size significantly on the repeat CT chest (-) on 01/06/2023, measuring 12mm.  There is also an additional new 9mm nodule  in the L lung as well as new mediastinal and possible L hilar LAD. PET/CT on 02/16/2023 showed hypermetabolic L hilar and mediastinal LAD. 1st  GUSTABO nodule now 8mm likely inflammatory. 2nd GUSTABO nodule showed stable size with SUV 4.4. Soft tissue mass associated with  L 11th rib fracture, SUV 11.5. Mass within the liver SUV 13.1, suspicious for mets. Hypermetabolic activity also noted  in  the stomach fundus, association with a hiatal hernia, in the cecum and ascending colon without masses seen. She underwent US guided liver biopsy on 02/16/2023 - poorly-differentiated carcinoma.      Treatment Summary:  04/21/2020: RUL lobectomy with medistainal LN dissection (Dr. Wheat)  04/11/2023: C1 pembrolizumab 200mg IV (on trial)  05/02/2023: C2 Pembrolizumab 200mg IV (on trial)  05/23/2023: C3 Pembrolizumab 200mg IV (on trial)  06/13/2023: C4 Pembrolizumab 200mg IV (on trial)   07/05/2023: C5 Pembrolizumab 200mg IV (on trial)   07/26/2023: C6 Pembrolizumab 200mg IV (on trial)   08/16/2023: C7 Pembrolizumab 200mg IV (on trial)   09/07/2023: C8 Pembrolizumab 200mg IV (on trial)   09/28/2023: C9 Pembrolizumab 200mg IV (on trial)   10/19/2023: C10 Pembrolizumab 200mg IV (on trial)  11/09/2023: C11 Pembrolizumab 200mg IV (on trial)  11/30/2023: C12 Pembrolizumab 200mg IV (on trial)  12/21/2023: C13 Pembrolizumab 200mg IV (on trial)  01/11/2024: C14 Pembrolizumab 200mg IV (on trial)  02/01/2024: C15 Pembrolizumab 200mg IV (on trial)  02/22/2024: C16 Pembrolizumab 200mg IV (on trial)  03/14/2024: C17 Pembrolizumab 200mg IV (on trial)  04/04/2024: C18 Pembrolizumab 200mg IV (on trial)  04/25/2024: C19 Pembrolizumab 200mg IV (on trial)  05/16/2024: C20 Pembrolizumab 200mg IV (on trial)  06/06/2024: C21 Pembrolizumab 200mg IV (on trial)  06/27/2024: C22 Pembrolizumab 200mg IV (on trial)  07/15/2024: ED visit right foot contusion   07/18/2024: C23 Pembrolizumab 200mg IV (on trial)  08/08/2024: C24 Pembrolizumab 200mg IV (on trial)  08/29/2024: C25 Pembrolizumab 200mg IV (on trial)  09/19/2024: C26 Pembrolizumab 200mg IV (on trial)  10/10/2024: C27 Pembrolizumab 200mg IV (on trial)  10/31/2024: C28 Pembrolizumab 200mg IV (on trial)  11/21/2024: C29 Pembrolizumab 200mg IV (on trial)  12/12/2024: C30 Pembrolizumab 200mg IV (on trial)  01/02/2025: C31 Pembrolizumab 200mg IV (on trial)  01/23/2025: C32 Pembrolizumab 200mg  "IV (on trial)  02/13/2025: Hold tx ureteritis or cystitis vs UTI - prednisone started     Interval History (02/20/2025):    Pt present in clinic for one week follow-up evaluation. Her daughter is present for visit. Patient has continued urinary complaints mainly frequency. Flank pain has resolved. No further fevers. She is not feeling back to her baseline. She is surprised and disappoints steroids have not improved her level of fatigue. No chest pain or shortness of breath. Has felt \"wheezy\" this week.  No nausea or vomiting. No constipation or diarrhea. No urinary symptoms. No rash. No neuropathy.     RUQ pain/left rib pain - chronic, believes Neurontin has helped,. Neurontin also helping generalized neuropathic pain and restless legs. Restless legs can cause sleep disturbance. Leg cramps on occasion. She was seen by palliative care mirtazapine was started for anxiety and sleep.     Review of Systems:  A review of systems has been completed and are negative for complaints except what is stated in the HPI and/or past medical history     PMHx: vocal cord cancer, GERD and COPD, OA  PSHx: tonsillectomy, tubal ligation, lumpectomy, L ankle and R hand surgery  FHx: strong family history of breast cancer including male breast cancer.   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 Then worked as a  since 2005. She quit etoh 2010. She smokes Marijuana every day.     Allergies:  Cephalexin    Medications:    Current Outpatient Medications:     alendronate (Fosamax) 70 mg tablet, Take 1 tablet (70 mg) by mouth every 7 days. Take in the morning with a full glass of water, on an empty stomach, and do not take anything else by mouth or lie down for the next 30 min., Disp: , Rfl:     azelastine HCl (AZELASTINE NASL), Administer 0.15 % into affected nostril(s) 2 times a day as needed., Disp: , Rfl:     budesonide-formoteroL (Symbicort) 160-4.5 mcg/actuation inhaler, Rinse mouth with water after use to reduce " aftertaste and incidence of candidiasis. Do not swallow., Disp: , Rfl:     busPIRone (Buspar) 30 mg tablet, Take 1 tablet (30 mg) by mouth 2 times a day., Disp: , Rfl:     cholecalciferol (Vitamin D-3) 50,000 unit capsule, Take 1 capsule (50,000 Units) by mouth 1 (one) time per week., Disp: , Rfl:     cyclobenzaprine (Flexeril) 10 mg tablet, Take 1 tablet (10 mg) by mouth 2 times a day as needed for muscle spasms., Disp: 30 tablet, Rfl: 1    gabapentin (Neurontin) 600 mg tablet, Take 1 tablet (600 mg) by mouth 2 times a day., Disp: , Rfl:     levothyroxine (Synthroid, Levoxyl) 75 mcg tablet, Take 1 tablet (75 mcg) by mouth once daily., Disp: 90 tablet, Rfl: 1    LORazepam (Ativan) 0.5 mg tablet, Take 1-2 tablets (0.5-1 mg) by mouth every 8 hours if needed for anxiety (nausea)., Disp: 60 tablet, Rfl: 0    mirtazapine (Remeron) 7.5 mg tablet, Take 1 tablet (7.5 mg) by mouth once daily at bedtime., Disp: 30 tablet, Rfl: 2    multivitamin with minerals tablet, Take 1 tablet by mouth once daily., Disp: , Rfl:     omeprazole (PriLOSEC) 40 mg DR capsule, TAKE 1 CAPSULE BY MOUTH 30 MINUTES BEFORE MORNING MEAL TWICE A DAY, Disp: , Rfl:     PEMBROLIZUMAB IV, Infuse into a venous catheter., Disp: , Rfl:     predniSONE (Deltasone) 20 mg tablet, Take 3 tablets (60 mg) by mouth once daily. Take 60mg daily for 5 days. Then decreased to 50mg daily x5 days. Then 40mg daily x5 days. Then 30mg daily x5 days then 20mg daily x5 days then 10mg daily., Disp: 30 tablet, Rfl: 0    tiotropium (Spiriva Respimat) 2.5 mcg/actuation inhaler, INHALE TWO PUFFS BY MOUTH EVERY DAY, Disp: , Rfl:     diphenhydramine HCl (BENADRYL ALLERGY ORAL), , Disp: , Rfl:     oxyCODONE-acetaminophen (Percocet) 5-325 mg tablet, Take 1 tablet by mouth every 6 hours if needed for severe pain (7 - 10) for up to 10 days. (Patient not taking: Reported on 2/20/2025), Disp: 30 tablet, Rfl: 0    sulfamethoxazole-trimethoprim (Bactrim DS) 800-160 mg tablet, Take 1 tablet by  mouth 2 times a day for 24 doses. Twice a day on  and Friday (Patient not taking: Reported on 2025), Disp: 24 tablet, Rfl: 0  No current facility-administered medications for this visit.    Facility-Administered Medications Ordered in Other Visits:     acetaminophen (Tylenol) tablet 650 mg, 650 mg, oral, PRN, Kayla Cook MD MPH    albuterol 2.5 mg /3 mL (0.083 %) nebulizer solution 3 mL, 3 mL, nebulization, PRN, Kayla Cook MD MPH    dextrose 5 % in water (D5W) bolus, 500 mL, intravenous, PRN, Kayla Cook MD MPH    diphenhydrAMINE (BENADryl) capsule 50 mg, 50 mg, oral, PRN, Kayla Cook MD MPH    diphenhydrAMINE (BENADryl) injection 50 mg, 50 mg, intravenous, PRN, Kayla Cook MD MPH    EPINEPHrine (Epipen) injection syringe 0.3 mg, 0.3 mg, intramuscular, q5 min PRN, Kayla Cook MD MPH    famotidine PF (Pepcid) injection 20 mg, 20 mg, intravenous, Once PRN, Kayla Cook MD MPH    methylPREDNISolone sod succinate (SOLU-Medrol) 40 mg/mL injection 40 mg, 40 mg, intravenous, PRN, Kayla Cook MD MPH    prochlorperazine (Compazine) injection 10 mg, 10 mg, intravenous, q6h PRN, Kayla Cook MD MPH    prochlorperazine (Compazine) tablet 10 mg, 10 mg, oral, q6h PRN, Kayla Cook MD MPH    sodium chloride 0.9 % bolus 500 mL, 500 mL, intravenous, PRN, Kayla Cook MD MPH        Vital Signs:  /74 (BP Location: Right arm, Patient Position: Sitting, BP Cuff Size: Adult)   Pulse 82   Temp 36.6 °C (97.9 °F) (Temporal)   Resp 18   Wt 64.4 kg (142 lb 1.4 oz)   SpO2 94%   BMI 23.28 kg/m²     Wt Readings from Last 5 Encounters:   25 64.4 kg (142 lb 1.4 oz)   25 64.4 kg (141 lb 13.9 oz)   25 62.5 kg (137 lb 12.6 oz)   25 63.8 kg (140 lb 10.5 oz)   24 62.8 kg (138 lb 7.2 oz)       Physical Exam:  ECO  Pain: flank pain resolved  Constitutional: Well developed, awake/alert/oriented x3, no distress, alert and cooperative  Eyes: PER. sclera anicteric  ENMT: Oral mucosa moist. No lesions or  thrush   Respiratory/Thorax: Breathing is non-labored. Lungs are clear to auscultation bilaterally. No adventitious breath sounds. Hx RUL lobectomy 4/2020  Cardiovascular: S1-S2. Regular rate and rhythm. No murmurs, rubs, or gallops appreciated  Gastrointestinal: Abdomen soft nontender, nondistended, normal active bowel sounds.   Musculoskeletal: ROM intact, no joint swelling, normal strength  Extremities: normal extremities, no cyanosis, no edema, no clubbing  Lymphatics: no palpable lymphadenopathy  Neurologic: alert and oriented x3. Nonfocal exam. No myoclonus  Psychological: Pleasant, appropriate and easily engaged       Results:     Labs  Lab Results   Component Value Date    WBC 10.2 02/11/2025    HGB 13.1 02/11/2025    HCT 40.5 02/11/2025    MCV 97 02/11/2025     02/11/2025      Lab Results   Component Value Date    NEUTROABS 6.74 02/11/2025      Lab Results   Component Value Date    GLUCOSE 95 02/11/2025    CALCIUM 9.5 02/11/2025     02/11/2025    K 3.8 02/11/2025    CO2 31 02/11/2025     02/11/2025    BUN 9 02/11/2025    CREATININE 0.92 02/11/2025    MG 1.89 10/31/2024     Lab Results   Component Value Date    ALT 13 02/11/2025    AST 15 02/11/2025    ALKPHOS 71 02/11/2025    BILITOT 0.5 02/11/2025      Lab Results   Component Value Date    ACTH 10.6 08/16/2023    CORTISOL 12.3 08/16/2023    TSH 1.03 12/31/2024    FREET4 0.52 (L) 08/15/2023       Imaging   CT C/A/P February 10, 2025  IMPRESSION:  Lung cancer, restaging scan.  1. Stable postoperative changes of right upper lobectomy without  evidence of locoregional disease recurrence. No new sites of  metastatic disease within the chest, abdomen or pelvis.  2. New mild bilateral hydronephrosis with urothelial thickening and  hyperenhancement, right-greater-than-left. In addition, new  circumferential urinary bladder wall thickening and mucosal  hyperenhancement is noted. Findings are most consistent with  pyelitis/ureteritis and cystitis,  respectively. Findings may relate  to the patient's immunotherapy.  3. Remaining chronic and incidental findings as described above.    CT C/A/P November 15, 2024  IMPRESSION:  CHEST:  1.  Stable postsurgical changes of right upper lobectomy.  2. Stable left upper lobe reticulonodular scarring.  3. No new sites of disease in the chest.      ABDOMEN-PELVIS:  1.  No metastatic disease in the abdomen and pelvis.  2. Chronic and incidental findings as described above.    Mammogram 12/27/2024  IMPRESSION:  No mammographic evidence of malignancy.        Assessment/Plan:  64 y.o. 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     The patient's records and imaging have been reviewed in detail.  MD discussed with the patient the natural history of their disease at length. The following has also been discussed with the patient:     # Cancer: recurrent likely metastatic ([id number]) lung cancer: liver biopsy showed extensive necrosis. Liquid biopsy showed KRAS G12C mutation, which was identified in her original surgical resected  specimen. PDL-1 50%. Patient initially had stage IA2 ([id number]) RUL lung  adenocarcinoma s/p RUL lobectomy, unfortunately now likely metastatic recurrence. Liver biopsy on 2/16 showed poorly differentiated malignant neoplasm with extensive necrosis. We discussed treatment  options with her today with either standard of care pembrolizumab vs clinical trials. Patient expressed interests in enrolling into clinical trial. Enrolled into RCT [id number]. Doing well.      - Imaging : 2/10/2025 Lung cancer, restaging scan.  1. Stable postoperative changes of right upper lobectomy without evidence of locoregional disease recurrence. No new sites of metastatic disease within the chest, abdomen or pelvis.  2. New mild bilateral hydronephrosis with urothelial thickening and  hyperenhancement, right-greater-than-left. In addition, new circumferential urinary bladder wall thickening and mucosal hyperenhancement is noted. Findings are most consistent with  pyelitis/ureteritis and cystitis, respectively. Findings may relate  to the patient's immunotherapy.  3. Remaining chronic and incidental findings as described above.      # Hypothyroidism: G2. Likely 2/2 immunotherapy - resolved.  TSH 1.07 11/8/23.  Continue levothyroxine 75mcg daily.      # Microcytic anemia 2/2 due to chronic disease and hiatal hernia: patient denies any hematochezia  or hematuria. No melena. hx of hiatal hernia. Per patient. Last EGD was done in 2021 and was normal. Colonoscopy was a few years ago.  EGD showed hiatal hernia 03/2023. Improved Hgb overall. Iron studies show low-normal ferritin and TSAT of 14%. Continue PO iron 3x/week. Tolerates well. Stable.  12/12/2024 Anemia resolved. Have decreased oral iron from BID to every day will now discontinue oral iron.   Iron discontinued in December 2024    # Mucositis (chronic).  Pt reports since start of immunotherapy.  Chronic sore secondary to ill fitting dentures recommended s&s rinse.  Continue Anbesol and/or Oragel for pain.  Will continue to monitor.  BMX added.  2/1/24  - resolved   Working with oral surgeon. Expects 3 upcoming extractions and 3 crowns (November)      # Constipation: managed well with stool softener and PRN lactulose.        # Clinical Trials: April 11, 2023 initiated treatment on [id number] this is cycle 1 of first-line treatment.  She has been randomized to  arm A which is pembrolizumab alone for up to 2 years or progression followed by Alimta/carboplatin for second line therapy. Consent has been signed. 11/15/24 CT scan shows favorable response. Continue treatment.   02/10/2025 CT with no concerning disease findings. However concern for ureteritis vs cystitis vs UTI. Pembrolizumab held.     # Access: Peripheral veins.     # Pain: she has a  displaced 8th Rib on the left side -Advised patient to take percocet daily and decrease Advil use. PRN Percocet Rx in place.  Rx last sent 23. Currently managed with PRN gummies.  New pain on the R upper back, controlled with percocet. Will try lidocaine patch OTC. :  Pain currently managed with PRN Advil, Aspercreme, and gummies.  Routine Gabapentin for neuropathy pain.   Pt verbalized desire to reduce # of medications taken.  Wishes to reduce Gabapentin dosage.   Supportive onc referral placed for assistance.   :  Bruise to buttock, pain 3-4/10.  Increased right flank/rib pain r/t fall, pain 3/10.  Managed with Advil 600mg TID.  :  Right flank pain 2/10.  Managed with PRN interventions.      # Anxiety:  Anxiety reported d/t recent death of her father, upcoming .  Pt requested 1x Valium dose, previous 10mg admin.  OARRS reviewed. Provided Rx for Valium 5mg x3 doses.    No following with palliative care  Started on Mirtazapine 7.5mg at HS    # Bone Health: L fifth ribs lesion stable.    # Arthralgia (grade 1).  Uric acid lab (-).  Intermittent arthralgia to left hand.  Continue PRN Advil, Aspercreme or Percocet (for severe pain).      # Left rib or chest wall pain over the past month. 23 CXR - RESULTS: Mediastinal surgical clips are noted. The cardiac silhouette is within normal limits. Mediastinal contours are unremarkable. The lungs demonstrate no infiltrate or atelectasis. There is no evidence for pleural effusion. Remote fracture is noted involving the right 7th rib. There are degenerative changes of the thoracic spine.  - Continue PRN Percocet and/or gummies for pain control.      # Right foot contusion, hyperextension:  Able to bear wt.  Continue to follow with podiatrist with any questions.  Continue Advil x5 days for inflammation. Resolved    # Psychosocial: Coping well, no acute issues.  Good support from family & friends.  Social work support available.     # GI/CINV: No acute  issues.  Eating and voiding well.  Nutrition consult available.    # Insomnia:  Both difficulty falling and staying asleep.  Recommended pt can trial Melatonin.  Start with 3mg dose.  Max dose 10mg.  Restless contributes     # Sinusitis:  Pressure to maxillary and frontal sinuses.  +Increased mucous, yellow in color.  Denies fevers.  Hx of recurrent sinus infections.  Rx called to pt's pharmacy.  Doxycycline 100mg BID x7-10 days.  Extended course d/t recent smoke exposure.  3/14: Resolved.  9/19:  9/16/24 ENT appt for recurrent sinus infection.  Completed ATB course.  Resolved.      # Urinary Symptoms: symptoms began 2/8/2025. CT scan 2/10/2025 as above. Saw Dr Dennis in urology on 2/12/2025. Urine PCR negative although interestingly UA +.  Ureteritis or cystitis vs UTI. Will start prednisone 1mg/kg and taper down by 10mg every 4th day. Continue PPI for stomach protection.   She did not yet start Bactrim DS, confusion with pharmacy/order. I have spoken to pharmacist We will have her take Bactrim DS BID for 5 days (UTI) followed by BID M-W-F for PCP prophylaxis until steroids completed.   Will see weekly during steroid taper.   Decreasing steroid every 4th day in effort to get pt down to 10mg by March 6th     # Smoking: former smoker     # Fertility: N/A.        # Heme/ESAs: N/A.     # GOC: Patient is aware of palliative intent goals of care.     # Language: English.    # Health Maintenance:   Patient plans to get flu and RSV vaccine   Mammogram completed 12/2024    # Dispo:   Hold pembrolizumab  FUV weekly while on prednisone taper      Mikie Hancock, APRN-CNP  [facility]  Thoracic + [facility]     I spent a total of 30+ minutes on the date of the service which included preparing to see the patient, face-to-face patient care, completing clinical documentation, obtaining and / or reviewing separately obtained history, counseling and educating the patient/family/caregiver, ordering  medications, tests, or procedures, communicating with other healthcare providers (not separately reported), independently interpreting results, not separately reported, and communicating results to the patient/family/caregiver, Name and date of birth verified.

## 2025-02-27 ENCOUNTER — OFFICE VISIT (OUTPATIENT)
Dept: HEMATOLOGY/ONCOLOGY | Facility: CLINIC | Age: 65
End: 2025-02-27
Payer: COMMERCIAL

## 2025-02-27 VITALS
WEIGHT: 139.55 LBS | TEMPERATURE: 96.8 F | DIASTOLIC BLOOD PRESSURE: 82 MMHG | OXYGEN SATURATION: 93 % | HEART RATE: 89 BPM | BODY MASS INDEX: 22.87 KG/M2 | SYSTOLIC BLOOD PRESSURE: 137 MMHG | RESPIRATION RATE: 17 BRPM

## 2025-02-27 DIAGNOSIS — Z00.00 ROUTINE ADULT HEALTH MAINTENANCE: Primary | ICD-10-CM

## 2025-02-27 DIAGNOSIS — C34.11 MALIGNANT NEOPLASM OF UPPER LOBE OF RIGHT LUNG (MULTI): ICD-10-CM

## 2025-02-27 PROCEDURE — 99213 OFFICE O/P EST LOW 20 MIN: CPT | Performed by: NURSE PRACTITIONER

## 2025-02-27 ASSESSMENT — PAIN SCALES - GENERAL: PAINLEVEL_OUTOF10: 7

## 2025-02-27 NOTE — PROGRESS NOTES
Patient ID: Hayley Potter is a 64 y.o. female.    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 She was initially found to have spiculated 2.9cm mass in the RUL on the CT chest on 03/02/2020. She underwent CT guided RUL biopsy with pathology showed lung tissue with scan and focal aypicla grandular proliferation  suspicious for adenocarcinoma. IHC positive for CK7, TTF1 and napsin A. CK20 negative. PET/CT on 04/15/2020 showed RUL hypermetabolic RUL mass. No evidence of distant metastases. She underwent RUL lobectomy with mediastinal LN dissection with Dr. Wheat  on 04/21/2020 which showed invasive well to moderately differentiated adenocarcinoma, tumor measuring 2.5x2.3.x1.7cm. No VPI or LVI. All margins were negative. LN (0/11).      Unfortunately, on the surveillance CT chest (+) on 08/24/2022, there was a new irregular marginated 6mm GUSTABO nodule, which increased in size significantly on the repeat CT chest (-) on 01/06/2023, measuring 12mm.  There is also an additional new 9mm nodule  in the L lung as well as new mediastinal and possible L hilar LAD. PET/CT on 02/16/2023 showed hypermetabolic L hilar and mediastinal LAD. 1st  GUSTABO nodule now 8mm likely inflammatory. 2nd GUSTABO nodule showed stable size with SUV 4.4. Soft tissue mass associated with  L 11th rib fracture, SUV 11.5. Mass within the liver SUV 13.1, suspicious for mets. Hypermetabolic activity also noted  in  the stomach fundus, association with a hiatal hernia, in the cecum and ascending colon without masses seen. She underwent US guided liver biopsy on 02/16/2023 - poorly-differentiated carcinoma.      Treatment Summary:  04/21/2020: RUL lobectomy with medistainal LN dissection (Dr. Wheat)  04/11/2023: C1 pembrolizumab 200mg IV (on trial)  05/02/2023: C2 Pembrolizumab 200mg IV (on trial)  05/23/2023: C3 Pembrolizumab 200mg IV (on trial)  06/13/2023: C4 Pembrolizumab 200mg IV (on trial)   07/05/2023: C5 Pembrolizumab 200mg IV (on trial)   07/26/2023: C6 Pembrolizumab 200mg IV (on trial)   08/16/2023: C7 Pembrolizumab 200mg IV (on trial)   09/07/2023: C8 Pembrolizumab 200mg IV (on trial)   09/28/2023: C9 Pembrolizumab 200mg IV (on trial)   10/19/2023: C10 Pembrolizumab 200mg IV (on trial)  11/09/2023: C11 Pembrolizumab 200mg IV (on trial)  11/30/2023: C12 Pembrolizumab 200mg IV (on trial)  12/21/2023: C13 Pembrolizumab 200mg IV (on trial)  01/11/2024: C14 Pembrolizumab 200mg IV (on trial)  02/01/2024: C15 Pembrolizumab 200mg IV (on trial)  02/22/2024: C16 Pembrolizumab 200mg IV (on trial)  03/14/2024: C17 Pembrolizumab 200mg IV (on trial)  04/04/2024: C18 Pembrolizumab 200mg IV (on trial)  04/25/2024: C19 Pembrolizumab 200mg IV (on trial)  05/16/2024: C20 Pembrolizumab 200mg IV (on trial)  06/06/2024: C21 Pembrolizumab 200mg IV (on trial)  06/27/2024: C22 Pembrolizumab 200mg IV (on trial)  07/15/2024: ED visit right foot contusion   07/18/2024: C23 Pembrolizumab 200mg IV (on trial)  08/08/2024: C24 Pembrolizumab 200mg IV (on trial)  08/29/2024: C25 Pembrolizumab 200mg IV (on trial)  09/19/2024: C26 Pembrolizumab 200mg IV (on trial)  10/10/2024: C27 Pembrolizumab 200mg IV (on trial)  10/31/2024: C28 Pembrolizumab 200mg IV (on trial)  11/21/2024: C29 Pembrolizumab 200mg IV (on trial)  12/12/2024: C30 Pembrolizumab 200mg IV (on trial)  01/02/2025: C31 Pembrolizumab 200mg IV (on trial)  01/23/2025: C32 Pembrolizumab 200mg  IV (on trial)  02/13/2025: Hold tx ureteritis or cystitis vs UTI - prednisone started     Interval History (02/27/2025):    Patient present in clinic for one week follow-up evaluation. Her daughter is present for visit. Urinary complaints and flank pain has resolved. further fevers. She is feeling back to her baseline. + cough. No chest pain or shortness of breath.  No nausea or vomiting. No constipation or diarrhea. No urinary symptoms. No rash. No neuropathy.   Left 8th rib pain - intermittent for many years.     RUQ pain/left rib pain - chronic, believes Neurontin has helped,. Neurontin also helping generalized neuropathic pain and restless legs. Restless legs can cause sleep disturbance. Leg cramps on occasion. She was seen by palliative care mirtazapine was started for anxiety and sleep.       Review of Systems:  A review of systems has been completed and are negative for complaints except what is stated in the HPI and/or past medical history     PMHx: vocal cord cancer, GERD and COPD, OA  PSHx: tonsillectomy, tubal ligation, lumpectomy, L ankle and R hand surgery  FHx: strong family history of breast cancer including male breast cancer.   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 Then worked as a  since 2005. She quit etoh 2010. She smokes Marijuana every day.     Allergies:  Cephalexin    Medications:    Current Outpatient Medications:     alendronate (Fosamax) 70 mg tablet, Take 1 tablet (70 mg) by mouth every 7 days. Take in the morning with a full glass of water, on an empty stomach, and do not take anything else by mouth or lie down for the next 30 min., Disp: , Rfl:     azelastine HCl (AZELASTINE NASL), Administer 0.15 % into affected nostril(s) 2 times a day as needed., Disp: , Rfl:     budesonide-formoteroL (Symbicort) 160-4.5 mcg/actuation inhaler, Rinse mouth with water after use to reduce aftertaste and incidence of candidiasis. Do not swallow., Disp: , Rfl:     busPIRone (Buspar)  30 mg tablet, Take 1 tablet (30 mg) by mouth 2 times a day., Disp: , Rfl:     cholecalciferol (Vitamin D-3) 50,000 unit capsule, Take 1 capsule (50,000 Units) by mouth 1 (one) time per week., Disp: , Rfl:     cyclobenzaprine (Flexeril) 10 mg tablet, Take 1 tablet (10 mg) by mouth 2 times a day as needed for muscle spasms., Disp: 30 tablet, Rfl: 1    diphenhydramine HCl (BENADRYL ALLERGY ORAL), , Disp: , Rfl:     gabapentin (Neurontin) 600 mg tablet, Take 1 tablet (600 mg) by mouth 2 times a day., Disp: , Rfl:     levothyroxine (Synthroid, Levoxyl) 75 mcg tablet, Take 1 tablet (75 mcg) by mouth once daily., Disp: 90 tablet, Rfl: 1    LORazepam (Ativan) 0.5 mg tablet, Take 1-2 tablets (0.5-1 mg) by mouth every 8 hours if needed for anxiety (nausea)., Disp: 60 tablet, Rfl: 0    mirtazapine (Remeron) 7.5 mg tablet, Take 1 tablet (7.5 mg) by mouth once daily at bedtime., Disp: 30 tablet, Rfl: 2    multivitamin with minerals tablet, Take 1 tablet by mouth once daily., Disp: , Rfl:     omeprazole (PriLOSEC) 40 mg DR capsule, TAKE 1 CAPSULE BY MOUTH 30 MINUTES BEFORE MORNING MEAL TWICE A DAY, Disp: , Rfl:     oxyCODONE-acetaminophen (Percocet) 5-325 mg tablet, Take 1 tablet by mouth every 6 hours if needed for severe pain (7 - 10) for up to 10 days. (Patient not taking: Reported on 2/20/2025), Disp: 30 tablet, Rfl: 0    PEMBROLIZUMAB IV, Infuse into a venous catheter., Disp: , Rfl:     predniSONE (Deltasone) 20 mg tablet, Take 3 tablets (60 mg) by mouth once daily. Take 60mg daily for 5 days. Then decreased to 50mg daily x5 days. Then 40mg daily x5 days. Then 30mg daily x5 days then 20mg daily x5 days then 10mg daily., Disp: 30 tablet, Rfl: 0    tiotropium (Spiriva Respimat) 2.5 mcg/actuation inhaler, INHALE TWO PUFFS BY MOUTH EVERY DAY, Disp: , Rfl:   No current facility-administered medications for this visit.    Facility-Administered Medications Ordered in Other Visits:     acetaminophen (Tylenol) tablet 650 mg, 650 mg,  oral, PRN, Kayla Cook MD MPH    albuterol 2.5 mg /3 mL (0.083 %) nebulizer solution 3 mL, 3 mL, nebulization, PRN, Kayla Cook MD MPH    dextrose 5 % in water (D5W) bolus, 500 mL, intravenous, PRN, Kayla Cook MD MPH    diphenhydrAMINE (BENADryl) capsule 50 mg, 50 mg, oral, PRN, Kayla Cook MD MPH    diphenhydrAMINE (BENADryl) injection 50 mg, 50 mg, intravenous, PRN, Kayla Cook MD MPH    EPINEPHrine (Epipen) injection syringe 0.3 mg, 0.3 mg, intramuscular, q5 min PRN, Kayla Cook MD MPH    famotidine PF (Pepcid) injection 20 mg, 20 mg, intravenous, Once PRN, Kayla Cook MD MPH    methylPREDNISolone sod succinate (SOLU-Medrol) 40 mg/mL injection 40 mg, 40 mg, intravenous, PRN, Kayla Cook MD MPH    prochlorperazine (Compazine) injection 10 mg, 10 mg, intravenous, q6h PRN, Kayla Cook MD MPH    prochlorperazine (Compazine) tablet 10 mg, 10 mg, oral, q6h PRN, Kayla Cook MD MPH    sodium chloride 0.9 % bolus 500 mL, 500 mL, intravenous, PRN, Kayla Cook MD MPH      Vital Signs:  /82   Pulse 89   Temp 36 °C (96.8 °F) (Temporal)   Resp 17   Wt 63.3 kg (139 lb 8.8 oz)   SpO2 93%   BMI 22.87 kg/m²     Wt Readings from Last 5 Encounters:   25 63.3 kg (139 lb 8.8 oz)   25 64.4 kg (142 lb 1.4 oz)   25 64.4 kg (141 lb 13.9 oz)   25 62.5 kg (137 lb 12.6 oz)   25 63.8 kg (140 lb 10.5 oz)       Physical Exam:  ECO  Pain: flank pain resolved  Constitutional: Well developed, awake/alert/oriented x3, no distress, alert and cooperative  Eyes: PER. sclera anicteric  ENMT: Oral mucosa moist. No lesions or thrush   Respiratory/Thorax: Breathing is non-labored. Lungs are clear to auscultation bilaterally. No adventitious breath sounds. Hx RUL lobectomy 2020  Cardiovascular: S1-S2. Regular rate and rhythm. No murmurs, rubs, or gallops appreciated  Gastrointestinal: Abdomen soft nontender, nondistended, normal active bowel sounds.   Musculoskeletal: ROM intact, no joint swelling, normal  strength  Extremities: normal extremities, no cyanosis, no edema, no clubbing  Lymphatics: no palpable lymphadenopathy  Neurologic: alert and oriented x3. Nonfocal exam. No myoclonus  Psychological: Pleasant, appropriate and easily engaged       Results:     Labs  Lab Results   Component Value Date    WBC 10.2 02/11/2025    HGB 13.1 02/11/2025    HCT 40.5 02/11/2025    MCV 97 02/11/2025     02/11/2025      Lab Results   Component Value Date    NEUTROABS 6.74 02/11/2025      Lab Results   Component Value Date    GLUCOSE 95 02/11/2025    CALCIUM 9.5 02/11/2025     02/11/2025    K 3.8 02/11/2025    CO2 31 02/11/2025     02/11/2025    BUN 9 02/11/2025    CREATININE 0.92 02/11/2025    MG 1.89 10/31/2024     Lab Results   Component Value Date    ALT 13 02/11/2025    AST 15 02/11/2025    ALKPHOS 71 02/11/2025    BILITOT 0.5 02/11/2025      Lab Results   Component Value Date    ACTH 10.6 08/16/2023    CORTISOL 12.3 08/16/2023    TSH 1.03 12/31/2024    FREET4 0.52 (L) 08/15/2023       Imaging   CT C/A/P February 10, 2025  IMPRESSION:  Lung cancer, restaging scan.  1. Stable postoperative changes of right upper lobectomy without  evidence of locoregional disease recurrence. No new sites of  metastatic disease within the chest, abdomen or pelvis.  2. New mild bilateral hydronephrosis with urothelial thickening and  hyperenhancement, right-greater-than-left. In addition, new  circumferential urinary bladder wall thickening and mucosal  hyperenhancement is noted. Findings are most consistent with  pyelitis/ureteritis and cystitis, respectively. Findings may relate  to the patient's immunotherapy.  3. Remaining chronic and incidental findings as described above.    CT C/A/P November 15, 2024  IMPRESSION:  CHEST:  1.  Stable postsurgical changes of right upper lobectomy.  2. Stable left upper lobe reticulonodular scarring.  3. No new sites of disease in the chest.      ABDOMEN-PELVIS:  1.  No metastatic disease  in the abdomen and pelvis.  2. Chronic and incidental findings as described above.    Mammogram 12/27/2024  IMPRESSION:  No mammographic evidence of malignancy.        Assessment/Plan:  64 y.o. 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     The patient's records and imaging have been reviewed in detail.  MD discussed with the patient the natural history of their disease at length. The following has also been discussed with the patient:     # Cancer: recurrent likely metastatic ([id number]) lung cancer: liver biopsy showed extensive necrosis. Liquid biopsy showed KRAS G12C mutation, which was identified in her original surgical resected  specimen. PDL-1 50%. Patient initially had stage IA2 ([id number]) RUL lung  adenocarcinoma s/p RUL lobectomy, unfortunately now likely metastatic recurrence. Liver biopsy on 2/16 showed poorly differentiated malignant neoplasm with extensive necrosis. We discussed treatment  options with her today with either standard of care pembrolizumab vs clinical trials. Patient expressed interests in enrolling into clinical trial. Enrolled into RCT [id number]. Doing well.      - Imaging : 2/10/2025 Lung cancer, restaging scan.  1. Stable postoperative changes of right upper lobectomy without evidence of locoregional disease recurrence. No new sites of metastatic disease within the chest, abdomen or pelvis.  2. New mild bilateral hydronephrosis with urothelial thickening and hyperenhancement, right-greater-than-left. In addition, new circumferential urinary bladder wall thickening and mucosal hyperenhancement is noted. Findings are most consistent with  pyelitis/ureteritis and cystitis, respectively. Findings may relate  to the patient's immunotherapy.  3. Remaining chronic and incidental findings as described above.      # Hypothyroidism: G2. Likely 2/2  immunotherapy - resolved.  TSH 1.07 11/8/23.  Continue levothyroxine 75mcg daily.      # Microcytic anemia 2/2 due to chronic disease and hiatal hernia: patient denies any hematochezia  or hematuria. No melena. hx of hiatal hernia. Per patient. Last EGD was done in 2021 and was normal. Colonoscopy was a few years ago.  EGD showed hiatal hernia 03/2023. Improved Hgb overall. Iron studies show low-normal ferritin and TSAT of 14%. Continue PO iron 3x/week. Tolerates well. Stable.  12/12/2024 Anemia resolved. Have decreased oral iron from BID to every day will now discontinue oral iron.   Iron discontinued in December 2024    # Mucositis (chronic).  Pt reports since start of immunotherapy.  Chronic sore secondary to ill fitting dentures recommended s&s rinse.  Continue Anbesol and/or Oragel for pain.  Will continue to monitor.  BMX added.  2/1/24  - resolved   Working with oral surgeon. Expects 3 upcoming extractions and 3 crowns (November)      # Constipation: managed well with stool softener and PRN lactulose.        # Clinical Trials: April 11, 2023 initiated treatment on [id number] this is cycle 1 of first-line treatment.  She has been randomized to  arm A which is pembrolizumab alone for up to 2 years or progression followed by Alimta/carboplatin for second line therapy. Consent has been signed. 11/15/24 CT scan shows favorable response. Continue treatment.   02/10/2025 CT with no concerning disease findings. However concern for ureteritis vs cystitis vs UTI. Pembrolizumab held.   2/27/2025 ureteritis/cystitis/UTI sx resolved continued steroid taper    # Access: Peripheral veins.     # Pain: she has a displaced 8th Rib on the left side -Advised patient to take percocet daily and decrease Advil use. PRN Percocet Rx in place.  Rx last sent 12/21/23. Currently managed with PRN gummies.  New pain on the R upper back, controlled with percocet. Will try lidocaine patch OTC. 6/27:  Pain currently managed with PRN Advil,  Aspercreme, and gummies.  Routine Gabapentin for neuropathy pain.   Pt verbalized desire to reduce # of medications taken.  Wishes to reduce Gabapentin dosage.   Supportive onc referral placed for assistance.   :  Bruise to buttock, pain 3-4/10.  Increased right flank/rib pain r/t fall, pain 3/10.  Managed with Advil 600mg TID.  :  Right flank pain 2/10.  Managed with PRN interventions.      # Anxiety:  Anxiety reported d/t recent death of her father, upcoming .  Pt requested 1x Valium dose, previous 10mg admin.  OARRS reviewed. Provided Rx for Valium 5mg x3 doses.    No following with palliative care  Started on Mirtazapine 7.5mg at HS    # Bone Health: L fifth ribs lesion stable.    # Arthralgia (grade 1).  Uric acid lab (-).  Intermittent arthralgia to left hand.  Continue PRN Advil, Aspercreme or Percocet (for severe pain).      # Left rib or chest wall pain over the past month. 23 CXR - RESULTS: Mediastinal surgical clips are noted. The cardiac silhouette is within normal limits. Mediastinal contours are unremarkable. The lungs demonstrate no infiltrate or atelectasis. There is no evidence for pleural effusion. Remote fracture is noted involving the right 7th rib. There are degenerative changes of the thoracic spine.  - Continue PRN Percocet and/or gummies for pain control.      # Right foot contusion, hyperextension:  Able to bear wt.  Continue to follow with podiatrist with any questions.  Continue Advil x5 days for inflammation. Resolved    # Psychosocial: Coping well, no acute issues.  Good support from family & friends.  Social work support available.     # GI/CINV: No acute issues.  Eating and voiding well.  Nutrition consult available.    # Insomnia:  Both difficulty falling and staying asleep.  Recommended pt can trial Melatonin.  Start with 3mg dose.  Max dose 10mg.  Restless contributes     # Sinusitis:  Pressure to maxillary and frontal sinuses.  +Increased mucous, yellow in  color.  Denies fevers.  Hx of recurrent sinus infections.  Rx called to pt's pharmacy.  Doxycycline 100mg BID x7-10 days.  Extended course d/t recent smoke exposure.  3/14: Resolved.  9/19:  9/16/24 ENT appt for recurrent sinus infection.  Completed ATB course.  Resolved.      # Urinary Symptoms: symptoms began 2/8/2025. CT scan 2/10/2025 as above. Saw Dr Dennis in urology on 2/12/2025. Urine PCR negative although interestingly UA +.  Ureteritis or cystitis vs UTI. Will start prednisone 1mg/kg and taper down by 10mg every 4th day. Continue PPI for stomach protection.   She did not yet start Bactrim DS, confusion with pharmacy/order. I have spoken to pharmacist We will have her take Bactrim DS BID for 5 days (UTI) followed by BID M-W-F for PCP prophylaxis until steroids completed.   Will see weekly during steroid taper.   Decreasing steroid every 4th day in effort to get pt down to 10mg by March 6th     # Smoking: former smoker     # Fertility: N/A.        # Heme/ESAs: N/A.     # GOC: Patient is aware of palliative intent goals of care.     # Language: English.    # Health Maintenance:   Patient plans to get flu and RSV vaccine   Mammogram completed 12/2024    # Dispo:   Hold pembrolizumab  FUV weekly while on prednisone taper      Roman Ac NP  Lipid panel and vitamin D will be drawn with our next set of labs for PCP      Mikie Hancock, APRN-CNP  [facility]  Thoracic + [facility] Medical Oncology     I spent a total of 30+ minutes on the date of the service which included preparing to see the patient, face-to-face patient care, completing clinical documentation, obtaining and / or reviewing separately obtained history, counseling and educating the patient/family/caregiver, ordering medications, tests, or procedures, communicating with other healthcare providers (not separately reported), independently interpreting results, not separately reported, and communicating results to the  patient/family/caregiver, Name and date of birth verified.

## 2025-03-04 ENCOUNTER — LAB (OUTPATIENT)
Dept: LAB | Facility: CLINIC | Age: 65
End: 2025-03-04
Payer: COMMERCIAL

## 2025-03-04 ENCOUNTER — OFFICE VISIT (OUTPATIENT)
Dept: PALLIATIVE MEDICINE | Facility: CLINIC | Age: 65
End: 2025-03-04
Payer: COMMERCIAL

## 2025-03-04 VITALS
OXYGEN SATURATION: 95 % | DIASTOLIC BLOOD PRESSURE: 82 MMHG | WEIGHT: 144.29 LBS | RESPIRATION RATE: 16 BRPM | SYSTOLIC BLOOD PRESSURE: 131 MMHG | HEART RATE: 62 BPM | BODY MASS INDEX: 23.65 KG/M2 | TEMPERATURE: 97.7 F

## 2025-03-04 DIAGNOSIS — Z51.81 ENCOUNTER FOR MONITORING OPIOID MAINTENANCE THERAPY: ICD-10-CM

## 2025-03-04 DIAGNOSIS — E03.8 OTHER SPECIFIED HYPOTHYROIDISM: Primary | ICD-10-CM

## 2025-03-04 DIAGNOSIS — G89.3 CANCER ASSOCIATED PAIN: ICD-10-CM

## 2025-03-04 DIAGNOSIS — Z51.5 PALLIATIVE CARE ENCOUNTER: Primary | ICD-10-CM

## 2025-03-04 DIAGNOSIS — C34.11 MALIGNANT NEOPLASM OF UPPER LOBE OF RIGHT LUNG (MULTI): ICD-10-CM

## 2025-03-04 DIAGNOSIS — M79.2 NEUROPATHIC PAIN: ICD-10-CM

## 2025-03-04 DIAGNOSIS — C34.90 MALIGNANT NEOPLASM OF LUNG, UNSPECIFIED LATERALITY, UNSPECIFIED PART OF LUNG (MULTI): ICD-10-CM

## 2025-03-04 DIAGNOSIS — G89.3 CANCER RELATED PAIN: ICD-10-CM

## 2025-03-04 DIAGNOSIS — Z79.891 ENCOUNTER FOR MONITORING OPIOID MAINTENANCE THERAPY: ICD-10-CM

## 2025-03-04 LAB
ALBUMIN SERPL BCP-MCNC: 3.9 G/DL (ref 3.4–5)
ALP SERPL-CCNC: 63 U/L (ref 33–136)
ALT SERPL W P-5'-P-CCNC: 17 U/L (ref 7–45)
ANION GAP SERPL CALC-SCNC: 13 MMOL/L (ref 10–20)
AST SERPL W P-5'-P-CCNC: 16 U/L (ref 9–39)
BASOPHILS # BLD AUTO: 0.05 X10*3/UL (ref 0–0.1)
BASOPHILS NFR BLD AUTO: 0.4 %
BILIRUB SERPL-MCNC: 0.4 MG/DL (ref 0–1.2)
BUN SERPL-MCNC: 11 MG/DL (ref 6–23)
CALCIUM SERPL-MCNC: 9.2 MG/DL (ref 8.6–10.3)
CHLORIDE SERPL-SCNC: 103 MMOL/L (ref 98–107)
CO2 SERPL-SCNC: 28 MMOL/L (ref 21–32)
CREAT SERPL-MCNC: 0.96 MG/DL (ref 0.5–1.05)
EGFRCR SERPLBLD CKD-EPI 2021: 66 ML/MIN/1.73M*2
EOSINOPHIL # BLD AUTO: 0.06 X10*3/UL (ref 0–0.7)
EOSINOPHIL NFR BLD AUTO: 0.5 %
ERYTHROCYTE [DISTWIDTH] IN BLOOD BY AUTOMATED COUNT: 12.7 % (ref 11.5–14.5)
GLUCOSE SERPL-MCNC: 101 MG/DL (ref 74–99)
HCT VFR BLD AUTO: 40.9 % (ref 36–46)
HGB BLD-MCNC: 13.2 G/DL (ref 12–16)
IMM GRANULOCYTES # BLD AUTO: 0.09 X10*3/UL (ref 0–0.7)
IMM GRANULOCYTES NFR BLD AUTO: 0.7 % (ref 0–0.9)
LYMPHOCYTES # BLD AUTO: 0.97 X10*3/UL (ref 1.2–4.8)
LYMPHOCYTES NFR BLD AUTO: 8.1 %
MCH RBC QN AUTO: 30.8 PG (ref 26–34)
MCHC RBC AUTO-ENTMCNC: 32.3 G/DL (ref 32–36)
MCV RBC AUTO: 95 FL (ref 80–100)
MONOCYTES # BLD AUTO: 0.56 X10*3/UL (ref 0.1–1)
MONOCYTES NFR BLD AUTO: 4.7 %
NEUTROPHILS # BLD AUTO: 10.3 X10*3/UL (ref 1.2–7.7)
NEUTROPHILS NFR BLD AUTO: 85.6 %
NRBC BLD-RTO: 0 /100 WBCS (ref 0–0)
PLATELET # BLD AUTO: 510 X10*3/UL (ref 150–450)
POTASSIUM SERPL-SCNC: 4.1 MMOL/L (ref 3.5–5.3)
PROT SERPL-MCNC: 6.1 G/DL (ref 6.4–8.2)
RBC # BLD AUTO: 4.29 X10*6/UL (ref 4–5.2)
SODIUM SERPL-SCNC: 140 MMOL/L (ref 136–145)
WBC # BLD AUTO: 12 X10*3/UL (ref 4.4–11.3)

## 2025-03-04 PROCEDURE — 82306 VITAMIN D 25 HYDROXY: CPT

## 2025-03-04 PROCEDURE — 82150 ASSAY OF AMYLASE: CPT

## 2025-03-04 PROCEDURE — 80061 LIPID PANEL: CPT

## 2025-03-04 PROCEDURE — 84443 ASSAY THYROID STIM HORMONE: CPT

## 2025-03-04 PROCEDURE — 84075 ASSAY ALKALINE PHOSPHATASE: CPT

## 2025-03-04 PROCEDURE — 85025 COMPLETE CBC W/AUTO DIFF WBC: CPT

## 2025-03-04 PROCEDURE — 99214 OFFICE O/P EST MOD 30 MIN: CPT | Performed by: NURSE PRACTITIONER

## 2025-03-04 PROCEDURE — 83690 ASSAY OF LIPASE: CPT

## 2025-03-04 PROCEDURE — 36415 COLL VENOUS BLD VENIPUNCTURE: CPT

## 2025-03-04 RX ORDER — OXYCODONE AND ACETAMINOPHEN 5; 325 MG/1; MG/1
1 TABLET ORAL EVERY 6 HOURS PRN
Qty: 120 TABLET | Refills: 0 | Status: SHIPPED | OUTPATIENT
Start: 2025-03-04 | End: 2025-04-03

## 2025-03-04 RX ORDER — CYCLOBENZAPRINE HCL 10 MG
10 TABLET ORAL 2 TIMES DAILY PRN
Qty: 60 TABLET | Refills: 3 | Status: SHIPPED | OUTPATIENT
Start: 2025-03-04 | End: 2025-04-03

## 2025-03-04 ASSESSMENT — PAIN SCALES - GENERAL: PAINLEVEL_OUTOF10: 7

## 2025-03-04 NOTE — PROGRESS NOTES
SUPPORTIVE AND PALLIATIVE ONCOLOGY OUTPATIENT FOLLOW-UP      SERVICE DATE: 3/4/2025    Cancer History  R lung CA dx 2020  -s/p R lobectomy April 2020  -currently on Pembro maintenance      Onc: Karissa Chau: Tom      Subjective   HISTORY OF PRESENT ILLNESS: Hayley Potter is a 64 y.o. female with Pmhx of COPD, GERD, anxiety, and R lung CA.      She presents to supportive oncology for follow up for symptom management.     Presents for FUV accompanied by her daughter, Jacqueline, today.     Pain Assessment:  Pain Score:   7  Location: Rib Cage  Education:      Symptom Assessment:  Pain:somewhat  Headache: none  Dizziness:none  Lack of energy: a little  Difficulty sleeping: a little  Worrying: a little  Anxiety: a little  Depression: a little  Pain in mouth/swallowing: none  Dry mouth: none  Taste changes: none  Shortness of breath: none  Lack of appetite: a little   Nausea: none  Vomiting: none  Constipation: none  Diarrhea: none  Sore muscles: none  Numbness or tingling in hands/feet/other: a little  Weight loss: none      Information obtained from: interview of patient, interview of family   ______________________________________________________________________        Objective                PHYSICAL EXAMINATION   Vital Signs:   Vital signs reviewed  Vitals:    03/04/25 1156   BP: 131/82   Pulse: 62   Resp: 16   Temp: 36.5 °C (97.7 °F)   SpO2: 95%     Pain Score:   7    Physical Exam  Vitals reviewed.   Constitutional:       Appearance: Normal appearance.   HENT:      Head: Normocephalic.   Cardiovascular:      Rate and Rhythm: Normal rate.   Pulmonary:      Effort: Pulmonary effort is normal.   Abdominal:      Palpations: Abdomen is soft.   Musculoskeletal:         General: Normal range of motion.   Skin:     General: Skin is warm and dry.   Neurological:      General: No focal deficit present.      Mental Status: She is alert and oriented to person, place, and time.   Psychiatric:         Mood and Affect: Mood  is not anxious.         Judgment: Judgment normal.         ASSESSMENT/PLAN    Pain  Pain is: cancer related pain  Type: visceral and neuropathic  Pain control: sub-optimally controlled  Home regimen:   -discussed starting long acting pain medication for more consistent pain control, pt declines at this time   -continue gabapentin 600/300/600 tid.   -continue flexeril 10mg bid PRN. Rx sent today.   -continue percocet 5/325 q6hrs PRN. Rx sent today. Not taking often d/t concern for constipation. Discussed importance of taking this medication for pain if needed, can adjust bowel regimen PRN.   -continue THC PRN  Intolerances/previously tried:     Opioid Use  Medication Management:   - OARRS report reviewed with no aberrant behavior; consistent with  prescriptions/records and patient history  - MED 0.  Overdose Risk Score 200.   This has been discussed with patient.   - We will continue to closely monitor the patient for signs of prescription misuse including UDS, OARRS review and subjective reports at each visit.  - concurrent benzodiazepine use with ativan, educated pt on medication safety when used in combination with opiates    - I am a provider who either is or has consulted and collaborated with a provider certified in Hospice and Palliative Medicine and have conducted a face-face visit and examination for this patient.  - Routine Urine Drug Screen completed deferred (MED <90)  appropriately positive for opioids and negative for illicit substances  - Controlled Substance Agreement completed deferred (MED <90)   - Specifically discussed that controlled substance prescriptions will only be provided by our group as outlined in the completed agreement  - Prescribed naloxone deferred (MED <90)   - Red Flags: history of ETOH misuse, in recovery for 20 years.      Nausea/ Reflux   Intermittent nausea without vomiting related to chemotherapy   -continue zofran 8mg q8hrs PRN, not currently taking this  medication  -continue prilosec 40mg bid.      Constipation   At risk for constipation related to opioids,  currently not constipated   Usual bowel pattern: daily   Current regimen:   -educated pt on importance of maintaining regular bowel schedule  -continue colace 100mg bid PRN for hard stools  -continue senna 8.6mg, 1-2tabs, 1-2x/day  -continue MOM 15mL 4x/day PRN     Altered Mood  Acute on chronic anxiety and depression related to health concerns   uncontrolled with home regimen  Current regimen:   -continue buspar 30mg bid.   -continue ativan 0.5mg, 1-2 tabs q8hrs PRN. Not taking often, makes her tired.   -continue mirtazapine 7.5mg at bedtime.   Intolerances/previously tried:  -effexor, caused low libido  -wellbutrin, did not like this medication     Decreased appetite  Related to malignancy, chemotherapy, and disease process  Current regimen:    -encouraged smaller, more frequent meals through out the day  -encouraged use of supplements such as Boost, Ensure, etc.    -continue mirtazapine 7.5mg at bedtime.     Supportive Interventions:     Supportive and Palliative Oncology encounter:  Emotional support provided  Coordination of care  We will continue to follow and address symptoms as needed    Advance Directives  Existence of Advance Directives:Yes, but NOT documented in medical record  Decision maker: RAFITAOA is Jacqueline Potter (daughter): [phone]  Code Status: Full code, would not want life support for prolonged time period         Next Follow-Up Visit:  Return to clinic in 4 weeks     Signature and billing  Medical complexity was moderate level due to due to complexity of problems, extensive data review, and high risk of management/treatment.  Time was spent on the following: Prep Time, Time Directly with Patient/Family/Caregiver, Documentation Time. Total time spent: 30 mins      Data  Diagnostic tests and information reviewed for today's visit:  Most recent labs, Medications         Some elements copied  from my note on 1/21/25, the elements have been updated and all reflect current decision making from today, 3/4/2025.      Plan of Care discussed with: Patient, daughter     SIGNATURE: VANESSA Edwards    Contact information:  Supportive and Palliative Oncology  Monday-Friday 8 AM-5 PM  Phone:  [phone], press option #5, then option #1.   Or Epic Secure Chat

## 2025-03-05 LAB
25(OH)D3 SERPL-MCNC: 81 NG/ML (ref 30–100)
AMYLASE SERPL-CCNC: 40 U/L (ref 29–103)
CHOLEST SERPL-MCNC: 201 MG/DL (ref 0–199)
CHOLESTEROL/HDL RATIO: 2.3
HDLC SERPL-MCNC: 87.8 MG/DL
LDLC SERPL CALC-MCNC: 63 MG/DL
LIPASE SERPL-CCNC: 18 U/L (ref 9–82)
NON HDL CHOLESTEROL: 113 MG/DL (ref 0–149)
TRIGL SERPL-MCNC: 250 MG/DL (ref 0–149)
TSH SERPL-ACNC: 0.85 MIU/L (ref 0.44–3.98)
VLDL: 50 MG/DL (ref 0–40)

## 2025-03-06 ENCOUNTER — INFUSION (OUTPATIENT)
Dept: HEMATOLOGY/ONCOLOGY | Facility: CLINIC | Age: 65
End: 2025-03-06
Payer: COMMERCIAL

## 2025-03-06 ENCOUNTER — OFFICE VISIT (OUTPATIENT)
Dept: HEMATOLOGY/ONCOLOGY | Facility: CLINIC | Age: 65
End: 2025-03-06
Payer: COMMERCIAL

## 2025-03-06 VITALS
SYSTOLIC BLOOD PRESSURE: 123 MMHG | DIASTOLIC BLOOD PRESSURE: 79 MMHG | WEIGHT: 143.63 LBS | TEMPERATURE: 96.8 F | BODY MASS INDEX: 23.54 KG/M2 | HEART RATE: 96 BPM | RESPIRATION RATE: 18 BRPM | OXYGEN SATURATION: 95 %

## 2025-03-06 DIAGNOSIS — C34.11 MALIGNANT NEOPLASM OF UPPER LOBE OF RIGHT LUNG (MULTI): Primary | ICD-10-CM

## 2025-03-06 DIAGNOSIS — C34.11 MALIGNANT NEOPLASM OF UPPER LOBE OF RIGHT LUNG (MULTI): ICD-10-CM

## 2025-03-06 PROCEDURE — 2560000001 HC RX 256 EXPERIMENTAL DRUGS: Mod: SE | Performed by: NURSE PRACTITIONER

## 2025-03-06 PROCEDURE — 96374 THER/PROPH/DIAG INJ IV PUSH: CPT | Mod: INF

## 2025-03-06 PROCEDURE — 99213 OFFICE O/P EST LOW 20 MIN: CPT | Performed by: NURSE PRACTITIONER

## 2025-03-06 PROCEDURE — 96365 THER/PROPH/DIAG IV INF INIT: CPT | Mod: INF

## 2025-03-06 RX ORDER — ALBUTEROL SULFATE 0.83 MG/ML
3 SOLUTION RESPIRATORY (INHALATION) AS NEEDED
Status: DISCONTINUED | OUTPATIENT
Start: 2025-03-06 | End: 2025-03-06 | Stop reason: HOSPADM

## 2025-03-06 RX ORDER — DIPHENHYDRAMINE HYDROCHLORIDE 50 MG/ML
50 INJECTION INTRAMUSCULAR; INTRAVENOUS AS NEEDED
Status: DISCONTINUED | OUTPATIENT
Start: 2025-03-06 | End: 2025-03-06 | Stop reason: HOSPADM

## 2025-03-06 RX ORDER — DIPHENHYDRAMINE HCL 25 MG
50 CAPSULE ORAL AS NEEDED
Status: DISCONTINUED | OUTPATIENT
Start: 2025-03-06 | End: 2025-03-06 | Stop reason: HOSPADM

## 2025-03-06 RX ORDER — ACETAMINOPHEN 325 MG/1
650 TABLET ORAL AS NEEDED
Status: DISCONTINUED | OUTPATIENT
Start: 2025-03-06 | End: 2025-03-06 | Stop reason: HOSPADM

## 2025-03-06 RX ORDER — FAMOTIDINE 10 MG/ML
20 INJECTION, SOLUTION INTRAVENOUS ONCE AS NEEDED
Status: DISCONTINUED | OUTPATIENT
Start: 2025-03-06 | End: 2025-03-06 | Stop reason: HOSPADM

## 2025-03-06 RX ORDER — PROCHLORPERAZINE MALEATE 10 MG
10 TABLET ORAL EVERY 6 HOURS PRN
Status: DISCONTINUED | OUTPATIENT
Start: 2025-03-06 | End: 2025-03-06 | Stop reason: HOSPADM

## 2025-03-06 RX ORDER — PROCHLORPERAZINE EDISYLATE 5 MG/ML
10 INJECTION INTRAMUSCULAR; INTRAVENOUS EVERY 6 HOURS PRN
Status: DISCONTINUED | OUTPATIENT
Start: 2025-03-06 | End: 2025-03-06 | Stop reason: HOSPADM

## 2025-03-06 RX ORDER — EPINEPHRINE 0.3 MG/.3ML
0.3 INJECTION SUBCUTANEOUS EVERY 5 MIN PRN
Status: DISCONTINUED | OUTPATIENT
Start: 2025-03-06 | End: 2025-03-06 | Stop reason: HOSPADM

## 2025-03-06 RX ADMIN — Medication 200 MG: at 08:53

## 2025-03-06 ASSESSMENT — PAIN SCALES - GENERAL: PAINLEVEL_OUTOF10: 7

## 2025-03-06 NOTE — PROGRESS NOTES
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 Previously a patient of Dr Cook. Currently receives [facility] also followed by research nurse Rafiq Ivy RN, available for this visit. Also scheduled for treatment today as well.     Recovered cystitis recently, on bactrim DS MWF and prednisone right now. Coughing up green sputum this morning Monday was bloody like \"old puss\" \"today pseudo green.\"     Appetite fine.      No concerns or complaints noted at this time.   Patient here with her daughter.    Medications and Allergies reviewed and reconciled this visit.    Follow up per MD request. FUV 3/27/25    Patient reports availability and use of [employer], Reviewed this is a good place to communicate with the team as well as review labs and upcoming orders.      No barriers to education noted, patient agrees to current plan and verbalized understanding using teach back method.      "

## 2025-03-06 NOTE — PROGRESS NOTES
Patient ID: Hayley Potter is a 64 y.o. female.    CC:  Management of stage IA3 ([id number]) adenocarcinoma of RUL s/p RUL lobectomy 04/2020, PDL-1 50%, NGS positive for KRAS G12C--> now with  metastatic recurrence involving liver, 11th L rib, and mediastinal LNs. 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 She was initially found to have spiculated 2.9cm mass in the RUL on the CT chest on 03/02/2020. She underwent CT guided RUL biopsy with pathology showed lung tissue with scan and focal aypicla grandular proliferation  suspicious for adenocarcinoma. IHC positive for CK7, TTF1 and napsin A. CK20 negative. PET/CT on 04/15/2020 showed RUL hypermetabolic RUL mass. No evidence of distant metastases. She underwent RUL lobectomy with mediastinal LN dissection with Dr. Wheat  on 04/21/2020 which showed invasive well to moderately differentiated adenocarcinoma, tumor measuring 2.5x2.3.x1.7cm. No VPI or LVI. All margins were negative. LN (0/11).      Unfortunately, on the surveillance CT chest (+) on 08/24/2022, there was a new irregular marginated 6mm GUSTABO nodule, which increased in size significantly on the repeat CT chest (-) on 01/06/2023, measuring 12mm.  There is also an additional new 9mm nodule  in the L lung as well as new mediastinal and possible L hilar LAD. PET/CT on 02/16/2023 showed hypermetabolic L hilar and mediastinal LAD. 1st  GUSTABO nodule now 8mm likely inflammatory. 2nd GUSTABO nodule showed stable size with SUV 4.4. Soft tissue mass associated with  L 11th rib fracture, SUV 11.5. Mass within the liver SUV 13.1, suspicious for mets. Hypermetabolic activity also noted  in  the stomach fundus, association with a hiatal hernia, in the cecum and ascending colon without masses seen. She underwent US guided liver biopsy on 02/16/2023 - poorly-differentiated carcinoma.      Treatment Summary:  04/21/2020: RUL lobectomy with medistainal LN dissection (Dr. Wheat)  04/11/2023: C1 pembrolizumab 200mg IV (on trial)  05/02/2023: C2 Pembrolizumab 200mg IV (on trial)  05/23/2023: C3 Pembrolizumab 200mg IV (on trial)  06/13/2023: C4 Pembrolizumab 200mg IV (on trial)   07/05/2023: C5 Pembrolizumab 200mg IV (on trial)   07/26/2023: C6 Pembrolizumab 200mg IV (on trial)   08/16/2023: C7 Pembrolizumab 200mg IV (on trial)   09/07/2023: C8 Pembrolizumab 200mg IV (on trial)   09/28/2023: C9 Pembrolizumab 200mg IV (on trial)   10/19/2023: C10 Pembrolizumab 200mg IV (on trial)  11/09/2023: C11 Pembrolizumab 200mg IV (on trial)  11/30/2023: C12 Pembrolizumab 200mg IV (on trial)  12/21/2023: C13 Pembrolizumab 200mg IV (on trial)  01/11/2024: C14 Pembrolizumab 200mg IV (on trial)  02/01/2024: C15 Pembrolizumab 200mg IV (on trial)  02/22/2024: C16 Pembrolizumab 200mg IV (on trial)  03/14/2024: C17 Pembrolizumab 200mg IV (on trial)  04/04/2024: C18 Pembrolizumab 200mg IV (on trial)  04/25/2024: C19 Pembrolizumab 200mg IV (on trial)  05/16/2024: C20 Pembrolizumab 200mg IV (on trial)  06/06/2024: C21 Pembrolizumab 200mg IV (on trial)  06/27/2024: C22 Pembrolizumab 200mg IV (on trial)  07/15/2024: ED visit right foot contusion   07/18/2024: C23 Pembrolizumab 200mg IV (on trial)  08/08/2024: C24 Pembrolizumab 200mg IV (on trial)  08/29/2024: C25 Pembrolizumab 200mg IV (on trial)  09/19/2024: C26 Pembrolizumab 200mg IV (on trial)  10/10/2024: C27 Pembrolizumab 200mg IV (on trial)  10/31/2024: C28 Pembrolizumab 200mg IV (on trial)  11/21/2024: C29 Pembrolizumab 200mg IV (on trial)  12/12/2024: C30 Pembrolizumab 200mg IV (on trial)  01/02/2025: C31 Pembrolizumab 200mg IV (on trial)  01/23/2025: C32 Pembrolizumab 200mg  IV (on trial)  02/13/2025: C33 Hold tx ureteritis or cystitis vs UTI - prednisone started cycle is not made up per trial.   03/06/2025: C34 Pembrolizumab 200mg IV (on trial)    Interval History (03/06/2025):    Patient present in clinic for one week follow-up evaluation. Her daughter is present for visit. No further urinary complaints or flank pain. No fevers, chills or night sweats. She is feeling back to her baseline. + cough. No chest pain or shortness of breath.  Productive cough on 2 occasions. No nausea or vomiting. No constipation or diarrhea. No urinary symptoms. No rash. No neuropathy.   Left 8th rib pain - intermittent for many years.     RUQ pain/left rib pain - chronic, believes Neurontin has helped,. Neurontin also helping generalized neuropathic pain and restless legs. Restless legs can cause sleep disturbance. Leg cramps on occasion. She was seen by palliative care mirtazapine was started for anxiety and sleep.       Review of Systems:  A review of systems has been completed and are negative for complaints except what is stated in the HPI and/or past medical history     PMHx: vocal cord cancer, GERD and COPD, OA  PSHx: tonsillectomy, tubal ligation, lumpectomy, L ankle and R hand surgery  FHx: strong family history of breast cancer including male breast cancer.   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 Then worked as a  since 2005. She quit etoh 2010. She smokes Marijuana every day.     Allergies:  Cephalexin    Medications:    Current Outpatient Medications:     alendronate (Fosamax) 70 mg tablet, Take 1 tablet (70 mg) by mouth every 7 days. Take in the morning with a full glass of water, on an empty stomach, and do not take anything else by mouth or lie down for the next 30 min., Disp: , Rfl:     azelastine HCl (AZELASTINE NASL), Administer 0.15 % into affected nostril(s) 2 times a day as needed., Disp: , Rfl:     budesonide-formoteroL (Symbicort) 160-4.5 mcg/actuation inhaler,  Rinse mouth with water after use to reduce aftertaste and incidence of candidiasis. Do not swallow., Disp: , Rfl:     busPIRone (Buspar) 30 mg tablet, Take 1 tablet (30 mg) by mouth 2 times a day., Disp: , Rfl:     cholecalciferol (Vitamin D-3) 50,000 unit capsule, Take 1 capsule (50,000 Units) by mouth 1 (one) time per week., Disp: , Rfl:     cyclobenzaprine (Flexeril) 10 mg tablet, Take 1 tablet (10 mg) by mouth 2 times a day as needed for muscle spasms., Disp: 60 tablet, Rfl: 3    diphenhydramine HCl (BENADRYL ALLERGY ORAL), , Disp: , Rfl:     gabapentin (Neurontin) 600 mg tablet, Take 1 tablet (600 mg) by mouth 2 times a day., Disp: , Rfl:     multivitamin with minerals tablet, Take 1 tablet by mouth once daily., Disp: , Rfl:     omeprazole (PriLOSEC) 40 mg DR capsule, TAKE 1 CAPSULE BY MOUTH 30 MINUTES BEFORE MORNING MEAL TWICE A DAY, Disp: , Rfl:     oxyCODONE-acetaminophen (Percocet) 5-325 mg tablet, Take 1 tablet by mouth every 6 hours if needed for severe pain (7 - 10)., Disp: 120 tablet, Rfl: 0    PEMBROLIZUMAB IV, Infuse into a venous catheter., Disp: , Rfl:     predniSONE (Deltasone) 20 mg tablet, Take 3 tablets (60 mg) by mouth once daily. Take 60mg daily for 5 days. Then decreased to 50mg daily x5 days. Then 40mg daily x5 days. Then 30mg daily x5 days then 20mg daily x5 days then 10mg daily., Disp: 30 tablet, Rfl: 0    tiotropium (Spiriva Respimat) 2.5 mcg/actuation inhaler, INHALE TWO PUFFS BY MOUTH EVERY DAY, Disp: , Rfl:     levothyroxine (Synthroid, Levoxyl) 75 mcg tablet, Take 1 tablet (75 mcg) by mouth once daily., Disp: 90 tablet, Rfl: 1    LORazepam (Ativan) 0.5 mg tablet, Take 1-2 tablets (0.5-1 mg) by mouth every 8 hours if needed for anxiety (nausea)., Disp: 60 tablet, Rfl: 0    mirtazapine (Remeron) 7.5 mg tablet, Take 1 tablet (7.5 mg) by mouth once daily at bedtime., Disp: 30 tablet, Rfl: 2  No current facility-administered medications for this visit.    Facility-Administered Medications  Ordered in Other Visits:     acetaminophen (Tylenol) tablet 650 mg, 650 mg, oral, PRN, Kayla Cook MD MPH    albuterol 2.5 mg /3 mL (0.083 %) nebulizer solution 3 mL, 3 mL, nebulization, PRN, Kayla Cook MD MPH    dextrose 5 % in water (D5W) bolus, 500 mL, intravenous, PRN, Kayla Cook MD MPH    diphenhydrAMINE (BENADryl) capsule 50 mg, 50 mg, oral, PRN, Kayla Cook MD MPH    diphenhydrAMINE (BENADryl) injection 50 mg, 50 mg, intravenous, PRN, Kayla Cook MD MPH    EPINEPHrine (Epipen) injection syringe 0.3 mg, 0.3 mg, intramuscular, q5 min PRN, Kayla Cook MD MPH    famotidine PF (Pepcid) injection 20 mg, 20 mg, intravenous, Once PRN, Kayla Cook MD MPH    methylPREDNISolone sod succinate (SOLU-Medrol) 40 mg/mL injection 40 mg, 40 mg, intravenous, PRN, Kayla Cook MD MPH    prochlorperazine (Compazine) injection 10 mg, 10 mg, intravenous, q6h PRN, Kayla Cook MD MPH    prochlorperazine (Compazine) tablet 10 mg, 10 mg, oral, q6h PRN, Kayla Cook MD MPH    sodium chloride 0.9 % bolus 500 mL, 500 mL, intravenous, PRN, Kayla Cook MD MPH        Vital Signs:  /79   Pulse 96   Temp 36 °C (96.8 °F) (Temporal)   Resp 18   Wt 65.2 kg (143 lb 10.1 oz)   SpO2 95%   BMI 23.54 kg/m²     Wt Readings from Last 5 Encounters:   25 65.2 kg (143 lb 10.1 oz)   25 65.5 kg (144 lb 4.7 oz)   25 63.3 kg (139 lb 8.8 oz)   25 64.4 kg (142 lb 1.4 oz)   25 64.4 kg (141 lb 13.9 oz)       Physical Exam:  ECO  Pain: flank pain resolved, +left rib pain   Constitutional: Well developed, awake/alert/oriented x3, no distress, alert and cooperative  Eyes: PER. sclera anicteric  ENMT: Oral mucosa moist. No lesions or thrush   Respiratory/Thorax: Breathing is non-labored. Lungs are clear to auscultation bilaterally. No adventitious breath sounds. Hx RUL lobectomy 2020  Cardiovascular: S1-S2. Regular rate and rhythm. No murmurs, rubs, or gallops appreciated  Gastrointestinal: Abdomen soft nontender, nondistended,  normal active bowel sounds.   Musculoskeletal: ROM intact, no joint swelling, normal strength  Extremities: normal extremities, no cyanosis, no edema, no clubbing  Lymphatics: no palpable lymphadenopathy  Neurologic: alert and oriented x3. Nonfocal exam. No myoclonus  Psychological: Pleasant, appropriate and easily engaged       Results:     Labs  Lab Results   Component Value Date    WBC 12.0 (H) 03/04/2025    HGB 13.2 03/04/2025    HCT 40.9 03/04/2025    MCV 95 03/04/2025     (H) 03/04/2025      Lab Results   Component Value Date    NEUTROABS 10.30 (H) 03/04/2025      Lab Results   Component Value Date    GLUCOSE 101 (H) 03/04/2025    CALCIUM 9.2 03/04/2025     03/04/2025    K 4.1 03/04/2025    CO2 28 03/04/2025     03/04/2025    BUN 11 03/04/2025    CREATININE 0.96 03/04/2025    MG 1.89 10/31/2024     Lab Results   Component Value Date    ALT 17 03/04/2025    AST 16 03/04/2025    ALKPHOS 63 03/04/2025    BILITOT 0.4 03/04/2025      Lab Results   Component Value Date    ACTH 10.6 08/16/2023    CORTISOL 12.3 08/16/2023    TSH 0.85 03/04/2025    FREET4 0.52 (L) 08/15/2023         Imaging   CT C/A/P February 10, 2025  IMPRESSION:  Lung cancer, restaging scan.  1. Stable postoperative changes of right upper lobectomy without  evidence of locoregional disease recurrence. No new sites of  metastatic disease within the chest, abdomen or pelvis.  2. New mild bilateral hydronephrosis with urothelial thickening and  hyperenhancement, right-greater-than-left. In addition, new  circumferential urinary bladder wall thickening and mucosal  hyperenhancement is noted. Findings are most consistent with  pyelitis/ureteritis and cystitis, respectively. Findings may relate  to the patient's immunotherapy.  3. Remaining chronic and incidental findings as described above.    CT C/A/P November 15, 2024  IMPRESSION:  CHEST:  1.  Stable postsurgical changes of right upper lobectomy.  2. Stable left upper lobe  reticulonodular scarring.  3. No new sites of disease in the chest.      ABDOMEN-PELVIS:  1.  No metastatic disease in the abdomen and pelvis.  2. Chronic and incidental findings as described above.    Mammogram 12/27/2024  IMPRESSION:  No mammographic evidence of malignancy.        Assessment/Plan:  64 y.o. 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     The patient's records and imaging have been reviewed in detail.  MD discussed with the patient the natural history of their disease at length. The following has also been discussed with the patient:     # Cancer: recurrent likely metastatic ([id number]) lung cancer: liver biopsy showed extensive necrosis. Liquid biopsy showed KRAS G12C mutation, which was identified in her original surgical resected  specimen. PDL-1 50%. Patient initially had stage IA2 ([id number]) RUL lung  adenocarcinoma s/p RUL lobectomy, unfortunately now likely metastatic recurrence. Liver biopsy on 2/16 showed poorly differentiated malignant neoplasm with extensive necrosis. We discussed treatment  options with her today with either standard of care pembrolizumab vs clinical trials. Patient expressed interests in enrolling into clinical trial. Enrolled into RCT [id number]. Doing well.      - Imaging : 2/10/2025 Lung cancer, restaging scan.  1. Stable postoperative changes of right upper lobectomy without evidence of locoregional disease recurrence. No new sites of metastatic disease within the chest, abdomen or pelvis.  2. New mild bilateral hydronephrosis with urothelial thickening and hyperenhancement, right-greater-than-left. In addition, new circumferential urinary bladder wall thickening and mucosal hyperenhancement is noted. Findings are most consistent with  pyelitis/ureteritis and cystitis, respectively. Findings may relate  to the patient's  immunotherapy.  3. Remaining chronic and incidental findings as described above.      # Hypothyroidism: G2. Likely 2/2 immunotherapy - resolved.  TSH 1.07 11/8/23.  Continue levothyroxine 75mcg daily.      # Microcytic anemia 2/2 due to chronic disease and hiatal hernia: patient denies any hematochezia  or hematuria. No melena. hx of hiatal hernia. Per patient. Last EGD was done in 2021 and was normal. Colonoscopy was a few years ago.  EGD showed hiatal hernia 03/2023. Improved Hgb overall. Iron studies show low-normal ferritin and TSAT of 14%. Continue PO iron 3x/week. Tolerates well. Stable.  12/12/2024 Anemia resolved. Have decreased oral iron from BID to every day will now discontinue oral iron.   Iron discontinued in December 2024    # Mucositis (chronic).  Pt reports since start of immunotherapy.  Chronic sore secondary to ill fitting dentures recommended s&s rinse.  Continue Anbesol and/or Oragel for pain.  Will continue to monitor.  BMX added.  2/1/24  - resolved   Working with oral surgeon. Expects 3 upcoming extractions and 3 crowns (November)      # Constipation: managed well with stool softener and PRN lactulose.        # Clinical Trials: April 11, 2023 initiated treatment on [id number] this is cycle 1 of first-line treatment.  She has been randomized to  arm A which is pembrolizumab alone for up to 2 years or progression followed by Alimta/carboplatin for second line therapy. Consent has been signed. 11/15/24 CT scan shows favorable response. Continue treatment.   02/10/2025 CT with no concerning disease findings. However concern for ureteritis vs cystitis vs UTI. Pembrolizumab held.   2/27/2025 ureteritis/cystitis/UTI sx resolved continued steroid taper  3/6/2025 current steroid dose prednisone 10mg. Resume pembrolizumab today     # Access: Peripheral veins.     # Pain: she has a displaced 8th Rib on the left side -Advised patient to take percocet daily and decrease Advil use. PRN Percocet Rx in place.  Rx  last sent 23. Currently managed with PRN gummies.  New pain on the R upper back, controlled with percocet. Will try lidocaine patch OTC. :  Pain currently managed with PRN Advil, Aspercreme, and gummies.  Routine Gabapentin for neuropathy pain.   Pt verbalized desire to reduce # of medications taken.  Wishes to reduce Gabapentin dosage.   Supportive onc referral placed for assistance.   :  Bruise to buttock, pain 3-4/10.  Increased right flank/rib pain r/t fall, pain 3/10.  Managed with Advil 600mg TID.  :  Right flank pain 2/10.  Managed with PRN interventions.      # Anxiety:  Anxiety reported d/t recent death of her father, upcoming .  Pt requested 1x Valium dose, previous 10mg admin.  OARRS reviewed. Provided Rx for Valium 5mg x3 doses.    No following with palliative care  Started on Mirtazapine 7.5mg at HS    # Bone Health: L fifth ribs lesion stable.    # Arthralgia (grade 1).  Uric acid lab (-).  Intermittent arthralgia to left hand.  Continue PRN Advil, Aspercreme or Percocet (for severe pain).      # Left rib or chest wall pain over the past month. 23 CXR - RESULTS: Mediastinal surgical clips are noted. The cardiac silhouette is within normal limits. Mediastinal contours are unremarkable. The lungs demonstrate no infiltrate or atelectasis. There is no evidence for pleural effusion. Remote fracture is noted involving the right 7th rib. There are degenerative changes of the thoracic spine.  - Continue PRN Percocet and/or gummies for pain control.      # Right foot contusion, hyperextension:  Able to bear wt.  Continue to follow with podiatrist with any questions.  Continue Advil x5 days for inflammation. Resolved    # Psychosocial: Coping well, no acute issues.  Good support from family & friends.  Social work support available.     # GI/CINV: No acute issues.  Eating and voiding well.  Nutrition consult available.    # Insomnia:  Both difficulty falling and staying asleep.   Recommended pt can trial Melatonin.  Start with 3mg dose.  Max dose 10mg.  Restless contributes     # Sinusitis:  Pressure to maxillary and frontal sinuses.  +Increased mucous, yellow in color.  Denies fevers.  Hx of recurrent sinus infections.  Rx called to pt's pharmacy.  Doxycycline 100mg BID x7-10 days.  Extended course d/t recent smoke exposure.  3/14: Resolved.  9/19:  9/16/24 ENT appt for recurrent sinus infection.  Completed ATB course.  Resolved.      # Urinary Symptoms: symptoms began 2/8/2025. CT scan 2/10/2025 as above. Saw Dr Dennis in urology on 2/12/2025. Urine PCR negative although interestingly UA +.  Ureteritis or cystitis vs UTI. Will start prednisone 1mg/kg and taper down by 10mg every 4th day. Continue PPI for stomach protection.   She did not yet start Bactrim DS, confusion with pharmacy/order. I have spoken to pharmacist We will have her take Bactrim DS BID for 5 days (UTI) followed by BID M-W-F for PCP prophylaxis until steroids completed.   Will see weekly during steroid taper.   Decreasing steroid every 4th day in effort to get pt down to 10mg by March 6th   3/6/2025 prednisone 10mg    # Smoking: former smoker     # Fertility: N/A.        # Heme/ESAs: N/A.     # GOC: Patient is aware of palliative intent goals of care.     # Language: English.    # Health Maintenance:   Patient plans to get flu and RSV vaccine   Mammogram completed 12/2024    # Dispo:   Resume Pembrolizumab   FUV in 21 days + provider visit      Roman cA NP  Lipid panel and vitamin D will be drawn with our next set of labs for PCP      Mikie Hancock, APRN-CNP  [facility]  Thoracic + [facility] Oncology     I spent a total of 30+ minutes on the date of the service which included preparing to see the patient, face-to-face patient care, completing clinical documentation, obtaining and / or reviewing separately obtained history, counseling and educating the patient/family/caregiver, ordering medications,  tests, or procedures, communicating with other healthcare providers (not separately reported), independently interpreting results, not separately reported, and communicating results to the patient/family/caregiver, Name and date of birth verified.

## 2025-03-13 ENCOUNTER — APPOINTMENT (OUTPATIENT)
Dept: HEMATOLOGY/ONCOLOGY | Facility: CLINIC | Age: 65
End: 2025-03-13
Payer: COMMERCIAL

## 2025-03-14 ENCOUNTER — LAB (OUTPATIENT)
Dept: LAB | Facility: CLINIC | Age: 65
End: 2025-03-14
Payer: COMMERCIAL

## 2025-03-14 DIAGNOSIS — R39.15 URINARY URGENCY: ICD-10-CM

## 2025-03-14 LAB
APPEARANCE UR: CLEAR
BILIRUB UR STRIP.AUTO-MCNC: NEGATIVE MG/DL
COLOR UR: COLORLESS
GLUCOSE UR STRIP.AUTO-MCNC: NORMAL MG/DL
KETONES UR STRIP.AUTO-MCNC: NEGATIVE MG/DL
LEUKOCYTE ESTERASE UR QL STRIP.AUTO: NEGATIVE
NITRITE UR QL STRIP.AUTO: NEGATIVE
PH UR STRIP.AUTO: 6.5 [PH]
PROT UR STRIP.AUTO-MCNC: NEGATIVE MG/DL
RBC # UR STRIP.AUTO: NEGATIVE MG/DL
SP GR UR STRIP.AUTO: 1
UROBILINOGEN UR STRIP.AUTO-MCNC: NORMAL MG/DL

## 2025-03-14 PROCEDURE — 81003 URINALYSIS AUTO W/O SCOPE: CPT

## 2025-03-15 LAB — HOLD SPECIMEN: NORMAL

## 2025-03-18 ENCOUNTER — DOCUMENTATION (OUTPATIENT)
Dept: HEMATOLOGY/ONCOLOGY | Facility: CLINIC | Age: 65
End: 2025-03-18
Payer: COMMERCIAL

## 2025-03-18 DIAGNOSIS — N30.90 CYSTITIS: ICD-10-CM

## 2025-03-18 RX ORDER — SOLIFENACIN SUCCINATE 5 MG/1
5 TABLET, FILM COATED ORAL DAILY
Qty: 30 TABLET | Refills: 5 | Status: SHIPPED | OUTPATIENT
Start: 2025-03-18 | End: 2025-09-14

## 2025-03-19 NOTE — PROGRESS NOTES
Let Dr Dennis know that the pts UA was negative for infection. Dr Dennis will order the pt vesicare oral medication and he will see her in his office in 6-8 weeks to assess. He stated immunotherapy could continue if pt is not in severe pain or discomfort. Pt will be in clinic next week for this final dose and decision will be made at that time. Pt is aware about this new medication and that Dr Dennis office will call to make follow up apt.

## 2025-03-20 RX ORDER — FAMOTIDINE 10 MG/ML
20 INJECTION, SOLUTION INTRAVENOUS ONCE AS NEEDED
Status: CANCELLED | OUTPATIENT
Start: 2025-03-27

## 2025-03-20 RX ORDER — EPINEPHRINE 0.3 MG/.3ML
0.3 INJECTION SUBCUTANEOUS EVERY 5 MIN PRN
Status: CANCELLED | OUTPATIENT
Start: 2025-03-27

## 2025-03-20 RX ORDER — DIPHENHYDRAMINE HYDROCHLORIDE 50 MG/ML
50 INJECTION, SOLUTION INTRAMUSCULAR; INTRAVENOUS AS NEEDED
Status: CANCELLED | OUTPATIENT
Start: 2025-03-27

## 2025-03-20 RX ORDER — PROCHLORPERAZINE EDISYLATE 5 MG/ML
10 INJECTION INTRAMUSCULAR; INTRAVENOUS EVERY 6 HOURS PRN
Status: CANCELLED | OUTPATIENT
Start: 2025-03-27

## 2025-03-20 RX ORDER — DEXTROMETHORPHAN HYDROBROMIDE, GUAIFENESIN 5; 100 MG/5ML; MG/5ML
650 LIQUID ORAL AS NEEDED
Status: CANCELLED
Start: 2025-03-27

## 2025-03-20 RX ORDER — DIPHENHYDRAMINE HCL 50 MG
50 CAPSULE ORAL AS NEEDED
Status: CANCELLED
Start: 2025-03-27

## 2025-03-20 RX ORDER — ALBUTEROL SULFATE 0.83 MG/ML
3 SOLUTION RESPIRATORY (INHALATION) AS NEEDED
Status: CANCELLED | OUTPATIENT
Start: 2025-03-27

## 2025-03-20 RX ORDER — PROCHLORPERAZINE MALEATE 10 MG
10 TABLET ORAL EVERY 6 HOURS PRN
Status: CANCELLED | OUTPATIENT
Start: 2025-03-27

## 2025-03-25 DIAGNOSIS — C34.11 MALIGNANT NEOPLASM OF UPPER LOBE OF RIGHT LUNG (MULTI): ICD-10-CM

## 2025-03-26 ENCOUNTER — LAB (OUTPATIENT)
Dept: LAB | Facility: CLINIC | Age: 65
End: 2025-03-26
Payer: COMMERCIAL

## 2025-03-26 DIAGNOSIS — C34.11 MALIGNANT NEOPLASM OF UPPER LOBE OF RIGHT LUNG (MULTI): ICD-10-CM

## 2025-03-26 LAB
ALBUMIN SERPL BCP-MCNC: 4.5 G/DL (ref 3.4–5)
ALP SERPL-CCNC: 69 U/L (ref 33–136)
ALT SERPL W P-5'-P-CCNC: 16 U/L (ref 7–45)
AMYLASE SERPL-CCNC: 32 U/L (ref 29–103)
ANION GAP SERPL CALC-SCNC: 14 MMOL/L (ref 10–20)
AST SERPL W P-5'-P-CCNC: 21 U/L (ref 9–39)
BASOPHILS # BLD AUTO: 0.07 X10*3/UL (ref 0–0.1)
BASOPHILS NFR BLD AUTO: 0.8 %
BILIRUB SERPL-MCNC: 0.4 MG/DL (ref 0–1.2)
BUN SERPL-MCNC: 11 MG/DL (ref 6–23)
CALCIUM SERPL-MCNC: 10 MG/DL (ref 8.6–10.3)
CHLORIDE SERPL-SCNC: 104 MMOL/L (ref 98–107)
CO2 SERPL-SCNC: 25 MMOL/L (ref 21–32)
CREAT SERPL-MCNC: 0.89 MG/DL (ref 0.5–1.05)
EGFRCR SERPLBLD CKD-EPI 2021: 73 ML/MIN/1.73M*2
EOSINOPHIL # BLD AUTO: 0.08 X10*3/UL (ref 0–0.7)
EOSINOPHIL NFR BLD AUTO: 0.9 %
ERYTHROCYTE [DISTWIDTH] IN BLOOD BY AUTOMATED COUNT: 13 % (ref 11.5–14.5)
GLUCOSE SERPL-MCNC: 96 MG/DL (ref 74–99)
HCT VFR BLD AUTO: 42.4 % (ref 36–46)
HGB BLD-MCNC: 14.3 G/DL (ref 12–16)
IMM GRANULOCYTES # BLD AUTO: 0.02 X10*3/UL (ref 0–0.7)
IMM GRANULOCYTES NFR BLD AUTO: 0.2 % (ref 0–0.9)
LIPASE SERPL-CCNC: 20 U/L (ref 9–82)
LYMPHOCYTES # BLD AUTO: 2.17 X10*3/UL (ref 1.2–4.8)
LYMPHOCYTES NFR BLD AUTO: 25.1 %
MCH RBC QN AUTO: 31.6 PG (ref 26–34)
MCHC RBC AUTO-ENTMCNC: 33.7 G/DL (ref 32–36)
MCV RBC AUTO: 94 FL (ref 80–100)
MONOCYTES # BLD AUTO: 0.89 X10*3/UL (ref 0.1–1)
MONOCYTES NFR BLD AUTO: 10.3 %
NEUTROPHILS # BLD AUTO: 5.43 X10*3/UL (ref 1.2–7.7)
NEUTROPHILS NFR BLD AUTO: 62.7 %
NRBC BLD-RTO: 0 /100 WBCS (ref 0–0)
PLATELET # BLD AUTO: 489 X10*3/UL (ref 150–450)
POTASSIUM SERPL-SCNC: 3.9 MMOL/L (ref 3.5–5.3)
PROT SERPL-MCNC: 6.8 G/DL (ref 6.4–8.2)
RBC # BLD AUTO: 4.52 X10*6/UL (ref 4–5.2)
SODIUM SERPL-SCNC: 139 MMOL/L (ref 136–145)
WBC # BLD AUTO: 8.7 X10*3/UL (ref 4.4–11.3)

## 2025-03-26 PROCEDURE — 80053 COMPREHEN METABOLIC PANEL: CPT

## 2025-03-26 PROCEDURE — 83690 ASSAY OF LIPASE: CPT

## 2025-03-26 PROCEDURE — 36415 COLL VENOUS BLD VENIPUNCTURE: CPT

## 2025-03-26 PROCEDURE — 85025 COMPLETE CBC W/AUTO DIFF WBC: CPT

## 2025-03-26 PROCEDURE — 82150 ASSAY OF AMYLASE: CPT

## 2025-03-27 ENCOUNTER — OFFICE VISIT (OUTPATIENT)
Dept: HEMATOLOGY/ONCOLOGY | Facility: CLINIC | Age: 65
End: 2025-03-27
Payer: COMMERCIAL

## 2025-03-27 ENCOUNTER — INFUSION (OUTPATIENT)
Dept: HEMATOLOGY/ONCOLOGY | Facility: CLINIC | Age: 65
End: 2025-03-27
Payer: COMMERCIAL

## 2025-03-27 ENCOUNTER — EDUCATION (OUTPATIENT)
Dept: HEMATOLOGY/ONCOLOGY | Facility: CLINIC | Age: 65
End: 2025-03-27

## 2025-03-27 VITALS
BODY MASS INDEX: 23.45 KG/M2 | RESPIRATION RATE: 18 BRPM | HEART RATE: 99 BPM | OXYGEN SATURATION: 97 % | TEMPERATURE: 97.9 F | DIASTOLIC BLOOD PRESSURE: 81 MMHG | SYSTOLIC BLOOD PRESSURE: 118 MMHG | WEIGHT: 143.08 LBS

## 2025-03-27 DIAGNOSIS — C34.11 MALIGNANT NEOPLASM OF UPPER LOBE OF RIGHT LUNG (MULTI): ICD-10-CM

## 2025-03-27 DIAGNOSIS — C34.11 MALIGNANT NEOPLASM OF UPPER LOBE OF RIGHT LUNG (MULTI): Primary | ICD-10-CM

## 2025-03-27 PROCEDURE — 96365 THER/PROPH/DIAG IV INF INIT: CPT | Mod: INF

## 2025-03-27 PROCEDURE — 99214 OFFICE O/P EST MOD 30 MIN: CPT | Performed by: NURSE PRACTITIONER

## 2025-03-27 PROCEDURE — 2560000001 HC RX 256 EXPERIMENTAL DRUGS: Mod: SE | Performed by: STUDENT IN AN ORGANIZED HEALTH CARE EDUCATION/TRAINING PROGRAM

## 2025-03-27 RX ORDER — ACETAMINOPHEN 325 MG/1
650 TABLET ORAL AS NEEDED
Status: DISCONTINUED | OUTPATIENT
Start: 2025-03-27 | End: 2025-03-27 | Stop reason: HOSPADM

## 2025-03-27 RX ORDER — DIPHENHYDRAMINE HCL 25 MG
50 CAPSULE ORAL AS NEEDED
Status: DISCONTINUED | OUTPATIENT
Start: 2025-03-27 | End: 2025-03-27 | Stop reason: HOSPADM

## 2025-03-27 RX ADMIN — Medication 200 MG: at 14:36

## 2025-03-27 ASSESSMENT — PAIN SCALES - GENERAL: PAINLEVEL_OUTOF10: 4

## 2025-03-27 NOTE — PROGRESS NOTES
Patient ID: Hayley Potter is a 64 y.o. female.    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 She was initially found to have spiculated 2.9cm mass in the RUL on the CT chest on 03/02/2020. She underwent CT guided RUL biopsy with pathology showed lung tissue with scan and focal aypicla grandular proliferation  suspicious for adenocarcinoma. IHC positive for CK7, TTF1 and napsin A. CK20 negative. PET/CT on 04/15/2020 showed RUL hypermetabolic RUL mass. No evidence of distant metastases. She underwent RUL lobectomy with mediastinal LN dissection with Dr. Wheat  on 04/21/2020 which showed invasive well to moderately differentiated adenocarcinoma, tumor measuring 2.5x2.3.x1.7cm. No VPI or LVI. All margins were negative. LN (0/11).      Unfortunately, on the surveillance CT chest (+) on 08/24/2022, there was a new irregular marginated 6mm GUSTABO nodule, which increased in size significantly on the repeat CT chest (-) on 01/06/2023, measuring 12mm.  There is also an additional new 9mm nodule  in the L lung as well as new mediastinal and possible L hilar LAD. PET/CT on 02/16/2023 showed hypermetabolic L hilar and mediastinal LAD. 1st  GUSTABO nodule now 8mm likely inflammatory. 2nd GUSTABO nodule showed stable size with SUV 4.4. Soft tissue mass associated with  L 11th rib fracture, SUV 11.5. Mass within the liver SUV 13.1, suspicious for mets. Hypermetabolic activity also noted  in  the stomach fundus, association with a hiatal hernia, in the cecum and ascending colon without masses seen. She underwent US guided liver biopsy on 02/16/2023 - poorly-differentiated carcinoma.      Treatment Summary:  04/21/2020: RUL lobectomy with medistainal LN dissection (Dr. Wheat)  04/11/2023: C1 pembrolizumab 200mg IV (on trial)  05/02/2023: C2 Pembrolizumab 200mg IV (on trial)  05/23/2023: C3 Pembrolizumab 200mg IV (on trial)  06/13/2023: C4 Pembrolizumab 200mg IV (on trial)   07/05/2023: C5 Pembrolizumab 200mg IV (on trial)   07/26/2023: C6 Pembrolizumab 200mg IV (on trial)   08/16/2023: C7 Pembrolizumab 200mg IV (on trial)   09/07/2023: C8 Pembrolizumab 200mg IV (on trial)   09/28/2023: C9 Pembrolizumab 200mg IV (on trial)   10/19/2023: C10 Pembrolizumab 200mg IV (on trial)  11/09/2023: C11 Pembrolizumab 200mg IV (on trial)  11/30/2023: C12 Pembrolizumab 200mg IV (on trial)  12/21/2023: C13 Pembrolizumab 200mg IV (on trial)  01/11/2024: C14 Pembrolizumab 200mg IV (on trial)  02/01/2024: C15 Pembrolizumab 200mg IV (on trial)  02/22/2024: C16 Pembrolizumab 200mg IV (on trial)  03/14/2024: C17 Pembrolizumab 200mg IV (on trial)  04/04/2024: C18 Pembrolizumab 200mg IV (on trial)  04/25/2024: C19 Pembrolizumab 200mg IV (on trial)  05/16/2024: C20 Pembrolizumab 200mg IV (on trial)  06/06/2024: C21 Pembrolizumab 200mg IV (on trial)  06/27/2024: C22 Pembrolizumab 200mg IV (on trial)  07/15/2024: ED visit right foot contusion   07/18/2024: C23 Pembrolizumab 200mg IV (on trial)  08/08/2024: C24 Pembrolizumab 200mg IV (on trial)  08/29/2024: C25 Pembrolizumab 200mg IV (on trial)  09/19/2024: C26 Pembrolizumab 200mg IV (on trial)  10/10/2024: C27 Pembrolizumab 200mg IV (on trial)  10/31/2024: C28 Pembrolizumab 200mg IV (on trial)  11/21/2024: C29 Pembrolizumab 200mg IV (on trial)  12/12/2024: C30 Pembrolizumab 200mg IV (on trial)  01/02/2025: C31 Pembrolizumab 200mg IV (on trial)  01/23/2025: C32 Pembrolizumab 200mg  IV (on trial)  02/13/2025: C33 Hold tx ureteritis or cystitis vs UTI - prednisone started cycle is not made up per trial.   03/06/2025: C34 Pembrolizumab 200mg IV (on trial)  03/27/2025: C35 Pembrolizumab 200mg IV (on trial) - Final Dose    Interval History (03/27/2025):    Patient present in clinic for one week follow-up evaluation. Her daughter is present for visit. No further urinary complaints or flank pain. No fevers, chills or night sweats. She is feeling back to her baseline. + chronic cough. No chest pain or shortness of breath. No nausea or vomiting. No constipation or diarrhea. No urinary symptoms. No rash. No neuropathy.   Left 8th rib pain - intermittent for many years - improving     RUQ pain/left rib pain - chronic, believes Neurontin has helped,. Neurontin also helping generalized neuropathic pain and restless legs. Restless legs can cause sleep disturbance. Leg cramps on occasion. She was seen by palliative care mirtazapine was started for anxiety and sleep. Does not like mirtazapine due to low libido but recognizes need for antidepressant/antianxiety agent.      Review of Systems:  A review of systems has been completed and are negative for complaints except what is stated in the HPI and/or past medical history     PMHx: vocal cord cancer, GERD and COPD, OA  PSHx: tonsillectomy, tubal ligation, lumpectomy, L ankle and R hand surgery  FHx: strong family history of breast cancer including male breast cancer.   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 Then worked as a  since 2005. She quit etoh 2010. She smokes Marijuana every day.     Allergies:  Cephalexin    Medications:    Current Outpatient Medications:     alendronate (Fosamax) 70 mg tablet, Take 1 tablet (70 mg) by mouth every 7 days. Take in the morning with a full glass of water, on an empty stomach, and do not take anything else by mouth or lie down for the next 30 min., Disp: , Rfl:     azelastine HCl (AZELASTINE NASL),  Administer 0.15 % into affected nostril(s) 2 times a day as needed., Disp: , Rfl:     budesonide-formoteroL (Symbicort) 160-4.5 mcg/actuation inhaler, Rinse mouth with water after use to reduce aftertaste and incidence of candidiasis. Do not swallow., Disp: , Rfl:     busPIRone (Buspar) 30 mg tablet, Take 1 tablet (30 mg) by mouth 2 times a day., Disp: , Rfl:     cholecalciferol (Vitamin D-3) 50,000 unit capsule, Take 1 capsule (50,000 Units) by mouth 1 (one) time per week., Disp: , Rfl:     cyclobenzaprine (Flexeril) 10 mg tablet, Take 1 tablet (10 mg) by mouth 2 times a day as needed for muscle spasms., Disp: 60 tablet, Rfl: 3    diphenhydramine HCl (BENADRYL ALLERGY ORAL), , Disp: , Rfl:     gabapentin (Neurontin) 600 mg tablet, Take 1 tablet (600 mg) by mouth 2 times a day., Disp: , Rfl:     levothyroxine (Synthroid, Levoxyl) 75 mcg tablet, Take 1 tablet (75 mcg) by mouth once daily., Disp: 90 tablet, Rfl: 1    LORazepam (Ativan) 0.5 mg tablet, Take 1-2 tablets (0.5-1 mg) by mouth every 8 hours if needed for anxiety (nausea)., Disp: 60 tablet, Rfl: 0    mirtazapine (Remeron) 7.5 mg tablet, Take 1 tablet (7.5 mg) by mouth once daily at bedtime., Disp: 30 tablet, Rfl: 2    multivitamin with minerals tablet, Take 1 tablet by mouth once daily., Disp: , Rfl:     omeprazole (PriLOSEC) 40 mg DR capsule, TAKE 1 CAPSULE BY MOUTH 30 MINUTES BEFORE MORNING MEAL TWICE A DAY, Disp: , Rfl:     oxyCODONE-acetaminophen (Percocet) 5-325 mg tablet, Take 1 tablet by mouth every 6 hours if needed for severe pain (7 - 10)., Disp: 120 tablet, Rfl: 0    PEMBROLIZUMAB IV, Infuse into a venous catheter., Disp: , Rfl:     solifenacin (VESIcare) 5 mg tablet, Take 1 tablet (5 mg) by mouth once daily. Swallow tablet whole; do not crush, chew, or split., Disp: 30 tablet, Rfl: 5    tiotropium (Spiriva Respimat) 2.5 mcg/actuation inhaler, INHALE TWO PUFFS BY MOUTH EVERY DAY, Disp: , Rfl:   No current facility-administered medications for this  visit.    Facility-Administered Medications Ordered in Other Visits:     acetaminophen (Tylenol) tablet 650 mg, 650 mg, oral, PRN, Kayla Cook MD MPH    albuterol 2.5 mg /3 mL (0.083 %) nebulizer solution 3 mL, 3 mL, nebulization, PRN, Kayla Cook MD MPH    dextrose 5 % in water (D5W) bolus, 500 mL, intravenous, PRN, Kayla Cook MD MPH    diphenhydrAMINE (BENADryl) capsule 50 mg, 50 mg, oral, PRN, Kayla Cook MD MPH    diphenhydrAMINE (BENADryl) injection 50 mg, 50 mg, intravenous, PRN, Kayla Cook MD MPH    EPINEPHrine (Epipen) injection syringe 0.3 mg, 0.3 mg, intramuscular, q5 min PRN, Kayla Cook MD MPH    famotidine PF (Pepcid) injection 20 mg, 20 mg, intravenous, Once PRN, Kayla Cook MD MPH    methylPREDNISolone sod succinate (SOLU-Medrol) 40 mg/mL injection 40 mg, 40 mg, intravenous, PRN, Kayla Cook MD MPH    prochlorperazine (Compazine) injection 10 mg, 10 mg, intravenous, q6h PRN, Kayla Cook MD MPH    prochlorperazine (Compazine) tablet 10 mg, 10 mg, oral, q6h PRN, Kayla Cook MD MPH    sodium chloride 0.9 % bolus 500 mL, 500 mL, intravenous, PRN, Kayla Cook MD MPH        Vital Signs:  /81 (BP Location: Left arm, Patient Position: Sitting, BP Cuff Size: Adult long)   Pulse 99   Temp 36.6 °C (97.9 °F) (Temporal)   Resp 18   Wt 64.9 kg (143 lb 1.3 oz)   SpO2 97%   BMI 23.45 kg/m²     Wt Readings from Last 5 Encounters:   25 64.9 kg (143 lb 1.3 oz)   25 65.2 kg (143 lb 10.1 oz)   25 65.5 kg (144 lb 4.7 oz)   25 63.3 kg (139 lb 8.8 oz)   25 64.4 kg (142 lb 1.4 oz)       Physical Exam:  ECO  Pain: +left rib pain - improving   Constitutional: Well developed, awake/alert/oriented x3, no distress, alert and cooperative  Eyes: PER. sclera anicteric  ENMT: Oral mucosa moist. No lesions or thrush   Respiratory/Thorax: Breathing is non-labored. Lungs are clear to auscultation bilaterally. No adventitious breath sounds. Hx RUL lobectomy 2020  Cardiovascular: S1-S2. Regular rate  and rhythm. No murmurs, rubs, or gallops appreciated  Gastrointestinal: Abdomen soft nontender, nondistended, normal active bowel sounds.   Musculoskeletal: ROM intact, no joint swelling, normal strength  Extremities: normal extremities, no cyanosis, no edema, no clubbing  Lymphatics: no palpable lymphadenopathy  Neurologic: alert and oriented x3. Nonfocal exam. No myoclonus  Psychological: Pleasant, appropriate and easily engaged       Results:     Labs  Lab Results   Component Value Date    WBC 8.7 03/26/2025    HGB 14.3 03/26/2025    HCT 42.4 03/26/2025    MCV 94 03/26/2025     (H) 03/26/2025      Lab Results   Component Value Date    NEUTROABS 5.43 03/26/2025      Lab Results   Component Value Date    GLUCOSE 96 03/26/2025    CALCIUM 10.0 03/26/2025     03/26/2025    K 3.9 03/26/2025    CO2 25 03/26/2025     03/26/2025    BUN 11 03/26/2025    CREATININE 0.89 03/26/2025    MG 1.89 10/31/2024     Lab Results   Component Value Date    ALT 16 03/26/2025    AST 21 03/26/2025    ALKPHOS 69 03/26/2025    BILITOT 0.4 03/26/2025      Lab Results   Component Value Date    ACTH 10.6 08/16/2023    CORTISOL 12.3 08/16/2023    TSH 0.85 03/04/2025    FREET4 0.52 (L) 08/15/2023       Imaging   CT C/A/P February 10, 2025  IMPRESSION:  Lung cancer, restaging scan.  1. Stable postoperative changes of right upper lobectomy without evidence of locoregional disease recurrence. No new sites of metastatic disease within the chest, abdomen or pelvis.  2. New mild bilateral hydronephrosis with urothelial thickening and hyperenhancement, right-greater-than-left. In addition, new circumferential urinary bladder wall thickening and mucosal hyperenhancement is noted. Findings are most consistent with pyelitis/ureteritis and cystitis, respectively. Findings may relate to the patient's immunotherapy.  3. Remaining chronic and incidental findings as described above.    CT C/A/P November 15, 2024  IMPRESSION:  CHEST:  1.   Stable postsurgical changes of right upper lobectomy.  2. Stable left upper lobe reticulonodular scarring.  3. No new sites of disease in the chest.      ABDOMEN-PELVIS:  1.  No metastatic disease in the abdomen and pelvis.  2. Chronic and incidental findings as described above.    Mammogram 12/27/2024  IMPRESSION:  No mammographic evidence of malignancy.        Assessment/Plan:  64 y.o. 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     The patient's records and imaging have been reviewed in detail.  MD discussed with the patient the natural history of their disease at length. The following has also been discussed with the patient:     # Cancer: recurrent likely metastatic ([id number]) lung cancer: liver biopsy showed extensive necrosis. Liquid biopsy showed KRAS G12C mutation, which was identified in her original surgical resected  specimen. PDL-1 50%. Patient initially had stage IA2 ([id number]) RUL lung  adenocarcinoma s/p RUL lobectomy, unfortunately now likely metastatic recurrence. Liver biopsy on 2/16 showed poorly differentiated malignant neoplasm with extensive necrosis. We discussed treatment  options with her today with either standard of care pembrolizumab vs clinical trials. Patient expressed interests in enrolling into clinical trial. Enrolled into RCT [id number]. Doing well.      - Imaging : 2/10/2025 Lung cancer, restaging scan.  1. Stable postoperative changes of right upper lobectomy without evidence of locoregional disease recurrence. No new sites of metastatic disease within the chest, abdomen or pelvis.  2. New mild bilateral hydronephrosis with urothelial thickening and hyperenhancement, right-greater-than-left. In addition, new circumferential urinary bladder wall thickening and mucosal hyperenhancement is noted. Findings are most consistent  with  pyelitis/ureteritis and cystitis, respectively. Findings may relate  to the patient's immunotherapy.  3. Remaining chronic and incidental findings as described above.      # Hypothyroidism: G2. Likely 2/2 immunotherapy - resolved.  TSH 1.07 11/8/23.  Continue levothyroxine 75mcg daily.      # Microcytic anemia 2/2 due to chronic disease and hiatal hernia: patient denies any hematochezia  or hematuria. No melena. hx of hiatal hernia. Per patient. Last EGD was done in 2021 and was normal. Colonoscopy was a few years ago.  EGD showed hiatal hernia 03/2023. Improved Hgb overall. Iron studies show low-normal ferritin and TSAT of 14%. Continue PO iron 3x/week. Tolerates well. Stable.  12/12/2024 Anemia resolved. Have decreased oral iron from BID to every day will now discontinue oral iron.   Iron discontinued in December 2024    # Mucositis (chronic).  Pt reports since start of immunotherapy.  Chronic sore secondary to ill fitting dentures recommended s&s rinse.  Continue Anbesol and/or Oragel for pain.  Will continue to monitor.  BMX added.  2/1/24  - resolved   Working with oral surgeon. Expects 3 upcoming extractions and 3 crowns (November)      # Constipation: managed well with stool softener and PRN lactulose.        # Clinical Trials: April 11, 2023 initiated treatment on [id number] this is cycle 1 of first-line treatment.  She has been randomized to  arm A which is pembrolizumab alone for up to 2 years or progression followed by Alimta/carboplatin for second line therapy. Consent has been signed. 11/15/24 CT scan shows favorable response. Continue treatment.   02/10/2025 CT with no concerning disease findings. However concern for ureteritis vs cystitis vs UTI. Pembrolizumab held.   2/27/2025 ureteritis/cystitis/UTI sx resolved continued steroid taper  3/6/2025 current steroid dose prednisone 10mg. Resume pembrolizumab today   3/27/2025 off pembrolizumab     # Access: Peripheral veins.     # Pain: she has a  displaced 8th Rib on the left side -Advised patient to take percocet daily and decrease Advil use. PRN Percocet Rx in place.  Rx last sent 23. Currently managed with PRN gummies.  New pain on the R upper back, controlled with percocet. Will try lidocaine patch OTC. :  Pain currently managed with PRN Advil, Aspercreme, and gummies.  Routine Gabapentin for neuropathy pain.   Pt verbalized desire to reduce # of medications taken.  Wishes to reduce Gabapentin dosage.   Supportive onc referral placed for assistance.   :  Bruise to buttock, pain 3-4/10.  Increased right flank/rib pain r/t fall, pain 3/10.  Managed with Advil 600mg TID.  :  Right flank pain 2/10.  Managed with PRN interventions.      # Anxiety:  Anxiety reported d/t recent death of her father, upcoming .  Pt requested 1x Valium dose, previous 10mg admin.  OARRS reviewed. Provided Rx for Valium 5mg x3 doses.    No following with palliative care  Started on Mirtazapine 7.5mg at HS (libido has now been an issue)     # Bone Health: L fifth ribs lesion stable.    # Arthralgia (grade 1).  Uric acid lab (-).  Intermittent arthralgia to left hand.  Continue PRN Advil, Aspercreme or Percocet (for severe pain).      # Left rib or chest wall pain over the past month. 23 CXR - RESULTS: Mediastinal surgical clips are noted. The cardiac silhouette is within normal limits. Mediastinal contours are unremarkable. The lungs demonstrate no infiltrate or atelectasis. There is no evidence for pleural effusion. Remote fracture is noted involving the right 7th rib. There are degenerative changes of the thoracic spine.  - Continue PRN Percocet and/or gummies for pain control.      # GI/CINV: No acute issues.  Eating and voiding well.  Nutrition consult available.    # Insomnia:  Both difficulty falling and staying asleep.  Recommended pt can trial Melatonin.  Start with 3mg dose.  Max dose 10mg.  Restless contributes     # Sinusitis:  Pressure to  maxillary and frontal sinuses.  +Increased mucous, yellow in color.  Denies fevers.  Hx of recurrent sinus infections.  Rx called to pt's pharmacy.  Doxycycline 100mg BID x7-10 days.  Extended course d/t recent smoke exposure.  3/14: Resolved.  9/19:  9/16/24 ENT appt for recurrent sinus infection.  Completed ATB course.  Resolved.      # Urinary Symptoms: symptoms began 2/8/2025. CT scan 2/10/2025 as above. Saw Dr Dennis in urology on 2/12/2025. Urine PCR negative although interestingly UA +.  Ureteritis or cystitis vs UTI. Will start prednisone 1mg/kg and taper down by 10mg every 4th day. Continue PPI for stomach protection.   She did not yet start Bactrim DS, confusion with pharmacy/order. I have spoken to pharmacist We will have her take Bactrim DS BID for 5 days (UTI) followed by BID M-W-F for PCP prophylaxis until steroids completed.   Will see weekly during steroid taper.   Decreasing steroid every 4th day in effort to get pt down to 10mg by March 6th   3/6/2025 prednisone 10mg. 3/27/2025 completed taper     # Smoking: former smoker     # Fertility: N/A.        # Heme/ESAs: N/A.     # GOC: Patient is aware of palliative intent goals of care.     # Language: English.    # Health Maintenance:   Patient plans to get flu and RSV vaccine   Mammogram completed 12/2024    # Dispo:   Final dose Pembrolizumab 3/27/2025  [facility] April 1, 2025  CT restaging evaluation May 1, 2025  [facility] Dr. Cook May 8, 2025      Mikie Hancock, APRN-CNP  [facility]  Thoracic + [facility] On[facility]     I spent a total of 30+ minutes on the date of the service which included preparing to see the patient, face-to-face patient care, completing clinical documentation, obtaining and / or reviewing separately obtained history, counseling and educating the patient/family/caregiver, ordering medications, tests, or procedures, communicating with other healthcare providers (not separately reported), independently  interpreting results, not separately reported, and communicating results to the patient/family/caregiver, Name and date of birth verified.

## 2025-03-27 NOTE — PROGRESS NOTES
Research Note Treatment Day    Hayley Potter is here today for treatment on [id number]. Today is C35D1. Procedures completed per protocol. AE's and con-meds reviewed with patient. Patient is aware of treatment plan.    [x]   Received treatment as planned   OR  []    Treatment delayed; patient calendar updated as required   Treatment delayed because:    []   AE    []   Physician Discretion    []   Clinical Deterioration or Progression     []   Other    Education Documentation  Treatment Plan and Schedule, taught by Rafiq Delgado RN at 3/27/2025  4:26 PM.  Learner: Family, Patient  Readiness: Acceptance  Method: Explanation  Response: Verbalizes Understanding    Education Comments  No comments found.    Pt and her daughter to clinic for last cycle on [id number]. Pt is feeling well today with no further bladder complaints voiced. Pt still with grade 1 fatigue and grade 1 taste alteration, grade 1 insomnia, grade 1 arthralgia, grade 1 neuropathy, grade 1 dyspnea and cough. Pt is aware of the plan going forward. NP in to see pt for full assessment.

## 2025-03-27 NOTE — PROGRESS NOTES
Patient was seen by provider at this visit.  Tolerated infusion well, no adverse reaction at this visit.  Patient last chemo today. Ambulated out of clinic in stable condition.

## 2025-04-01 ENCOUNTER — OFFICE VISIT (OUTPATIENT)
Dept: PALLIATIVE MEDICINE | Facility: CLINIC | Age: 65
End: 2025-04-01
Payer: COMMERCIAL

## 2025-04-01 VITALS
HEART RATE: 76 BPM | DIASTOLIC BLOOD PRESSURE: 89 MMHG | BODY MASS INDEX: 24.08 KG/M2 | RESPIRATION RATE: 18 BRPM | SYSTOLIC BLOOD PRESSURE: 125 MMHG | WEIGHT: 146.94 LBS | OXYGEN SATURATION: 97 % | TEMPERATURE: 98.1 F

## 2025-04-01 DIAGNOSIS — F32.A ANXIETY AND DEPRESSION: ICD-10-CM

## 2025-04-01 DIAGNOSIS — Z51.81 ENCOUNTER FOR MONITORING OPIOID MAINTENANCE THERAPY: ICD-10-CM

## 2025-04-01 DIAGNOSIS — F41.9 ANXIETY AND DEPRESSION: ICD-10-CM

## 2025-04-01 DIAGNOSIS — M79.2 NEUROPATHIC PAIN: ICD-10-CM

## 2025-04-01 DIAGNOSIS — F41.9 ANXIETY: ICD-10-CM

## 2025-04-01 DIAGNOSIS — G89.3 CANCER RELATED PAIN: ICD-10-CM

## 2025-04-01 DIAGNOSIS — Z79.891 ENCOUNTER FOR MONITORING OPIOID MAINTENANCE THERAPY: ICD-10-CM

## 2025-04-01 DIAGNOSIS — Z51.5 PALLIATIVE CARE ENCOUNTER: Primary | ICD-10-CM

## 2025-04-01 PROCEDURE — 99214 OFFICE O/P EST MOD 30 MIN: CPT | Performed by: NURSE PRACTITIONER

## 2025-04-01 RX ORDER — MIRTAZAPINE 7.5 MG/1
7.5 TABLET, FILM COATED ORAL NIGHTLY
Qty: 30 TABLET | Refills: 2 | Status: SHIPPED | OUTPATIENT
Start: 2025-04-01 | End: 2025-05-01

## 2025-04-01 RX ORDER — BUSPIRONE HYDROCHLORIDE 15 MG/1
15 TABLET ORAL 2 TIMES DAILY
Qty: 60 TABLET | Refills: 3 | Status: SHIPPED | OUTPATIENT
Start: 2025-04-01 | End: 2025-05-01

## 2025-04-01 ASSESSMENT — PAIN SCALES - GENERAL: PAINLEVEL_OUTOF10: 3

## 2025-04-01 NOTE — PATIENT INSTRUCTIONS
To decrease your buspar:  --take 15 mg in the morning, and 30mg at night for 10 days. If no side effects, and feeling well, then:   --take 15mg in the morning, and 15mg at night until our next appointment.

## 2025-04-01 NOTE — PROGRESS NOTES
SUPPORTIVE AND PALLIATIVE ONCOLOGY OUTPATIENT FOLLOW-UP      SERVICE DATE: 4/1/2025    Cancer History  R lung CA dx 2020  -s/p R lobectomy April 2020  -currently on Pembro maintenance      Onc: Karissa Chau: Tom      Subjective   HISTORY OF PRESENT ILLNESS: Hayley Potter is a 64 y.o. female with Pmhx of COPD, GERD, anxiety, and R lung CA.      She presents to supportive oncology for follow up for symptom management.     Presents for FUV alone today. Continues to have pain in R rib area, always there, worse with bending. Has taken percocet a few times, and flexeril PRN, which helps with back pain. Bowels have been more formed than usual, recently had to take colace 4 days in a row to get back on schedule, passing gas daily. No N/V, occasional reflux, takes protonix 40mg. Appetite is okay, eats one good meal/day. Feels mood is much better, continues on buspar bid, taking ativan PRN, and mirtazapine. However, c/o low sex drive, and wondering if mood medications causing this. Today we discussed possible medication rotation to see if this helps. Energy levels are fair, walking to the park, walks on a track.     Pain Assessment:  Pain Score:  6  Location:  rib cage  Education:      Symptom Assessment:  Pain:somewhat  Headache: none  Dizziness:none  Lack of energy: a little  Difficulty sleeping: a little  Worrying: a little  Anxiety: a little  Depression: a little  Pain in mouth/swallowing: none  Dry mouth: none  Taste changes: none  Shortness of breath: none  Lack of appetite: a little   Nausea: none  Vomiting: none  Constipation: none  Diarrhea: none  Sore muscles: none  Numbness or tingling in hands/feet/other: a little  Weight loss: none      Information obtained from: interview of patient, interview of family   ______________________________________________________________________        Objective            PHYSICAL EXAMINATION   Vital Signs:   Vital signs reviewed  Visit Vitals  /89 (BP Location: Right  arm, Patient Position: Sitting, BP Cuff Size: Adult)   Pulse 76   Temp 36.7 °C (98.1 °F) (Temporal)   Resp 18        Pain Score:   6    Physical Exam  Vitals reviewed.   Constitutional:       Appearance: Normal appearance.   HENT:      Head: Normocephalic.   Cardiovascular:      Rate and Rhythm: Normal rate.   Pulmonary:      Effort: Pulmonary effort is normal.   Abdominal:      Palpations: Abdomen is soft.   Musculoskeletal:         General: Normal range of motion.   Skin:     General: Skin is warm and dry.   Neurological:      General: No focal deficit present.      Mental Status: She is alert and oriented to person, place, and time.   Psychiatric:         Mood and Affect: Mood is not anxious.         Judgment: Judgment normal.       ASSESSMENT/PLAN    Pain  Pain is: cancer related pain  Type: visceral and neuropathic  Pain control: sub-optimally controlled  Home regimen:   -discussed starting long acting pain medication for more consistent pain control, pt declines at this time   -continue gabapentin 600/300/600 tid.   -continue flexeril 10mg bid PRN.   -continue percocet 5/325 q6hrs PRN. Rx sent today. Not taking often d/t concern for constipation. Discussed importance of taking this medication for pain if needed, can adjust bowel regimen PRN.   -continue THC PRN  Intolerances/previously tried:     Opioid Use  Medication Management:   - OARRS report reviewed with no aberrant behavior; consistent with  prescriptions/records and patient history  - MED 0.  Overdose Risk Score 200.   This has been discussed with patient.   - We will continue to closely monitor the patient for signs of prescription misuse including UDS, OARRS review and subjective reports at each visit.  - concurrent benzodiazepine use with ativan, educated pt on medication safety when used in combination with opiates    - I am a provider who either is or has consulted and collaborated with a provider certified in Hospice and Palliative Medicine  and have conducted a face-face visit and examination for this patient.  - Routine Urine Drug Screen completed deferred (MED <90)  appropriately positive for opioids and negative for illicit substances  - Controlled Substance Agreement completed deferred (MED <90)   - Specifically discussed that controlled substance prescriptions will only be provided by our group as outlined in the completed agreement  - Prescribed naloxone deferred (MED <90)   - Red Flags: history of ETOH misuse, in recovery for 20 years.      Nausea/ Reflux   Intermittent nausea without vomiting related to chemotherapy   -continue zofran 8mg q8hrs PRN, not currently taking this medication  -continue prilosec 40mg bid.      Constipation   At risk for constipation related to opioids,  currently not constipated   Usual bowel pattern: daily   Current regimen:   -educated pt on importance of maintaining regular bowel schedule  -continue colace 100mg bid PRN for hard stools  -continue senna 8.6mg, 1-2tabs, 1-2x/day  -continue MOM 15mL 4x/day PRN     Altered Mood  Acute on chronic anxiety and depression related to health concerns   controlled with home regimen, experiencing sexual dysfunction, discussed medication rotation  Current regimen:   -decrease buspar to 15mg bid. Rx sent today. Plan to continue to wean this medication, to see if sexual dysfunction r/t this medication.     --plan to possibly rotate to wellbutrin, was on many years ago, willing to try again  -continue ativan 0.5mg, 1-2 tabs q8hrs PRN. Not taking often, makes her tired.   -continue mirtazapine 7.5mg at bedtime. Rx sent today.   Intolerances/previously tried:  -effexor, caused low libido  -wellbutrin, did not like this medication     Decreased appetite  Related to malignancy, chemotherapy, and disease process  Current regimen:    -encouraged smaller, more frequent meals through out the day  -encouraged use of supplements such as Boost, Ensure, etc.    -continue mirtazapine 7.5mg at  bedtime.     Supportive Interventions:     Supportive and Palliative Oncology encounter:  Emotional support provided  Coordination of care  We will continue to follow and address symptoms as needed    Advance Directives  Existence of Advance Directives:Yes, but NOT documented in medical record  Decision maker: KALPANA Potter (daughter): [phone]  Code Status: Full code, would not want life support for prolonged time period         Next Follow-Up Visit:  Return to clinic in 3 weeks     Signature and billing  Medical complexity was moderate level due to due to complexity of problems, extensive data review, and high risk of management/treatment.  Time was spent on the following: Prep Time, Time Directly with Patient/Family/Caregiver, Documentation Time. Total time spent: 30 mins      Data  Diagnostic tests and information reviewed for today's visit:  Most recent labs, Medications         Some elements copied from my note on 3/4/25, the elements have been updated and all reflect current decision making from today, 4/1/2025.      Plan of Care discussed with: Patient, daughter     SIGNATURE: DUTCH Edwards-CNP    Contact information:  Supportive and Palliative Oncology  Monday-Friday 8 AM-5 PM  Phone:  [phone], press option #5, then option #1.   Or Epic Secure Chat

## 2025-04-24 ENCOUNTER — TELEMEDICINE (OUTPATIENT)
Dept: PALLIATIVE MEDICINE | Facility: CLINIC | Age: 65
End: 2025-04-24
Payer: COMMERCIAL

## 2025-04-24 DIAGNOSIS — Z51.81 ENCOUNTER FOR MONITORING OPIOID MAINTENANCE THERAPY: ICD-10-CM

## 2025-04-24 DIAGNOSIS — F32.A ANXIETY AND DEPRESSION: ICD-10-CM

## 2025-04-24 DIAGNOSIS — M79.2 NEUROPATHIC PAIN: ICD-10-CM

## 2025-04-24 DIAGNOSIS — Z51.5 PALLIATIVE CARE ENCOUNTER: Primary | ICD-10-CM

## 2025-04-24 DIAGNOSIS — Z79.891 ENCOUNTER FOR MONITORING OPIOID MAINTENANCE THERAPY: ICD-10-CM

## 2025-04-24 DIAGNOSIS — F41.9 ANXIETY AND DEPRESSION: ICD-10-CM

## 2025-04-24 DIAGNOSIS — G89.3 CANCER RELATED PAIN: ICD-10-CM

## 2025-04-24 PROCEDURE — 99213 OFFICE O/P EST LOW 20 MIN: CPT | Performed by: NURSE PRACTITIONER

## 2025-04-24 NOTE — PROGRESS NOTES
SUPPORTIVE AND PALLIATIVE ONCOLOGY OUTPATIENT FOLLOW-UP      SERVICE DATE: 4/24/2025    Virtual or Telephone Consent    While technically available, the patient was unable or unwilling to consent to connect via audio/video telehealth technology; therefore, I performed this visit using a real-time audio only connection between Hayley Potter & DUTCH Edwards-CNP.  Verbal consent was requested and obtained from Hayley Potter on this date, 04/24/25 for a telehealth visit and the patient's location was confirmed at the time of the visit.     Cancer History  R lung CA dx 2020  -s/p R lobectomy April 2020  -currently on Pembro maintenance      Onc: Karissa  Pulm: Tom      Subjective   HISTORY OF PRESENT ILLNESS: Hayley Potter is a 64 y.o. female with Pmhx of COPD, GERD, anxiety, and R lung CA.      She presents to supportive oncology for follow up for symptom management.     Spoke with pt on the phone for FUV. Pain under rib always there, worse she bends over. Taking gabapentin and using THC to help with pain, has not been using percocet or flexeril. Moving bowels regularly. No N/V/reflux. Eating okay. Mood has been stable, doing well with decreased buspar dosing, would like to continue to try and wean, has seen some improvement in libido. Sleeping okay most nights. Planning to work out in her yard today.     Pain Assessment:  Pain Score:  6  Location:  rib cage  Education:      Symptom Assessment:  Pain:somewhat  Headache: none  Dizziness:none  Lack of energy: a little  Difficulty sleeping: a little  Worrying: a little  Anxiety: a little  Depression: a little  Pain in mouth/swallowing: none  Dry mouth: none  Taste changes: none  Shortness of breath: none  Lack of appetite: a little   Nausea: none  Vomiting: none  Constipation: none  Diarrhea: none  Sore muscles: none  Numbness or tingling in hands/feet/other: a little  Weight loss: none      Information obtained from: interview of patient, interview of  family   ______________________________________________________________________        Objective            PHYSICAL EXAMINATION not performed d/t phone visit.   Vital Signs:   Vital signs reviewed  There were no vitals taken for this visit.       Pain Score:   6      ASSESSMENT/PLAN    Pain  Pain is: cancer related pain  Type: visceral and neuropathic  Pain control: sub-optimally controlled  Home regimen:   -continue gabapentin 600/300/600 tid.   -continue flexeril 10mg bid PRN, using this sparingly, no Rx needed today.   -continue percocet 5/325 q6hrs PRN, using this sparingly, no Rx needed today.   -continue THC PRN  Intolerances/previously tried:     Opioid Use  Medication Management:   - OARRS report reviewed with no aberrant behavior; consistent with  prescriptions/records and patient history  - MED 0.  Overdose Risk Score 200.   This has been discussed with patient.   - We will continue to closely monitor the patient for signs of prescription misuse including UDS, OARRS review and subjective reports at each visit.  - concurrent benzodiazepine use with ativan, educated pt on medication safety when used in combination with opiates    - I am a provider who either is or has consulted and collaborated with a provider certified in Hospice and Palliative Medicine and have conducted a face-face visit and examination for this patient.  - Routine Urine Drug Screen completed deferred (MED <90)  appropriately positive for opioids and negative for illicit substances  - Controlled Substance Agreement completed deferred (MED <90)   - Specifically discussed that controlled substance prescriptions will only be provided by our group as outlined in the completed agreement  - Prescribed naloxone deferred (MED <90)   - Red Flags: history of ETOH misuse, in recovery for 20 years.      Nausea/ Reflux   Intermittent nausea without vomiting related to chemotherapy   -continue zofran 8mg q8hrs PRN, not currently taking this  medication  -continue prilosec 40mg bid.      Constipation   At risk for constipation related to opioids,  currently not constipated   Usual bowel pattern: daily   Current regimen:   -educated pt on importance of maintaining regular bowel schedule  -continue colace 100mg bid PRN for hard stools  -continue senna 8.6mg, 1-2tabs, 1-2x/day  -continue MOM 15mL 4x/day PRN     Altered Mood  Acute on chronic anxiety and depression related to health concerns   controlled with home regimen, experiencing sexual dysfunction, discussed medication rotation  Current regimen:   -decrease buspar to 15mg bid. No Rx needed today. Has noticed increase in libido since decreasing dose, will plan to stay at 15mg bid if tolerates well.     --plan to possibly rotate to wellbutrin, was on many years ago, willing to try again  -continue ativan 0.5mg, 1-2 tabs q8hrs PRN. Not taking often, makes her tired.   -continue mirtazapine 7.5mg at bedtime.   Intolerances/previously tried:  -effexor, caused low libido  -wellbutrin, did not like this medication     Decreased appetite  Related to malignancy, chemotherapy, and disease process  Current regimen:    -encouraged smaller, more frequent meals through out the day  -encouraged use of supplements such as Boost, Ensure, etc.    -continue mirtazapine 7.5mg at bedtime.     Supportive Interventions:     Supportive and Palliative Oncology encounter:  Emotional support provided  Coordination of care  We will continue to follow and address symptoms as needed    Advance Directives  Existence of Advance Directives:Yes, but NOT documented in medical record  Decision maker: RAFITAOA is Jacqueline Potter (daughter): [phone]  Code Status: Full code, would not want life support for prolonged time period         Next Follow-Up Visit:  Return to clinic in 4 weeks     Signature and billing  Medical complexity was moderate level due to due to complexity of problems, extensive data review, and high risk of  management/treatment.  Time was spent on the following: Prep Time, Time Directly with Patient/Family/Caregiver, Documentation Time. Total time spent: 30 mins      Data  Diagnostic tests and information reviewed for today's visit:  Most recent labs, Medications         Some elements copied from my note on 4/1/25, the elements have been updated and all reflect current decision making from today, 4/24/2025.      Plan of Care discussed with: Patient    SIGNATURE: DUTCH Edwards-CNP    Contact information:  Supportive and Palliative Oncology  Monday-Friday 8 AM-5 PM  Phone:  [phone], press option #5, then option #1.   Or Epic Secure Chat

## 2025-04-30 ENCOUNTER — TELEPHONE (OUTPATIENT)
Dept: HEMATOLOGY/ONCOLOGY | Facility: CLINIC | Age: 65
End: 2025-04-30
Payer: COMMERCIAL

## 2025-04-30 DIAGNOSIS — G89.3 CANCER ASSOCIATED PAIN: ICD-10-CM

## 2025-04-30 DIAGNOSIS — F41.9 ANXIETY AND DEPRESSION: ICD-10-CM

## 2025-04-30 DIAGNOSIS — F32.A ANXIETY AND DEPRESSION: ICD-10-CM

## 2025-04-30 RX ORDER — LORAZEPAM 0.5 MG/1
.5-1 TABLET ORAL EVERY 8 HOURS PRN
Qty: 60 TABLET | Refills: 0 | Status: SHIPPED | OUTPATIENT
Start: 2025-04-30 | End: 2025-05-30

## 2025-04-30 RX ORDER — OXYCODONE AND ACETAMINOPHEN 5; 325 MG/1; MG/1
1 TABLET ORAL EVERY 6 HOURS PRN
Qty: 120 TABLET | Refills: 0 | Status: SHIPPED | OUTPATIENT
Start: 2025-04-30 | End: 2025-05-30

## 2025-04-30 NOTE — TELEPHONE ENCOUNTER
Reason for Conversation  Med Refill    Background   Patient last seen by DUTCH Licea on 4/24 with plan to continue percocet 5/325mg q6h PRN and ativan 0.5mg 1-2 tabs q8h PRN. Follow up visit is scheduled for 5/23. OARRS reviewed and no aberrancy noted. Patient last filled Ativan 0.5mg #60/10 days on 12/31 and percocet 5/325mg #28/7 days on 3/5.    Disposition  Prescriptions pended to provider to approve.

## 2025-04-30 NOTE — TELEPHONE ENCOUNTER
Reason for Conversation  call back  (They need a clarify on 4 different medication that Entract with each other and they can cause the patient to stop breathing  )      Disposition:  I spoke with the pharmacist and clarified that patient has tolerated these medications in the past and DUTCH Licea is aware of the risk.

## 2025-05-01 ENCOUNTER — HOSPITAL ENCOUNTER (OUTPATIENT)
Dept: RADIOLOGY | Facility: HOSPITAL | Age: 65
Discharge: HOME | End: 2025-05-01
Payer: COMMERCIAL

## 2025-05-01 DIAGNOSIS — C34.11 MALIGNANT NEOPLASM OF UPPER LOBE OF RIGHT LUNG (MULTI): ICD-10-CM

## 2025-05-01 PROCEDURE — 74177 CT ABD & PELVIS W/CONTRAST: CPT

## 2025-05-01 PROCEDURE — 2550000001 HC RX 255 CONTRASTS: Performed by: STUDENT IN AN ORGANIZED HEALTH CARE EDUCATION/TRAINING PROGRAM

## 2025-05-01 RX ADMIN — IOHEXOL 75 ML: 350 INJECTION, SOLUTION INTRAVENOUS at 10:37

## 2025-05-08 ENCOUNTER — EDUCATION (OUTPATIENT)
Dept: HEMATOLOGY/ONCOLOGY | Facility: CLINIC | Age: 65
End: 2025-05-08
Payer: COMMERCIAL

## 2025-05-08 ENCOUNTER — OFFICE VISIT (OUTPATIENT)
Dept: HEMATOLOGY/ONCOLOGY | Facility: CLINIC | Age: 65
End: 2025-05-08
Payer: COMMERCIAL

## 2025-05-08 VITALS
TEMPERATURE: 97 F | WEIGHT: 146.83 LBS | BODY MASS INDEX: 24.06 KG/M2 | OXYGEN SATURATION: 93 % | DIASTOLIC BLOOD PRESSURE: 79 MMHG | HEART RATE: 81 BPM | SYSTOLIC BLOOD PRESSURE: 115 MMHG | RESPIRATION RATE: 18 BRPM

## 2025-05-08 DIAGNOSIS — C34.11 MALIGNANT NEOPLASM OF UPPER LOBE OF RIGHT LUNG (MULTI): Primary | ICD-10-CM

## 2025-05-08 DIAGNOSIS — C34.11 MALIGNANT NEOPLASM OF UPPER LOBE OF RIGHT LUNG (MULTI): ICD-10-CM

## 2025-05-08 DIAGNOSIS — C34.91 PRIMARY MALIGNANT NEOPLASM OF RIGHT LUNG METASTATIC TO OTHER SITE (MULTI): ICD-10-CM

## 2025-05-08 DIAGNOSIS — Z00.6 EXAMINATION OF PARTICIPANT IN CLINICAL TRIAL: ICD-10-CM

## 2025-05-08 DIAGNOSIS — C79.51 MALIGNANT NEOPLASM METASTATIC TO BONE (MULTI): ICD-10-CM

## 2025-05-08 PROCEDURE — 99215 OFFICE O/P EST HI 40 MIN: CPT | Performed by: STUDENT IN AN ORGANIZED HEALTH CARE EDUCATION/TRAINING PROGRAM

## 2025-05-08 ASSESSMENT — PAIN SCALES - GENERAL: PAINLEVEL_OUTOF10: 3

## 2025-05-08 NOTE — PROGRESS NOTES
Patient here for follow up visit malignant neoplasm Lung, recieves trial [facility] Treatment.  Rafiq Delgadillo Cata available for visit.   First visit after completed treatment.   Feels a little more congested but not taking allergy meds at this this.  Muscle aches but admits to doing a lot of work in the yard.     Patient here with her daughter.  Noting some edema to BLE.    Medications and Allergies reviewed and reconciled this visit.    Follow up per MD request.    Patient reports availability and use of mychart, Reviewed this is a good place to communicate with the team as well as review labs and upcoming orders.      No barriers to education noted, patient agrees to current plan and verbalized understanding using teach back method.

## 2025-05-08 NOTE — RESEARCH NOTES
Research Note Follow Up Visit       Hayley Potter is here today for 3 month post tx follow up on [id number].  Follow up procedures completed per protocol. Patient is aware of continued follow up plan.      Education Documentation  Treatment Plan and Schedule, taught by Rafiq Delgado RN at 5/8/2025  4:56 PM.  Learner: Family, Patient  Readiness: Acceptance  Method: Explanation  Response: Verbalizes Understanding    Education Comments  No comments found.     Pt presented to clinic for [id number] follow up and scan review. Pt is feeling pretty today. Still has fatigue grade 1, insomnia grade 1, neuropathy grade 1, dyspnea and cough grade 1. She has a little bit of edema to lower ext grade 1 and still complains of feeling achy grade 1. Pt is aware of next scans and follow up. MD in to see pt and complete assessment .

## 2025-05-08 NOTE — PROGRESS NOTES
Patient ID: Hayley Potter is a 64 y.o. female.    CC:  Management of stage IA3 ([id number]) adenocarcinoma of RUL s/p RUL lobectomy 04/2020, PDL-1 50%, NGS positive for KRAS G12C--> now with  metastatic recurrence involving liver, 11th L rib, and mediastinal LNs. 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 She was initially found to have spiculated 2.9cm mass in the RUL on the CT chest on 03/02/2020. She underwent CT guided RUL biopsy with pathology showed lung tissue with scan and focal aypicla grandular proliferation  suspicious for adenocarcinoma. IHC positive for CK7, TTF1 and napsin A. CK20 negative. PET/CT on 04/15/2020 showed RUL hypermetabolic RUL mass. No evidence of distant metastases. She underwent RUL lobectomy with mediastinal LN dissection with Dr. Wheat  on 04/21/2020 which showed invasive well to moderately differentiated adenocarcinoma, tumor measuring 2.5x2.3.x1.7cm. No VPI or LVI. All margins were negative. LN (0/11).      Unfortunately, on the surveillance CT chest (+) on 08/24/2022, there was a new irregular marginated 6mm GUSTABO nodule, which increased in size significantly on the repeat CT chest (-) on 01/06/2023, measuring 12mm.  There is also an additional new 9mm nodule  in the L lung as well as new mediastinal and possible L hilar LAD. PET/CT on 02/16/2023 showed hypermetabolic L hilar and mediastinal LAD. 1st  GUSTABO nodule now 8mm likely inflammatory. 2nd GUSTABO nodule showed stable size with SUV 4.4. Soft tissue mass associated with  L 11th rib fracture, SUV 11.5. Mass within the liver SUV 13.1, suspicious for mets. Hypermetabolic activity also noted  in  the stomach fundus, association with a hiatal hernia, in the cecum and ascending colon without masses seen. She underwent US guided liver biopsy on 02/16/2023 - poorly-differentiated carcinoma.      Treatment Summary:  04/21/2020: RUL lobectomy with medistainal LN dissection (Dr. Wheat)  04/11/2023: C1 pembrolizumab 200mg IV (on trial)  05/02/2023: C2 Pembrolizumab 200mg IV (on trial)  05/23/2023: C3 Pembrolizumab 200mg IV (on trial)  06/13/2023: C4 Pembrolizumab 200mg IV (on trial)   07/05/2023: C5 Pembrolizumab 200mg IV (on trial)   07/26/2023: C6 Pembrolizumab 200mg IV (on trial)   08/16/2023: C7 Pembrolizumab 200mg IV (on trial)   09/07/2023: C8 Pembrolizumab 200mg IV (on trial)   09/28/2023: C9 Pembrolizumab 200mg IV (on trial)   10/19/2023: C10 Pembrolizumab 200mg IV (on trial)  11/09/2023: C11 Pembrolizumab 200mg IV (on trial)  11/30/2023: C12 Pembrolizumab 200mg IV (on trial)  12/21/2023: C13 Pembrolizumab 200mg IV (on trial)  01/11/2024: C14 Pembrolizumab 200mg IV (on trial)  02/01/2024: C15 Pembrolizumab 200mg IV (on trial)  02/22/2024: C16 Pembrolizumab 200mg IV (on trial)  03/14/2024: C17 Pembrolizumab 200mg IV (on trial)  04/04/2024: C18 Pembrolizumab 200mg IV (on trial)  04/25/2024: C19 Pembrolizumab 200mg IV (on trial)  05/16/2024: C20 Pembrolizumab 200mg IV (on trial)  06/06/2024: C21 Pembrolizumab 200mg IV (on trial)  06/27/2024: C22 Pembrolizumab 200mg IV (on trial)  07/15/2024: ED visit right foot contusion   07/18/2024: C23 Pembrolizumab 200mg IV (on trial)  08/08/2024: C24 Pembrolizumab 200mg IV (on trial)  08/29/2024: C25 Pembrolizumab 200mg IV (on trial)  09/19/2024: C26 Pembrolizumab 200mg IV (on trial)  10/10/2024: C27 Pembrolizumab 200mg IV (on trial)  10/31/2024: C28 Pembrolizumab 200mg IV (on trial)  11/21/2024: C29 Pembrolizumab 200mg IV (on trial)  12/12/2024: C30 Pembrolizumab 200mg IV (on trial)  01/02/2025: C31 Pembrolizumab 200mg IV (on trial)  01/23/2025: C32 Pembrolizumab 200mg  IV (on trial)  02/13/2025: C33 Hold tx ureteritis or cystitis vs UTI - prednisone started cycle is not made up per trial.   03/06/2025: C34 Pembrolizumab 200mg IV (on trial)  03/27/2025: C35 Pembrolizumab 200mg IV (on trial) - Final Dose    Interval History (05/08/2025):    Patient present in clinic for one week follow-up evaluation. Her daughter is present for visit. No further urinary complaints or flank pain. No fevers, chills or night sweats. Stable chronic cough. No chest pain or shortness of breath. No nausea or vomiting. No constipation or diarrhea. No urinary symptoms. No rash. No neuropathy.     Review of Systems:  A review of systems has been completed and are negative for complaints except what is stated in the HPI and/or past medical history     PMHx: vocal cord cancer, GERD and COPD, OA  PSHx: tonsillectomy, tubal ligation, lumpectomy, L ankle and R hand surgery  FHx: strong family history of breast cancer including male breast cancer.   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 Then worked as a  since 2005. She quit etoh 2010. She smokes Marijuana every day.     Allergies:  Cephalexin    Medications:    Current Outpatient Medications:     alendronate (Fosamax) 70 mg tablet, Take 1 tablet (70 mg) by mouth every 7 days. Take in the morning with a full glass of water, on an empty stomach, and do not take anything else by mouth or lie down for the next 30 min., Disp: , Rfl:     azelastine HCl (AZELASTINE NASL), Administer 0.15 % into affected nostril(s) 2 times a day as needed., Disp: , Rfl:     budesonide-formoteroL (Symbicort) 160-4.5 mcg/actuation inhaler, Rinse mouth with water after use to reduce aftertaste and incidence of candidiasis. Do not swallow., Disp: , Rfl:     cholecalciferol (Vitamin D-3) 50,000 unit capsule, Take 1 capsule (50,000 Units) by mouth 1 (one) time per week., Disp: , Rfl:     diphenhydramine HCl (BENADRYL ALLERGY ORAL), , Disp: , Rfl:     gabapentin (Neurontin)  600 mg tablet, Take 1 tablet (600 mg) by mouth 2 times a day., Disp: , Rfl:     LORazepam (Ativan) 0.5 mg tablet, Take 1-2 tablets (0.5-1 mg) by mouth every 8 hours if needed for anxiety (nausea)., Disp: 60 tablet, Rfl: 0    multivitamin with minerals tablet, Take 1 tablet by mouth once daily., Disp: , Rfl:     omeprazole (PriLOSEC) 40 mg DR capsule, TAKE 1 CAPSULE BY MOUTH 30 MINUTES BEFORE MORNING MEAL TWICE A DAY, Disp: , Rfl:     oxyCODONE-acetaminophen (Percocet) 5-325 mg tablet, Take 1 tablet by mouth every 6 hours if needed for severe pain (7 - 10)., Disp: 120 tablet, Rfl: 0    solifenacin (VESIcare) 5 mg tablet, Take 1 tablet (5 mg) by mouth once daily. Swallow tablet whole; do not crush, chew, or split., Disp: 30 tablet, Rfl: 5    tiotropium (Spiriva Respimat) 2.5 mcg/actuation inhaler, INHALE TWO PUFFS BY MOUTH EVERY DAY, Disp: , Rfl:     busPIRone (Buspar) 15 mg tablet, Take 1 tablet (15 mg) by mouth 2 times a day., Disp: 60 tablet, Rfl: 3    cyclobenzaprine (Flexeril) 10 mg tablet, Take 1 tablet (10 mg) by mouth 2 times a day as needed for muscle spasms., Disp: 60 tablet, Rfl: 3    levothyroxine (Synthroid, Levoxyl) 75 mcg tablet, Take 1 tablet (75 mcg) by mouth once daily., Disp: 90 tablet, Rfl: 1    mirtazapine (Remeron) 7.5 mg tablet, Take 1 tablet (7.5 mg) by mouth once daily at bedtime., Disp: 30 tablet, Rfl: 2    PEMBROLIZUMAB IV, Infuse into a venous catheter. (Patient not taking: Reported on 5/8/2025), Disp: , Rfl:   No current facility-administered medications for this visit.    Facility-Administered Medications Ordered in Other Visits:     acetaminophen (Tylenol) tablet 650 mg, 650 mg, oral, PRN, Kayla Cook MD MPH    albuterol 2.5 mg /3 mL (0.083 %) nebulizer solution 3 mL, 3 mL, nebulization, PRN, Kayla Cook MD MPH    dextrose 5 % in water (D5W) bolus, 500 mL, intravenous, PRN, Kayla Cook MD MPH    diphenhydrAMINE (BENADryl) capsule 50 mg, 50 mg, oral, PRN, Kayla Cook MD MPH    diphenhydrAMINE  (BENADryl) injection 50 mg, 50 mg, intravenous, PRN, Kayla Cook MD MPH    EPINEPHrine (Epipen) injection syringe 0.3 mg, 0.3 mg, intramuscular, q5 min PRN, Kayla Cook MD MPH    famotidine PF (Pepcid) injection 20 mg, 20 mg, intravenous, Once PRN, Kayla Cook MD MPH    methylPREDNISolone sod succinate (SOLU-Medrol) 40 mg/mL injection 40 mg, 40 mg, intravenous, PRN, Kayla Cook MD MPH    prochlorperazine (Compazine) injection 10 mg, 10 mg, intravenous, q6h PRN, Kayla Cook MD MPH    prochlorperazine (Compazine) tablet 10 mg, 10 mg, oral, q6h PRN, Kayla Cook MD MPH    sodium chloride 0.9 % bolus 500 mL, 500 mL, intravenous, PRN, Kayla Cook MD MPH        Vital Signs:  /79 (BP Location: Right arm, Patient Position: Sitting, BP Cuff Size: Adult long)   Pulse 81   Temp 36.1 °C (97 °F) (Temporal)   Resp 18   Wt 66.6 kg (146 lb 13.2 oz)   SpO2 93%   BMI 24.06 kg/m²     Wt Readings from Last 5 Encounters:   25 66.6 kg (146 lb 13.2 oz)   25 66.7 kg (146 lb 15 oz)   25 64.9 kg (143 lb 1.3 oz)   25 65.2 kg (143 lb 10.1 oz)   25 65.5 kg (144 lb 4.7 oz)       Physical Exam:  ECO  Pain: +left rib pain - improving   Constitutional: Well developed, awake/alert/oriented x3, no distress, alert and cooperative  Eyes: PER. sclera anicteric  ENMT: Oral mucosa moist. No lesions or thrush   Respiratory/Thorax: Breathing is non-labored. Lungs are clear to auscultation bilaterally. No adventitious breath sounds. Hx RUL lobectomy 2020  Cardiovascular: S1-S2. Regular rate and rhythm. No murmurs, rubs, or gallops appreciated  Gastrointestinal: Abdomen soft nontender, nondistended, normal active bowel sounds.   Musculoskeletal: ROM intact, no joint swelling, normal strength  Extremities: normal extremities, no cyanosis, no edema, no clubbing  Lymphatics: no palpable lymphadenopathy  Neurologic: alert and oriented x3. Nonfocal exam. No myoclonus  Psychological: Pleasant, appropriate and easily engaged        Results:     Labs  Lab Results   Component Value Date    WBC 8.7 03/26/2025    HGB 14.3 03/26/2025    HCT 42.4 03/26/2025    MCV 94 03/26/2025     (H) 03/26/2025      Lab Results   Component Value Date    NEUTROABS 5.43 03/26/2025      Lab Results   Component Value Date    GLUCOSE 96 03/26/2025    CALCIUM 10.0 03/26/2025     03/26/2025    K 3.9 03/26/2025    CO2 25 03/26/2025     03/26/2025    BUN 11 03/26/2025    CREATININE 0.89 03/26/2025    MG 1.89 10/31/2024     Lab Results   Component Value Date    ALT 16 03/26/2025    AST 21 03/26/2025    ALKPHOS 69 03/26/2025    BILITOT 0.4 03/26/2025      Lab Results   Component Value Date    ACTH 10.6 08/16/2023    CORTISOL 12.3 08/16/2023    TSH 0.85 03/04/2025    FREET4 0.52 (L) 08/15/2023       Imaging   Reviewed see below.        Assessment/Plan:  64 y.o. 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     The patient's records and imaging have been reviewed in detail.  MD discussed with the patient the natural history of their disease at length. The following has also been discussed with the patient:     # Cancer: recurrent likely metastatic ([id number]) lung cancer: liver biopsy showed extensive necrosis. Liquid biopsy showed KRAS G12C mutation, which was identified in her original surgical resected  specimen. PDL-1 50%. Patient initially had stage IA2 ([id number]) RUL lung  adenocarcinoma s/p RUL lobectomy, unfortunately now likely metastatic recurrence. Liver biopsy on 2/16 showed poorly differentiated malignant neoplasm with extensive necrosis. We discussed treatment  options with her today with either standard of care pembrolizumab vs clinical trials. Patient expressed interests in enrolling into clinical trial. Enrolled into RCT [id number]. Doing well.      - Imaging : 5/1/2025 CT CAP (+):Moderate to  large-sized hiatal hernia which is new when compared to the previous study. Otherwise, unchanged findings as detailed above including multifocal areas of scarring in the lungs, most conspicuous in the left upper lobe, as well as a few nodules measuring 5 mm or less in size. No new pulmonary nodules.      # Hypothyroidism: G2. Likely 2/2 immunotherapy - resolved.  TSH 1.07 11/8/23.  Continue levothyroxine 75mcg daily.      # Microcytic anemia 2/2 due to chronic disease and hiatal hernia: patient denies any hematochezia  or hematuria. No melena. hx of hiatal hernia. Per patient. Last EGD was done in 2021 and was normal. Colonoscopy was a few years ago.  EGD showed hiatal hernia 03/2023. Improved Hgb overall. Iron studies show low-normal ferritin and TSAT of 14%. Continue PO iron 3x/week. Tolerates well. Stable.  12/12/2024 Anemia resolved. Have decreased oral iron from BID to every day will now discontinue oral iron. Iron discontinued in December 2024    # Mucositis (chronic).  Pt reports since start of immunotherapy.  Chronic sore secondary to ill fitting dentures recommended s&s rinse.  Continue Anbesol and/or Oragel for pain.  Will continue to monitor.  BMX added.  2/1/24  - resolved Working with oral surgeon. Expects 3 upcoming extractions and 3 crowns (November)      # Constipation: managed well with stool softener and PRN lactulose.       # Access: Peripheral veins.     # Pain: she has a displaced 8th Rib on the left side -Advised patient to take percocet daily and decrease Advil use. PRN Percocet Rx in place.  Rx last sent 12/21/23. Currently managed with PRN gummies.  New pain on the R upper back, controlled with percocet. Will try lidocaine patch OTC. 6/27:  Pain currently managed with PRN Advil, Aspercreme, and gummies.  Routine Gabapentin for neuropathy pain.   Pt verbalized desire to reduce # of medications taken.  Wishes to reduce Gabapentin dosage.   Supportive onc referral placed for assistance.   7/18:   Bruise to buttock, pain 3-4/10.  Increased right flank/rib pain r/t fall, pain 3/10.  Managed with Advil 600mg TID.  :  Right flank pain 2/10.  Managed with PRN interventions.      # Anxiety:  Anxiety reported d/t recent death of her father, upcoming .  Pt requested 1x Valium dose, previous 10mg admin.  OARRS reviewed. Provided Rx for Valium 5mg x3 doses.    No following with palliative care  Started on Mirtazapine 7.5mg at HS (libido has now been an issue)     # Bone Health: L fifth ribs lesion stable.    # Arthralgia (grade 1).  Uric acid lab (-).  Intermittent arthralgia to left hand.  Continue PRN Advil, Aspercreme or Percocet (for severe pain).      # Left rib or chest wall pain over the past month. 23 CXR - RESULTS: Mediastinal surgical clips are noted. The cardiac silhouette is within normal limits. Mediastinal contours are unremarkable. The lungs demonstrate no infiltrate or atelectasis. There is no evidence for pleural effusion. Remote fracture is noted involving the right 7th rib. There are degenerative changes of the thoracic spine.  - Continue PRN Percocet and/or gummies for pain control.      # GI/CINV: No acute issues.  Eating and voiding well.  Nutrition consult available.    # Insomnia:  Both difficulty falling and staying asleep.  Recommended pt can trial Melatonin.  Start with 3mg dose.  Max dose 10mg.  Restless contributes     # Urinary Symptoms: symptoms began 2025. CT scan 2/10/2025 as above. Saw Dr Dennis in urology on 2025. Urine PCR negative although interestingly UA +.  Ureteritis or cystitis vs UTI. Will start prednisone 1mg/kg and taper down by 10mg every 4th day. Continue PPI for stomach protection.   She did not yet start Bactrim DS, confusion with pharmacy/order. I have spoken to pharmacist We will have her take Bactrim DS BID for 5 days (UTI) followed by BID -W- for PCP prophylaxis until steroids completed.   Will see weekly during steroid taper.    Decreasing steroid every 4th day in effort to get pt down to 10mg by March 6th   3/6/2025 prednisone 10mg. 3/27/2025 completed taper     # Smoking: former smoker     # Fertility: N/A.        # Heme/ESAs: N/A.     # GOC: Patient is aware of palliative intent goals of care.     # Language: English.    # Health Maintenance:   Patient plans to get flu and RSV vaccine   Mammogram completed 12/2024    # Dispo:   RTC in 3 months

## 2025-05-12 ENCOUNTER — APPOINTMENT (OUTPATIENT)
Dept: UROLOGY | Facility: CLINIC | Age: 65
End: 2025-05-12
Payer: COMMERCIAL

## 2025-05-12 DIAGNOSIS — N32.81 OVERACTIVE BLADDER: Primary | ICD-10-CM

## 2025-05-12 DIAGNOSIS — N28.89 URETERITIS: ICD-10-CM

## 2025-05-12 PROCEDURE — 99214 OFFICE O/P EST MOD 30 MIN: CPT | Performed by: STUDENT IN AN ORGANIZED HEALTH CARE EDUCATION/TRAINING PROGRAM

## 2025-05-12 NOTE — PROGRESS NOTES
Subjective   Patient ID: Hayley Potter is a 65 y.o. female.      HPI  65 y.o. female presents for a follow up visit in regards to mild bilateral hydronephrosis with urothelial thickening. She was experiencing dysuria, bilateral flank pain and a low grade fever the days prior to her last visit.     She denies bilateral flank pain. She denies major urinary symptoms. She is not experiencing any of the symptoms she previously was.     Her last treatment of Keytruda in March 2025.     She continues to take the Vesicare 5 mg daily.     CT CHEST ABDOMEN PELVIS W IV CONTRAST; 5/1/2025   KIDNEYS, URETERS, AND BLADDER:  The kidneys enhance with contrast material symmetrically. There is no  evidence of hydronephrosis.  The urinary bladder appears grossly  unremarkable.  IMPRESSION:  Moderate to large-sized hiatal hernia which is new when compared to  the previous study. Otherwise, unchanged findings as detailed above  including multifocal areas of scarring in the lungs, most conspicuous  in the left upper lobe, as well as a few nodules measuring 5 mm or  less in size. No new pulmonary nodules.    Review of Systems  A complete review of systems was performed. All systems are noted to be negative unless indicated in the history of present illness, impression, active problem list, or past histories.     Objective   Physical Exam  No CVA tenderness bilaterally    Assessment/Plan   Diagnoses and all orders for this visit:  Overactive bladder  Ureteritis      65 y.o. female presents for a follow up visit in regards to mild bilateral hydronephrosis with urothelial thickening. She was experiencing dysuria, bilateral flank pain and a low grade fever the days prior to her last visit.     Overall, the patient is doing well. She is not experiencing pain or any other major urinary symptoms.     I personally reviewed the CT chest, abdomen and pelvis on 05/01/2025. The results indicate no evidence of hydronephrosis or a hydroureter. There is  no requirement to follow up with another set of imaging studies as her symptoms have resolved and her imaging was unremarkable.     We discussed that she is able to decrease the amount of Vesicare she is taking. She is able to take the Vesicare 5 mg every other day to titrate. If symptoms do not persist she can discontinue the medication completely. If her symptoms do persist she can continue the medication.     Plan:  Reassure about resolution of ureteritis on CT scan  Vesicare 5 mg every other day, titrate based upon symptoms and stop completely if symptoms do not recur  FUV as needed        Scribe Attestation   By signing my name below, I, Talisha Sigala, Scribe attestation that this documentation has been prepared under the direction and in the presence of Charmaine Dennis MD.

## 2025-05-23 ENCOUNTER — TELEMEDICINE (OUTPATIENT)
Dept: PALLIATIVE MEDICINE | Facility: CLINIC | Age: 65
End: 2025-05-23
Payer: COMMERCIAL

## 2025-05-23 DIAGNOSIS — M79.2 NEUROPATHIC PAIN: ICD-10-CM

## 2025-05-23 DIAGNOSIS — G62.9 NEUROPATHY: ICD-10-CM

## 2025-05-23 DIAGNOSIS — G89.3 CANCER RELATED PAIN: ICD-10-CM

## 2025-05-23 DIAGNOSIS — Z51.5 PALLIATIVE CARE ENCOUNTER: Primary | ICD-10-CM

## 2025-05-23 NOTE — PROGRESS NOTES
SUPPORTIVE AND PALLIATIVE ONCOLOGY OUTPATIENT FOLLOW-UP      SERVICE DATE: 5/23/2025    Virtual or Telephone Consent    While technically available, the patient was unable or unwilling to consent to connect via audio/video telehealth technology; therefore, I performed this visit using a real-time audio only connection between Hayley Potter & DUTCH Edwards-CNP.  Verbal consent was requested and obtained from Hayley Potter on this date, 05/23/25 for a telehealth visit and the patient's location was confirmed at the time of the visit.     Cancer History  R lung CA dx 2020  -s/p R lobectomy April 2020  -currently on Pembro maintenance      Onc: Karissa  Pulm: Tom      Subjective   HISTORY OF PRESENT ILLNESS: Hayley Potter is a 65 y.o. female with Pmhx of COPD, GERD, anxiety, and R lung CA.      She presents to supportive oncology for follow up for symptom management.     Spoke with pt on the phone for FUV. Pain under rib always there, worse she bends over. Taking gabapentin and using THC to help with pain, has not been using percocet or flexeril. Moving bowels regularly. No N/V/reflux. Eating okay. Mood has been stable, doing well with decreased buspar dosing, would like to continue to try and wean, has seen some improvement in libido. Sleeping okay most nights. Planning to work out in her yard today.     Pain Assessment:  Pain Score:  6  Location:  rib cage  Education:      Symptom Assessment:  Pain:somewhat  Headache: none  Dizziness:none  Lack of energy: a little  Difficulty sleeping: a little  Worrying: a little  Anxiety: a little  Depression: a little  Pain in mouth/swallowing: none  Dry mouth: none  Taste changes: none  Shortness of breath: none  Lack of appetite: a little   Nausea: none  Vomiting: none  Constipation: none  Diarrhea: none  Sore muscles: none  Numbness or tingling in hands/feet/other: a little  Weight loss: none      Information obtained from: interview of patient, interview of  family   ______________________________________________________________________        Objective     {If you would like to pull in Imaging results, type .imgrslt :99}       PHYSICAL EXAMINATION not performed d/t phone visit.   Vital Signs:   Vital signs reviewed  There were no vitals taken for this visit.       Pain Score:   6      ASSESSMENT/PLAN    Pain  Pain is: cancer related pain  Type: visceral and neuropathic  Pain control: sub-optimally controlled  Home regimen:   -continue gabapentin 600/300/600 tid.   -continue flexeril 10mg bid PRN, using this sparingly, no Rx needed today.   -continue percocet 5/325 q6hrs PRN, using this sparingly, no Rx needed today.   -continue THC PRN  Intolerances/previously tried:     Opioid Use  Medication Management:   - OARRS report reviewed with no aberrant behavior; consistent with  prescriptions/records and patient history  - MED 0.  Overdose Risk Score 200.   This has been discussed with patient.   - We will continue to closely monitor the patient for signs of prescription misuse including UDS, OARRS review and subjective reports at each visit.  - concurrent benzodiazepine use with ativan, educated pt on medication safety when used in combination with opiates    - I am a provider who either is or has consulted and collaborated with a provider certified in Hospice and Palliative Medicine and have conducted a face-face visit and examination for this patient.  - Routine Urine Drug Screen completed deferred (MED <90)  appropriately positive for opioids and negative for illicit substances  - Controlled Substance Agreement completed deferred (MED <90)   - Specifically discussed that controlled substance prescriptions will only be provided by our group as outlined in the completed agreement  - Prescribed naloxone deferred (MED <90)   - Red Flags: history of ETOH misuse, in recovery for 20 years.      Nausea/ Reflux   Intermittent nausea without vomiting related to chemotherapy    -continue zofran 8mg q8hrs PRN, not currently taking this medication  -continue prilosec 40mg bid.      Constipation   At risk for constipation related to opioids,  currently not constipated   Usual bowel pattern: daily   Current regimen:   -educated pt on importance of maintaining regular bowel schedule  -continue colace 100mg bid PRN for hard stools  -continue senna 8.6mg, 1-2tabs, 1-2x/day  -continue MOM 15mL 4x/day PRN     Altered Mood  Acute on chronic anxiety and depression related to health concerns   controlled with home regimen, experiencing sexual dysfunction, discussed medication rotation  Current regimen:   -decrease buspar to 15mg bid. No Rx needed today. Has noticed increase in libido since decreasing dose, will plan to stay at 15mg bid if tolerates well.     --plan to possibly rotate to wellbutrin, was on many years ago, willing to try again  -continue ativan 0.5mg, 1-2 tabs q8hrs PRN. Not taking often, makes her tired.   -continue mirtazapine 7.5mg at bedtime.   Intolerances/previously tried:  -effexor, caused low libido  -wellbutrin, did not like this medication     Decreased appetite  Related to malignancy, chemotherapy, and disease process  Current regimen:    -encouraged smaller, more frequent meals through out the day  -encouraged use of supplements such as Boost, Ensure, etc.    -continue mirtazapine 7.5mg at bedtime.     Supportive Interventions:     Supportive and Palliative Oncology encounter:  Emotional support provided  Coordination of care  We will continue to follow and address symptoms as needed    Advance Directives  Existence of Advance Directives:Yes, but NOT documented in medical record  Decision maker: RAFITAOA is Jacqueline Potter (daughter): [phone]  Code Status: Full code, would not want life support for prolonged time period         Next Follow-Up Visit:  Return to clinic in 4 weeks     Signature and billing  Medical complexity was moderate level due to due to complexity of  problems, extensive data review, and high risk of management/treatment.  Time was spent on the following: Prep Time, Time Directly with Patient/Family/Caregiver, Documentation Time. Total time spent: 30 mins      Data  Diagnostic tests and information reviewed for today's visit:  Most recent labs, Medications         Some elements copied from my note on 4/1/25, the elements have been updated and all reflect current decision making from today, 5/23/2025.      Plan of Care discussed with: Patient    SIGNATURE: DUTCH Edwards-CNP    Contact information:  Supportive and Palliative Oncology  Monday-Friday 8 AM-5 PM  Phone:  [phone], press option #5, then option #1.   Or Epic Secure Chat

## 2025-06-09 ENCOUNTER — TELEPHONE (OUTPATIENT)
Dept: HEMATOLOGY/ONCOLOGY | Facility: CLINIC | Age: 65
End: 2025-06-09
Payer: COMMERCIAL

## 2025-06-09 NOTE — TELEPHONE ENCOUNTER
I spoke with the pharmacist and made her aware that DUTCH Licea is aware patient is on these medications.

## 2025-06-09 NOTE — TELEPHONE ENCOUNTER
Reason for Conversation  medication clarification     Background   [facility] in [facility] called and said they needs clarification on the Desiree Combo -lorazapam, flexeril, percocet- Please ask for Amanda or Cyndy the pharmacist. Thank you.     Disposition   No disposition on file.

## 2025-07-10 ENCOUNTER — TELEMEDICINE (OUTPATIENT)
Dept: PALLIATIVE MEDICINE | Facility: CLINIC | Age: 65
End: 2025-07-10
Payer: MEDICARE

## 2025-07-10 ENCOUNTER — APPOINTMENT (OUTPATIENT)
Dept: SURGERY | Facility: CLINIC | Age: 65
End: 2025-07-10
Payer: COMMERCIAL

## 2025-07-10 VITALS — WEIGHT: 146 LBS | BODY MASS INDEX: 24.32 KG/M2 | HEIGHT: 65 IN

## 2025-07-10 DIAGNOSIS — Z51.5 PALLIATIVE CARE ENCOUNTER: Primary | ICD-10-CM

## 2025-07-10 DIAGNOSIS — K44.9 DIAPHRAGMATIC HERNIA WITHOUT OBSTRUCTION AND WITHOUT GANGRENE: ICD-10-CM

## 2025-07-10 DIAGNOSIS — K59.03 THERAPEUTIC OPIOID INDUCED CONSTIPATION: ICD-10-CM

## 2025-07-10 DIAGNOSIS — G89.3 CANCER RELATED PAIN: ICD-10-CM

## 2025-07-10 DIAGNOSIS — F41.9 ANXIETY: ICD-10-CM

## 2025-07-10 DIAGNOSIS — K21.00 GASTROESOPHAGEAL REFLUX DISEASE WITH ESOPHAGITIS WITHOUT HEMORRHAGE: Primary | ICD-10-CM

## 2025-07-10 DIAGNOSIS — M79.2 NEUROPATHIC PAIN: ICD-10-CM

## 2025-07-10 DIAGNOSIS — T40.2X5A THERAPEUTIC OPIOID INDUCED CONSTIPATION: ICD-10-CM

## 2025-07-10 PROCEDURE — 3008F BODY MASS INDEX DOCD: CPT | Performed by: SURGERY

## 2025-07-10 PROCEDURE — 99213 OFFICE O/P EST LOW 20 MIN: CPT | Performed by: NURSE PRACTITIONER

## 2025-07-10 PROCEDURE — 99204 OFFICE O/P NEW MOD 45 MIN: CPT | Performed by: SURGERY

## 2025-07-10 RX ORDER — MIRTAZAPINE 7.5 MG/1
7.5 TABLET, FILM COATED ORAL NIGHTLY
Qty: 30 TABLET | Refills: 3 | Status: SHIPPED | OUTPATIENT
Start: 2025-07-10 | End: 2025-08-09

## 2025-07-10 ASSESSMENT — ENCOUNTER SYMPTOMS
APPETITE CHANGE: 0
CHEST TIGHTNESS: 1
CHILLS: 0
ACTIVITY CHANGE: 0
DIZZINESS: 0
SHORTNESS OF BREATH: 0
FEVER: 0
AGITATION: 0
CONFUSION: 0
ABDOMINAL PAIN: 1
DIAPHORESIS: 0

## 2025-07-10 NOTE — PROGRESS NOTES
Subjective   Patient ID: Hayley Potter is a 65 y.o. female who presents for New Patient Visit.    The patient is a 65-year-old woman with history of right lung cancer status post right lobectomy in 2020 followed by immunotherapy who presents for evaluation of a hiatal hernia and GERD.  She has known about the hiatal hernia and GERD for several years now.  She initially had seen a surgeon back in 2021, but due to her recovering lungs she was never able to undergo surgery.  She has continued to deal with GERD on a daily basis.  She uses omeprazole 40 mg twice daily.  Her last EGD was a few weeks ago.  She has noted to have some esophagitis, and a 7 cm hiatal hernia.  She used to be anemic when she was being treated for the lung cancer, and there was some thought that the hiatal hernia was contributing to her anemia as well.  She is interested now in having the hernia repaired.  Her lung function has improved.  She is scheduled to have repeat pulmonary function testing in September.  She would like to hold off on surgical intervention for the hiatal hernia until the beginning of January.  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  She denies any dysphagia.  She also denies any regurgitation.  She does have some chronic chest pains that radiate from her thoracotomy incision on the right side down to her epigastrium.  Sometimes this makes it difficult for her to differentiate this pain from the paraesophageal hernia.    Review of Systems   Constitutional:  Negative for activity change, appetite change, chills, diaphoresis and fever.   Respiratory:  Positive for chest tightness. Negative for shortness of breath.    Cardiovascular:  Negative for chest pain.   Gastrointestinal:  Positive for abdominal pain.   Neurological:  Negative for dizziness.   Psychiatric/Behavioral:  Negative for agitation and confusion.    All other systems reviewed and are  negative.      Objective   Physical Exam  Constitutional:       Appearance: Normal appearance.   Pulmonary:      Effort: Pulmonary effort is normal.   Abdominal:      General: Abdomen is flat.      Palpations: Abdomen is soft.   Neurological:      General: No focal deficit present.      Mental Status: She is alert.   Psychiatric:         Mood and Affect: Mood normal.         Assessment/Plan   The patient is a 65-year-old woman with a history of lung cancer status post right lobectomy in 2020 followed by immunotherapy who presents for evaluation of GERD and hiatal hernia.  I reviewed her imaging as well as past esophagram's and manometry.  She has a moderate size hiatal hernia with some esophagitis.  Manometry showed EGJ outflow obstruction without achalasia, and incomplete bolus clearance.  Today we had a discussion regarding surgical options.  We discussed paraesophageal hernia repair with likely partial toupee fundoplication to help with acid reflux and repair of the hiatal hernia.  The patient would like to wait until January to have her surgery.  She will be having repeat pulmonary function testing in September as well as follow-up with her pulmonologist, cardiologist, and oncologist.  If they are all in agreement we can proceed with paraesophageal hernia repair in the new year.           Neville Lloyd MD 07/10/25 11:31 AM

## 2025-07-10 NOTE — PROGRESS NOTES
SUPPORTIVE AND PALLIATIVE ONCOLOGY OUTPATIENT FOLLOW-UP      SERVICE DATE: 7/10/2025    Virtual or Telephone Consent    While technically available, the patient was unable or unwilling to consent to connect via audio/video telehealth technology; therefore, I performed this visit using a real-time audio only connection between Hayley Potter & DUTCH Edwards-CNP.  Verbal consent was requested and obtained from Hayley Potter on this date, 07/10/25 for a telehealth visit and the patient's location was confirmed at the time of the visit.     Cancer History  R lung CA dx 2020  -s/p R lobectomy April 2020  -currently on Pembro maintenance      Onc: Karissa  Pulm: Tom      Subjective   HISTORY OF PRESENT ILLNESS: Hayley Potter is a 65 y.o. female with Pmhx of COPD, GERD, anxiety, and R lung CA.      She presents to supportive oncology for follow up for symptom management.     Spoke with pt on the phone for FUV, daughter, Jacqueline, also present on the call. Reports she will need paraesophageal hernia repair, plans to schedule for January 2026. Has some discomfort and chest pressure from this. Having increased back pain, taking percocet and flexeril PRN, not every day. Moving bowels regularly. Started on reglan. Appetite is good. 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 Sleeping 5-7 hrs/night.     Pain Assessment:  Pain Score:  4  Location:  rib cage, back  Education:      Symptom Assessment:  Pain:somewhat  Headache: none  Dizziness:none  Lack of energy: a little  Difficulty sleeping: a little  Worrying: a little  Anxiety: a little  Depression: a little  Pain in mouth/swallowing: none  Dry mouth: none  Taste changes: none  Shortness of breath: none  Lack of appetite: a little   Nausea: none  Vomiting: none  Constipation: none  Diarrhea: none  Sore muscles: none  Numbness or tingling in hands/feet/other: a little  Weight loss: none      Information obtained from: interview of  patient  ______________________________________________________________________        Objective            PHYSICAL EXAMINATION not performed d/t phone visit.   Vital Signs:   Vital signs reviewed  There were no vitals taken for this visit.       Pain Score:   4      ASSESSMENT/PLAN    Pain  Pain is: cancer related pain  Type: visceral and neuropathic  Pain control: sub-optimally controlled  Home regimen:   -continue gabapentin 600/300/600 tid.   -continue flexeril 10mg bid PRN, using this sparingly, no Rx needed today.   -continue percocet 5/325 q6hrs PRN, using this sparingly, no Rx needed today.   -continue THC PRN  Intolerances/previously tried:     Opioid Use  Medication Management:   - OARRS report reviewed with no aberrant behavior; consistent with  prescriptions/records and patient history  - MED 0.  Overdose Risk Score 220.   This has been discussed with patient.   - We will continue to closely monitor the patient for signs of prescription misuse including UDS, OARRS review and subjective reports at each visit.  - concurrent benzodiazepine use with ativan, educated pt on medication safety when used in combination with opiates    - I am a provider who either is or has consulted and collaborated with a provider certified in Hospice and Palliative Medicine and have conducted a face-face visit and examination for this patient.  - Routine Urine Drug Screen completed deferred (MED <90)  appropriately positive for opioids and negative for illicit substances  - Controlled Substance Agreement completed deferred (MED <90)   - Specifically discussed that controlled substance prescriptions will only be provided by our group as outlined in the completed agreement  - Prescribed naloxone deferred (MED <90)   - Red Flags: history of ETOH misuse, in recovery for 20 years.      Nausea/ Reflux   Intermittent nausea without vomiting related to chemotherapy   -continue zofran 8mg q8hrs PRN, not currently taking this  medication  -continue prilosec 40mg bid.      Constipation   At risk for constipation related to opioids,  currently not constipated   Usual bowel pattern: daily   Current regimen:   -educated pt on importance of maintaining regular bowel schedule  -continue colace 100mg bid PRN for hard stools  -continue senna 8.6mg, 1-2tabs, 1-2x/day  -continue MOM 15mL 4x/day PRN     Altered Mood  Acute on chronic anxiety and depression related to health concerns   controlled with home regimen, experiencing sexual dysfunction, discussed medication rotation  Current regimen:   -continue buspar 15mg bid. No Rx needed today. Has noticed increase in libido since decreasing dose, will plan to stay at 15mg bid for now.    --plan to possibly rotate to wellbutrin, was on many years ago, willing to try again  -continue ativan 0.5mg, 1-2 tabs q8hrs PRN. Not taking often, makes her tired.   -continue mirtazapine 7.5mg at bedtime. Rx sent today.   Intolerances/previously tried:  -effexor, caused low libido  -wellbutrin, did not like this medication     Decreased appetite  Related to malignancy, chemotherapy, and disease process  Current regimen:    -encouraged smaller, more frequent meals through out the day  -encouraged use of supplements such as Boost, Ensure, etc.    -continue mirtazapine 7.5mg at bedtime. Rx sent today.     Supportive Interventions:     Supportive and Palliative Oncology encounter:  Emotional support provided  Coordination of care  We will continue to follow and address symptoms as needed    Advance Directives  Existence of Advance Directives:Yes, but NOT documented in medical record  Decision maker: RAFITAOA is Jacqueline Potter (daughter): [phone]  Code Status: Full code, would not want life support for prolonged time period         Next Follow-Up Visit:  Return to clinic in 6 weeks     Signature and billing  Medical complexity was low level due to due to complexity of problems, extensive data review, and high risk of  management/treatment.  Time was spent on the following: Prep Time, Time Directly with Patient/Family/Caregiver, Documentation Time. Total time spent: 20 mins      Data  Diagnostic tests and information reviewed for today's visit:  Most recent labs, Medications         Some elements copied from my note on 5/23/25, the elements have been updated and all reflect current decision making from today, 7/10/2025.      Plan of Care discussed with: Patient    SIGNATURE: DUTCH Edwards-CNP    Contact information:  Supportive and Palliative Oncology  Monday-Friday 8 AM-5 PM  Phone:  [phone], press option #5, then option #1.   Or Epic Secure Chat

## 2025-07-28 DIAGNOSIS — C34.11 MALIGNANT NEOPLASM OF UPPER LOBE, RIGHT BRONCHUS OR LUNG: Primary | ICD-10-CM

## 2025-07-30 ENCOUNTER — LAB (OUTPATIENT)
Dept: LAB | Facility: CLINIC | Age: 65
End: 2025-07-30
Payer: MEDICARE

## 2025-07-30 DIAGNOSIS — C34.11 MALIGNANT NEOPLASM OF UPPER LOBE OF RIGHT LUNG (MULTI): ICD-10-CM

## 2025-07-30 LAB
ALBUMIN SERPL BCP-MCNC: 4.2 G/DL (ref 3.4–5)
ALP SERPL-CCNC: 68 U/L (ref 33–136)
ALT SERPL W P-5'-P-CCNC: 17 U/L (ref 7–45)
ANION GAP SERPL CALC-SCNC: 10 MMOL/L (ref 10–20)
AST SERPL W P-5'-P-CCNC: 21 U/L (ref 9–39)
BASOPHILS # BLD AUTO: 0.06 X10*3/UL (ref 0–0.1)
BASOPHILS NFR BLD AUTO: 0.8 %
BILIRUB SERPL-MCNC: 0.4 MG/DL (ref 0–1.2)
BUN SERPL-MCNC: 9 MG/DL (ref 6–23)
CALCIUM SERPL-MCNC: 9.6 MG/DL (ref 8.6–10.3)
CHLORIDE SERPL-SCNC: 104 MMOL/L (ref 98–107)
CO2 SERPL-SCNC: 30 MMOL/L (ref 21–32)
CREAT SERPL-MCNC: 0.84 MG/DL (ref 0.5–1.05)
EGFRCR SERPLBLD CKD-EPI 2021: 77 ML/MIN/1.73M*2
EOSINOPHIL # BLD AUTO: 0.07 X10*3/UL (ref 0–0.7)
EOSINOPHIL NFR BLD AUTO: 0.9 %
ERYTHROCYTE [DISTWIDTH] IN BLOOD BY AUTOMATED COUNT: 13.1 % (ref 11.5–14.5)
GLUCOSE SERPL-MCNC: 85 MG/DL (ref 74–99)
HCT VFR BLD AUTO: 41.5 % (ref 36–46)
HGB BLD-MCNC: 14.1 G/DL (ref 12–16)
IMM GRANULOCYTES # BLD AUTO: 0.01 X10*3/UL (ref 0–0.7)
IMM GRANULOCYTES NFR BLD AUTO: 0.1 % (ref 0–0.9)
LYMPHOCYTES # BLD AUTO: 1.27 X10*3/UL (ref 1.2–4.8)
LYMPHOCYTES NFR BLD AUTO: 16.7 %
MCH RBC QN AUTO: 31.7 PG (ref 26–34)
MCHC RBC AUTO-ENTMCNC: 34 G/DL (ref 32–36)
MCV RBC AUTO: 93 FL (ref 80–100)
MONOCYTES # BLD AUTO: 0.91 X10*3/UL (ref 0.1–1)
MONOCYTES NFR BLD AUTO: 12 %
NEUTROPHILS # BLD AUTO: 5.28 X10*3/UL (ref 1.2–7.7)
NEUTROPHILS NFR BLD AUTO: 69.5 %
NRBC BLD-RTO: 0 /100 WBCS (ref 0–0)
PLATELET # BLD AUTO: 348 X10*3/UL (ref 150–450)
POTASSIUM SERPL-SCNC: 4 MMOL/L (ref 3.5–5.3)
PROT SERPL-MCNC: 6.6 G/DL (ref 6.4–8.2)
RBC # BLD AUTO: 4.45 X10*6/UL (ref 4–5.2)
SODIUM SERPL-SCNC: 140 MMOL/L (ref 136–145)
WBC # BLD AUTO: 7.6 X10*3/UL (ref 4.4–11.3)

## 2025-07-30 PROCEDURE — 84443 ASSAY THYROID STIM HORMONE: CPT

## 2025-07-30 PROCEDURE — 36415 COLL VENOUS BLD VENIPUNCTURE: CPT

## 2025-07-30 PROCEDURE — 84075 ASSAY ALKALINE PHOSPHATASE: CPT

## 2025-07-30 PROCEDURE — 85025 COMPLETE CBC W/AUTO DIFF WBC: CPT

## 2025-07-31 LAB — TSH SERPL-ACNC: 0.44 MIU/L (ref 0.44–3.98)

## 2025-08-06 ENCOUNTER — HOSPITAL ENCOUNTER (OUTPATIENT)
Dept: RADIOLOGY | Facility: HOSPITAL | Age: 65
Discharge: HOME | End: 2025-08-06
Payer: MEDICARE

## 2025-08-06 DIAGNOSIS — C34.91 PRIMARY MALIGNANT NEOPLASM OF RIGHT LUNG METASTATIC TO OTHER SITE (MULTI): ICD-10-CM

## 2025-08-06 DIAGNOSIS — C34.11 MALIGNANT NEOPLASM OF UPPER LOBE OF RIGHT LUNG (MULTI): ICD-10-CM

## 2025-08-06 DIAGNOSIS — C79.51 MALIGNANT NEOPLASM METASTATIC TO BONE (MULTI): ICD-10-CM

## 2025-08-06 PROCEDURE — 2550000001 HC RX 255 CONTRASTS: Performed by: STUDENT IN AN ORGANIZED HEALTH CARE EDUCATION/TRAINING PROGRAM

## 2025-08-06 PROCEDURE — 74177 CT ABD & PELVIS W/CONTRAST: CPT

## 2025-08-06 PROCEDURE — 70460 CT HEAD/BRAIN W/DYE: CPT

## 2025-08-06 RX ADMIN — IOHEXOL 40 ML: 350 INJECTION, SOLUTION INTRAVENOUS at 10:56

## 2025-08-06 RX ADMIN — IOHEXOL 35 ML: 350 INJECTION, SOLUTION INTRAVENOUS at 10:54

## 2025-08-07 ENCOUNTER — APPOINTMENT (OUTPATIENT)
Dept: HEMATOLOGY/ONCOLOGY | Facility: CLINIC | Age: 65
End: 2025-08-07
Payer: COMMERCIAL

## 2025-08-14 ENCOUNTER — OFFICE VISIT (OUTPATIENT)
Dept: HEMATOLOGY/ONCOLOGY | Facility: CLINIC | Age: 65
End: 2025-08-14
Payer: MEDICARE

## 2025-08-14 ENCOUNTER — EDUCATION (OUTPATIENT)
Dept: HEMATOLOGY/ONCOLOGY | Facility: CLINIC | Age: 65
End: 2025-08-14

## 2025-08-14 VITALS
RESPIRATION RATE: 18 BRPM | BODY MASS INDEX: 25.46 KG/M2 | WEIGHT: 153 LBS | TEMPERATURE: 97.3 F | DIASTOLIC BLOOD PRESSURE: 81 MMHG | OXYGEN SATURATION: 93 % | HEART RATE: 84 BPM | SYSTOLIC BLOOD PRESSURE: 121 MMHG

## 2025-08-14 DIAGNOSIS — C34.91 PRIMARY MALIGNANT NEOPLASM OF RIGHT LUNG METASTATIC TO OTHER SITE (MULTI): Primary | ICD-10-CM

## 2025-08-14 DIAGNOSIS — C34.91 PRIMARY MALIGNANT NEOPLASM OF RIGHT LUNG METASTATIC TO OTHER SITE (MULTI): ICD-10-CM

## 2025-08-14 DIAGNOSIS — C34.11 MALIGNANT NEOPLASM OF UPPER LOBE OF RIGHT LUNG (MULTI): ICD-10-CM

## 2025-08-14 DIAGNOSIS — Z00.6 EXAMINATION OF PARTICIPANT IN CLINICAL TRIAL: ICD-10-CM

## 2025-08-14 PROCEDURE — 99215 OFFICE O/P EST HI 40 MIN: CPT | Performed by: STUDENT IN AN ORGANIZED HEALTH CARE EDUCATION/TRAINING PROGRAM

## 2025-08-14 PROCEDURE — 1159F MED LIST DOCD IN RCRD: CPT | Performed by: STUDENT IN AN ORGANIZED HEALTH CARE EDUCATION/TRAINING PROGRAM

## 2025-08-14 PROCEDURE — 1125F AMNT PAIN NOTED PAIN PRSNT: CPT | Performed by: STUDENT IN AN ORGANIZED HEALTH CARE EDUCATION/TRAINING PROGRAM

## 2025-08-14 PROCEDURE — G2211 COMPLEX E/M VISIT ADD ON: HCPCS | Performed by: STUDENT IN AN ORGANIZED HEALTH CARE EDUCATION/TRAINING PROGRAM

## 2025-08-14 ASSESSMENT — PAIN SCALES - GENERAL: PAINLEVEL_OUTOF10: 3

## 2025-08-19 DIAGNOSIS — E03.2 HYPOTHYROIDISM DUE TO MEDICATION: ICD-10-CM

## 2025-08-19 DIAGNOSIS — C34.11 MALIGNANT NEOPLASM OF UPPER LOBE OF RIGHT LUNG (MULTI): ICD-10-CM

## 2025-08-25 ENCOUNTER — OFFICE VISIT (OUTPATIENT)
Dept: PALLIATIVE MEDICINE | Facility: CLINIC | Age: 65
End: 2025-08-25
Payer: MEDICARE

## 2025-08-25 DIAGNOSIS — C34.91 PRIMARY MALIGNANT NEOPLASM OF RIGHT LUNG METASTATIC TO OTHER SITE (MULTI): Primary | ICD-10-CM

## 2025-08-25 ASSESSMENT — PAIN SCALES - GENERAL: PAINLEVEL_OUTOF10: 3
